# Patient Record
Sex: FEMALE | Race: WHITE | NOT HISPANIC OR LATINO | Employment: OTHER | URBAN - NONMETROPOLITAN AREA
[De-identification: names, ages, dates, MRNs, and addresses within clinical notes are randomized per-mention and may not be internally consistent; named-entity substitution may affect disease eponyms.]

---

## 2017-08-11 ENCOUNTER — TRANSCRIBE ORDERS (OUTPATIENT)
Dept: LAB | Facility: HOSPITAL | Age: 77
End: 2017-08-11

## 2017-08-11 ENCOUNTER — LAB (OUTPATIENT)
Dept: LAB | Facility: HOSPITAL | Age: 77
End: 2017-08-11

## 2017-08-11 DIAGNOSIS — E78.5 HYPERLIPIDEMIA, UNSPECIFIED HYPERLIPIDEMIA TYPE: ICD-10-CM

## 2017-08-11 DIAGNOSIS — E78.00 PURE HYPERCHOLESTEROLEMIA: ICD-10-CM

## 2017-08-11 DIAGNOSIS — E78.00 PURE HYPERCHOLESTEROLEMIA: Primary | ICD-10-CM

## 2017-08-11 LAB
ALBUMIN SERPL-MCNC: 4.4 G/DL (ref 3.5–5)
ALP SERPL-CCNC: 57 U/L (ref 38–126)
ALT SERPL W P-5'-P-CCNC: 35 U/L (ref 13–69)
ANION GAP SERPL CALCULATED.3IONS-SCNC: 17.2 MMOL/L
AST SERPL-CCNC: 42 U/L (ref 15–46)
BILIRUB CONJ SERPL-MCNC: 0.3 MG/DL (ref 0–0.4)
BILIRUB INDIRECT SERPL-MCNC: 0.2 MG/DL
BILIRUB SERPL-MCNC: 0.5 MG/DL (ref 0.2–1.3)
BUN BLD-MCNC: 16 MG/DL (ref 7–20)
BUN/CREAT SERPL: 20 (ref 7.1–23.5)
CALCIUM SPEC-SCNC: 9.5 MG/DL (ref 8.4–10.2)
CHLORIDE SERPL-SCNC: 102 MMOL/L (ref 98–107)
CHOLEST SERPL-MCNC: 181 MG/DL (ref 0–199)
CO2 SERPL-SCNC: 29 MMOL/L (ref 26–30)
CREAT BLD-MCNC: 0.8 MG/DL (ref 0.6–1.3)
GFR SERPL CREATININE-BSD FRML MDRD: 69 ML/MIN/1.73
GLUCOSE BLD-MCNC: 75 MG/DL (ref 74–98)
HDLC SERPL-MCNC: 40 MG/DL (ref 40–60)
LDLC SERPL CALC-MCNC: 76 MG/DL (ref 0–99)
LDLC/HDLC SERPL: 1.9 {RATIO}
POTASSIUM BLD-SCNC: 4.2 MMOL/L (ref 3.5–5.1)
PROT SERPL-MCNC: 7.2 G/DL (ref 6.3–8.2)
SODIUM BLD-SCNC: 144 MMOL/L (ref 137–145)
TRIGL SERPL-MCNC: 326 MG/DL
VLDLC SERPL-MCNC: 65.2 MG/DL

## 2017-08-11 PROCEDURE — 80076 HEPATIC FUNCTION PANEL: CPT | Performed by: INTERNAL MEDICINE

## 2017-08-11 PROCEDURE — 80048 BASIC METABOLIC PNL TOTAL CA: CPT | Performed by: INTERNAL MEDICINE

## 2017-08-11 PROCEDURE — 80061 LIPID PANEL: CPT | Performed by: INTERNAL MEDICINE

## 2017-08-11 PROCEDURE — 36415 COLL VENOUS BLD VENIPUNCTURE: CPT

## 2018-01-26 ENCOUNTER — HOSPITAL ENCOUNTER (OUTPATIENT)
Dept: GENERAL RADIOLOGY | Facility: HOSPITAL | Age: 78
Discharge: HOME | End: 2018-01-26

## 2018-01-26 ENCOUNTER — HOSPITAL ENCOUNTER (OUTPATIENT)
Dept: GENERAL RADIOLOGY | Facility: HOSPITAL | Age: 78
Discharge: HOME | End: 2018-01-26
Admitting: INTERNAL MEDICINE

## 2018-01-26 ENCOUNTER — TRANSCRIBE ORDERS (OUTPATIENT)
Dept: GENERAL RADIOLOGY | Facility: HOSPITAL | Age: 78
End: 2018-01-26

## 2018-01-26 DIAGNOSIS — R52 PAIN: ICD-10-CM

## 2018-01-26 DIAGNOSIS — R52 PAIN: Primary | ICD-10-CM

## 2018-01-26 PROCEDURE — 73630 X-RAY EXAM OF FOOT: CPT

## 2018-01-26 PROCEDURE — 73120 X-RAY EXAM OF HAND: CPT

## 2018-02-14 ENCOUNTER — HOSPITAL ENCOUNTER (OUTPATIENT)
Dept: GENERAL RADIOLOGY | Facility: HOSPITAL | Age: 78
Discharge: HOME | End: 2018-02-14

## 2018-02-14 ENCOUNTER — HOSPITAL ENCOUNTER (OUTPATIENT)
Dept: GENERAL RADIOLOGY | Facility: HOSPITAL | Age: 78
Discharge: HOME | End: 2018-02-14
Admitting: INTERNAL MEDICINE

## 2018-02-14 ENCOUNTER — TRANSCRIBE ORDERS (OUTPATIENT)
Dept: ADMINISTRATIVE | Facility: HOSPITAL | Age: 78
End: 2018-02-14

## 2018-02-14 DIAGNOSIS — M06.4 INFLAMMATORY POLYARTHROPATHY (HCC): ICD-10-CM

## 2018-02-14 DIAGNOSIS — R53.82 CHRONIC FATIGUE, UNSPECIFIED: ICD-10-CM

## 2018-02-14 DIAGNOSIS — M13.0 POLYARTHRITIS: ICD-10-CM

## 2018-02-14 DIAGNOSIS — R76.0 RAISED ANTIBODY TITER: ICD-10-CM

## 2018-02-14 DIAGNOSIS — M15.0 PRIMARY GENERALIZED (OSTEO)ARTHRITIS: ICD-10-CM

## 2018-02-14 DIAGNOSIS — M79.10 MYALGIA: ICD-10-CM

## 2018-02-14 PROCEDURE — 72040 X-RAY EXAM NECK SPINE 2-3 VW: CPT

## 2018-02-14 PROCEDURE — 72100 X-RAY EXAM L-S SPINE 2/3 VWS: CPT

## 2018-04-03 ENCOUNTER — HOSPITAL ENCOUNTER (EMERGENCY)
Facility: HOSPITAL | Age: 78
Discharge: HOME | End: 2018-04-03
Attending: EMERGENCY MEDICINE | Admitting: EMERGENCY MEDICINE

## 2018-04-03 ENCOUNTER — APPOINTMENT (OUTPATIENT)
Dept: GENERAL RADIOLOGY | Facility: HOSPITAL | Age: 78
End: 2018-04-03

## 2018-04-03 VITALS
OXYGEN SATURATION: 95 % | WEIGHT: 134 LBS | HEIGHT: 62 IN | HEART RATE: 70 BPM | SYSTOLIC BLOOD PRESSURE: 164 MMHG | BODY MASS INDEX: 24.66 KG/M2 | RESPIRATION RATE: 18 BRPM | DIASTOLIC BLOOD PRESSURE: 66 MMHG | TEMPERATURE: 98.6 F

## 2018-04-03 DIAGNOSIS — S80.11XA CONTUSION OF RIGHT LEG, INITIAL ENCOUNTER: ICD-10-CM

## 2018-04-03 DIAGNOSIS — S80.02XA CONTUSION OF LEFT KNEE, INITIAL ENCOUNTER: ICD-10-CM

## 2018-04-03 DIAGNOSIS — S80.01XA CONTUSION OF RIGHT KNEE, INITIAL ENCOUNTER: Primary | ICD-10-CM

## 2018-04-03 PROCEDURE — 73562 X-RAY EXAM OF KNEE 3: CPT

## 2018-04-03 PROCEDURE — 99283 EMERGENCY DEPT VISIT LOW MDM: CPT

## 2018-04-03 PROCEDURE — 73590 X-RAY EXAM OF LOWER LEG: CPT

## 2018-04-03 RX ORDER — IBUPROFEN 400 MG/1
400 TABLET ORAL ONCE
Status: COMPLETED | OUTPATIENT
Start: 2018-04-03 | End: 2018-04-03

## 2018-04-03 RX ADMIN — IBUPROFEN 400 MG: 400 TABLET ORAL at 19:30

## 2018-04-04 NOTE — DISCHARGE INSTRUCTIONS
Take your pain medication as directed by your physician.  Have a recheck with your primary care physician in 2-3 days if not improving.  Keep your injured legs elevated and apply ice packs 15 minutes hourly.

## 2018-07-31 ENCOUNTER — HOSPITAL ENCOUNTER (EMERGENCY)
Facility: HOSPITAL | Age: 78
Discharge: HOME | End: 2018-07-31
Attending: EMERGENCY MEDICINE | Admitting: EMERGENCY MEDICINE

## 2018-07-31 ENCOUNTER — APPOINTMENT (OUTPATIENT)
Dept: GENERAL RADIOLOGY | Facility: HOSPITAL | Age: 78
End: 2018-07-31
Attending: EMERGENCY MEDICINE

## 2018-07-31 VITALS
TEMPERATURE: 97.9 F | HEART RATE: 72 BPM | HEIGHT: 62 IN | WEIGHT: 130 LBS | RESPIRATION RATE: 16 BRPM | DIASTOLIC BLOOD PRESSURE: 62 MMHG | SYSTOLIC BLOOD PRESSURE: 179 MMHG | OXYGEN SATURATION: 96 % | BODY MASS INDEX: 23.92 KG/M2

## 2018-07-31 DIAGNOSIS — S86.911A KNEE STRAIN, RIGHT, INITIAL ENCOUNTER: ICD-10-CM

## 2018-07-31 DIAGNOSIS — G89.29 CHRONIC PAIN OF RIGHT KNEE: Primary | ICD-10-CM

## 2018-07-31 DIAGNOSIS — M25.561 CHRONIC PAIN OF RIGHT KNEE: Primary | ICD-10-CM

## 2018-07-31 PROCEDURE — 73562 X-RAY EXAM OF KNEE 3: CPT

## 2018-07-31 PROCEDURE — 99283 EMERGENCY DEPT VISIT LOW MDM: CPT

## 2018-11-03 ENCOUNTER — APPOINTMENT (OUTPATIENT)
Dept: GENERAL RADIOLOGY | Facility: HOSPITAL | Age: 78
End: 2018-11-03

## 2018-11-03 ENCOUNTER — HOSPITAL ENCOUNTER (EMERGENCY)
Facility: HOSPITAL | Age: 78
Discharge: HOME | End: 2018-11-03
Attending: EMERGENCY MEDICINE | Admitting: EMERGENCY MEDICINE

## 2018-11-03 ENCOUNTER — APPOINTMENT (OUTPATIENT)
Dept: CT IMAGING | Facility: HOSPITAL | Age: 78
End: 2018-11-03

## 2018-11-03 VITALS
HEART RATE: 72 BPM | BODY MASS INDEX: 25.21 KG/M2 | TEMPERATURE: 97.7 F | OXYGEN SATURATION: 95 % | RESPIRATION RATE: 18 BRPM | DIASTOLIC BLOOD PRESSURE: 74 MMHG | SYSTOLIC BLOOD PRESSURE: 143 MMHG | WEIGHT: 137 LBS | HEIGHT: 62 IN

## 2018-11-03 DIAGNOSIS — S20.212A CHEST WALL CONTUSION, LEFT, INITIAL ENCOUNTER: Primary | ICD-10-CM
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
## 2018-11-03 PROCEDURE — 71100 X-RAY EXAM RIBS UNI 2 VIEWS: CPT

## 2018-11-03 PROCEDURE — 71046 X-RAY EXAM CHEST 2 VIEWS: CPT

## 2018-11-03 PROCEDURE — 71250 CT THORAX DX C-: CPT

## 2018-11-03 PROCEDURE — 99283 EMERGENCY DEPT VISIT LOW MDM: CPT

## 2018-11-03 RX ORDER — HYDROCODONE BITARTRATE AND ACETAMINOPHEN 5; 325 MG/1; MG/1
1 TABLET ORAL EVERY 6 HOURS PRN
COMMUNITY

## 2018-11-03 RX ORDER — IBUPROFEN 800 MG/1
800 TABLET ORAL ONCE
Status: COMPLETED | OUTPATIENT
Start: 2018-11-03 | End: 2018-11-03

## 2018-11-03 RX ORDER — AMLODIPINE BESYLATE 5 MG/1
5 TABLET ORAL DAILY
COMMUNITY

## 2018-11-03 RX ORDER — GABAPENTIN 600 MG/1
600 TABLET ORAL 3 TIMES DAILY
COMMUNITY

## 2018-11-03 RX ORDER — OMEPRAZOLE 40 MG/1
40 CAPSULE, DELAYED RELEASE ORAL DAILY
COMMUNITY

## 2018-11-03 RX ORDER — PRAVASTATIN SODIUM 40 MG
80 TABLET ORAL DAILY
COMMUNITY

## 2018-11-03 RX ORDER — CARVEDILOL 25 MG/1
25 TABLET ORAL 2 TIMES DAILY WITH MEALS
COMMUNITY

## 2018-11-03 RX ADMIN — IBUPROFEN 800 MG: 800 TABLET, FILM COATED ORAL at 15:01

## 2018-11-03 NOTE — DISCHARGE INSTRUCTIONS
Take over-the-counter ibuprofen or Tylenol as needed for pain- 400 mg ibuprofen and/or 500 mg Tylenol every 6 hours.  Massage the area.  May apply topical creams to the area as you have been doing.  Apply ice and/or heating pad to area to help.  May splint your side with a pillow while coughing or deep breathing. Schedule a follow-up with your PCP as needed.  ED for any change, worsening symptoms, or any additional concerns including but not limited to worsening shortness of breath, chest pain, productive cough with fever >100.4.

## 2018-11-03 NOTE — ED PROVIDER NOTES
"Subjective   Patient is a 77-year-old female to history of CHF, COPD, CAD, and hypertension resents to the ED for evaluation of left side rib pain.  Patient states this past Wednesday 10/31/18 she was out on her patio when she tripped over a hose and landed on a steel railing on her left chest wall.  She states that is \"knocked the wind out of her\", but she otherwise felt ok.  She denies any head trauma or LOC.  She states the pain is worse with certain movements, especially when moving her left arm or bending forward.  She has noticed a bruise to the posterior chest wall her left side.  She states she has been taking her Lortab at home as directed, but is afraid she may have broken a rib.  She denies any shortness of breath chest pain, shoulder pain, back pain, abdominal pain, emesis, or any other symptoms.            Review of Systems   All other systems reviewed and are negative.      Past Medical History:   Diagnosis Date   • CHF (congestive heart failure) (CMS/HCC)    • COPD (chronic obstructive pulmonary disease) (CMS/HCC)    • Coronary artery disease    • Hyperlipidemia    • Hypertension    • Myocardial infarction (CMS/HCC)    • Oxygen deficit    • Pneumonia    • RLS (restless legs syndrome)    • Seizures (CMS/HCC)        Allergies   Allergen Reactions   • Codeine Delirium   • Sulfa Antibiotics Hives   • Penicillins Rash       Past Surgical History:   Procedure Laterality Date   • CAROTID ENDARTERECTOMY Left    • CORONARY ANGIOPLASTY WITH STENT PLACEMENT      x2   • LUNG SURGERY Left    • PACEMAKER IMPLANTATION         History reviewed. No pertinent family history.    Social History     Social History   • Marital status:      Social History Main Topics   • Smoking status: Former Smoker   • Alcohol use No   • Drug use: No     Other Topics Concern   • Not on file           Objective   Physical Exam   Nursing note and vitals reviewed.    GEN: No acute distress, sitting comfortably in the stretcher. She is " awake, alert, speaking in full sentences.   Head: Normocephalic, atraumatic  Eyes: Pupils equal and round, EOM intact.   Chest: Nontender to palpation over anterior chest wall and clavicles. Tender to palpation over left anterior and posterior chest wall. Bruise noted to posterior to midline under right axilla, no obvious deformity appreciated.   Cardiovascular: Regular rate and rhythm   Lungs: Lung sounds are diminished throughout without wheezes or rales.  Abdomen: Soft, nontender, nondistended, no peritoneal signs.   Extremities: No edema, normal appearance, full ROM and strength. Full ROM in left shoulder without deformity, ecchymoses, or tenderness.   Neuro: GCS 15  Psych: Mood and affect are appropriate    Procedures           ED Course                  MDM  Number of Diagnoses or Management Options  Chest wall contusion, left, initial encounter:   Fall, initial encounter:   Diagnosis management comments: On arrival, patient is in no acute distress, nontoxic in appearance, vital signs within normal limits.  She is in no respiratory distress.  Differential includes muscle strain or spasm, rib fracture, contusion, and other concerns.  Patient was given 800 mg for pain; she took a Lortab at home prior to arrival.  Primary chest x-ray revealed abnormalities to left anterior ribs that may be calcifications, but we will obtain CT of chest for further examination.  CT of the chest did not reveal any acute fracture or abnormality.  Discussed these findings with patient.  He is likely she has muscle strain or spasm from her fall.  Advised she keep using over-the-counter ibuprofen pain medication as prescribed.  Advised she follow-up with her PCP if symptoms worsen.  She was given instructions on symptomatic treatment.  She was given strict precautions to return to the ED.  She verbalized understanding and was agreeable to this plan of care.  She was discharged home in stable condition.       Amount and/or Complexity of  Data Reviewed  Tests in the radiology section of CPT®: reviewed and ordered  Independent visualization of images, tracings, or specimens: yes    Risk of Complications, Morbidity, and/or Mortality  Presenting problems: moderate  Diagnostic procedures: moderate  Management options: low    Patient Progress  Patient progress: stable        Final diagnoses:   None            Jeniffer Interiano PA-C  11/04/18 0046

## 2019-04-19 ENCOUNTER — TRANSCRIBE ORDERS (OUTPATIENT)
Dept: ADMINISTRATIVE | Facility: HOSPITAL | Age: 79
End: 2019-04-19

## 2019-04-19 DIAGNOSIS — J44.9 OBSTRUCTIVE CHRONIC BRONCHITIS WITHOUT EXACERBATION (HCC): Primary | ICD-10-CM

## 2019-05-20 ENCOUNTER — APPOINTMENT (OUTPATIENT)
Dept: GENERAL RADIOLOGY | Facility: HOSPITAL | Age: 79
End: 2019-05-20

## 2019-05-20 ENCOUNTER — HOSPITAL ENCOUNTER (EMERGENCY)
Facility: HOSPITAL | Age: 79
Discharge: HOME | End: 2019-05-20
Attending: EMERGENCY MEDICINE | Admitting: EMERGENCY MEDICINE

## 2019-05-20 VITALS
OXYGEN SATURATION: 98 % | HEIGHT: 61 IN | TEMPERATURE: 98.1 F | SYSTOLIC BLOOD PRESSURE: 96 MMHG | WEIGHT: 140.8 LBS | DIASTOLIC BLOOD PRESSURE: 59 MMHG | HEART RATE: 70 BPM | BODY MASS INDEX: 26.58 KG/M2 | RESPIRATION RATE: 20 BRPM

## 2019-05-20 DIAGNOSIS — R05.9 COUGH: Primary | ICD-10-CM

## 2019-05-20 DIAGNOSIS — J44.1 COPD EXACERBATION (HCC): ICD-10-CM

## 2019-05-20 LAB
ALBUMIN SERPL-MCNC: 4.2 G/DL (ref 3.5–5)
ALBUMIN/GLOB SERPL: 1.5 G/DL (ref 1–2)
ALP SERPL-CCNC: 63 U/L (ref 38–126)
ALT SERPL W P-5'-P-CCNC: 36 U/L (ref 13–69)
ANION GAP SERPL CALCULATED.3IONS-SCNC: 12.5 MMOL/L (ref 10–20)
AST SERPL-CCNC: 54 U/L (ref 15–46)
BASOPHILS # BLD AUTO: 0.02 10*3/MM3 (ref 0–0.2)
BASOPHILS NFR BLD AUTO: 0.4 % (ref 0–1.5)
BILIRUB SERPL-MCNC: 0.5 MG/DL (ref 0.2–1.3)
BUN BLD-MCNC: 19 MG/DL (ref 7–20)
BUN/CREAT SERPL: 23.8 (ref 7.1–23.5)
CALCIUM SPEC-SCNC: 8.9 MG/DL (ref 8.4–10.2)
CHLORIDE SERPL-SCNC: 101 MMOL/L (ref 98–107)
CO2 SERPL-SCNC: 31 MMOL/L (ref 26–30)
CREAT BLD-MCNC: 0.8 MG/DL (ref 0.6–1.3)
DEPRECATED RDW RBC AUTO: 44.6 FL (ref 37–54)
EOSINOPHIL # BLD AUTO: 0.21 10*3/MM3 (ref 0–0.4)
EOSINOPHIL NFR BLD AUTO: 4.1 % (ref 0.3–6.2)
ERYTHROCYTE [DISTWIDTH] IN BLOOD BY AUTOMATED COUNT: 12.6 % (ref 12.3–15.4)
GFR SERPL CREATININE-BSD FRML MDRD: 69 ML/MIN/1.73
GLOBULIN UR ELPH-MCNC: 2.8 GM/DL
GLUCOSE BLD-MCNC: 85 MG/DL (ref 74–98)
HCT VFR BLD AUTO: 38.9 % (ref 34–46.6)
HGB BLD-MCNC: 12.9 G/DL (ref 12–15.9)
IMM GRANULOCYTES # BLD AUTO: 0.01 10*3/MM3 (ref 0–0.05)
IMM GRANULOCYTES NFR BLD AUTO: 0.2 % (ref 0–0.5)
LYMPHOCYTES # BLD AUTO: 1.26 10*3/MM3 (ref 0.7–3.1)
LYMPHOCYTES NFR BLD AUTO: 24.4 % (ref 19.6–45.3)
MCH RBC QN AUTO: 31.5 PG (ref 26.6–33)
MCHC RBC AUTO-ENTMCNC: 33.2 G/DL (ref 31.5–35.7)
MCV RBC AUTO: 94.9 FL (ref 79–97)
MONOCYTES # BLD AUTO: 0.44 10*3/MM3 (ref 0.1–0.9)
MONOCYTES NFR BLD AUTO: 8.5 % (ref 5–12)
NEUTROPHILS # BLD AUTO: 3.23 10*3/MM3 (ref 1.7–7)
NEUTROPHILS NFR BLD AUTO: 62.4 % (ref 42.7–76)
NRBC BLD AUTO-RTO: 0 /100 WBC (ref 0–0.2)
NT-PROBNP SERPL-MCNC: 1740 PG/ML (ref 0–450)
PLATELET # BLD AUTO: 141 10*3/MM3 (ref 140–450)
PMV BLD AUTO: 10.3 FL (ref 6–12)
POTASSIUM BLD-SCNC: 4.5 MMOL/L (ref 3.5–5.1)
PROT SERPL-MCNC: 7 G/DL (ref 6.3–8.2)
RBC # BLD AUTO: 4.1 10*6/MM3 (ref 3.77–5.28)
SODIUM BLD-SCNC: 140 MMOL/L (ref 137–145)
TROPONIN I SERPL-MCNC: <0.012 NG/ML (ref 0–0.03)
WBC NRBC COR # BLD: 5.17 10*3/MM3 (ref 3.4–10.8)

## 2019-05-20 PROCEDURE — 93005 ELECTROCARDIOGRAM TRACING: CPT | Performed by: PHYSICIAN ASSISTANT

## 2019-05-20 PROCEDURE — 94799 UNLISTED PULMONARY SVC/PX: CPT

## 2019-05-20 PROCEDURE — 80053 COMPREHEN METABOLIC PANEL: CPT | Performed by: PHYSICIAN ASSISTANT

## 2019-05-20 PROCEDURE — 83880 ASSAY OF NATRIURETIC PEPTIDE: CPT | Performed by: PHYSICIAN ASSISTANT

## 2019-05-20 PROCEDURE — 84484 ASSAY OF TROPONIN QUANT: CPT | Performed by: PHYSICIAN ASSISTANT

## 2019-05-20 PROCEDURE — 85025 COMPLETE CBC W/AUTO DIFF WBC: CPT | Performed by: PHYSICIAN ASSISTANT

## 2019-05-20 PROCEDURE — 36415 COLL VENOUS BLD VENIPUNCTURE: CPT

## 2019-05-20 PROCEDURE — 99284 EMERGENCY DEPT VISIT MOD MDM: CPT

## 2019-05-20 PROCEDURE — 94640 AIRWAY INHALATION TREATMENT: CPT

## 2019-05-20 PROCEDURE — 71046 X-RAY EXAM CHEST 2 VIEWS: CPT

## 2019-05-20 RX ORDER — DOXYCYCLINE 100 MG/1
100 CAPSULE ORAL ONCE
Status: COMPLETED | OUTPATIENT
Start: 2019-05-20 | End: 2019-05-20

## 2019-05-20 RX ORDER — IPRATROPIUM BROMIDE AND ALBUTEROL SULFATE 2.5; .5 MG/3ML; MG/3ML
3 SOLUTION RESPIRATORY (INHALATION) ONCE
Status: COMPLETED | OUTPATIENT
Start: 2019-05-20 | End: 2019-05-20

## 2019-05-20 RX ORDER — DOXYCYCLINE 100 MG/1
100 CAPSULE ORAL 2 TIMES DAILY
Qty: 14 CAPSULE | Refills: 0 | Status: SHIPPED | OUTPATIENT
Start: 2019-05-20 | End: 2019-05-27

## 2019-05-20 RX ADMIN — DOXYCYCLINE 100 MG: 100 CAPSULE ORAL at 14:41

## 2019-05-20 RX ADMIN — IPRATROPIUM BROMIDE AND ALBUTEROL SULFATE 3 ML: .5; 3 SOLUTION RESPIRATORY (INHALATION) at 13:27

## 2019-09-03 ENCOUNTER — APPOINTMENT (OUTPATIENT)
Dept: GENERAL RADIOLOGY | Facility: HOSPITAL | Age: 79
End: 2019-09-03

## 2019-09-03 ENCOUNTER — HOSPITAL ENCOUNTER (EMERGENCY)
Facility: HOSPITAL | Age: 79
Discharge: HOME | End: 2019-09-03
Attending: EMERGENCY MEDICINE | Admitting: EMERGENCY MEDICINE

## 2019-09-03 VITALS
TEMPERATURE: 98.8 F | HEART RATE: 71 BPM | DIASTOLIC BLOOD PRESSURE: 62 MMHG | HEIGHT: 64 IN | WEIGHT: 146.13 LBS | RESPIRATION RATE: 20 BRPM | OXYGEN SATURATION: 93 % | BODY MASS INDEX: 24.95 KG/M2 | SYSTOLIC BLOOD PRESSURE: 129 MMHG

## 2019-09-03 DIAGNOSIS — L03.90 CELLULITIS, UNSPECIFIED CELLULITIS SITE: Primary | ICD-10-CM

## 2019-09-03 PROCEDURE — 73590 X-RAY EXAM OF LOWER LEG: CPT

## 2019-09-03 PROCEDURE — 99283 EMERGENCY DEPT VISIT LOW MDM: CPT

## 2019-09-03 RX ORDER — NITROGLYCERIN 0.4 MG/1
0.4 TABLET SUBLINGUAL
COMMUNITY

## 2019-09-03 RX ORDER — CEPHALEXIN 500 MG/1
500 CAPSULE ORAL 4 TIMES DAILY
Qty: 28 CAPSULE | Refills: 0 | Status: SHIPPED | OUTPATIENT
Start: 2019-09-03 | End: 2019-09-10

## 2019-09-03 NOTE — ED PROVIDER NOTES
Subjective   78-year-old female presents to the ED with chief complaint of leg wound.  Patient indicates that she initially injured her leg in May when she struck it with a weedeater.  She states that she had 5-6 small lacerations that did not require suturing.  She states that 1 of them has not healed well.  She is complaining of a wound on the distal aspect of her right medial leg.  She states that over the last 3 to 4 days it has become tender and red.  She notes a small amount of swelling around it.  No fever chills.  No prior treatments or alleviating factors.  No other complaints at this time.            Review of Systems   Constitutional: Negative for fever.   Skin: Positive for wound.   All other systems reviewed and are negative.      Past Medical History:   Diagnosis Date   • CHF (congestive heart failure) (CMS/HCC)    • COPD (chronic obstructive pulmonary disease) (CMS/HCC)    • Coronary artery disease    • Hyperlipidemia    • Hypertension    • Myocardial infarction (CMS/HCC)    • Oxygen deficit    • Pneumonia    • RLS (restless legs syndrome)    • Seizures (CMS/HCC)        Allergies   Allergen Reactions   • Codeine Delirium   • Crestor [Rosuvastatin] Other (See Comments)     Muscle pain    • Morphine Nausea And Vomiting   • Sulfa Antibiotics Hives   • Penicillins Rash       Past Surgical History:   Procedure Laterality Date   • CAROTID ENDARTERECTOMY Left    • CORONARY ANGIOPLASTY WITH STENT PLACEMENT      x2   • LUNG SURGERY Left    • PACEMAKER IMPLANTATION         History reviewed. No pertinent family history.    Social History     Socioeconomic History   • Marital status:      Spouse name: Not on file   • Number of children: Not on file   • Years of education: Not on file   • Highest education level: Not on file   Tobacco Use   • Smoking status: Former Smoker   Substance and Sexual Activity   • Alcohol use: No   • Drug use: No           Objective   Physical Exam   Constitutional: She is  oriented to person, place, and time. No distress.   Elderly appearing.   HENT:   Head: Normocephalic and atraumatic.   Nose: Nose normal.   Eyes: Conjunctivae and EOM are normal.   Cardiovascular: Normal rate and regular rhythm.   Pulmonary/Chest: Effort normal and breath sounds normal. No respiratory distress.   Abdominal: Soft. She exhibits no distension. There is no tenderness. There is no guarding.   Musculoskeletal: She exhibits no edema or deformity.   Neurological: She is alert and oriented to person, place, and time. No cranial nerve deficit.   Skin: She is not diaphoretic.   Small 1 cm circular wound on the medial aspect of the right distal leg.  There is a small amount of surrounding edema and erythema.  The site is tenderness to palpation with a normal eschar covering the wound.   Nursing note and vitals reviewed.      Procedures           ED Course  Final Diagnosis: as of Sep 03 1549   Cellulitis, unspecified cellulitis site                  MDM    Xray negative for acute process. Exam consistent with cellulitis. Will cover with antibiotics. Follow up as needed.          Kimani Bacon, DO  09/03/19 1549

## 2019-11-21 ENCOUNTER — HOSPITAL ENCOUNTER (EMERGENCY)
Facility: HOSPITAL | Age: 79
Discharge: HOME | End: 2019-11-21
Attending: EMERGENCY MEDICINE | Admitting: EMERGENCY MEDICINE

## 2019-11-21 ENCOUNTER — APPOINTMENT (OUTPATIENT)
Dept: GENERAL RADIOLOGY | Facility: HOSPITAL | Age: 79
End: 2019-11-21

## 2019-11-21 VITALS
RESPIRATION RATE: 18 BRPM | SYSTOLIC BLOOD PRESSURE: 145 MMHG | BODY MASS INDEX: 25.49 KG/M2 | TEMPERATURE: 98.2 F | WEIGHT: 135 LBS | DIASTOLIC BLOOD PRESSURE: 70 MMHG | OXYGEN SATURATION: 91 % | HEIGHT: 61 IN | HEART RATE: 72 BPM

## 2019-11-21 DIAGNOSIS — R05.9 COUGH: ICD-10-CM

## 2019-11-21 DIAGNOSIS — R06.02 SHORTNESS OF BREATH: Primary | ICD-10-CM

## 2019-11-21 LAB
A-A DO2: 89.8 MMHG
ALBUMIN SERPL-MCNC: 4 G/DL (ref 3.5–5.2)
ALBUMIN/GLOB SERPL: 1.4 G/DL
ALP SERPL-CCNC: 62 U/L (ref 39–117)
ALT SERPL W P-5'-P-CCNC: 21 U/L (ref 1–33)
ANION GAP SERPL CALCULATED.3IONS-SCNC: 13.6 MMOL/L (ref 5–15)
ARTERIAL PATENCY WRIST A: POSITIVE
AST SERPL-CCNC: 31 U/L (ref 1–32)
ATMOSPHERIC PRESS: 738 MMHG
BASE EXCESS BLDA CALC-SCNC: 6.5 MMOL/L (ref 0–2)
BASOPHILS # BLD AUTO: 0.03 10*3/MM3 (ref 0–0.2)
BASOPHILS NFR BLD AUTO: 0.3 % (ref 0–1.5)
BDY SITE: ABNORMAL
BILIRUB SERPL-MCNC: 0.7 MG/DL (ref 0.2–1.2)
BUN BLD-MCNC: 15 MG/DL (ref 8–23)
BUN/CREAT SERPL: 22.7 (ref 7–25)
CALCIUM SPEC-SCNC: 9.6 MG/DL (ref 8.6–10.5)
CHLORIDE SERPL-SCNC: 98 MMOL/L (ref 98–107)
CO2 SERPL-SCNC: 27.4 MMOL/L (ref 22–29)
COHGB MFR BLD: 0.9 % (ref 0–2)
CREAT BLD-MCNC: 0.66 MG/DL (ref 0.57–1)
D-LACTATE SERPL-SCNC: 0.7 MMOL/L (ref 0.5–2)
DEPRECATED RDW RBC AUTO: 43 FL (ref 37–54)
EOSINOPHIL # BLD AUTO: 0.04 10*3/MM3 (ref 0–0.4)
EOSINOPHIL NFR BLD AUTO: 0.3 % (ref 0.3–6.2)
ERYTHROCYTE [DISTWIDTH] IN BLOOD BY AUTOMATED COUNT: 12.5 % (ref 12.3–15.4)
FLUAV AG NPH QL: NEGATIVE
FLUBV AG NPH QL IA: NEGATIVE
GAS FLOW AIRWAY: 2.5 LPM
GFR SERPL CREATININE-BSD FRML MDRD: 86 ML/MIN/1.73
GLOBULIN UR ELPH-MCNC: 2.8 GM/DL
GLUCOSE BLD-MCNC: 133 MG/DL (ref 65–99)
GLUCOSE BLDC GLUCOMTR-MCNC: 148 MG/DL (ref 70–130)
HCO3 BLDA-SCNC: 31.2 MMOL/L (ref 22–28)
HCT VFR BLD AUTO: 37.8 % (ref 34–46.6)
HCT VFR BLD CALC: 40 %
HGB BLD-MCNC: 12.7 G/DL (ref 12–15.9)
HGB BLDA-MCNC: 13.1 G/DL (ref 12–18)
HOROWITZ INDEX BLD+IHG-RTO: 30 %
IMM GRANULOCYTES # BLD AUTO: 0.04 10*3/MM3 (ref 0–0.05)
IMM GRANULOCYTES NFR BLD AUTO: 0.3 % (ref 0–0.5)
LYMPHOCYTES # BLD AUTO: 0.54 10*3/MM3 (ref 0.7–3.1)
LYMPHOCYTES NFR BLD AUTO: 4.6 % (ref 19.6–45.3)
MAGNESIUM SERPL-MCNC: 1.8 MG/DL (ref 1.6–2.4)
MCH RBC QN AUTO: 31.6 PG (ref 26.6–33)
MCHC RBC AUTO-ENTMCNC: 33.6 G/DL (ref 31.5–35.7)
MCV RBC AUTO: 94 FL (ref 79–97)
METHGB BLD QL: 0.8 % (ref 0–1.5)
MODALITY: ABNORMAL
MONOCYTES # BLD AUTO: 0.84 10*3/MM3 (ref 0.1–0.9)
MONOCYTES NFR BLD AUTO: 7.2 % (ref 5–12)
NEUTROPHILS # BLD AUTO: 10.25 10*3/MM3 (ref 1.7–7)
NEUTROPHILS NFR BLD AUTO: 87.3 % (ref 42.7–76)
NOTE: ABNORMAL
NRBC BLD AUTO-RTO: 0 /100 WBC (ref 0–0.2)
NT-PROBNP SERPL-MCNC: 3904 PG/ML (ref 5–1800)
OXYHGB MFR BLDV: 93.9 % (ref 94–99)
PCO2 BLDA: 43.8 MM HG (ref 35–45)
PCO2 TEMP ADJ BLD: ABNORMAL MM[HG]
PH BLDA: 7.46 PH UNITS (ref 7.3–7.5)
PH, TEMP CORRECTED: ABNORMAL
PLATELET # BLD AUTO: 133 10*3/MM3 (ref 140–450)
PMV BLD AUTO: 10.5 FL (ref 6–12)
PO2 BLDA: 68.6 MM HG (ref 75–100)
PO2 TEMP ADJ BLD: ABNORMAL MM[HG]
POTASSIUM BLD-SCNC: 4.1 MMOL/L (ref 3.5–5.2)
PROCALCITONIN SERPL-MCNC: 0.09 NG/ML (ref 0.1–0.25)
PROT SERPL-MCNC: 6.8 G/DL (ref 6–8.5)
RBC # BLD AUTO: 4.02 10*6/MM3 (ref 3.77–5.28)
SAO2 % BLDCOA: 95.5 % (ref 94–100)
SODIUM BLD-SCNC: 139 MMOL/L (ref 136–145)
TROPONIN T SERPL-MCNC: <0.01 NG/ML (ref 0–0.03)
VENTILATOR MODE: ABNORMAL
WBC NRBC COR # BLD: 11.74 10*3/MM3 (ref 3.4–10.8)

## 2019-11-21 PROCEDURE — 94799 UNLISTED PULMONARY SVC/PX: CPT

## 2019-11-21 PROCEDURE — 96374 THER/PROPH/DIAG INJ IV PUSH: CPT

## 2019-11-21 PROCEDURE — 94640 AIRWAY INHALATION TREATMENT: CPT

## 2019-11-21 PROCEDURE — 25010000002 FUROSEMIDE PER 20 MG: Performed by: EMERGENCY MEDICINE

## 2019-11-21 PROCEDURE — 25010000002 METHYLPREDNISOLONE PER 125 MG: Performed by: EMERGENCY MEDICINE

## 2019-11-21 PROCEDURE — 87804 INFLUENZA ASSAY W/OPTIC: CPT | Performed by: EMERGENCY MEDICINE

## 2019-11-21 PROCEDURE — 83605 ASSAY OF LACTIC ACID: CPT | Performed by: EMERGENCY MEDICINE

## 2019-11-21 PROCEDURE — 85025 COMPLETE CBC W/AUTO DIFF WBC: CPT | Performed by: EMERGENCY MEDICINE

## 2019-11-21 PROCEDURE — 83735 ASSAY OF MAGNESIUM: CPT | Performed by: EMERGENCY MEDICINE

## 2019-11-21 PROCEDURE — 71046 X-RAY EXAM CHEST 2 VIEWS: CPT

## 2019-11-21 PROCEDURE — 82375 ASSAY CARBOXYHB QUANT: CPT

## 2019-11-21 PROCEDURE — 82962 GLUCOSE BLOOD TEST: CPT

## 2019-11-21 PROCEDURE — 87040 BLOOD CULTURE FOR BACTERIA: CPT | Performed by: EMERGENCY MEDICINE

## 2019-11-21 PROCEDURE — 84484 ASSAY OF TROPONIN QUANT: CPT | Performed by: EMERGENCY MEDICINE

## 2019-11-21 PROCEDURE — 83050 HGB METHEMOGLOBIN QUAN: CPT

## 2019-11-21 PROCEDURE — 96375 TX/PRO/DX INJ NEW DRUG ADDON: CPT

## 2019-11-21 PROCEDURE — 36600 WITHDRAWAL OF ARTERIAL BLOOD: CPT

## 2019-11-21 PROCEDURE — 93005 ELECTROCARDIOGRAM TRACING: CPT | Performed by: EMERGENCY MEDICINE

## 2019-11-21 PROCEDURE — 99284 EMERGENCY DEPT VISIT MOD MDM: CPT

## 2019-11-21 PROCEDURE — 80053 COMPREHEN METABOLIC PANEL: CPT | Performed by: EMERGENCY MEDICINE

## 2019-11-21 PROCEDURE — 82805 BLOOD GASES W/O2 SATURATION: CPT

## 2019-11-21 PROCEDURE — 84145 PROCALCITONIN (PCT): CPT | Performed by: EMERGENCY MEDICINE

## 2019-11-21 PROCEDURE — 83880 ASSAY OF NATRIURETIC PEPTIDE: CPT | Performed by: EMERGENCY MEDICINE

## 2019-11-21 RX ORDER — IPRATROPIUM BROMIDE AND ALBUTEROL SULFATE 2.5; .5 MG/3ML; MG/3ML
3 SOLUTION RESPIRATORY (INHALATION) ONCE
Status: COMPLETED | OUTPATIENT
Start: 2019-11-21 | End: 2019-11-21

## 2019-11-21 RX ORDER — METHYLPREDNISOLONE SODIUM SUCCINATE 125 MG/2ML
125 INJECTION, POWDER, LYOPHILIZED, FOR SOLUTION INTRAMUSCULAR; INTRAVENOUS ONCE
Status: COMPLETED | OUTPATIENT
Start: 2019-11-21 | End: 2019-11-21

## 2019-11-21 RX ORDER — PREDNISONE 20 MG/1
TABLET ORAL
Qty: 12 TABLET | Refills: 0 | Status: SHIPPED | OUTPATIENT
Start: 2019-11-21 | End: 2019-11-27

## 2019-11-21 RX ORDER — FUROSEMIDE 10 MG/ML
20 INJECTION INTRAMUSCULAR; INTRAVENOUS ONCE
Status: COMPLETED | OUTPATIENT
Start: 2019-11-21 | End: 2019-11-21

## 2019-11-21 RX ORDER — DOXYCYCLINE 100 MG/1
100 CAPSULE ORAL 2 TIMES DAILY
Qty: 20 CAPSULE | Refills: 0 | OUTPATIENT
Start: 2019-11-21 | End: 2023-01-09

## 2019-11-21 RX ORDER — DOXYCYCLINE 100 MG/1
100 CAPSULE ORAL ONCE
Status: COMPLETED | OUTPATIENT
Start: 2019-11-21 | End: 2019-11-21

## 2019-11-21 RX ORDER — CEFUROXIME AXETIL 250 MG/1
500 TABLET ORAL ONCE
Status: COMPLETED | OUTPATIENT
Start: 2019-11-21 | End: 2019-11-21

## 2019-11-21 RX ORDER — FUROSEMIDE 20 MG/1
20 TABLET ORAL DAILY
Qty: 4 TABLET | Refills: 0 | Status: SHIPPED | OUTPATIENT
Start: 2019-11-21 | End: 2019-12-08 | Stop reason: SDUPTHER

## 2019-11-21 RX ORDER — CEFUROXIME AXETIL 500 MG/1
500 TABLET ORAL 2 TIMES DAILY
Qty: 20 TABLET | Refills: 0 | OUTPATIENT
Start: 2019-11-21 | End: 2023-01-09

## 2019-11-21 RX ADMIN — IPRATROPIUM BROMIDE AND ALBUTEROL SULFATE 3 ML: .5; 3 SOLUTION RESPIRATORY (INHALATION) at 12:42

## 2019-11-21 RX ADMIN — FUROSEMIDE 20 MG: 10 INJECTION, SOLUTION INTRAMUSCULAR; INTRAVENOUS at 12:07

## 2019-11-21 RX ADMIN — SODIUM CHLORIDE 500 ML: 9 INJECTION, SOLUTION INTRAVENOUS at 09:54

## 2019-11-21 RX ADMIN — METHYLPREDNISOLONE SODIUM SUCCINATE 125 MG: 125 INJECTION, POWDER, FOR SOLUTION INTRAMUSCULAR; INTRAVENOUS at 09:57

## 2019-11-21 RX ADMIN — DOXYCYCLINE 100 MG: 100 CAPSULE ORAL at 13:19

## 2019-11-21 RX ADMIN — IPRATROPIUM BROMIDE AND ALBUTEROL SULFATE 3 ML: .5; 3 SOLUTION RESPIRATORY (INHALATION) at 09:02

## 2019-11-21 RX ADMIN — CEFUROXIME AXETIL 500 MG: 250 TABLET ORAL at 13:19

## 2019-11-21 RX ADMIN — IPRATROPIUM BROMIDE AND ALBUTEROL SULFATE 3 ML: .5; 3 SOLUTION RESPIRATORY (INHALATION) at 10:44

## 2019-11-21 NOTE — ED PROVIDER NOTES
TRIAGE CHIEF COMPLAINT:     Nursing and triage notes reviewed    Chief Complaint   Patient presents with   • Shortness of Breath      HPI: Lesley Calero is a 79 y.o. female who presents to the emergency department complaining of a 3-week history of cough, sputum production, shortness of breath.  Patient describes coughing up a large amount of thick green sputum occasionally laced with blood.  Patient does have a history of congestive heart failure as well as COPD and coronary artery disease and is on 2 L of oxygen continuously at home.  Patient states the cough has progressively worsened and seemed the most severe yesterday evening and throughout the night.  She states she does have some pain with coughing but denies out right chest pain.  Denies abdominal pain.    REVIEW OF SYSTEMS: All other systems reviewed and are negative     PAST MEDICAL HISTORY:   Past Medical History:   Diagnosis Date   • CHF (congestive heart failure) (CMS/[facility])    • COPD (chronic obstructive pulmonary disease) (CMS/[facility])    • Coronary artery disease    • Hyperlipidemia    • Hypertension    • Myocardial infarction (CMS/[facility])    • Oxygen deficit    • Pneumonia    • RLS (restless legs syndrome)    • Seizures (CMS/[facility])         FAMILY HISTORY:   History reviewed. No pertinent family history.     SOCIAL HISTORY:   Social History     Socioeconomic History   • Marital status:      Spouse name: Not on file   • Number of children: Not on file   • Years of education: Not on file   • Highest education level: Not on file   Tobacco Use   • Smoking status: Former Smoker   Substance and Sexual Activity   • Alcohol use: No   • Drug use: No        SURGICAL HISTORY:   Past Surgical History:   Procedure Laterality Date   • CAROTID ENDARTERECTOMY Left    • CORONARY ANGIOPLASTY WITH STENT PLACEMENT      x2   • LUNG SURGERY Left    • PACEMAKER IMPLANTATION          CURRENT MEDICATIONS:      Medication List      ASK your doctor about these medications     amLODIPine 5 MG tablet  Commonly known as:  NORVASC     carvedilol 25 MG tablet  Commonly known as:  COREG     gabapentin 600 MG tablet  Commonly known as:  NEURONTIN     HYDROcodone-acetaminophen 5-325 MG per tablet  Commonly known as:  NORCO     nitroglycerin 0.4 MG SL tablet  Commonly known as:  NITROSTAT     omeprazole 40 MG capsule  Commonly known as:  priLOSEC     pravastatin 40 MG tablet  Commonly known as:  PRAVACHOL           ALLERGIES: Codeine; Crestor [rosuvastatin]; Morphine; Sulfa antibiotics; and Penicillins     PHYSICAL EXAM:   VITAL SIGNS:   Vitals:    11/21/19 0826   BP: 145/66   Pulse: 72   Resp: 20   Temp: 99.4 °F (37.4 °C)   SpO2: 95%      CONSTITUTIONAL: Awake, oriented, appears non-toxic   HENT: Atraumatic, normocephalic, oral mucosa pink and moist, airway patent.  EYES: Conjunctiva clear   NECK: Trachea midline, non-tender, supple   CARDIOVASCULAR: Normal heart rate, Normal rhythm, No murmurs, rubs, gallops   PULMONARY/CHEST: Decreased air movement with some faint scattered wheezes.  Patient coughs frequently.  ABDOMINAL: Non-distended, soft, non-tender - no rebound or guarding.   NEUROLOGIC: Non-focal, moving all four extremities, no gross sensory or motor deficits.   EXTREMITIES: No clubbing, cyanosis, or edema   SKIN: Warm, Dry, No erythema, No rash     ED COURSE / MEDICAL DECISION MAKING:   Lesley Calero is a 79 y.o. female who presents to the emergency department for evaluation of cough, congestion, shortness of breath.  Patient does appear mildly uncomfortable on arrival but is nontoxic.  Patient breathing comfortably on her home dose of oxygen therapy.  Patient does have significantly decreased air movement with few wheezes.  Patient has a low-grade temperature of 99.4 on arrival.    EKG obtained and interpreted by me and reveals an electronic ventricular pacemaker with a rate of 72 bpm.  There is a left bundle branch pattern consistent with a ventricularly paced rhythm.  This is an  abnormal appearing EKG.    Chest x-ray per radiology interpretation reveals chronic appearing findings but no acute infiltrate.  X-ray is unchanged when compared to previous.    Laboratory testing reveals mildly elevated white blood cell count at 11.  Patient does have an elevated BNP at 3900.  Other labs are largely unremarkable including cardiac enzymes, procalcitonin, lactic acid.    Patient treated with breathing treatments, IV steroids, Lasix in the emergency department with improvement in symptoms.  Repeat exam revealed improved air movement.    Patient remained stable on her home oxygen in the emergency department.  Will place patient on antibiotics and steroids for her COPD as well as a light dose of Lasix to try and help with fluid retention.  Patient comfortable with this plan and discharged in good condition with strict return precautions.    DECISION TO DISCHARGE/ADMIT: see ED care timeline     FINAL IMPRESSION:   1 --shortness of breath  2 --cough  3 --     Electronically signed by: Laly Wray MD, 11/21/2019 8:48 AM       Laly Wray MD  11/21/19 6572

## 2019-11-21 NOTE — ED NOTES
Pt waiting for family to bring oxygen and come to pick her up.      Rosie Martinez, RN  11/21/19 2241

## 2019-11-26 LAB
BACTERIA SPEC AEROBE CULT: NORMAL
BACTERIA SPEC AEROBE CULT: NORMAL

## 2019-12-08 ENCOUNTER — HOSPITAL ENCOUNTER (EMERGENCY)
Facility: HOSPITAL | Age: 79
Discharge: HOME | End: 2019-12-08
Attending: EMERGENCY MEDICINE | Admitting: EMERGENCY MEDICINE

## 2019-12-08 ENCOUNTER — APPOINTMENT (OUTPATIENT)
Dept: GENERAL RADIOLOGY | Facility: HOSPITAL | Age: 79
End: 2019-12-08

## 2019-12-08 VITALS
RESPIRATION RATE: 18 BRPM | DIASTOLIC BLOOD PRESSURE: 84 MMHG | WEIGHT: 135 LBS | SYSTOLIC BLOOD PRESSURE: 146 MMHG | HEIGHT: 61 IN | OXYGEN SATURATION: 96 % | TEMPERATURE: 98.1 F | BODY MASS INDEX: 25.49 KG/M2 | HEART RATE: 70 BPM

## 2019-12-08 DIAGNOSIS — R06.02 SHORTNESS OF BREATH: ICD-10-CM

## 2019-12-08 DIAGNOSIS — R11.0 NAUSEA: Primary | ICD-10-CM

## 2019-12-08 LAB
ALBUMIN SERPL-MCNC: 4 G/DL (ref 3.5–5.2)
ALBUMIN/GLOB SERPL: 1.3 G/DL
ALP SERPL-CCNC: 70 U/L (ref 39–117)
ALT SERPL W P-5'-P-CCNC: 24 U/L (ref 1–33)
AMYLASE SERPL-CCNC: 52 U/L (ref 28–100)
ANION GAP SERPL CALCULATED.3IONS-SCNC: 13.6 MMOL/L (ref 5–15)
AST SERPL-CCNC: 37 U/L (ref 1–32)
BASOPHILS # BLD AUTO: 0.02 10*3/MM3 (ref 0–0.2)
BASOPHILS NFR BLD AUTO: 0.3 % (ref 0–1.5)
BILIRUB SERPL-MCNC: 0.8 MG/DL (ref 0.2–1.2)
BILIRUB UR QL STRIP: NEGATIVE
BUN BLD-MCNC: 11 MG/DL (ref 8–23)
BUN/CREAT SERPL: 14.5 (ref 7–25)
CALCIUM SPEC-SCNC: 9.5 MG/DL (ref 8.6–10.5)
CHLORIDE SERPL-SCNC: 99 MMOL/L (ref 98–107)
CLARITY UR: CLEAR
CO2 SERPL-SCNC: 26.4 MMOL/L (ref 22–29)
COLOR UR: YELLOW
CREAT BLD-MCNC: 0.76 MG/DL (ref 0.57–1)
DEPRECATED RDW RBC AUTO: 44.6 FL (ref 37–54)
EOSINOPHIL # BLD AUTO: 0.02 10*3/MM3 (ref 0–0.4)
EOSINOPHIL NFR BLD AUTO: 0.3 % (ref 0.3–6.2)
ERYTHROCYTE [DISTWIDTH] IN BLOOD BY AUTOMATED COUNT: 13 % (ref 12.3–15.4)
FLUAV AG NPH QL: NEGATIVE
FLUBV AG NPH QL IA: NEGATIVE
GFR SERPL CREATININE-BSD FRML MDRD: 73 ML/MIN/1.73
GLOBULIN UR ELPH-MCNC: 3.1 GM/DL
GLUCOSE BLD-MCNC: 95 MG/DL (ref 65–99)
GLUCOSE UR STRIP-MCNC: NEGATIVE MG/DL
HCT VFR BLD AUTO: 37.3 % (ref 34–46.6)
HGB BLD-MCNC: 12.3 G/DL (ref 12–15.9)
HGB UR QL STRIP.AUTO: NEGATIVE
IMM GRANULOCYTES # BLD AUTO: 0.03 10*3/MM3 (ref 0–0.05)
IMM GRANULOCYTES NFR BLD AUTO: 0.4 % (ref 0–0.5)
KETONES UR QL STRIP: ABNORMAL
LEUKOCYTE ESTERASE UR QL STRIP.AUTO: NEGATIVE
LIPASE SERPL-CCNC: 17 U/L (ref 13–60)
LYMPHOCYTES # BLD AUTO: 0.56 10*3/MM3 (ref 0.7–3.1)
LYMPHOCYTES NFR BLD AUTO: 7.5 % (ref 19.6–45.3)
MAGNESIUM SERPL-MCNC: 2.1 MG/DL (ref 1.6–2.4)
MCH RBC QN AUTO: 31.5 PG (ref 26.6–33)
MCHC RBC AUTO-ENTMCNC: 33 G/DL (ref 31.5–35.7)
MCV RBC AUTO: 95.4 FL (ref 79–97)
MONOCYTES # BLD AUTO: 0.25 10*3/MM3 (ref 0.1–0.9)
MONOCYTES NFR BLD AUTO: 3.4 % (ref 5–12)
NEUTROPHILS # BLD AUTO: 6.57 10*3/MM3 (ref 1.7–7)
NEUTROPHILS NFR BLD AUTO: 88.1 % (ref 42.7–76)
NITRITE UR QL STRIP: NEGATIVE
NRBC BLD AUTO-RTO: 0 /100 WBC (ref 0–0.2)
NT-PROBNP SERPL-MCNC: 4589 PG/ML (ref 5–1800)
PH UR STRIP.AUTO: 7.5 [PH] (ref 5–8)
PLATELET # BLD AUTO: 137 10*3/MM3 (ref 140–450)
PMV BLD AUTO: 10.4 FL (ref 6–12)
POTASSIUM BLD-SCNC: 4.6 MMOL/L (ref 3.5–5.2)
PROT SERPL-MCNC: 7.1 G/DL (ref 6–8.5)
PROT UR QL STRIP: NEGATIVE
RBC # BLD AUTO: 3.91 10*6/MM3 (ref 3.77–5.28)
SODIUM BLD-SCNC: 139 MMOL/L (ref 136–145)
SP GR UR STRIP: 1.01 (ref 1–1.03)
TROPONIN T SERPL-MCNC: <0.01 NG/ML (ref 0–0.03)
TROPONIN T SERPL-MCNC: <0.01 NG/ML (ref 0–0.03)
UROBILINOGEN UR QL STRIP: ABNORMAL
WBC NRBC COR # BLD: 7.45 10*3/MM3 (ref 3.4–10.8)

## 2019-12-08 PROCEDURE — 96375 TX/PRO/DX INJ NEW DRUG ADDON: CPT

## 2019-12-08 PROCEDURE — 25010000002 ONDANSETRON PER 1 MG: Performed by: PHYSICIAN ASSISTANT

## 2019-12-08 PROCEDURE — 25010000002 FUROSEMIDE PER 20 MG: Performed by: PHYSICIAN ASSISTANT

## 2019-12-08 PROCEDURE — 81003 URINALYSIS AUTO W/O SCOPE: CPT | Performed by: PHYSICIAN ASSISTANT

## 2019-12-08 PROCEDURE — 85025 COMPLETE CBC W/AUTO DIFF WBC: CPT | Performed by: PHYSICIAN ASSISTANT

## 2019-12-08 PROCEDURE — 96374 THER/PROPH/DIAG INJ IV PUSH: CPT

## 2019-12-08 PROCEDURE — 84484 ASSAY OF TROPONIN QUANT: CPT | Performed by: PHYSICIAN ASSISTANT

## 2019-12-08 PROCEDURE — 99284 EMERGENCY DEPT VISIT MOD MDM: CPT

## 2019-12-08 PROCEDURE — 82150 ASSAY OF AMYLASE: CPT | Performed by: PHYSICIAN ASSISTANT

## 2019-12-08 PROCEDURE — 87804 INFLUENZA ASSAY W/OPTIC: CPT | Performed by: PHYSICIAN ASSISTANT

## 2019-12-08 PROCEDURE — 83735 ASSAY OF MAGNESIUM: CPT | Performed by: PHYSICIAN ASSISTANT

## 2019-12-08 PROCEDURE — 83690 ASSAY OF LIPASE: CPT | Performed by: PHYSICIAN ASSISTANT

## 2019-12-08 PROCEDURE — 83880 ASSAY OF NATRIURETIC PEPTIDE: CPT | Performed by: PHYSICIAN ASSISTANT

## 2019-12-08 PROCEDURE — 80053 COMPREHEN METABOLIC PANEL: CPT | Performed by: PHYSICIAN ASSISTANT

## 2019-12-08 PROCEDURE — 93005 ELECTROCARDIOGRAM TRACING: CPT | Performed by: PHYSICIAN ASSISTANT

## 2019-12-08 PROCEDURE — 71046 X-RAY EXAM CHEST 2 VIEWS: CPT

## 2019-12-08 RX ORDER — FUROSEMIDE 10 MG/ML
40 INJECTION INTRAMUSCULAR; INTRAVENOUS ONCE
Status: COMPLETED | OUTPATIENT
Start: 2019-12-08 | End: 2019-12-08

## 2019-12-08 RX ORDER — SODIUM CHLORIDE 0.9 % (FLUSH) 0.9 %
10 SYRINGE (ML) INJECTION AS NEEDED
Status: DISCONTINUED | OUTPATIENT
Start: 2019-12-08 | End: 2019-12-08 | Stop reason: HOSPADM

## 2019-12-08 RX ORDER — FUROSEMIDE 20 MG/1
40 TABLET ORAL DAILY
Qty: 6 TABLET | Refills: 0 | Status: SHIPPED | OUTPATIENT
Start: 2019-12-08 | End: 2019-12-11

## 2019-12-08 RX ORDER — ONDANSETRON 2 MG/ML
4 INJECTION INTRAMUSCULAR; INTRAVENOUS ONCE
Status: COMPLETED | OUTPATIENT
Start: 2019-12-08 | End: 2019-12-08

## 2019-12-08 RX ORDER — ONDANSETRON 4 MG/1
4 TABLET, ORALLY DISINTEGRATING ORAL EVERY 6 HOURS PRN
Qty: 12 TABLET | Refills: 0 | Status: SHIPPED | OUTPATIENT
Start: 2019-12-08 | End: 2023-01-09 | Stop reason: SDUPTHER

## 2019-12-08 RX ADMIN — ONDANSETRON 4 MG: 2 INJECTION INTRAMUSCULAR; INTRAVENOUS at 17:17

## 2019-12-08 RX ADMIN — FUROSEMIDE 40 MG: 10 INJECTION, SOLUTION INTRAMUSCULAR; INTRAVENOUS at 18:17

## 2019-12-09 NOTE — ED PROVIDER NOTES
Subjective   79-year-old female with past medical history significant for hypertension, hyperlipidemia, COPD, CHF, CAD, oxygen dependent presents to the ER with complaint of nausea since yesterday, and shortness of breath.  Patient states that she was recently seen in here approximately 3 weeks ago for shortness of breath and cough, she was diagnosed with a COPD exacerbation put on Ceftin and doxycycline.  She completed both of these.  She states her cough is improved.  She states she has had the nausea, but no vomiting.  She states she initially had some mild generalized abdominal cramping, but this is since resolved.  Bowel movements have been normal.  Denies any dysuria.  She denies any chest pain or pressure.  She has been wearing her oxygen as normal.      History provided by:  Patient   used: No    Nausea   The primary symptoms include nausea. Primary symptoms do not include fever, vomiting, diarrhea, dysuria, myalgias, arthralgias or rash. Primary symptoms comment: shortness of breath. The illness began yesterday. The onset was gradual. The problem has been gradually improving.   The illness does not include chills, constipation or back pain. Associated medical issues do not include bowel resection, irritable bowel syndrome or diverticulitis.       Review of Systems   Constitutional: Negative for activity change, chills and fever.   HENT: Negative for congestion, rhinorrhea and sore throat.    Eyes: Negative for pain and redness.   Respiratory: Positive for shortness of breath. Negative for cough and wheezing.    Cardiovascular: Negative for chest pain.   Gastrointestinal: Positive for nausea. Negative for abdominal distention, constipation, diarrhea and vomiting.   Endocrine: Negative for polyphagia and polyuria.   Genitourinary: Negative for decreased urine volume, difficulty urinating and dysuria.   Musculoskeletal: Negative for arthralgias, back pain and myalgias.   Skin: Negative for  rash and wound.   Allergic/Immunologic: Negative for food allergies and immunocompromised state.   Neurological: Negative for dizziness and headaches.   Hematological: Negative for adenopathy.   Psychiatric/Behavioral: Negative for agitation and confusion.   All other systems reviewed and are negative.      Past Medical History:   Diagnosis Date   • CHF (congestive heart failure) (CMS/[facility])    • COPD (chronic obstructive pulmonary disease) (CMS/[facility])    • Coronary artery disease    • Hyperlipidemia    • Hypertension    • Myocardial infarction (CMS/[facility])    • Oxygen deficit    • Pneumonia    • RLS (restless legs syndrome)    • Seizures (CMS/[facility])        Allergies   Allergen Reactions   • Ceftin [Cefuroxime Axetil] Diarrhea   • Codeine Delirium   • Crestor [Rosuvastatin] Other (See Comments)     Muscle pain    • Doxycycline Diarrhea   • Morphine Nausea And Vomiting   • Sulfa Antibiotics Hives   • Penicillins Rash       Past Surgical History:   Procedure Laterality Date   • CAROTID ENDARTERECTOMY Left    • CORONARY ANGIOPLASTY WITH STENT PLACEMENT      x2   • LUNG SURGERY Left    • PACEMAKER IMPLANTATION         History reviewed. No pertinent family history.    Social History     Socioeconomic History   • Marital status:      Spouse name: Not on file   • Number of children: Not on file   • Years of education: Not on file   • Highest education level: Not on file   Tobacco Use   • Smoking status: Former Smoker   Substance and Sexual Activity   • Alcohol use: No   • Drug use: No           Objective   Physical Exam   Constitutional: She is oriented to person, place, and time. She appears well-developed and well-nourished.   HENT:   Head: Normocephalic and atraumatic.   Eyes: Pupils are equal, round, and reactive to light. EOM are normal.   Neck: Normal range of motion. Neck supple.   Cardiovascular: Normal rate, regular rhythm and normal heart sounds.   Pulmonary/Chest: Effort normal and breath sounds normal. She has no  decreased breath sounds. She has no wheezes.   Abdominal: Soft. Bowel sounds are normal. There is no tenderness.   Musculoskeletal: Normal range of motion.   Neurological: She is alert and oriented to person, place, and time.   Skin: Skin is warm and dry.   Psychiatric: She has a normal mood and affect. Her behavior is normal. Judgment and thought content normal.   Nursing note and vitals reviewed.      Procedures           ED Course  ED Course as of Dec 08 1937   Sun Dec 08, 2019   1717 EKG interpreted by me reveals a paced rhythm with a rate of 69 bpm.  There are some nonspecific findings but EKG is largely unchanged when compared to previous.  This is an abnormal appearing EKG.    [TB]   1841 Awaiting 2hr troponin, if negative, will plan for discharge home with rx for zofran and an increase of po lasix for the next couple of days. Have d/w Dr. Wray.     [JM]   1931 Patient states she feels better at this time, no longer is nauseous after the Zofran.  She has remained 96 to 100% on her regular 2 L .work-up here has been fairly unremarkable, aside from the elevated BNP.  She was instructed on increasing her Lasix for the next couple of days.  She has follow-up with her primary care doctor this week.  Discussed signs and symptoms to watch for prompting return to the ER.    [JM]      ED Course User Index  [JM] Kristian Mcgrath PA-C  [TB] Laly Wray MD                      No data recorded                        MDM  Number of Diagnoses or Management Options  Nausea:   Shortness of breath:      Amount and/or Complexity of Data Reviewed  Clinical lab tests: ordered and reviewed  Tests in the radiology section of CPT®: ordered and reviewed  Tests in the medicine section of CPT®: reviewed and ordered  Decide to obtain previous medical records or to obtain history from someone other than the patient: yes  Independent visualization of images, tracings, or specimens: yes    Patient Progress  Patient  progress: stable      Final diagnoses:   Nausea   Shortness of breath              Kristian Mcgrath PA-C  12/08/19 1937

## 2021-04-07 ENCOUNTER — HOSPITAL ENCOUNTER (EMERGENCY)
Facility: HOSPITAL | Age: 81
Discharge: HOME | End: 2021-04-07
Attending: EMERGENCY MEDICINE | Admitting: EMERGENCY MEDICINE

## 2021-04-07 ENCOUNTER — APPOINTMENT (OUTPATIENT)
Dept: CT IMAGING | Facility: HOSPITAL | Age: 81
End: 2021-04-07

## 2021-04-07 ENCOUNTER — APPOINTMENT (OUTPATIENT)
Dept: GENERAL RADIOLOGY | Facility: HOSPITAL | Age: 81
End: 2021-04-07

## 2021-04-07 VITALS
SYSTOLIC BLOOD PRESSURE: 138 MMHG | HEIGHT: 61 IN | TEMPERATURE: 97.6 F | OXYGEN SATURATION: 98 % | BODY MASS INDEX: 27.38 KG/M2 | HEART RATE: 71 BPM | DIASTOLIC BLOOD PRESSURE: 45 MMHG | RESPIRATION RATE: 17 BRPM | WEIGHT: 145 LBS

## 2021-04-07 DIAGNOSIS — E87.6 HYPOKALEMIA: ICD-10-CM

## 2021-04-07 DIAGNOSIS — R19.7 DIARRHEA, UNSPECIFIED TYPE: Primary | ICD-10-CM

## 2021-04-07 DIAGNOSIS — R53.1 WEAKNESS: ICD-10-CM

## 2021-04-07 LAB
ALBUMIN SERPL-MCNC: 4.7 G/DL (ref 3.5–5.2)
ALBUMIN/GLOB SERPL: 1.7 G/DL
ALP SERPL-CCNC: 84 U/L (ref 39–117)
ALT SERPL W P-5'-P-CCNC: 16 U/L (ref 1–33)
ANION GAP SERPL CALCULATED.3IONS-SCNC: 11.2 MMOL/L (ref 5–15)
AST SERPL-CCNC: 36 U/L (ref 1–32)
BASOPHILS # BLD AUTO: 0.02 10*3/MM3 (ref 0–0.2)
BASOPHILS NFR BLD AUTO: 0.3 % (ref 0–1.5)
BILIRUB SERPL-MCNC: 0.5 MG/DL (ref 0–1.2)
BILIRUB UR QL STRIP: NEGATIVE
BUN SERPL-MCNC: 15 MG/DL (ref 8–23)
BUN/CREAT SERPL: 16.1 (ref 7–25)
CALCIUM SPEC-SCNC: 9.9 MG/DL (ref 8.6–10.5)
CHLORIDE SERPL-SCNC: 96 MMOL/L (ref 98–107)
CLARITY UR: CLEAR
CO2 SERPL-SCNC: 33.8 MMOL/L (ref 22–29)
COLOR UR: YELLOW
CREAT SERPL-MCNC: 0.93 MG/DL (ref 0.57–1)
DEPRECATED RDW RBC AUTO: 45.9 FL (ref 37–54)
EOSINOPHIL # BLD AUTO: 0.14 10*3/MM3 (ref 0–0.4)
EOSINOPHIL NFR BLD AUTO: 2.4 % (ref 0.3–6.2)
ERYTHROCYTE [DISTWIDTH] IN BLOOD BY AUTOMATED COUNT: 13.4 % (ref 12.3–15.4)
GFR SERPL CREATININE-BSD FRML MDRD: 58 ML/MIN/1.73
GLOBULIN UR ELPH-MCNC: 2.7 GM/DL
GLUCOSE SERPL-MCNC: 94 MG/DL (ref 65–99)
GLUCOSE UR STRIP-MCNC: NEGATIVE MG/DL
HCT VFR BLD AUTO: 40.3 % (ref 34–46.6)
HGB BLD-MCNC: 13.1 G/DL (ref 12–15.9)
HGB UR QL STRIP.AUTO: NEGATIVE
HOLD SPECIMEN: NORMAL
IMM GRANULOCYTES # BLD AUTO: 0.02 10*3/MM3 (ref 0–0.05)
IMM GRANULOCYTES NFR BLD AUTO: 0.3 % (ref 0–0.5)
KETONES UR QL STRIP: NEGATIVE
LEUKOCYTE ESTERASE UR QL STRIP.AUTO: NEGATIVE
LIPASE SERPL-CCNC: 34 U/L (ref 13–60)
LYMPHOCYTES # BLD AUTO: 0.8 10*3/MM3 (ref 0.7–3.1)
LYMPHOCYTES NFR BLD AUTO: 13.6 % (ref 19.6–45.3)
MCH RBC QN AUTO: 30.7 PG (ref 26.6–33)
MCHC RBC AUTO-ENTMCNC: 32.5 G/DL (ref 31.5–35.7)
MCV RBC AUTO: 94.4 FL (ref 79–97)
MONOCYTES # BLD AUTO: 0.4 10*3/MM3 (ref 0.1–0.9)
MONOCYTES NFR BLD AUTO: 6.8 % (ref 5–12)
NEUTROPHILS NFR BLD AUTO: 4.5 10*3/MM3 (ref 1.7–7)
NEUTROPHILS NFR BLD AUTO: 76.6 % (ref 42.7–76)
NITRITE UR QL STRIP: NEGATIVE
NRBC BLD AUTO-RTO: 0 /100 WBC (ref 0–0.2)
PH UR STRIP.AUTO: 8 [PH] (ref 5–8)
PLATELET # BLD AUTO: 171 10*3/MM3 (ref 140–450)
PMV BLD AUTO: 10.4 FL (ref 6–12)
POTASSIUM SERPL-SCNC: 3.3 MMOL/L (ref 3.5–5.2)
PROT SERPL-MCNC: 7.4 G/DL (ref 6–8.5)
PROT UR QL STRIP: NEGATIVE
RBC # BLD AUTO: 4.27 10*6/MM3 (ref 3.77–5.28)
SARS-COV-2 RNA PNL SPEC NAA+PROBE: NOT DETECTED
SODIUM SERPL-SCNC: 141 MMOL/L (ref 136–145)
SP GR UR STRIP: 1.01 (ref 1–1.03)
TROPONIN T SERPL-MCNC: <0.01 NG/ML (ref 0–0.03)
UROBILINOGEN UR QL STRIP: NORMAL
WBC # BLD AUTO: 5.88 10*3/MM3 (ref 3.4–10.8)
WHOLE BLOOD HOLD SPECIMEN: NORMAL
WHOLE BLOOD HOLD SPECIMEN: NORMAL

## 2021-04-07 PROCEDURE — 93005 ELECTROCARDIOGRAM TRACING: CPT

## 2021-04-07 PROCEDURE — 87635 SARS-COV-2 COVID-19 AMP PRB: CPT | Performed by: PHYSICIAN ASSISTANT

## 2021-04-07 PROCEDURE — 74177 CT ABD & PELVIS W/CONTRAST: CPT

## 2021-04-07 PROCEDURE — 81003 URINALYSIS AUTO W/O SCOPE: CPT

## 2021-04-07 PROCEDURE — 85025 COMPLETE CBC W/AUTO DIFF WBC: CPT

## 2021-04-07 PROCEDURE — 99284 EMERGENCY DEPT VISIT MOD MDM: CPT

## 2021-04-07 PROCEDURE — 83690 ASSAY OF LIPASE: CPT

## 2021-04-07 PROCEDURE — 96360 HYDRATION IV INFUSION INIT: CPT

## 2021-04-07 PROCEDURE — 71045 X-RAY EXAM CHEST 1 VIEW: CPT

## 2021-04-07 PROCEDURE — 25010000002 IOPAMIDOL 61 % SOLUTION: Performed by: EMERGENCY MEDICINE

## 2021-04-07 PROCEDURE — 84484 ASSAY OF TROPONIN QUANT: CPT

## 2021-04-07 PROCEDURE — 80053 COMPREHEN METABOLIC PANEL: CPT

## 2021-04-07 RX ORDER — POTASSIUM CHLORIDE 750 MG/1
40 CAPSULE, EXTENDED RELEASE ORAL ONCE
Status: COMPLETED | OUTPATIENT
Start: 2021-04-07 | End: 2021-04-07

## 2021-04-07 RX ORDER — POTASSIUM CHLORIDE 750 MG/1
10 TABLET, FILM COATED, EXTENDED RELEASE ORAL 2 TIMES DAILY
Qty: 6 TABLET | Refills: 0 | Status: SHIPPED | OUTPATIENT
Start: 2021-04-07 | End: 2021-04-10

## 2021-04-07 RX ORDER — SODIUM CHLORIDE 0.9 % (FLUSH) 0.9 %
10 SYRINGE (ML) INJECTION AS NEEDED
Status: DISCONTINUED | OUTPATIENT
Start: 2021-04-07 | End: 2021-04-07 | Stop reason: HOSPADM

## 2021-04-07 RX ADMIN — POTASSIUM CHLORIDE 40 MEQ: 10 CAPSULE, COATED, EXTENDED RELEASE ORAL at 15:16

## 2021-04-07 RX ADMIN — IOPAMIDOL 100 ML: 612 INJECTION, SOLUTION INTRAVENOUS at 15:57

## 2021-04-07 RX ADMIN — SODIUM CHLORIDE 500 ML: 9 INJECTION, SOLUTION INTRAVENOUS at 15:16

## 2021-04-07 NOTE — ED PROVIDER NOTES
Subjective   80-year-old female presents with weakness, and diarrhea, she states she has not felt well for a few days, and she has had diarrhea for 1 to 2 days.  She says for the last week she is intermittently not felt well, she had decreased appetite as well.  No fever no chills no chest pain no shortness of breath.  She denies any urinary symptoms.  She states the diarrhea is worse with eating and drinking at times.  No recent antibiotic use      History provided by:  Patient   used: No        Review of Systems   Gastrointestinal: Positive for abdominal pain and diarrhea.   Neurological: Positive for weakness.   All other systems reviewed and are negative.      Past Medical History:   Diagnosis Date   • CHF (congestive heart failure) (CMS/Formerly [facility])    • COPD (chronic obstructive pulmonary disease) (CMS/Formerly [facility])    • Coronary artery disease    • Hyperlipidemia    • Hypertension    • Myocardial infarction (CMS/Formerly [facility])    • Oxygen deficit    • Pneumonia    • RLS (restless legs syndrome)    • Seizures (CMS/Formerly [facility])        Allergies   Allergen Reactions   • Ceftin [Cefuroxime Axetil] Diarrhea   • Codeine Delirium   • Crestor [Rosuvastatin] Other (See Comments)     Muscle pain    • Doxycycline Diarrhea   • Morphine Nausea And Vomiting   • Sulfa Antibiotics Hives   • Penicillins Rash       Past Surgical History:   Procedure Laterality Date   • CAROTID ENDARTERECTOMY Left    • CORONARY ANGIOPLASTY WITH STENT PLACEMENT      x2   • LUNG SURGERY Left    • PACEMAKER IMPLANTATION         History reviewed. No pertinent family history.    Social History     Socioeconomic History   • Marital status:      Spouse name: Not on file   • Number of children: Not on file   • Years of education: Not on file   • Highest education level: Not on file   Tobacco Use   • Smoking status: Former Smoker   Substance and Sexual Activity   • Alcohol use: No   • Drug use: No           Objective   Physical Exam  Vitals and nursing note  reviewed.   Constitutional:       Appearance: She is well-developed.   HENT:      Head: Normocephalic.   Cardiovascular:      Rate and Rhythm: Normal rate and regular rhythm.   Pulmonary:      Effort: Pulmonary effort is normal.      Breath sounds: Normal breath sounds.   Abdominal:      General: Bowel sounds are normal. There is no distension.      Palpations: Abdomen is soft.      Tenderness: There is no abdominal tenderness.   Musculoskeletal:         General: Normal range of motion.      Cervical back: Normal range of motion and neck supple.   Skin:     General: Skin is warm and dry.   Neurological:      Mental Status: She is alert and oriented to person, place, and time.      Deep Tendon Reflexes: Reflexes are normal and symmetric.         Procedures           ED Course  ED Course as of Apr 07 1741   Wed Apr 07, 2021   1420 EKG interpreted by me reveals an electronic ventricular pacemaker with a rate of 69 bpm.  This is an abnormal appearing EKG.    [TB]   1631 Daughter is requesting she be tested for Covid for discharge    [CS]      ED Course User Index  [CS] Darin Moy Jr., PA-C  [TB] Laly Wray MD                                           MDM  Number of Diagnoses or Management Options  Diarrhea, unspecified type: new and requires workup  Hypokalemia: new and requires workup  Weakness: new and requires workup     Amount and/or Complexity of Data Reviewed  Clinical lab tests: reviewed  Tests in the radiology section of CPT®: reviewed  Discuss the patient with other providers: yes    Risk of Complications, Morbidity, and/or Mortality  Presenting problems: minimal  Diagnostic procedures: minimal  Management options: minimal    Patient Progress  Patient progress: stable      Final diagnoses:   Diarrhea, unspecified type   Weakness   Hypokalemia       ED Disposition  ED Disposition     ED Disposition Condition Comment    Discharge Stable           Nj Smith MD  [address] [place]  [address]  [facility] [address] [phone]    Schedule an appointment as soon as possible for a visit       [facility] Emergency Department  [address] [facility] [address] [phone]    If symptoms worsen         Medication List      New Prescriptions    potassium chloride 10 MEQ CR tablet  Take 1 tablet by mouth 2 (Two) Times a Day for 3 days.           Where to Get Your Medications      These medications were sent to [facility] [address] [place] [address] [address] AT [facility] - [phone]  - [phone]   [address] [place] [id number]    Phone: [phone]   · potassium chloride 10 MEQ CR tablet          Darin Moy Jr., PA-C  04/07/21 1741

## 2023-01-09 ENCOUNTER — APPOINTMENT (OUTPATIENT)
Dept: GENERAL RADIOLOGY | Facility: HOSPITAL | Age: 83
End: 2023-01-09
Payer: MEDICARE

## 2023-01-09 ENCOUNTER — HOSPITAL ENCOUNTER (EMERGENCY)
Facility: HOSPITAL | Age: 83
Discharge: HOME | End: 2023-01-09
Attending: STUDENT IN AN ORGANIZED HEALTH CARE EDUCATION/TRAINING PROGRAM | Admitting: EMERGENCY MEDICINE
Payer: MEDICARE

## 2023-01-09 VITALS
SYSTOLIC BLOOD PRESSURE: 172 MMHG | WEIGHT: 120 LBS | RESPIRATION RATE: 18 BRPM | TEMPERATURE: 97.5 F | DIASTOLIC BLOOD PRESSURE: 70 MMHG | HEART RATE: 71 BPM | OXYGEN SATURATION: 100 % | HEIGHT: 60 IN | BODY MASS INDEX: 23.56 KG/M2

## 2023-01-09 DIAGNOSIS — U07.1 COVID-19: Primary | ICD-10-CM

## 2023-01-09 LAB
ALBUMIN SERPL-MCNC: 4.3 G/DL (ref 3.5–5.2)
ALBUMIN/GLOB SERPL: 1.1 G/DL
ALP SERPL-CCNC: 106 U/L (ref 39–117)
ALT SERPL W P-5'-P-CCNC: 22 U/L (ref 1–33)
ANION GAP SERPL CALCULATED.3IONS-SCNC: 19.4 MMOL/L (ref 5–15)
AST SERPL-CCNC: 52 U/L (ref 1–32)
BASOPHILS # BLD AUTO: 0.01 10*3/MM3 (ref 0–0.2)
BASOPHILS NFR BLD AUTO: 0.2 % (ref 0–1.5)
BILIRUB SERPL-MCNC: 0.9 MG/DL (ref 0–1.2)
BUN SERPL-MCNC: 26 MG/DL (ref 8–23)
BUN/CREAT SERPL: 33.3 (ref 7–25)
CALCIUM SPEC-SCNC: 10.1 MG/DL (ref 8.6–10.5)
CHLORIDE SERPL-SCNC: 91 MMOL/L (ref 98–107)
CO2 SERPL-SCNC: 27.6 MMOL/L (ref 22–29)
CREAT SERPL-MCNC: 0.78 MG/DL (ref 0.57–1)
D-LACTATE SERPL-SCNC: 1.4 MMOL/L (ref 0.5–2)
DEPRECATED RDW RBC AUTO: 44.2 FL (ref 37–54)
EGFRCR SERPLBLD CKD-EPI 2021: 75 ML/MIN/1.73
EOSINOPHIL # BLD AUTO: 0 10*3/MM3 (ref 0–0.4)
EOSINOPHIL NFR BLD AUTO: 0 % (ref 0.3–6.2)
ERYTHROCYTE [DISTWIDTH] IN BLOOD BY AUTOMATED COUNT: 13.1 % (ref 12.3–15.4)
FLUAV AG NPH QL: NEGATIVE
FLUBV AG NPH QL IA: NEGATIVE
GLOBULIN UR ELPH-MCNC: 3.8 GM/DL
GLUCOSE SERPL-MCNC: 78 MG/DL (ref 65–99)
HCT VFR BLD AUTO: 46.6 % (ref 34–46.6)
HGB BLD-MCNC: 15.4 G/DL (ref 12–15.9)
HOLD SPECIMEN: NORMAL
HOLD SPECIMEN: NORMAL
IMM GRANULOCYTES # BLD AUTO: 0.01 10*3/MM3 (ref 0–0.05)
IMM GRANULOCYTES NFR BLD AUTO: 0.2 % (ref 0–0.5)
LIPASE SERPL-CCNC: 24 U/L (ref 13–60)
LYMPHOCYTES # BLD AUTO: 0.49 10*3/MM3 (ref 0.7–3.1)
LYMPHOCYTES NFR BLD AUTO: 10.1 % (ref 19.6–45.3)
MCH RBC QN AUTO: 30.4 PG (ref 26.6–33)
MCHC RBC AUTO-ENTMCNC: 33 G/DL (ref 31.5–35.7)
MCV RBC AUTO: 91.9 FL (ref 79–97)
MONOCYTES # BLD AUTO: 0.51 10*3/MM3 (ref 0.1–0.9)
MONOCYTES NFR BLD AUTO: 10.5 % (ref 5–12)
NEUTROPHILS NFR BLD AUTO: 3.85 10*3/MM3 (ref 1.7–7)
NEUTROPHILS NFR BLD AUTO: 79 % (ref 42.7–76)
NRBC BLD AUTO-RTO: 0 /100 WBC (ref 0–0.2)
PLATELET # BLD AUTO: 181 10*3/MM3 (ref 140–450)
PMV BLD AUTO: 10.6 FL (ref 6–12)
POTASSIUM SERPL-SCNC: 3.3 MMOL/L (ref 3.5–5.2)
PROT SERPL-MCNC: 8.1 G/DL (ref 6–8.5)
RBC # BLD AUTO: 5.07 10*6/MM3 (ref 3.77–5.28)
SARS-COV-2 RNA PNL SPEC NAA+PROBE: DETECTED
SODIUM SERPL-SCNC: 138 MMOL/L (ref 136–145)
WBC NRBC COR # BLD: 4.87 10*3/MM3 (ref 3.4–10.8)
WHOLE BLOOD HOLD COAG: NORMAL
WHOLE BLOOD HOLD SPECIMEN: NORMAL

## 2023-01-09 PROCEDURE — 25010000002 METHYLPREDNISOLONE PER 125 MG

## 2023-01-09 PROCEDURE — 85025 COMPLETE CBC W/AUTO DIFF WBC: CPT | Performed by: STUDENT IN AN ORGANIZED HEALTH CARE EDUCATION/TRAINING PROGRAM

## 2023-01-09 PROCEDURE — 71045 X-RAY EXAM CHEST 1 VIEW: CPT

## 2023-01-09 PROCEDURE — 83690 ASSAY OF LIPASE: CPT | Performed by: STUDENT IN AN ORGANIZED HEALTH CARE EDUCATION/TRAINING PROGRAM

## 2023-01-09 PROCEDURE — 96374 THER/PROPH/DIAG INJ IV PUSH: CPT

## 2023-01-09 PROCEDURE — 96375 TX/PRO/DX INJ NEW DRUG ADDON: CPT

## 2023-01-09 PROCEDURE — 87635 SARS-COV-2 COVID-19 AMP PRB: CPT

## 2023-01-09 PROCEDURE — 87804 INFLUENZA ASSAY W/OPTIC: CPT | Performed by: EMERGENCY MEDICINE

## 2023-01-09 PROCEDURE — 83605 ASSAY OF LACTIC ACID: CPT | Performed by: STUDENT IN AN ORGANIZED HEALTH CARE EDUCATION/TRAINING PROGRAM

## 2023-01-09 PROCEDURE — 99284 EMERGENCY DEPT VISIT MOD MDM: CPT

## 2023-01-09 PROCEDURE — 80053 COMPREHEN METABOLIC PANEL: CPT | Performed by: STUDENT IN AN ORGANIZED HEALTH CARE EDUCATION/TRAINING PROGRAM

## 2023-01-09 PROCEDURE — 25010000002 ONDANSETRON PER 1 MG

## 2023-01-09 RX ORDER — ONDANSETRON 2 MG/ML
4 INJECTION INTRAMUSCULAR; INTRAVENOUS ONCE
Status: COMPLETED | OUTPATIENT
Start: 2023-01-09 | End: 2023-01-09

## 2023-01-09 RX ORDER — SODIUM CHLORIDE 0.9 % (FLUSH) 0.9 %
10 SYRINGE (ML) INJECTION AS NEEDED
Status: DISCONTINUED | OUTPATIENT
Start: 2023-01-09 | End: 2023-01-09 | Stop reason: HOSPADM

## 2023-01-09 RX ORDER — ONDANSETRON 4 MG/1
4 TABLET, ORALLY DISINTEGRATING ORAL EVERY 6 HOURS PRN
Qty: 12 TABLET | Refills: 0 | Status: SHIPPED | OUTPATIENT
Start: 2023-01-09

## 2023-01-09 RX ORDER — METHYLPREDNISOLONE SODIUM SUCCINATE 125 MG/2ML
125 INJECTION, POWDER, LYOPHILIZED, FOR SOLUTION INTRAMUSCULAR; INTRAVENOUS ONCE
Status: COMPLETED | OUTPATIENT
Start: 2023-01-09 | End: 2023-01-09

## 2023-01-09 RX ORDER — DEXAMETHASONE 0.5 MG/1
TABLET ORAL
Qty: 21 TABLET | Refills: 0 | Status: SHIPPED | OUTPATIENT
Start: 2023-01-09

## 2023-01-09 RX ADMIN — METHYLPREDNISOLONE SODIUM SUCCINATE 125 MG: 125 INJECTION, POWDER, FOR SOLUTION INTRAMUSCULAR; INTRAVENOUS at 19:25

## 2023-01-09 RX ADMIN — ONDANSETRON 4 MG: 2 INJECTION INTRAMUSCULAR; INTRAVENOUS at 19:25

## 2023-01-10 NOTE — ED PROVIDER NOTES
Subjective  History of Present Illness:    Patient is an 82-year-old female here today with nausea, diarrhea, and abdominal pain.  She states that she has been having symptoms for 4 days.  Nausea with minimal to no vomiting.  She has had 2 episodes of diarrhea.  Has had decreased p.o. intake and notes that she has lost her sense of taste and smell.  Mild headache and generalized abdominal pain, but denies any fever, urinary symptoms, sore throat, ear pain, or sinus drainage.  No known exposure that she is aware of, lives at home with her .  Patient has a history of COPD and wears 2 L nasal cannula at all times and did not require any increase in her oxygen requirement at home.  However, she did report feeling like she could not get a good breath at home, but did not increase her oxygen.      Nurses Notes reviewed and agree, including vitals, allergies, social history and prior medical history.     REVIEW OF SYSTEMS: All systems reviewed and not pertinent unless noted.  Review of Systems    Past Medical History:   Diagnosis Date   • CHF (congestive heart failure) ([facility])    • COPD (chronic obstructive pulmonary disease) ([facility])    • Coronary artery disease    • Hyperlipidemia    • Hypertension    • Myocardial infarction ([facility])    • Oxygen deficit    • Pneumonia    • RLS (restless legs syndrome)    • Seizures ([facility])        Allergies:    Ceftin [cefuroxime axetil], Codeine, Crestor [rosuvastatin], Doxycycline, Morphine, Sulfa antibiotics, and Penicillins      Past Surgical History:   Procedure Laterality Date   • CAROTID ENDARTERECTOMY Left    • CORONARY ANGIOPLASTY WITH STENT PLACEMENT      x2   • LUNG SURGERY Left    • PACEMAKER IMPLANTATION           Social History     Socioeconomic History   • Marital status:    Tobacco Use   • Smoking status: Former   Substance and Sexual Activity   • Alcohol use: No   • Drug use: No         History reviewed. No pertinent family history.    Objective  Physical Exam:  BP  "172/70   Pulse 71   Temp 97.5 °F (36.4 °C)   Resp 18   Ht 152.4 cm (60\")   Wt 54.4 kg (120 lb)   SpO2 100%   BMI 23.44 kg/m²      Physical Exam  Vitals and nursing note reviewed.   Constitutional:       Appearance: Normal appearance.   HENT:      Head: Normocephalic and atraumatic.      Right Ear: Tympanic membrane, ear canal and external ear normal.      Left Ear: Tympanic membrane, ear canal and external ear normal.      Nose: Nose normal.      Mouth/Throat:      Mouth: Mucous membranes are moist.      Pharynx: Oropharynx is clear.   Eyes:      Extraocular Movements: Extraocular movements intact.      Conjunctiva/sclera: Conjunctivae normal.      Pupils: Pupils are equal, round, and reactive to light.   Neck:      Vascular: No carotid bruit.   Cardiovascular:      Rate and Rhythm: Normal rate and regular rhythm.      Pulses: Normal pulses.      Heart sounds: Normal heart sounds.   Pulmonary:      Effort: Pulmonary effort is normal.      Breath sounds: Decreased breath sounds present.   Abdominal:      General: Abdomen is flat. Bowel sounds are normal. There is no distension.      Palpations: Abdomen is soft.      Tenderness: There is no abdominal tenderness.   Musculoskeletal:      Cervical back: Neck supple. No tenderness.      Right lower leg: No edema.      Left lower leg: No edema.   Lymphadenopathy:      Cervical: No cervical adenopathy.   Skin:     General: Skin is warm and dry.      Capillary Refill: Capillary refill takes less than 2 seconds.   Neurological:      General: No focal deficit present.      Mental Status: She is alert and oriented to person, place, and time.   Psychiatric:         Mood and Affect: Mood normal.         Behavior: Behavior normal.         Procedures    ED Course:         Lab Results (last 24 hours)     Procedure Component Value Units Date/Time    CBC & Differential [[id number]]  (Abnormal) Collected: 01/09/23 1856    Specimen: Blood Updated: 01/09/23 1905    Narrative:      " The following orders were created for panel order CBC & Differential.  Procedure                               Abnormality         Status                     ---------                               -----------         ------                     CBC Auto Differential[[id number]]        Abnormal            Final result                 Please view results for these tests on the individual orders.    Comprehensive Metabolic Panel [[id number]]  (Abnormal) Collected: 01/09/23 1856    Specimen: Blood Updated: 01/09/23 1925     Glucose 78 mg/dL      BUN 26 mg/dL      Creatinine 0.78 mg/dL      Sodium 138 mmol/L      Potassium 3.3 mmol/L      Chloride 91 mmol/L      CO2 27.6 mmol/L      Calcium 10.1 mg/dL      Total Protein 8.1 g/dL      Albumin 4.3 g/dL      ALT (SGPT) 22 U/L      AST (SGOT) 52 U/L      Alkaline Phosphatase 106 U/L      Total Bilirubin 0.9 mg/dL      Globulin 3.8 gm/dL      A/G Ratio 1.1 g/dL      BUN/Creatinine Ratio 33.3     Anion Gap 19.4 mmol/L      eGFR 75.0 mL/min/1.73      Comment: National Kidney Foundation and American Society of Nephrology (ASN) Task Force recommended calculation based on the Chronic Kidney Disease Epidemiology Collaboration (CKD-EPI) equation refit without adjustment for race.       Narrative:      GFR Normal >60  Chronic Kidney Disease <60  Kidney Failure <15    The GFR formula is only valid for adults with stable renal function between ages 18 and 70.    Lipase [[id number]]  (Normal) Collected: 01/09/23 1856    Specimen: Blood Updated: 01/09/23 1925     Lipase 24 U/L     Lactic Acid, Plasma [[id number]]  (Normal) Collected: 01/09/23 1856    Specimen: Blood Updated: 01/09/23 1921     Lactate 1.4 mmol/L     CBC Auto Differential [[id number]]  (Abnormal) Collected: 01/09/23 1856    Specimen: Blood Updated: 01/09/23 1905     WBC 4.87 10*3/mm3      RBC 5.07 10*6/mm3      Hemoglobin 15.4 g/dL      Hematocrit 46.6 %      MCV 91.9 fL      MCH 30.4 pg      MCHC 33.0 g/dL      RDW 13.1 %       RDW-SD 44.2 fl      MPV 10.6 fL      Platelets 181 10*3/mm3      Neutrophil % 79.0 %      Lymphocyte % 10.1 %      Monocyte % 10.5 %      Eosinophil % 0.0 %      Basophil % 0.2 %      Immature Grans % 0.2 %      Neutrophils, Absolute 3.85 10*3/mm3      Lymphocytes, Absolute 0.49 10*3/mm3      Monocytes, Absolute 0.51 10*3/mm3      Eosinophils, Absolute 0.00 10*3/mm3      Basophils, Absolute 0.01 10*3/mm3      Immature Grans, Absolute 0.01 10*3/mm3      nRBC 0.0 /100 WBC     COVID-19,[facility] IN-HOUSE,Nasal Swab No Transport Media 3-4 HR TAT - Swab, Nasal Cavity [[id number]]  (Abnormal) Collected: 01/09/23 1926    Specimen: Swab from Nasal Cavity Updated: 01/09/23 2009     COVID19 Detected    Narrative:      Fact sheet for providers: https://www.fda.gov/media/644528/download     Fact sheet for patients: https://www.fda.gov/media/033325/download    Test performed by PCR.    Consider negative results in combination with clinical observations, patient history, and epidemiological information.    Influenza Antigen, Rapid - Swab, Nasopharynx [[id number]]  (Normal) Collected: 01/09/23 1926    Specimen: Swab from Nasopharynx Updated: 01/09/23 1958     Influenza A Ag, EIA Negative     Influenza B Ag, EIA Negative           No radiology results from the last 24 hrs       MDM    Initial impression of presenting illness: Viral illness    DDX: includes but is not limited to: Viral illness, COVID-19 infection, bacterial infection    Patient arrives hemodynamically stable with vitals interpreted by myself.     Pertinent features from physical exam: Diminished lung sounds.    Initial diagnostic plan: COVID swab, flu swab, basic labs, chest x-ray, and IV medications to treat her cough and nausea    Results from initial plan were reviewed and interpreted by me revealing positive COVID-19 swab.  Her other labs are within their normal ranges and her chest x-ray did not show any viral appearance or concern for focal  opacities.    Diagnostic information from other sources: Medical record    Interventions / Re-evaluation: Patient noted improvement in her nausea with Zofran and did not report any change in her breathing with the Solu-Medrol.    Results/clinical rationale were discussed with Dr. Wray    Consultations/Discussion of results with other physicians: None    Disposition plan: Patient is an 82-year-old female here today with a COVID-19 infection.  She does not appear to be in acute distress and did not require any extra supplemental oxygen during her visit.  She had a positive COVID-19 swab and normal blood work.  Provided her with prescription for Zofran and dexamethasone.  Also discussed possibly taking Paxlovid.  Advised her that it may or may not be beneficial, but she would need to begin this medication no later than tomorrow evening as it requires a 5-day window from symptom onset.  Instructed her that if she is unable to get this filled by her pharmacy tomorrow evening she should continue her other medications as normal.  If she is able to get the antiviral from her pharmacy, she needs to take as directed.  Patient understands the plan of care and is ready for discharge.  -----    Final diagnoses:   COVID-19        Tyrese Olivo, APRN  01/10/23 0216

## 2023-01-10 NOTE — DISCHARGE INSTRUCTIONS
You are positive for COVID-19 which would help explain your symptoms.  Use the Zofran as needed to help with nausea and vomiting.  Start the dexamethasone tomorrow and take as directed.  You have been prescribed the antiviral Paxlovid to help treat COVID-19.  If your pharmacy is unable to fill this or you are unable to start this before tomorrow evening, do not take as that puts you outside of your 5-day window.  If you are not able to start the medication, please stop your pravastatin as it can interact with the antiviral.  Once you complete the treatment, you can restart your pravastatin.  Follow-up with your primary provider as needed, return to the ER for any new or worsening symptoms.

## 2023-02-25 ENCOUNTER — HOSPITAL ENCOUNTER (EMERGENCY)
Facility: HOSPITAL | Age: 83
Discharge: HOME | End: 2023-02-25
Attending: EMERGENCY MEDICINE | Admitting: EMERGENCY MEDICINE
Payer: MEDICARE

## 2023-02-25 VITALS
HEIGHT: 60 IN | WEIGHT: 125 LBS | DIASTOLIC BLOOD PRESSURE: 64 MMHG | SYSTOLIC BLOOD PRESSURE: 150 MMHG | RESPIRATION RATE: 18 BRPM | TEMPERATURE: 98.1 F | OXYGEN SATURATION: 95 % | HEART RATE: 70 BPM | BODY MASS INDEX: 24.54 KG/M2

## 2023-02-25 DIAGNOSIS — E87.70 HYPERVOLEMIA, UNSPECIFIED HYPERVOLEMIA TYPE: ICD-10-CM

## 2023-02-25 DIAGNOSIS — S81.802A WOUND OF LEFT LOWER EXTREMITY, INITIAL ENCOUNTER: Primary | ICD-10-CM

## 2023-02-25 LAB
ALBUMIN SERPL-MCNC: 3.5 G/DL (ref 3.5–5.2)
ALBUMIN/GLOB SERPL: 1.3 G/DL
ALP SERPL-CCNC: 100 U/L (ref 39–117)
ALT SERPL W P-5'-P-CCNC: 11 U/L (ref 1–33)
ANION GAP SERPL CALCULATED.3IONS-SCNC: 8.5 MMOL/L (ref 5–15)
AST SERPL-CCNC: 26 U/L (ref 1–32)
BASOPHILS # BLD AUTO: 0.03 10*3/MM3 (ref 0–0.2)
BASOPHILS NFR BLD AUTO: 0.7 % (ref 0–1.5)
BILIRUB SERPL-MCNC: 0.5 MG/DL (ref 0–1.2)
BUN SERPL-MCNC: 21 MG/DL (ref 8–23)
BUN/CREAT SERPL: 26.9 (ref 7–25)
CALCIUM SPEC-SCNC: 9.2 MG/DL (ref 8.6–10.5)
CHLORIDE SERPL-SCNC: 99 MMOL/L (ref 98–107)
CO2 SERPL-SCNC: 31.5 MMOL/L (ref 22–29)
CREAT SERPL-MCNC: 0.78 MG/DL (ref 0.57–1)
DEPRECATED RDW RBC AUTO: 46.7 FL (ref 37–54)
EGFRCR SERPLBLD CKD-EPI 2021: 75 ML/MIN/1.73
EOSINOPHIL # BLD AUTO: 0.19 10*3/MM3 (ref 0–0.4)
EOSINOPHIL NFR BLD AUTO: 4.4 % (ref 0.3–6.2)
ERYTHROCYTE [DISTWIDTH] IN BLOOD BY AUTOMATED COUNT: 13.7 % (ref 12.3–15.4)
GLOBULIN UR ELPH-MCNC: 2.6 GM/DL
GLUCOSE SERPL-MCNC: 98 MG/DL (ref 65–99)
HCT VFR BLD AUTO: 33.9 % (ref 34–46.6)
HGB BLD-MCNC: 11 G/DL (ref 12–15.9)
HOLD SPECIMEN: NORMAL
HOLD SPECIMEN: NORMAL
IMM GRANULOCYTES # BLD AUTO: 0.01 10*3/MM3 (ref 0–0.05)
IMM GRANULOCYTES NFR BLD AUTO: 0.2 % (ref 0–0.5)
LYMPHOCYTES # BLD AUTO: 0.75 10*3/MM3 (ref 0.7–3.1)
LYMPHOCYTES NFR BLD AUTO: 17.6 % (ref 19.6–45.3)
MCH RBC QN AUTO: 30.5 PG (ref 26.6–33)
MCHC RBC AUTO-ENTMCNC: 32.4 G/DL (ref 31.5–35.7)
MCV RBC AUTO: 93.9 FL (ref 79–97)
MONOCYTES # BLD AUTO: 0.49 10*3/MM3 (ref 0.1–0.9)
MONOCYTES NFR BLD AUTO: 11.5 % (ref 5–12)
NEUTROPHILS NFR BLD AUTO: 2.8 10*3/MM3 (ref 1.7–7)
NEUTROPHILS NFR BLD AUTO: 65.6 % (ref 42.7–76)
NRBC BLD AUTO-RTO: 0 /100 WBC (ref 0–0.2)
NT-PROBNP SERPL-MCNC: 5448 PG/ML (ref 0–1800)
PLATELET # BLD AUTO: 158 10*3/MM3 (ref 140–450)
PMV BLD AUTO: 10.7 FL (ref 6–12)
POTASSIUM SERPL-SCNC: 3.6 MMOL/L (ref 3.5–5.2)
PROT SERPL-MCNC: 6.1 G/DL (ref 6–8.5)
RBC # BLD AUTO: 3.61 10*6/MM3 (ref 3.77–5.28)
SODIUM SERPL-SCNC: 139 MMOL/L (ref 136–145)
WBC NRBC COR # BLD: 4.27 10*3/MM3 (ref 3.4–10.8)
WHOLE BLOOD HOLD COAG: NORMAL
WHOLE BLOOD HOLD SPECIMEN: NORMAL

## 2023-02-25 PROCEDURE — 83880 ASSAY OF NATRIURETIC PEPTIDE: CPT | Performed by: EMERGENCY MEDICINE

## 2023-02-25 PROCEDURE — 99283 EMERGENCY DEPT VISIT LOW MDM: CPT

## 2023-02-25 PROCEDURE — 93005 ELECTROCARDIOGRAM TRACING: CPT | Performed by: EMERGENCY MEDICINE

## 2023-02-25 PROCEDURE — 99284 EMERGENCY DEPT VISIT MOD MDM: CPT

## 2023-02-25 PROCEDURE — 85025 COMPLETE CBC W/AUTO DIFF WBC: CPT | Performed by: EMERGENCY MEDICINE

## 2023-02-25 PROCEDURE — 80053 COMPREHEN METABOLIC PANEL: CPT | Performed by: EMERGENCY MEDICINE

## 2023-02-25 RX ORDER — SODIUM CHLORIDE 0.9 % (FLUSH) 0.9 %
10 SYRINGE (ML) INJECTION AS NEEDED
Status: DISCONTINUED | OUTPATIENT
Start: 2023-02-25 | End: 2023-02-25 | Stop reason: HOSPADM

## 2023-04-12 ENCOUNTER — APPOINTMENT (OUTPATIENT)
Dept: GENERAL RADIOLOGY | Facility: HOSPITAL | Age: 83
End: 2023-04-12
Payer: MEDICARE

## 2023-04-12 ENCOUNTER — HOSPITAL ENCOUNTER (OUTPATIENT)
Facility: HOSPITAL | Age: 83
Setting detail: OBSERVATION
LOS: 1 days | Discharge: HOME | End: 2023-04-14
Attending: EMERGENCY MEDICINE | Admitting: INTERNAL MEDICINE
Payer: MEDICARE

## 2023-04-12 DIAGNOSIS — Z87.09 HISTORY OF COPD: ICD-10-CM

## 2023-04-12 DIAGNOSIS — I50.9 ACUTE ON CHRONIC CONGESTIVE HEART FAILURE, UNSPECIFIED HEART FAILURE TYPE: Primary | ICD-10-CM

## 2023-04-12 DIAGNOSIS — R09.02 HYPOXIA: ICD-10-CM

## 2023-04-12 DIAGNOSIS — J44.9 CHRONIC OBSTRUCTIVE PULMONARY DISEASE, UNSPECIFIED COPD TYPE: ICD-10-CM

## 2023-04-12 PROBLEM — I10 ESSENTIAL HYPERTENSION: Status: ACTIVE | Noted: 2023-04-12

## 2023-04-12 PROBLEM — Z95.0 STATUS POST PLACEMENT OF CARDIAC PACEMAKER: Status: ACTIVE | Noted: 2023-04-12

## 2023-04-12 LAB
ALBUMIN SERPL-MCNC: 4 G/DL (ref 3.5–5.2)
ALBUMIN/GLOB SERPL: 1.5 G/DL
ALP SERPL-CCNC: 91 U/L (ref 39–117)
ALT SERPL W P-5'-P-CCNC: 18 U/L (ref 1–33)
ANION GAP SERPL CALCULATED.3IONS-SCNC: 10.6 MMOL/L (ref 5–15)
AST SERPL-CCNC: 49 U/L (ref 1–32)
BASOPHILS # BLD AUTO: 0.03 10*3/MM3 (ref 0–0.2)
BASOPHILS NFR BLD AUTO: 0.5 % (ref 0–1.5)
BILIRUB SERPL-MCNC: 1 MG/DL (ref 0–1.2)
BUN SERPL-MCNC: 24 MG/DL (ref 8–23)
BUN/CREAT SERPL: 28.9 (ref 7–25)
CALCIUM SPEC-SCNC: 9.5 MG/DL (ref 8.6–10.5)
CHLORIDE SERPL-SCNC: 98 MMOL/L (ref 98–107)
CO2 SERPL-SCNC: 34.4 MMOL/L (ref 22–29)
CREAT SERPL-MCNC: 0.83 MG/DL (ref 0.57–1)
DEPRECATED RDW RBC AUTO: 50.3 FL (ref 37–54)
EGFRCR SERPLBLD CKD-EPI 2021: 69.6 ML/MIN/1.73
EOSINOPHIL # BLD AUTO: 0.03 10*3/MM3 (ref 0–0.4)
EOSINOPHIL NFR BLD AUTO: 0.5 % (ref 0.3–6.2)
ERYTHROCYTE [DISTWIDTH] IN BLOOD BY AUTOMATED COUNT: 13.9 % (ref 12.3–15.4)
GLOBULIN UR ELPH-MCNC: 2.6 GM/DL
GLUCOSE SERPL-MCNC: 90 MG/DL (ref 65–99)
HCT VFR BLD AUTO: 38 % (ref 34–46.6)
HGB BLD-MCNC: 11.8 G/DL (ref 12–15.9)
HOLD SPECIMEN: NORMAL
HOLD SPECIMEN: NORMAL
IMM GRANULOCYTES # BLD AUTO: 0.02 10*3/MM3 (ref 0–0.05)
IMM GRANULOCYTES NFR BLD AUTO: 0.3 % (ref 0–0.5)
LYMPHOCYTES # BLD AUTO: 0.49 10*3/MM3 (ref 0.7–3.1)
LYMPHOCYTES NFR BLD AUTO: 7.4 % (ref 19.6–45.3)
MCH RBC QN AUTO: 30.8 PG (ref 26.6–33)
MCHC RBC AUTO-ENTMCNC: 31.1 G/DL (ref 31.5–35.7)
MCV RBC AUTO: 99.2 FL (ref 79–97)
MONOCYTES # BLD AUTO: 0.3 10*3/MM3 (ref 0.1–0.9)
MONOCYTES NFR BLD AUTO: 4.5 % (ref 5–12)
NEUTROPHILS NFR BLD AUTO: 5.78 10*3/MM3 (ref 1.7–7)
NEUTROPHILS NFR BLD AUTO: 86.8 % (ref 42.7–76)
NRBC BLD AUTO-RTO: 0 /100 WBC (ref 0–0.2)
NT-PROBNP SERPL-MCNC: 9424 PG/ML (ref 0–1800)
PLATELET # BLD AUTO: 122 10*3/MM3 (ref 140–450)
PMV BLD AUTO: 11.7 FL (ref 6–12)
POTASSIUM SERPL-SCNC: 4.4 MMOL/L (ref 3.5–5.2)
PROT SERPL-MCNC: 6.6 G/DL (ref 6–8.5)
RBC # BLD AUTO: 3.83 10*6/MM3 (ref 3.77–5.28)
SODIUM SERPL-SCNC: 143 MMOL/L (ref 136–145)
TROPONIN T SERPL HS-MCNC: 23 NG/L
WBC NRBC COR # BLD: 6.65 10*3/MM3 (ref 3.4–10.8)
WHOLE BLOOD HOLD COAG: NORMAL
WHOLE BLOOD HOLD SPECIMEN: NORMAL

## 2023-04-12 PROCEDURE — 84484 ASSAY OF TROPONIN QUANT: CPT | Performed by: EMERGENCY MEDICINE

## 2023-04-12 PROCEDURE — 85025 COMPLETE CBC W/AUTO DIFF WBC: CPT | Performed by: EMERGENCY MEDICINE

## 2023-04-12 PROCEDURE — G0378 HOSPITAL OBSERVATION PER HR: HCPCS

## 2023-04-12 PROCEDURE — 25010000002 FUROSEMIDE PER 20 MG: Performed by: EMERGENCY MEDICINE

## 2023-04-12 PROCEDURE — 96372 THER/PROPH/DIAG INJ SC/IM: CPT

## 2023-04-12 PROCEDURE — 94664 DEMO&/EVAL PT USE INHALER: CPT

## 2023-04-12 PROCEDURE — 99285 EMERGENCY DEPT VISIT HI MDM: CPT

## 2023-04-12 PROCEDURE — 96375 TX/PRO/DX INJ NEW DRUG ADDON: CPT

## 2023-04-12 PROCEDURE — 25010000002 ENOXAPARIN PER 10 MG: Performed by: INTERNAL MEDICINE

## 2023-04-12 PROCEDURE — 93005 ELECTROCARDIOGRAM TRACING: CPT | Performed by: EMERGENCY MEDICINE

## 2023-04-12 PROCEDURE — 94640 AIRWAY INHALATION TREATMENT: CPT

## 2023-04-12 PROCEDURE — 96374 THER/PROPH/DIAG INJ IV PUSH: CPT

## 2023-04-12 PROCEDURE — 83880 ASSAY OF NATRIURETIC PEPTIDE: CPT | Performed by: EMERGENCY MEDICINE

## 2023-04-12 PROCEDURE — 94799 UNLISTED PULMONARY SVC/PX: CPT

## 2023-04-12 PROCEDURE — 71045 X-RAY EXAM CHEST 1 VIEW: CPT

## 2023-04-12 PROCEDURE — 25010000002 METHYLPREDNISOLONE PER 125 MG: Performed by: EMERGENCY MEDICINE

## 2023-04-12 PROCEDURE — 80053 COMPREHEN METABOLIC PANEL: CPT | Performed by: EMERGENCY MEDICINE

## 2023-04-12 PROCEDURE — 94761 N-INVAS EAR/PLS OXIMETRY MLT: CPT

## 2023-04-12 RX ORDER — FUROSEMIDE 10 MG/ML
40 INJECTION INTRAMUSCULAR; INTRAVENOUS ONCE
Status: COMPLETED | OUTPATIENT
Start: 2023-04-12 | End: 2023-04-12

## 2023-04-12 RX ORDER — ACETAMINOPHEN 160 MG/5ML
650 SOLUTION ORAL EVERY 4 HOURS PRN
Status: DISCONTINUED | OUTPATIENT
Start: 2023-04-12 | End: 2023-04-14 | Stop reason: HOSPADM

## 2023-04-12 RX ORDER — GABAPENTIN 300 MG/1
600 CAPSULE ORAL EVERY 8 HOURS SCHEDULED
Status: DISCONTINUED | OUTPATIENT
Start: 2023-04-12 | End: 2023-04-14 | Stop reason: HOSPADM

## 2023-04-12 RX ORDER — CARVEDILOL 25 MG/1
25 TABLET ORAL 2 TIMES DAILY WITH MEALS
Status: DISCONTINUED | OUTPATIENT
Start: 2023-04-12 | End: 2023-04-14 | Stop reason: HOSPADM

## 2023-04-12 RX ORDER — ENOXAPARIN SODIUM 100 MG/ML
40 INJECTION SUBCUTANEOUS EVERY 24 HOURS
Status: DISCONTINUED | OUTPATIENT
Start: 2023-04-12 | End: 2023-04-14 | Stop reason: HOSPADM

## 2023-04-12 RX ORDER — IPRATROPIUM BROMIDE AND ALBUTEROL SULFATE 2.5; .5 MG/3ML; MG/3ML
3 SOLUTION RESPIRATORY (INHALATION) EVERY 4 HOURS PRN
Status: DISCONTINUED | OUTPATIENT
Start: 2023-04-12 | End: 2023-04-14 | Stop reason: HOSPADM

## 2023-04-12 RX ORDER — POLYETHYLENE GLYCOL 3350 17 G/17G
17 POWDER, FOR SOLUTION ORAL DAILY PRN
Status: DISCONTINUED | OUTPATIENT
Start: 2023-04-12 | End: 2023-04-14 | Stop reason: HOSPADM

## 2023-04-12 RX ORDER — BISACODYL 5 MG/1
5 TABLET, DELAYED RELEASE ORAL DAILY PRN
Status: DISCONTINUED | OUTPATIENT
Start: 2023-04-12 | End: 2023-04-14 | Stop reason: HOSPADM

## 2023-04-12 RX ORDER — IPRATROPIUM BROMIDE AND ALBUTEROL SULFATE 2.5; .5 MG/3ML; MG/3ML
3 SOLUTION RESPIRATORY (INHALATION) ONCE
Status: COMPLETED | OUTPATIENT
Start: 2023-04-12 | End: 2023-04-12

## 2023-04-12 RX ORDER — FUROSEMIDE 10 MG/ML
40 INJECTION INTRAMUSCULAR; INTRAVENOUS DAILY
Status: DISCONTINUED | OUTPATIENT
Start: 2023-04-13 | End: 2023-04-14 | Stop reason: HOSPADM

## 2023-04-12 RX ORDER — ACETAMINOPHEN 325 MG/1
650 TABLET ORAL EVERY 4 HOURS PRN
Status: DISCONTINUED | OUTPATIENT
Start: 2023-04-12 | End: 2023-04-14 | Stop reason: HOSPADM

## 2023-04-12 RX ORDER — BUDESONIDE 0.5 MG/2ML
0.5 INHALANT ORAL
Status: DISCONTINUED | OUTPATIENT
Start: 2023-04-12 | End: 2023-04-14 | Stop reason: HOSPADM

## 2023-04-12 RX ORDER — FUROSEMIDE 40 MG/1
40 TABLET ORAL DAILY
COMMUNITY

## 2023-04-12 RX ORDER — SODIUM CHLORIDE 0.9 % (FLUSH) 0.9 %
10 SYRINGE (ML) INJECTION AS NEEDED
Status: DISCONTINUED | OUTPATIENT
Start: 2023-04-12 | End: 2023-04-14 | Stop reason: HOSPADM

## 2023-04-12 RX ORDER — BISACODYL 10 MG
10 SUPPOSITORY, RECTAL RECTAL DAILY PRN
Status: DISCONTINUED | OUTPATIENT
Start: 2023-04-12 | End: 2023-04-14 | Stop reason: HOSPADM

## 2023-04-12 RX ORDER — ASPIRIN 81 MG/1
81 TABLET ORAL DAILY
Status: DISCONTINUED | OUTPATIENT
Start: 2023-04-12 | End: 2023-04-14 | Stop reason: HOSPADM

## 2023-04-12 RX ORDER — ONDANSETRON 2 MG/ML
4 INJECTION INTRAMUSCULAR; INTRAVENOUS EVERY 6 HOURS PRN
Status: DISCONTINUED | OUTPATIENT
Start: 2023-04-12 | End: 2023-04-14 | Stop reason: HOSPADM

## 2023-04-12 RX ORDER — SODIUM CHLORIDE 0.9 % (FLUSH) 0.9 %
3-10 SYRINGE (ML) INJECTION AS NEEDED
Status: DISCONTINUED | OUTPATIENT
Start: 2023-04-12 | End: 2023-04-14 | Stop reason: HOSPADM

## 2023-04-12 RX ORDER — PRAVASTATIN SODIUM 20 MG
80 TABLET ORAL NIGHTLY
Status: DISCONTINUED | OUTPATIENT
Start: 2023-04-12 | End: 2023-04-14 | Stop reason: HOSPADM

## 2023-04-12 RX ORDER — HYDROCODONE BITARTRATE AND ACETAMINOPHEN 5; 325 MG/1; MG/1
1 TABLET ORAL EVERY 6 HOURS PRN
Status: DISCONTINUED | OUTPATIENT
Start: 2023-04-12 | End: 2023-04-14 | Stop reason: HOSPADM

## 2023-04-12 RX ORDER — ACETAMINOPHEN 650 MG/1
650 SUPPOSITORY RECTAL EVERY 4 HOURS PRN
Status: DISCONTINUED | OUTPATIENT
Start: 2023-04-12 | End: 2023-04-14 | Stop reason: HOSPADM

## 2023-04-12 RX ORDER — AMOXICILLIN 250 MG
2 CAPSULE ORAL 2 TIMES DAILY
Status: DISCONTINUED | OUTPATIENT
Start: 2023-04-12 | End: 2023-04-14 | Stop reason: HOSPADM

## 2023-04-12 RX ORDER — SODIUM CHLORIDE 9 MG/ML
40 INJECTION, SOLUTION INTRAVENOUS AS NEEDED
Status: DISCONTINUED | OUTPATIENT
Start: 2023-04-12 | End: 2023-04-14 | Stop reason: HOSPADM

## 2023-04-12 RX ORDER — PANTOPRAZOLE SODIUM 40 MG/1
40 TABLET, DELAYED RELEASE ORAL
Status: DISCONTINUED | OUTPATIENT
Start: 2023-04-13 | End: 2023-04-14 | Stop reason: HOSPADM

## 2023-04-12 RX ORDER — SODIUM CHLORIDE 0.9 % (FLUSH) 0.9 %
3 SYRINGE (ML) INJECTION EVERY 12 HOURS SCHEDULED
Status: DISCONTINUED | OUTPATIENT
Start: 2023-04-12 | End: 2023-04-14 | Stop reason: HOSPADM

## 2023-04-12 RX ORDER — METHYLPREDNISOLONE SODIUM SUCCINATE 125 MG/2ML
125 INJECTION, POWDER, LYOPHILIZED, FOR SOLUTION INTRAMUSCULAR; INTRAVENOUS ONCE
Status: COMPLETED | OUTPATIENT
Start: 2023-04-12 | End: 2023-04-12

## 2023-04-12 RX ADMIN — Medication 3 ML: at 21:09

## 2023-04-12 RX ADMIN — CARVEDILOL 25 MG: 25 TABLET, FILM COATED ORAL at 18:55

## 2023-04-12 RX ADMIN — HYDROCODONE BITARTRATE AND ACETAMINOPHEN 1 TABLET: 5; 325 TABLET ORAL at 21:08

## 2023-04-12 RX ADMIN — PRAVASTATIN SODIUM 80 MG: 20 TABLET ORAL at 21:08

## 2023-04-12 RX ADMIN — BUDESONIDE 0.5 MG: 0.5 INHALANT RESPIRATORY (INHALATION) at 19:16

## 2023-04-12 RX ADMIN — METHYLPREDNISOLONE SODIUM SUCCINATE 125 MG: 125 INJECTION, POWDER, FOR SOLUTION INTRAMUSCULAR; INTRAVENOUS at 14:59

## 2023-04-12 RX ADMIN — IPRATROPIUM BROMIDE AND ALBUTEROL SULFATE 3 ML: .5; 3 SOLUTION RESPIRATORY (INHALATION) at 14:56

## 2023-04-12 RX ADMIN — FUROSEMIDE 40 MG: 10 INJECTION, SOLUTION INTRAMUSCULAR; INTRAVENOUS at 16:04

## 2023-04-12 RX ADMIN — IPRATROPIUM BROMIDE AND ALBUTEROL SULFATE 3 ML: .5; 3 SOLUTION RESPIRATORY (INHALATION) at 19:16

## 2023-04-12 RX ADMIN — ENOXAPARIN SODIUM 40 MG: 100 INJECTION SUBCUTANEOUS at 18:56

## 2023-04-12 RX ADMIN — GABAPENTIN 600 MG: 300 CAPSULE ORAL at 21:08

## 2023-04-12 RX ADMIN — ASPIRIN 81 MG: 81 TABLET, COATED ORAL at 18:56

## 2023-04-12 RX ADMIN — SENNOSIDES AND DOCUSATE SODIUM 2 TABLET: 50; 8.6 TABLET ORAL at 21:08

## 2023-04-12 NOTE — ED PROVIDER NOTES
"Subjective  History of Present Illness:    Chief Complaint: Short of breath  History of Present Illness: This 82-year-old female presents with short of breath history of home oxygen dependent COPD CHF, history of hypertension MI, pneumonia, progressive symptoms.  Worse with ambulation.  Also chronic lower extremity edema for which she has been doing wraps it does have a superficial ulceration in the left shin.  Patient is on diuretics chronically, Lasix 20 mg daily.    Nurses Notes reviewed and agree, including vitals, allergies, social history and prior medical history.     REVIEW OF SYSTEMS: All systems reviewed and not pertinent unless noted.  Review of Systems      Positive for: Shortness of breath    Negative for: Fever cough hemoptysis syncope palpitations    Past Medical History:   Diagnosis Date   • CHF (congestive heart failure)    • COPD (chronic obstructive pulmonary disease)    • Coronary artery disease    • Hyperlipidemia    • Hypertension    • Myocardial infarction    • Oxygen deficit    • Pneumonia    • RLS (restless legs syndrome)    • Seizures        Allergies:    Ceftin [cefuroxime axetil], Codeine, Crestor [rosuvastatin], Doxycycline, Morphine, Sulfa antibiotics, and Penicillins      Past Surgical History:   Procedure Laterality Date   • CAROTID ENDARTERECTOMY Left    • CORONARY ANGIOPLASTY WITH STENT PLACEMENT      x2   • LUNG SURGERY Left    • PACEMAKER IMPLANTATION           Social History     Socioeconomic History   • Marital status:    Tobacco Use   • Smoking status: Former   Substance and Sexual Activity   • Alcohol use: No   • Drug use: No         History reviewed. No pertinent family history.    Objective  Physical Exam:  /78   Pulse 73   Temp 97.7 °F (36.5 °C) (Oral)   Resp 20   Ht 152.4 cm (60\")   Wt 56.2 kg (124 lb)   SpO2 92%   BMI 24.22 kg/m²      Physical Exam    CONSTITUTIONAL: Well developed, frail chronically ill-appearing 82-year-old female,  in no " acute.  VITAL SIGNS: per nursing, reviewed and noted  SKIN: exposed skin with no rashes, ulcerations or petechiae  EYES: Grossly EOMI, no icterus  ENT: Normal voice.  Moist mucous membranes   RESPIRATORY:  No increased work of breathing. No retractions.  Decreased breath sounds throughout.  Faint basilar crackles mild wheezes  CARDIOVASCULAR:   Extremities pink and warm.  Good cap refill to extremities.  1+ edema, 1 cm irregular shaped left pretibial superficial scabbed ulceration  GI: Abdomen without distention   MUSCULOSKELETAL: Age-appropriate bulk and tone, moves all 4 extremities  NEUROLOGIC: Alert, oriented x 3. No gross deficits. GCS 15.   PSYCH: appropriate affect.  : no bladder tenderness or distention, no CVA tenderness    Procedures    ED Course:    Lab Results (last 24 hours)     Procedure Component Value Units Date/Time    CBC & Differential [[id number]]  (Abnormal) Collected: 04/12/23 1439    Specimen: Blood Updated: 04/12/23 1451    Narrative:      The following orders were created for panel order CBC & Differential.  Procedure                               Abnormality         Status                     ---------                               -----------         ------                     CBC Auto Differential[[id number]]        Abnormal            Final result               Scan Slide[[id number]]                                                                    Please view results for these tests on the individual orders.    Comprehensive Metabolic Panel [[id number]]  (Abnormal) Collected: 04/12/23 1439    Specimen: Blood Updated: 04/12/23 1547     Glucose 90 mg/dL      BUN 24 mg/dL      Creatinine 0.83 mg/dL      Sodium 143 mmol/L      Potassium 4.4 mmol/L      Comment: Slight hemolysis detected by analyzer. Results may be affected.        Chloride 98 mmol/L      CO2 34.4 mmol/L      Calcium 9.5 mg/dL      Total Protein 6.6 g/dL      Albumin 4.0 g/dL      ALT (SGPT) 18 U/L      AST (SGOT) 49 U/L       Comment: Slight hemolysis detected by analyzer. Results may be affected.        Alkaline Phosphatase 91 U/L      Total Bilirubin 1.0 mg/dL      Globulin 2.6 gm/dL      A/G Ratio 1.5 g/dL      BUN/Creatinine Ratio 28.9     Anion Gap 10.6 mmol/L      eGFR 69.6 mL/min/1.73     Narrative:      GFR Normal >60  Chronic Kidney Disease <60  Kidney Failure <15    The GFR formula is only valid for adults with stable renal function between ages 18 and 70.    BNP [[id number]]  (Abnormal) Collected: 04/12/23 1439    Specimen: Blood Updated: 04/12/23 1548     proBNP 9,424.0 pg/mL     Narrative:      Among patients with dyspnea, NT-proBNP is highly sensitive for the detection of acute congestive heart failure. In addition NT-proBNP of <300 pg/ml effectively rules out acute congestive heart failure with 99% negative predictive value.    Results may be falsely decreased if patient taking Biotin.      Single High Sensitivity Troponin T [[id number]]  (Abnormal) Collected: 04/12/23 1439    Specimen: Blood Updated: 04/12/23 1545     HS Troponin T 23 ng/L     Narrative:      High Sensitive Troponin T Reference Range:  <10.0 ng/L- Negative Female for AMI  <15.0 ng/L- Negative Male for AMI  >=10 - Abnormal Female indicating possible myocardial injury.  >=15 - Abnormal Male indicating possible myocardial injury.   Clinicians would have to utilize clinical acumen, EKG, Troponin, and serial changes to determine if it is an Acute Myocardial Infarction or myocardial injury due to an underlying chronic condition.         CBC Auto Differential [[id number]]  (Abnormal) Collected: 04/12/23 1439    Specimen: Blood Updated: 04/12/23 1451     WBC 6.65 10*3/mm3      RBC 3.83 10*6/mm3      Hemoglobin 11.8 g/dL      Hematocrit 38.0 %      MCV 99.2 fL      MCH 30.8 pg      MCHC 31.1 g/dL      RDW 13.9 %      RDW-SD 50.3 fl      MPV 11.7 fL      Platelets 122 10*3/mm3      Neutrophil % 86.8 %      Lymphocyte % 7.4 %      Monocyte % 4.5 %      Eosinophil % 0.5  %      Basophil % 0.5 %      Immature Grans % 0.3 %      Neutrophils, Absolute 5.78 10*3/mm3      Lymphocytes, Absolute 0.49 10*3/mm3      Monocytes, Absolute 0.30 10*3/mm3      Eosinophils, Absolute 0.03 10*3/mm3      Basophils, Absolute 0.03 10*3/mm3      Immature Grans, Absolute 0.02 10*3/mm3      nRBC 0.0 /100 WBC            XR Chest 1 View    Result Date: 4/12/2023  CLINICAL INDICATION:  CHF/COPD Protocol  EXAMINATION TECHNIQUE: XR CHEST 1 VW-  COMPARISON: Radiographs 01/09/2023.  FINDINGS: Right chest wall subclavian approach right atrial biventricular cardiac pacer in place. Mild cardiomegaly. Aortic atherosclerosis. Perihilar vascular engorgement and prominent peribronchovascular interstitial markings. Small bilateral pleural effusions. No pneumothorax. Moderate to severe emphysema.      Impression: Findings are concerning for mild congestive heart failure.  Images personally reviewed, interpreted and dictated by JIGAR Jin.       This report was signed and finalized on 4/12/2023 3:14 PM by JIGAR Jin.       [facility]    ED Course as of 04/12/23 1646   Wed Apr 12, 2023   1440 EKG interpreted by me: Ventricularly paced rhythm, normal rate, no further interpretation, this is an abnormal EKG [MP]      ED Course User Index  [MP] Jose Francisco Spring MD       Medical Decision Making:    Initial impression of presenting illness: 82-year-old female home oxygen pendant COPD CHF, here with shortness of breath    DDX: includes but is not limited to: COPD exacerbation pneumonia CHF exacerbation         Patient arrives hypertensive afebrile no tachycardia oxygen saturations 92% initially on baseline 3 L nasal cannula with vitals interpreted by myself.     Pertinent features from physical exam: Decreased breath sounds faint basilar crackles and diffuse mild wheezes, 1+ lower extremity edema.    Initial diagnostic plan: Shortness of breath protocol labs work-up chest x-ray    Results from initial plan  were reviewed and interpreted by me revealing pulmonary edema/CHF on chest x-ray per radiologist, BNP 9424    Diagnostic information from other sources: Old record review    Interventions / Re-evaluation: Provided Lasix Solu-Medrol, duo nebs.  Had increased patient's oxygen to 4 L that she had trended to 88% on her baseline oxygen troponin 23    Results/clinical rationale were discussed with patient    Consultations/Discussion of results with other physicians: Dr. Ruiz. hospitalist    Disposition plan: Plan for presumed CHF exacerbation with increased oxygen requirement off of baseline  -----      Final diagnoses:   Acute on chronic congestive heart failure, unspecified heart failure type   Hypoxia   History of COPD        Minesh Joshi, DO  04/12/23 9533

## 2023-04-12 NOTE — PROGRESS NOTES
"Pharmacy Consult - Enoxaparin Dosing  Lesley Calero is a 82 y.o. female who has been consulted to dose enoxaparin for DVT prophylaxis.     Allergies  Ceftin [cefuroxime axetil], Codeine, Crestor [rosuvastatin], Doxycycline, Morphine, Sulfa antibiotics, and Penicillins    Relevant clinical data and objective history reviewed:   [Ht: 152.4 cm (60\"); Wt: 56.2 kg (124 lb)]  Body mass index is 24.22 kg/m².  Estimated Creatinine Clearance: 39.7 mL/min (by C-G formula based on SCr of 0.83 mg/dL).  Results from last 7 days   Lab Units 04/12/23  1439   HEMOGLOBIN g/dL 11.8*   HEMATOCRIT % 38.0   PLATELETS 10*3/mm3 122*   CREATININE mg/dL 0.83       Asessment/Plan  Initiate Enoxaparin 40 mg SQ every 24 hours  Pharmacy will monitor Ms. Calero's renal function and clinical status and adjust the enoxaparin dose and/or frequency as needed.    Thank you for the consult,     Azalea BuenoD, BCPS   4/12/2023  17:47 EDT     "

## 2023-04-12 NOTE — H&P
[facility]   HISTORY AND PHYSICAL      Name:  Lesley Calero   Age:  82 y.o.  Sex:  female  :  1940  MRN:  [id number]   Visit Number:  [id number]  Admission Date:  2023  Date Of Service:  23  Primary Care Physician:  Nj Smith MD    Chief Complaint:     Shortness of breath.    History Of Presenting Illness:      Ms. Calero is an 82-year-old female with history of CHF unknown type, COPD, coronary artery disease, hypertension was brought to the emergency room by EMS with symptoms of increased shortness of breath since 1 day.  Patient lives with her  and is usually independent in daily activities.  She does have COPD and uses 3 L of nasal cannula oxygen.  Patient states that she was walking at [facility] yesterday when she developed shortness of breath and sweating with some chest discomfort.  Since then, patient has had progressive shortness of breath overnight and called her sister who visited her and subsequently called the ambulance.  Patient currently denies any chest pain.  She does have history of heart disease and used to follow-up with Dr. Erickson in [place].  She has had pacemaker for several years.  She does not remember whether she had stents in her heart but unsure.  Denies any fevers, orthopnea or PND.  Does complain of bilateral chronic leg swelling.    In the emergency room, she was afebrile and hemodynamically stable but was requiring 3 L of nasal cannula oxygen to maintain saturations above 90%.  Blood work done in the emergency room showed a proBNP of 9424, high-sensitivity troponin of 23.  Otherwise CMP and CBC was unremarkable.  Hemoglobin was 11.8 and platelets were 122.  EKG showed paced rhythm.  Chest x-ray showed findings concerning for mild congestive heart failure.  Patient was treated with IV Lasix 40 mg, DuoNebs and IV Solu-Medrol in the emergency room.    Review Of Systems:    All systems were reviewed and negative except as  mentioned in history of presenting illness, assessment and plan.    Past Medical History: Patient  has a past medical history of CHF (congestive heart failure), COPD (chronic obstructive pulmonary disease), Coronary artery disease, Hyperlipidemia, Hypertension, Myocardial infarction, Oxygen deficit, Pneumonia, RLS (restless legs syndrome), and Seizures.    Past Surgical History: Patient  has a past surgical history that includes Coronary angioplasty with stent; Pacemaker Implantation; Carotid endarterectomy (Left); and Lung surgery (Left).    Social History: Patient  reports that she has quit smoking. She does not have any smokeless tobacco history on file. She reports that she does not drink alcohol and does not use drugs.    Family History: History of congestive heart failure in her mother.    Allergies:      Ceftin [cefuroxime axetil], Codeine, Crestor [rosuvastatin], Doxycycline, Morphine, Sulfa antibiotics, and Penicillins    Home Medications:    Prior to Admission Medications     Prescriptions Last Dose Informant Patient Reported? Taking?    amLODIPine (NORVASC) 5 MG tablet   Yes No    Take 5 mg by mouth Daily.    carvedilol (COREG) 25 MG tablet   Yes No    Take 25 mg by mouth 2 (Two) Times a Day With Meals.    dexamethasone (DECADRON) 0.5 MG tablet   No No    6 po x1d; then 5 po x 1d; then 4 po x 1d; then 3 po x 1d; then 2 po x 1d; then 1 po x1d; then STOP    gabapentin (NEURONTIN) 600 MG tablet   Yes No    Take 600 mg by mouth 3 (Three) Times a Day.    HYDROcodone-acetaminophen (NORCO) 5-325 MG per tablet   Yes No    Take 1 tablet by mouth Every 6 (Six) Hours As Needed.    nitroglycerin (NITROSTAT) 0.4 MG SL tablet   Yes No    Place 0.4 mg under the tongue Every 5 (Five) Minutes As Needed for Chest Pain. Take no more than 3 doses in 15 minutes.    omeprazole (priLOSEC) 40 MG capsule   Yes No    Take 40 mg by mouth Daily.    ondansetron ODT (ZOFRAN-ODT) 4 MG disintegrating tablet   No No    Place 1 tablet on  "the tongue Every 6 (Six) Hours As Needed for Nausea or Vomiting.    pravastatin (PRAVACHOL) 40 MG tablet   Yes No    Take 80 mg by mouth Daily.        ED Medications:    Medications   sodium chloride 0.9 % flush 10 mL (has no administration in time range)   methylPREDNISolone sodium succinate (SOLU-Medrol) injection 125 mg (125 mg Intravenous Given 4/12/23 1459)   ipratropium-albuterol (DUO-NEB) nebulizer solution 3 mL (3 mL Nebulization Given 4/12/23 1456)   furosemide (LASIX) injection 40 mg (40 mg Intravenous Given 4/12/23 1604)     Vital Signs:  Temp:  [97.7 °F (36.5 °C)] 97.7 °F (36.5 °C)  Heart Rate:  [71-73] 73  Resp:  [20-23] 20  BP: (148-165)/(69-78) 148/78        04/12/23  1426   Weight: 56.2 kg (124 lb)     Body mass index is 24.22 kg/m².    Physical Exam:     Most recent vital Signs: /78   Pulse 73   Temp 97.7 °F (36.5 °C) (Oral)   Resp 20   Ht 152.4 cm (60\")   Wt 56.2 kg (124 lb)   SpO2 92%   BMI 24.22 kg/m²     Physical Exam  Constitutional:       General: She is not in acute distress.     Appearance: Normal appearance. She is not ill-appearing.   HENT:      Head: Normocephalic and atraumatic.      Right Ear: External ear normal.      Left Ear: External ear normal.      Nose: Nose normal.      Mouth/Throat:      Mouth: Mucous membranes are moist.   Eyes:      Extraocular Movements: Extraocular movements intact.      Conjunctiva/sclera: Conjunctivae normal.   Cardiovascular:      Rate and Rhythm: Normal rate and regular rhythm.      Pulses: Normal pulses.      Heart sounds: Normal heart sounds. No murmur heard.     Comments: Pacemaker palpated on the right upper chest.  Pulmonary:      Effort: Pulmonary effort is normal.      Breath sounds: Rales present. No wheezing.      Comments: Bilateral basal crackles heard.  Abdominal:      General: Bowel sounds are normal.      Palpations: Abdomen is soft.      Tenderness: There is no abdominal tenderness. There is no guarding or rebound. "   Musculoskeletal:         General: Normal range of motion.      Cervical back: Neck supple.      Right lower leg: Edema present.      Left lower leg: Edema present.      Comments: 1+ pitting edema noted bilaterally.  Healing skin ulcer noted on the left anterior leg.   Skin:     General: Skin is warm.      Findings: No erythema or rash.   Neurological:      General: No focal deficit present.      Mental Status: She is alert and oriented to person, place, and time. Mental status is at baseline.   Psychiatric:         Mood and Affect: Mood normal.         Behavior: Behavior normal.       Laboratory data:    I have reviewed the labs done in the emergency room.    Results from last 7 days   Lab Units 04/12/23  1439   SODIUM mmol/L 143   POTASSIUM mmol/L 4.4   CHLORIDE mmol/L 98   CO2 mmol/L 34.4*   BUN mg/dL 24*   CREATININE mg/dL 0.83   CALCIUM mg/dL 9.5   BILIRUBIN mg/dL 1.0   ALK PHOS U/L 91   ALT (SGPT) U/L 18   AST (SGOT) U/L 49*   GLUCOSE mg/dL 90     Results from last 7 days   Lab Units 04/12/23  1439   WBC 10*3/mm3 6.65   HEMOGLOBIN g/dL 11.8*   HEMATOCRIT % 38.0   PLATELETS 10*3/mm3 122*         Results from last 7 days   Lab Units 04/12/23  1439   HSTROP T ng/L 23*     Results from last 7 days   Lab Units 04/12/23  1439   PROBNP pg/mL 9,424.0*     EKG:      EKG done in the emergency room was reviewed by me.  It shows ventricularly paced rhythm at 70 bpm.  Broad QRS complexes noted with no significant ST-T changes.    Radiology:    XR Chest 1 View    Result Date: 4/12/2023  CLINICAL INDICATION:  CHF/COPD Protocol  EXAMINATION TECHNIQUE: XR CHEST 1 VW-  COMPARISON: Radiographs 01/09/2023.  FINDINGS: Right chest wall subclavian approach right atrial biventricular cardiac pacer in place. Mild cardiomegaly. Aortic atherosclerosis. Perihilar vascular engorgement and prominent peribronchovascular interstitial markings. Small bilateral pleural effusions. No pneumothorax. Moderate to severe emphysema.      Findings  are concerning for mild congestive heart failure.  Images personally reviewed, interpreted and dictated by JIGAR Jin.       This report was signed and finalized on 4/12/2023 3:14 PM by JIGAR Jin.    Assessment:    1. Suspected acute on chronic diastolic heart failure, POA.  2. Suspected coronary artery disease.  3. Hypertensive heart disease.  4. COPD, no evidence of exacerbation.  5. Chronic hypoxic respiratory failure on home oxygen.  6. Sick sinus syndrome status post pacemaker.  7. Essential hypertension.    Plan:    Patient is currently being admitted to the medical floor with telemetry as an inpatient.  Due to her congestive heart failure, anticipate 2 midnights of hospital stay for IV diuretic therapy.    Congestive heart failure.  - Patient has already received IV Lasix 40 mg in the emergency room and we will continue her on daily Lasix from tomorrow.  - She has already started having good diuresis and is feeling better.  - Continue home dose of carvedilol.    Suspected coronary artery disease.  - Patient did have some chest tightness yesterday with sweating.  - High-sensitivity troponin after 24 hours is not significantly elevated.  - We will place her on low-dose aspirin and continue carvedilol and pravastatin.  - We will repeat troponin level in the morning tomorrow.  - We will obtain 2D echocardiogram.    COPD.  - Continue DuoNeb and budesonide.  - Continue nasal cannula oxygen at 3 L.    I have discussed advanced directives with the patient and she wants to be full code.  She does not have a living will.  I have discussed the patient's condition and treatment plan with her sister Brittny who is at the bedside.    Risk Assessment: Moderate  DVT Prophylaxis: Enoxaparin  Code Status: Full  Diet: Cardiac    Advance Care Planning      ACP discussion was held with the patient during this visit. Patient does not have an advance directive, information provided.         Dawood Ruiz  MD  04/12/23  16:56 EDT    Dictated utilizing Dragon dictation.

## 2023-04-13 ENCOUNTER — APPOINTMENT (OUTPATIENT)
Dept: CARDIOLOGY | Facility: HOSPITAL | Age: 83
End: 2023-04-13
Payer: MEDICARE

## 2023-04-13 LAB
ANION GAP SERPL CALCULATED.3IONS-SCNC: 9.7 MMOL/L (ref 5–15)
BH CV ECHO MEAS - AO ROOT DIAM: 3 CM
BH CV ECHO MEAS - EDV(CUBED): 105.8 ML
BH CV ECHO MEAS - EDV(MOD-SP2): 77.3 ML
BH CV ECHO MEAS - EDV(MOD-SP4): 80.8 ML
BH CV ECHO MEAS - EF(MOD-BP): 62.7 %
BH CV ECHO MEAS - EF(MOD-SP2): 66.9 %
BH CV ECHO MEAS - EF(MOD-SP4): 59 %
BH CV ECHO MEAS - ESV(CUBED): 58.4 ML
BH CV ECHO MEAS - ESV(MOD-SP2): 25.6 ML
BH CV ECHO MEAS - ESV(MOD-SP4): 33.1 ML
BH CV ECHO MEAS - FS: 18 %
BH CV ECHO MEAS - IVS/LVPW: 0.52 CM
BH CV ECHO MEAS - IVSD: 1.3 CM
BH CV ECHO MEAS - LA DIMENSION: 4.9 CM
BH CV ECHO MEAS - LAT PEAK E' VEL: 7.2 CM/SEC
BH CV ECHO MEAS - LV MASS(C)D: 162.8 GRAMS
BH CV ECHO MEAS - LVIDD: 4.7 CM
BH CV ECHO MEAS - LVIDS: 3.9 CM
BH CV ECHO MEAS - LVOT AREA: 1.99 CM2
BH CV ECHO MEAS - LVOT DIAM: 1.59 CM
BH CV ECHO MEAS - LVPWD: 1.3 CM
BH CV ECHO MEAS - MED PEAK E' VEL: 5.8 CM/SEC
BH CV ECHO MEAS - MV DEC SLOPE: 820 CM/SEC2
BH CV ECHO MEAS - MV DEC TIME: 0.14 MSEC
BH CV ECHO MEAS - MV E MAX VEL: 114 CM/SEC
BH CV ECHO MEAS - MV MAX PG: 12.3 MMHG
BH CV ECHO MEAS - MV MEAN PG: 3 MMHG
BH CV ECHO MEAS - MV V2 VTI: 31.5 CM
BH CV ECHO MEAS - PA ACC TIME: 0.13 SEC
BH CV ECHO MEAS - PA PR(ACCEL): 21.9 MMHG
BH CV ECHO MEAS - PA V2 MAX: 97.6 CM/SEC
BH CV ECHO MEAS - RAP SYSTOLE: 8 MMHG
BH CV ECHO MEAS - RV MAX PG: 1.09 MMHG
BH CV ECHO MEAS - RV V1 MAX: 52.2 CM/SEC
BH CV ECHO MEAS - RV V1 VTI: 11.6 CM
BH CV ECHO MEAS - RVSP: 55 MMHG
BH CV ECHO MEAS - SV(MOD-SP2): 51.7 ML
BH CV ECHO MEAS - SV(MOD-SP4): 47.7 ML
BH CV ECHO MEAS - TAPSE (>1.6): 1.11 CM
BH CV ECHO MEAS - TR MAX PG: 47 MMHG
BH CV ECHO MEAS - TR MAX VEL: 342.5 CM/SEC
BH CV ECHO MEASUREMENTS AVERAGE E/E' RATIO: 17.54
BH CV XLRA - RV BASE: 3.9 CM
BH CV XLRA - RV LENGTH: 6.8 CM
BH CV XLRA - RV MID: 2.08 CM
BH CV XLRA - TDI S': 7.7 CM/SEC
BUN SERPL-MCNC: 26 MG/DL (ref 8–23)
BUN/CREAT SERPL: 31.7 (ref 7–25)
CALCIUM SPEC-SCNC: 8.5 MG/DL (ref 8.6–10.5)
CHLORIDE SERPL-SCNC: 97 MMOL/L (ref 98–107)
CO2 SERPL-SCNC: 36.3 MMOL/L (ref 22–29)
CREAT SERPL-MCNC: 0.82 MG/DL (ref 0.57–1)
DEPRECATED RDW RBC AUTO: 49.6 FL (ref 37–54)
EGFRCR SERPLBLD CKD-EPI 2021: 70.6 ML/MIN/1.73
ERYTHROCYTE [DISTWIDTH] IN BLOOD BY AUTOMATED COUNT: 13.6 % (ref 12.3–15.4)
GEN 5 2HR TROPONIN T REFLEX: 25 NG/L
GLUCOSE SERPL-MCNC: 142 MG/DL (ref 65–99)
HCT VFR BLD AUTO: 34.5 % (ref 34–46.6)
HGB BLD-MCNC: 10.8 G/DL (ref 12–15.9)
LEFT ATRIUM VOLUME INDEX: 49.6 ML/M2
LV EF 2D ECHO EST: 62 %
MAGNESIUM SERPL-MCNC: 1.5 MG/DL (ref 1.6–2.4)
MAXIMAL PREDICTED HEART RATE: 136 BPM
MCH RBC QN AUTO: 30.9 PG (ref 26.6–33)
MCHC RBC AUTO-ENTMCNC: 31.3 G/DL (ref 31.5–35.7)
MCV RBC AUTO: 98.9 FL (ref 79–97)
PLATELET # BLD AUTO: 121 10*3/MM3 (ref 140–450)
PMV BLD AUTO: 11.3 FL (ref 6–12)
POTASSIUM SERPL-SCNC: 3.5 MMOL/L (ref 3.5–5.2)
RBC # BLD AUTO: 3.49 10*6/MM3 (ref 3.77–5.28)
SODIUM SERPL-SCNC: 143 MMOL/L (ref 136–145)
STRESS TARGET HR: 116 BPM
TROPONIN T DELTA: -1 NG/L
TROPONIN T SERPL HS-MCNC: 26 NG/L
TSH SERPL DL<=0.05 MIU/L-ACNC: 0.76 UIU/ML (ref 0.27–4.2)
WBC NRBC COR # BLD: 3.44 10*3/MM3 (ref 3.4–10.8)

## 2023-04-13 PROCEDURE — 84443 ASSAY THYROID STIM HORMONE: CPT | Performed by: INTERNAL MEDICINE

## 2023-04-13 PROCEDURE — 85027 COMPLETE CBC AUTOMATED: CPT | Performed by: INTERNAL MEDICINE

## 2023-04-13 PROCEDURE — 93306 TTE W/DOPPLER COMPLETE: CPT

## 2023-04-13 PROCEDURE — 94761 N-INVAS EAR/PLS OXIMETRY MLT: CPT

## 2023-04-13 PROCEDURE — 96372 THER/PROPH/DIAG INJ SC/IM: CPT

## 2023-04-13 PROCEDURE — 84484 ASSAY OF TROPONIN QUANT: CPT | Performed by: INTERNAL MEDICINE

## 2023-04-13 PROCEDURE — 25010000002 ENOXAPARIN PER 10 MG: Performed by: INTERNAL MEDICINE

## 2023-04-13 PROCEDURE — 94799 UNLISTED PULMONARY SVC/PX: CPT

## 2023-04-13 PROCEDURE — G0378 HOSPITAL OBSERVATION PER HR: HCPCS

## 2023-04-13 PROCEDURE — 25010000002 MAGNESIUM SULFATE IN D5W 1G/100ML (PREMIX) 1-5 GM/100ML-% SOLUTION: Performed by: INTERNAL MEDICINE

## 2023-04-13 PROCEDURE — 83735 ASSAY OF MAGNESIUM: CPT | Performed by: INTERNAL MEDICINE

## 2023-04-13 PROCEDURE — 94664 DEMO&/EVAL PT USE INHALER: CPT

## 2023-04-13 PROCEDURE — 97161 PT EVAL LOW COMPLEX 20 MIN: CPT

## 2023-04-13 PROCEDURE — 25010000002 FUROSEMIDE PER 20 MG: Performed by: INTERNAL MEDICINE

## 2023-04-13 PROCEDURE — 80048 BASIC METABOLIC PNL TOTAL CA: CPT | Performed by: INTERNAL MEDICINE

## 2023-04-13 PROCEDURE — 96376 TX/PRO/DX INJ SAME DRUG ADON: CPT

## 2023-04-13 RX ORDER — MAGNESIUM SULFATE 1 G/100ML
1 INJECTION INTRAVENOUS ONCE
Status: COMPLETED | OUTPATIENT
Start: 2023-04-13 | End: 2023-04-13

## 2023-04-13 RX ADMIN — SENNOSIDES AND DOCUSATE SODIUM 2 TABLET: 50; 8.6 TABLET ORAL at 08:41

## 2023-04-13 RX ADMIN — ASPIRIN 81 MG: 81 TABLET, COATED ORAL at 08:41

## 2023-04-13 RX ADMIN — PANTOPRAZOLE SODIUM 40 MG: 40 TABLET, DELAYED RELEASE ORAL at 06:23

## 2023-04-13 RX ADMIN — BUDESONIDE 0.5 MG: 0.5 INHALANT RESPIRATORY (INHALATION) at 19:17

## 2023-04-13 RX ADMIN — MAGNESIUM SULFATE HEPTAHYDRATE 1 G: 1 INJECTION, SOLUTION INTRAVENOUS at 08:41

## 2023-04-13 RX ADMIN — GABAPENTIN 600 MG: 300 CAPSULE ORAL at 21:36

## 2023-04-13 RX ADMIN — GABAPENTIN 600 MG: 300 CAPSULE ORAL at 06:23

## 2023-04-13 RX ADMIN — HYDROCODONE BITARTRATE AND ACETAMINOPHEN 1 TABLET: 5; 325 TABLET ORAL at 04:11

## 2023-04-13 RX ADMIN — FUROSEMIDE 40 MG: 10 INJECTION, SOLUTION INTRAMUSCULAR; INTRAVENOUS at 08:41

## 2023-04-13 RX ADMIN — GABAPENTIN 600 MG: 300 CAPSULE ORAL at 13:42

## 2023-04-13 RX ADMIN — BUDESONIDE 0.5 MG: 0.5 INHALANT RESPIRATORY (INHALATION) at 06:53

## 2023-04-13 RX ADMIN — PRAVASTATIN SODIUM 80 MG: 20 TABLET ORAL at 21:35

## 2023-04-13 RX ADMIN — CARVEDILOL 25 MG: 25 TABLET, FILM COATED ORAL at 17:24

## 2023-04-13 RX ADMIN — Medication 3 ML: at 21:36

## 2023-04-13 RX ADMIN — CARVEDILOL 25 MG: 25 TABLET, FILM COATED ORAL at 08:41

## 2023-04-13 RX ADMIN — HYDROCODONE BITARTRATE AND ACETAMINOPHEN 1 TABLET: 5; 325 TABLET ORAL at 21:36

## 2023-04-13 RX ADMIN — ENOXAPARIN SODIUM 40 MG: 100 INJECTION SUBCUTANEOUS at 17:24

## 2023-04-13 RX ADMIN — Medication 3 ML: at 08:42

## 2023-04-13 NOTE — PROGRESS NOTES
Adult Nutrition  Assessment/PES    Patient Name:  Lesley Calero  YOB: 1940  MRN: [id number]  Admit Date:  4/12/2023    Assessment Date:  4/13/2023    Comments:      1. Continue current diet order as medically appropriate.   2. Encourage PO intake to meet 50% of estimated needs.   3. Will order Boost Plus BID.   4. Encourage intake of supplement ordered.     RD to follow-up and available PRN.      Reason for Assessment     Row Name 04/13/23 1027          Reason for Assessment    Reason For Assessment identified at risk by screening criteria;per organizational policy     Diagnosis pulmonary disease;cardiac disease     Identified At Risk by Screening Criteria MST SCORE 2+;difficulty chewing/swallowing                  Labs/Tests/Procedures/Meds     Row Name 04/13/23 1027          Labs/Procedures/Meds    Lab Results Reviewed reviewed, pertinent     Lab Results Comments High: BUN  Low: Mg++        Medications    Pertinent Medications Reviewed reviewed, pertinent     Pertinent Medications Comments lovenox, protonix, docusate, lasix                Physical Findings     Row Name 04/13/23 1028          Physical Findings    Overall Physical Appearance normal wt for age                Estimated/Assessed Needs - Anthropometrics     Row Name 04/13/23 1028 04/13/23 0444       Anthropometrics    Weight -- 57.6 kg (126 lb 15.8 oz)    Height for Calculation 1.524 m (5') --    Weight for Calculation 57.2 kg (126 lb) --       Estimated/Assessed Needs    Additional Documentation Estimated Calorie Needs (Group);KCAL/KG (Group);Protein Requirements (Group);Fluid Requirements (Group) --       Estimated Calorie Needs    Estimated Calorie Requirement (kcal/day) 1714 --       KCAL/KG    KCAL/KG 25 Kcal/Kg (kcal);30 Kcal/Kg (kcal) --    25 Kcal/Kg (kcal) 1428.825 --    30 Kcal/Kg (kcal) 1714.59 --       Protein Requirements    Weight Used For Protein Calculations 57.2 kg (126 lb) --    Est Protein Requirement Amount (gms/kg)  1.2 gm protein --    Estimated Protein Requirements (gms/day) 68.58 --       Fluid Requirements    Fluid Requirements (mL/day) 1714  1mL/kcal --    RDA Method (mL) 1714 --               Nutrition Prescription Ordered     Row Name 04/13/23 1029          Nutrition Prescription PO    Current PO Diet Regular     Fluid Consistency Thin     Common Modifiers Cardiac                Evaluation of Received Nutrient/Fluid Intake     Row Name 04/13/23 1029          Intake Assessment    Energy/Calorie Requirement Assessment meeting needs     Protein Requirement Assessment meeting needs        PO Evaluation    Number of Days PO Intake Evaluated 1 day     Number of Meals 1     % PO Intake 50                   Problem/Interventions:   Problem 1     Row Name 04/13/23 1030          Nutrition Diagnoses Problem 1    Problem 1 Increased Nutrient Needs     Macronutrient Kcal;Protein     Etiology (related to) Medical Diagnosis     Pulmonary/Critical Care COPD     Signs/Symptoms (evidenced by) Other (comment)  need for oral nutrition supplement to meet increased estimated needs.                      Intervention Goal     Row Name 04/13/23 1038          Intervention Goal    General Maintain nutrition;Improved nutrition related lab(s);Reduce/improve symptoms;Meet nutritional needs for age/condition;Disease management/therapy     PO Establish PO;Tolerate PO;Increase intake;Maintain intake;Continue positive trend;Meet estimated needs     Weight Maintain weight                Nutrition Intervention     Row Name 04/13/23 1038          Nutrition Intervention    RD/Tech Action Follow Tx progress;Care plan reviewd;Await begin PO;Encourage intake;Recommend/ordered     Recommended/Ordered Supplement                Nutrition Prescription     Row Name 04/13/23 1039          Nutrition Prescription PO    PO Prescription Begin/change supplement     Supplement Boost Plus     Supplement Frequency 2 times a day     New PO Prescription Ordered? Yes         Other Orders    Obtain Weight Daily     Obtain Weight Ordered? No, recommended     Supplement Vitamin mineral supplement     Supplement Ordered? No, recommended     Labs Mg++;Na+;K+     Labs Ordered? No, recommended                Education/Evaluation     Row Name 04/13/23 1039          Education    Education Will Instruct as appropriate        Monitor/Evaluation    Monitor Per protocol;PO intake;Supplement intake;Pertinent labs;Weight;Skin status                 Electronically signed by:  Glen Hutchins RD  04/13/23 10:39 EDT

## 2023-04-13 NOTE — THERAPY EVALUATION
Patient Name: Lesley Calero  : 1940    MRN: [id number]                              Today's Date: 2023       Admit Date: 2023    Visit Dx:     ICD-10-CM ICD-9-CM   1. Acute on chronic congestive heart failure, unspecified heart failure type  I50.9 428.0   2. Hypoxia  R09.02 799.02   3. History of COPD  Z87.09 V12.69     Patient Active Problem List   Diagnosis   • Knee strain, right, initial encounter   • Chronic pain of right knee   • Acute on chronic congestive heart failure, unspecified heart failure type   • COPD (chronic obstructive pulmonary disease)   • Essential hypertension   • Status post placement of cardiac pacemaker     Past Medical History:   Diagnosis Date   • CHF (congestive heart failure)    • COPD (chronic obstructive pulmonary disease)    • Coronary artery disease    • Hyperlipidemia    • Hypertension    • Impaired functional mobility, balance, gait, and endurance    • Myocardial infarction    • Oxygen deficit    • Pneumonia    • RLS (restless legs syndrome)    • Seizures      Past Surgical History:   Procedure Laterality Date   • CAROTID ENDARTERECTOMY Left    • CORONARY ANGIOPLASTY WITH STENT PLACEMENT      x2   • LUNG SURGERY Left    • PACEMAKER IMPLANTATION        General Information     Row Name 23 1421          Physical Therapy Time and Intention    Document Type evaluation  -JR     Mode of Treatment physical therapy  -JR     Row Name 23 1421          General Information    Patient Profile Reviewed yes  -JR     Prior Level of Function independent:;all household mobility  -JR     Existing Precautions/Restrictions fall;oxygen therapy device and L/min  -JR     Barriers to Rehab medically complex  -JR     Row Name 23 1421          Living Environment    People in Home spouse  -JR     Row Name 23 1421          Home Main Entrance    Number of Stairs, Main Entrance four  -JR     Stair Railings, Main Entrance railings on both sides of stairs  -JR     Row  Name 04/13/23 1421          Stairs Within Home, Primary    Number of Stairs, Within Home, Primary none  -JR     Row Name 04/13/23 1421          Cognition    Orientation Status (Cognition) oriented x 4  -JR     Row Name 04/13/23 1421          Safety Issues, Functional Mobility    Safety Issues Affecting Function (Mobility) safety precautions follow-through/compliance;awareness of need for assistance;insight into deficits/self-awareness  -JR     Impairments Affecting Function (Mobility) strength;balance;endurance/activity tolerance;postural/trunk control;shortness of breath  -JR           User Key  (r) = Recorded By, (t) = Taken By, (c) = Cosigned By    Initials Name Provider Type    Susan Clayton PT Physical Therapist               Mobility     Row Name 04/13/23 1421          Bed Mobility    Bed Mobility supine-sit;sit-supine  -JR     Supine-Sit [place] (Bed Mobility) contact guard  -JR     Sit-Supine [place] (Bed Mobility) contact guard  -JR     Assistive Device (Bed Mobility) head of bed elevated;bed rails  -     Row Name 04/13/23 1421          Sit-Stand Transfer    Sit-Stand [place] (Transfers) contact guard  -JR     Assistive Device (Sit-Stand Transfers) --  gait belt and bed rail  -     Row Name 04/13/23 1421          Gait/Stairs (Locomotion)    [place] Level (Gait) contact guard  gait belt & bed rail  -     Distance in Feet (Gait) marched in place x 20 bilaterally  -     Deviations/Abnormal Patterns (Gait) base of support, narrow;janeen decreased  -           User Key  (r) = Recorded By, (t) = Taken By, (c) = Cosigned By    Initials Name Provider Type    Susan Clayton PT Physical Therapist               Obj/Interventions     Row Name 04/13/23 1421          Range of Motion Comprehensive    General Range of Motion no range of motion deficits identified  -     Row Name 04/13/23 1421          Strength Comprehensive (MMT)    Comment, General Manual Muscle Testing (MMT)  Assessment Grossly 3+/5 throughout  -     Row Name 04/13/23 1421          Balance    Balance Assessment sitting static balance;sitting dynamic balance;sit to stand dynamic balance;standing static balance;standing dynamic balance  -JR     Static Sitting Balance supervision  -JR     Dynamic Sitting Balance supervision;contact guard  -JR     Position, Sitting Balance unsupported;sitting edge of bed  -JR     Sit to Stand Dynamic Balance contact guard  -JR     Static Standing Balance contact guard  -JR     Dynamic Standing Balance contact guard  -JR     Position/Device Used, Standing Balance other (see comments)  gait belt and hand rail  -JR           User Key  (r) = Recorded By, (t) = Taken By, (c) = Cosigned By    Initials Name Provider Type    Susan Clayton, PT Physical Therapist               Goals/Plan     Row Name 04/13/23 1421          Bed Mobility Goal 1 (PT)    Activity/Assistive Device (Bed Mobility Goal 1, PT) bed mobility activities, all  -JR     [place] Level/Cues Needed (Bed Mobility Goal 1, PT) modified independence  -JR     Time Frame (Bed Mobility Goal 1, PT) short term goal (STG)  -JR     Progress/Outcomes (Bed Mobility Goal 1, PT) goal ongoing  -     Row Name 04/13/23 1421          Transfer Goal 1 (PT)    Activity/Assistive Device (Transfer Goal 1, PT) sit-to-stand/stand-to-sit;bed-to-chair/chair-to-bed;walker, rolling  -JR     [place] Level/Cues Needed (Transfer Goal 1, PT) supervision required  -JR     Time Frame (Transfer Goal 1, PT) short term goal (STG)  -JR     Progress/Outcome (Transfer Goal 1, PT) goal ongoing  -     Row Name 04/13/23 1421          Gait Training Goal 1 (PT)    Activity/Assistive Device (Gait Training Goal 1, PT) gait (walking locomotion);assistive device use;walker, rolling  -JR     [place] Level (Gait Training Goal 1, PT) supervision required  -JR     Distance (Gait Training Goal 1, PT) 100  -JR     Time Frame (Gait Training Goal 1, PT) long term goal  (LTG)  -JR     Strategies/Barriers (Gait Training Goal 1, PT) with good pacing and minimal SOA  -JR     Progress/Outcome (Gait Training Goal 1, PT) goal ongoing  -JR     Row Name 04/13/23 1421          Patient Education Goal (PT)    Activity (Patient Education Goal, PT) Perform BLE exercises x 20  -JR     [place]/Cues/Accuracy (Memory Goal 2, PT) demonstrates adequately  -JR     Time Frame (Patient Education Goal, PT) 3 days  -JR     Barriers (Patient Education Goal, PT) with good breathing coordination  -JR     Progress/Outcome (Patient Education Goal, PT) goal ongoing  -JR     Row Name 04/13/23 1421          Therapy Assessment/Plan (PT)    Planned Therapy Interventions (PT) strengthening;balance training;bed mobility training;transfer training;gait training;home exercise program;patient/family education  -JR           User Key  (r) = Recorded By, (t) = Taken By, (c) = Cosigned By    Initials Name Provider Type    JR Susan Crum, PT Physical Therapist               Clinical Impression     [facility] Name 04/13/23 1421          Pain    Pretreatment Pain Rating 0/10 - no pain  -JR     Posttreatment Pain Rating 0/10 - no pain  -JR     Row Name 04/13/23 1421          Plan of Care Review    Plan of Care Reviewed With patient  -JR     Progress no change  -JR     Outcome Evaluation Patient participated well in PT evaluation and demonstrates decreased strength, activity tolerance, balance, transfers and gait.  She requires contact guard assistance for all mobility.  She is able to perform marching in place x 20 reps per leg while holding onto the bed rail.  She is instructed on pursed lip breathing and pacing.  Patient is expected to benefit from additional PT services while hospitalized and upon discharge to home with home health care services.  -JR     Row Name 04/13/23 1421          Therapy Assessment/Plan (PT)    Patient/Family Therapy Goals Statement (PT) Patient wants to go home  -JR     Rehab Potential (PT) good, to  achieve stated therapy goals  -     Criteria for Skilled Interventions Met (PT) yes;meets criteria;skilled treatment is necessary  -     Therapy Frequency (PT) 5 times/wk  -     Row Name 04/13/23 1421          Positioning and Restraints    Pre-Treatment Position in bed  -JR     Post Treatment Position bed  -JR     In Bed fowlers;sitting EOB;call light within reach;with family/caregiver  -           User Key  (r) = Recorded By, (t) = Taken By, (c) = Cosigned By    Initials Name Provider Type    Susan Clayton PT Physical Therapist               Outcome Measures     Row Name 04/13/23 1421          How much help from another person do you currently need...    Turning from your back to your side while in flat bed without using bedrails? 3  -JR     Moving from lying on back to sitting on the side of a flat bed without bedrails? 3  -JR     Moving to and from a bed to a chair (including a wheelchair)? 3  -JR     Standing up from a chair using your arms (e.g., wheelchair, bedside chair)? 3  -JR     Climbing 3-5 steps with a railing? 2  -JR     To walk in hospital room? 3  -JR     AM-PAC 6 Clicks Score (PT) 17  -JR     Highest level of mobility 5 --> Static standing  -JR     Row Name 04/13/23 1421          Functional Assessment    Outcome Measure Options AM-PAC 6 Clicks Basic Mobility (PT)  -           User Key  (r) = Recorded By, (t) = Taken By, (c) = Cosigned By    Initials Name Provider Type    Susan Clayton PT Physical Therapist                             Physical Therapy Education     Title: PT OT SLP Therapies (In Progress)     Topic: Physical Therapy (In Progress)     Point: Mobility training (Done)     Learning Progress Summary           Patient Acceptance, E,TB, VU by  at 4/13/2023 1674    Comment: Role of PT and POC                   Point: Home exercise program (Not Started)     Learner Progress:  Not documented in this visit.          Point: Body mechanics (Not Started)     Learner Progress:   Not documented in this visit.          Point: Precautions (Not Started)     Learner Progress:  Not documented in this visit.                      User Key     Initials Effective Dates Name Provider Type Discipline     03/23/22 -  Susan Crum PT Physical Therapist PT              PT Recommendation and Plan  Planned Therapy Interventions (PT): strengthening, balance training, bed mobility training, transfer training, gait training, home exercise program, patient/family education  Plan of Care Reviewed With: patient  Progress: no change  Outcome Evaluation: Patient participated well in PT evaluation and demonstrates decreased strength, activity tolerance, balance, transfers and gait.  She requires contact guard assistance for all mobility.  She is able to perform marching in place x 20 reps per leg while holding onto the bed rail.  She is instructed on pursed lip breathing and pacing.  Patient is expected to benefit from additional PT services while hospitalized and upon discharge to home with home health care services.     Time Calculation:    PT Charges     Row Name 04/13/23 1630             Time Calculation    Start Time 1421  -JR      PT Received On 04/13/23  -      PT Goal Re-Cert Due Date 04/23/23  -         Untimed Charges    PT Eval/Re-eval Minutes 55  -JR         Total Minutes    Untimed Charges Total Minutes 55  -JR       Total Minutes 55  -JR            User Key  (r) = Recorded By, (t) = Taken By, (c) = Cosigned By    Initials Name Provider Type     Susan Crum PT Physical Therapist              Therapy Charges for Today     Code Description Service Date Service Provider Modifiers Qty    03199704686 HC PT EVAL LOW COMPLEXITY 4 4/13/2023 Susan Crum, PT GP 1          PT G-Codes  Outcome Measure Options: AM-PAC 6 Clicks Basic Mobility (PT)  AM-PAC 6 Clicks Score (PT): 17  PT Discharge Summary  Anticipated Discharge Disposition (PT): home with home health    Susan Crum PT  4/13/2023

## 2023-04-13 NOTE — PLAN OF CARE
Goal Outcome Evaluation:      Daily Care Plan Summary: Heart Failure    Diuretic in use (IV or PO):   IV        Daily weight (up or down):    loss: 1lbs      Output > Intake (yes/no):Yes      O2 Requirements (current, any change?):  4 liters/min via nasal cannula      Symptoms noted with Activity (Respiratory Tolerance, functional state):     soa with movement      Anticipated Discharge Plans:     possible tomorrow

## 2023-04-13 NOTE — CASE MANAGEMENT/SOCIAL WORK
1600 Cm spoke with pt and her sister Maria C at bedside to further discuss HH and services they provide. Pt states she has already been taught exercises and does not feel HH can assist her with needs she has such as chores, laundry etc. Cm suggested [facility] to [facility] what resources they can offer. She is agreeable to doing so and states he has an appt soon. CM discussed advanced directives and pt is interested in ACP consult.

## 2023-04-13 NOTE — PLAN OF CARE
Goal Outcome Evaluation:  Plan of Care Reviewed With: patient        Progress: no change  Outcome Evaluation: Patient participated well in PT evaluation and demonstrates decreased strength, activity tolerance, balance, transfers and gait.  She requires contact guard assistance for all mobility.  She is able to perform marching in place x 20 reps per leg while holding onto the bed rail.  She is instructed on pursed lip breathing and pacing.  Patient is expected to benefit from additional PT services while hospitalized and upon discharge to home with home health care services.

## 2023-04-13 NOTE — PROGRESS NOTES
[facility]    Name:  Lesley Calero   Age:  82 y.o.  Sex:  female  :  1940  MRN:  [id number]   Visit Number:  [id number]  Admission Date:  2023  Date Of Service:  23  Primary Care Physician:  Nj Smith MD     LOS: 1 day :    Chief Complaint:      Follow-up of shortness of breath.    Subjective:    Ms. Calero was seen and examined this morning.  She is currently lying down in the bed and is comfortable at rest.  She states that her shortness of breath has significantly improved.  Her lower extremity swelling also has improved.  She is having good diuresis.  Denies any chest pain.  No significant overnight events.    Hospital Course:    Ms. Calero is an 82-year-old female with history of CHF unknown type, COPD, coronary artery disease, hypertension was brought to the emergency room by EMS with symptoms of increased shortness of breath  of 1 day duration.  Patient lives with her  and is usually independent in daily activities.  She does have COPD and uses 3 L of nasal cannula oxygen.  She does have history of heart disease and used to follow-up with Dr. Erickson in [place].  She has had pacemaker for several years.  She does not remember whether she had stents in her heart but unsure.      In the emergency room, she was afebrile and hemodynamically stable but was requiring 3 L of nasal cannula oxygen to maintain saturations above 90%.  Blood work done in the emergency room showed a proBNP of 9424, high-sensitivity troponin of 23.  Otherwise CMP and CBC was unremarkable.  Hemoglobin was 11.8 and platelets were 122.  EKG showed paced rhythm.  Chest x-ray showed findings concerning for mild congestive heart failure.  Patient was treated with IV Lasix 40 mg, DuoNebs and IV Solu-Medrol in the emergency room.    Patient was admitted to the medical floor with telemetry for suspected heart failure.  She was continued on IV Lasix with improvement in  her symptoms.    Review of Systems:     All systems were reviewed and negative except as mentioned in subjective, assessment and plan.    Vital Signs:    Temp:  [97.3 °F (36.3 °C)-97.9 °F (36.6 °C)] 97.5 °F (36.4 °C)  Heart Rate:  [70-74] 73  Resp:  [18-23] 18  BP: (134-165)/(45-78) 153/51    Intake and output:    I/O last 3 completed shifts:  In: 240 [P.O.:240]  Out: 500 [Urine:500]  I/O this shift:  In: 360 [P.O.:360]  Out: 900 [Urine:900]    Physical Examination:    General Appearance:  Alert and cooperative.   Head:  Atraumatic and normocephalic.   Eyes: Conjunctivae and sclerae normal, no icterus. No pallor.   Throat: No oral lesions, no thrush, oral mucosa moist.   Neck: Supple, trachea midline, no thyromegaly.   Lungs:   Breath sounds heard bilaterally equally.  No wheezing.  Occasional basal crackles. No Pleural rub or bronchial breathing.   Heart:  Normal S1 and S2, no murmur, no gallop, no rub. No JVD.  Pacemaker is palpated on the right upper chest.   Abdomen:   Normal bowel sounds, no masses, no organomegaly. Soft, nontender, nondistended, no rebound tenderness.   Extremities: Supple, no edema, no cyanosis, no clubbing.   Skin: No bleeding or rash.   Neurologic: Alert and oriented x 3. No facial asymmetry. Moves all four limbs. No tremors.      Laboratory results:    Results from last 7 days   Lab Units 04/13/23  0527 04/12/23  1439   SODIUM mmol/L 143 143   POTASSIUM mmol/L 3.5 4.4   CHLORIDE mmol/L 97* 98   CO2 mmol/L 36.3* 34.4*   BUN mg/dL 26* 24*   CREATININE mg/dL 0.82 0.83   CALCIUM mg/dL 8.5* 9.5   BILIRUBIN mg/dL  --  1.0   ALK PHOS U/L  --  91   ALT (SGPT) U/L  --  18   AST (SGOT) U/L  --  49*   GLUCOSE mg/dL 142* 90     Results from last 7 days   Lab Units 04/13/23  0527 04/12/23  1439   WBC 10*3/mm3 3.44 6.65   HEMOGLOBIN g/dL 10.8* 11.8*   HEMATOCRIT % 34.5 38.0   PLATELETS 10*3/mm3 121* 122*         Results from last 7 days   Lab Units 04/13/23  0714 04/13/23  0527 04/12/23  1439   HSTROP T  ng/L 25* 26* 23*     I have reviewed the patient's laboratory results.    Radiology results:    XR Chest 1 View    Result Date: 4/12/2023  CLINICAL INDICATION:  CHF/COPD Protocol  EXAMINATION TECHNIQUE: XR CHEST 1 VW-  COMPARISON: Radiographs 01/09/2023.  FINDINGS: Right chest wall subclavian approach right atrial biventricular cardiac pacer in place. Mild cardiomegaly. Aortic atherosclerosis. Perihilar vascular engorgement and prominent peribronchovascular interstitial markings. Small bilateral pleural effusions. No pneumothorax. Moderate to severe emphysema.      Impression: Findings are concerning for mild congestive heart failure.  Images personally reviewed, interpreted and dictated by JIGAR Jin.       This report was signed and finalized on 4/12/2023 3:14 PM by JIGAR Jin.    I have reviewed the patient's radiology reports.    Medication Review:     I have reviewed the patient's active and prn medications.     Problem List:      Acute on chronic congestive heart failure, unspecified heart failure type    COPD (chronic obstructive pulmonary disease)    Essential hypertension    Status post placement of cardiac pacemaker    Assessment:    1. Suspected acute on chronic diastolic heart failure, POA.  2. Suspected coronary artery disease.  3. Hypertensive heart disease.  4. COPD, no evidence of exacerbation.  5. Chronic hypoxic respiratory failure on home oxygen.  6. Sick sinus syndrome status post pacemaker.  7. Essential hypertension.    Plan:    Congestive heart failure.  - Continue IV Lasix 40 mg daily.  - Continue home dose of carvedilol.  - 2D echocardiogram has been ordered.     Suspected coronary artery disease.  - Patient did have some chest tightness prior to presentation.  - High-sensitivity troponin levels have not been significantly elevated and showed a plateau pattern.  - Continue aspirin, carvedilol and pravastatin.     COPD.  - Continue DuoNeb and budesonide.  - Continue  nasal cannula oxygen at 3 L.    Patient will be started on physical therapy.  Discussed with nursing staff and multidisciplinary team.  If she continues to improve she may be able to go home tomorrow with home health services.    DVT Prophylaxis: Enoxaparin  Code Status: Full  Diet: Cardiac  Discharge Plan: Hopefully, home with home health tomorrow.    Dawood Ruiz MD  04/13/23  12:54 EDT    Dictated utilizing Dragon dictation.

## 2023-04-13 NOTE — CASE MANAGEMENT/SOCIAL WORK
Discharge Planning Assessment  [facility]     Patient Name: Lesley Calero  MRN: [id number]  Today's Date: 4/13/2023    Admit Date: 4/12/2023    Plan: Pt plans to return home with HH services. Sister/Maria C/[phone] will transport. Pt has portable oxygen tank available for transport home. Pt given List of [facility] agencies and will talk to sister (who works for HH agency) to see who she wants for hh. No other needs at this time.   Discharge Needs Assessment     Row Name 04/13/23 1320       Living Environment    People in Home spouse    Name(s) of People in Home Roberto    Current Living Arrangements home    Duration at Residence 26 yrs    Potentially Unsafe Housing Conditions unable to assess    Primary Care Provided by self;spouse/significant other    Provides Primary Care For no one    Caregiving Concerns pt feels she could use more assistance-wants HH at [place]    Family Caregiver if Needed sibling(s)    Family Caregiver Names Maria C [email or web address]-[phone]    Quality of Family Relationships helpful    Able to Return to Prior Arrangements yes    Living Arrangement Comments 4 steps onto porch to enter home entrance       Resource/Environmental Concerns    Resource/Environmental Concerns none    Transportation Concerns none       Food Insecurity    Within the past 12 months, you worried that your food would run out before you got the money to buy more. Never true    Within the past 12 months, the food you bought just didn't last and you didn't have money to get more. Never true       Transition Planning    Patient/Family Anticipates Transition to home with family;home with help/services    Patient/Family Anticipated Services at Transition home health care    Transportation Anticipated family or friend will provide  sister Maria C to transport       Discharge Needs Assessment    Readmission Within the Last 30 Days no previous admission in last 30 days    Equipment Currently Used at Home  rollator;wheelchair;oxygen;bp cuff;commode;cane, straight;nebulizer;pulse ox    Concerns to be Addressed discharge planning    Concerns Comments wants HH at [place]- sister works for hh in [place] and pt will ask her for rec for agencies    Anticipated Changes Related to Illness none    Equipment Needed After Discharge none    Outpatient/Agency/Support Group Needs homecare agency    Provided Post Acute Provider List? Yes    Post Acute Provider List Home Health    Provided Post Acute Provider Quality & Resource List? Yes    Post Acute Provider Quality and Resource List Home Health    Delivered To Patient    Method of Delivery In person    Patient's Choice of Community Agency(s) TBD wants to talk to sister first    Current Discharge Risk chronically ill;physical impairment               Discharge Plan     Row Name 23 9577       Plan    Plan Comments Pt lives at in single level home with spouse. Uses a cane and 3L o2 ([employer])  at baseline. Pt confirms she has O2 concentrator and portable tanks. Also has Nebulizer, BP cuff, pulse ox BSC, Shower chair, grab bars, rollator (used for dr jean) and WC she does not use. Pt states she and he spouse help each other out with bathing and meal prep. Pt drives self to appts. Pt sister is also helpful and will transport her home at [place]. Pt states her spouse is a  and [facility] [place]. CM encouraged her to ask him about any additional benefits he may be entitled to which can provide additional asistance to them in the home. She is agreeable. Pt denies further needs at this time. Wants to talk to her sister before deciding on HH agency.    Row Name 23 4267       Plan    Plan Comments She confirms her name, , phone number and address. Pcp is Nj Smith and she uses [facility]. Pt agreeable to meds to beds.    Row Name 23 1129       Plan    Plan Pt plans to return home with  services. Sister/Maria C/[phone] will transport. Pt has  portable oxygen tank available for transport home. Pt given List of [facility] agencies and will talk to sister (who works for HH agenccy) to see who she wants for hh. No other needs at this time.    Plan Comments CM spoke with pt at bedside to discuss DCP.              Continued Care and Services - Admitted Since 4/12/2023     Durable Medical Equipment Coordination complete.    Service Provider Request Status Selected Services Address Phone Fax Patient Preferred    [facility] [place]  Selected Durable Medical Equipment [address] [facility] [id number] [phone] --       Internal Comment last updated by Rafaela Ramos, APRN 4/13/2023 1214    Provides O2 concentrator and port tanks.                         Expected Discharge Date and Time     Expected Discharge Date Expected Discharge Time    Apr 16, 2023          Demographic Summary     Row Name 04/13/23 1317       General Information    Admission Type observation    Arrived From emergency department    Required Notices Provided Observation Status Notice;Important Message from Medicare    Referral Source admission list    Reason for Consult discharge planning       Contact Information    Permission Granted to Share Info With family/designee    Contact Information Obtained for     Contact Information Comments Roberto Calero- spouse- [phone]               Functional Status     Row Name 04/13/23 1318       Functional Status    Usual Activity Tolerance moderate    Current Activity Tolerance fair    Functional Status Comments uses cane at baseline       Physical Activity    On average, how many days per week do you engage in moderate to strenuous exercise (like a brisk walk)? 0 days    On average, how many minutes do you engage in exercise at this level? 0 min    Number of minutes of exercise per week 0       Assessment of Health Literacy    How often do you have someone help you read hospital materials? Sometimes     How often do you have problems learning about your medical condition because of difficulty understanding written information? Sometimes    How often do you have a problem understanding what is told to you about your medical condition? Sometimes    How confident are you filling out medical forms by yourself? Somewhat    Health Literacy Moderate       Functional Status, IADL    Medications independent    Meal Preparation assistive person    Housekeeping assistive person    Laundry assistive person    Shopping assistive person    IADL Comments pt states she and her  help each other. Pt drives self to appts.               Psychosocial     Row Name 04/13/23 1064       Developmental Stage (Eriksson's)    Developmental Stage Stage 8 (63 years-death/Late Adulthood) Integrity vs. Despair               Abuse/Neglect    No documentation.                Legal    No documentation.                Substance Abuse    No documentation.                Patient Forms     Row Name 04/13/23 7619       Patient Forms    Provider Choice List Delivered    Delivered to Patient    Method of delivery In person    Important Message from Medicare (IMM) Delivered    Delivered to Patient    Method of delivery In person    Patient Observation Letter Delivered    Delivered to Patient    Method of delivery In person                  Rafaela Mandel, APRN

## 2023-04-14 ENCOUNTER — READMISSION MANAGEMENT (OUTPATIENT)
Dept: CALL CENTER | Facility: HOSPITAL | Age: 83
End: 2023-04-14
Payer: MEDICARE

## 2023-04-14 VITALS
WEIGHT: 114.86 LBS | HEART RATE: 75 BPM | SYSTOLIC BLOOD PRESSURE: 137 MMHG | DIASTOLIC BLOOD PRESSURE: 63 MMHG | HEIGHT: 60 IN | RESPIRATION RATE: 18 BRPM | OXYGEN SATURATION: 92 % | TEMPERATURE: 97.5 F | BODY MASS INDEX: 22.55 KG/M2

## 2023-04-14 LAB
ANION GAP SERPL CALCULATED.3IONS-SCNC: 3.2 MMOL/L (ref 5–15)
BUN SERPL-MCNC: 23 MG/DL (ref 8–23)
BUN/CREAT SERPL: 29.5 (ref 7–25)
CALCIUM SPEC-SCNC: 8.8 MG/DL (ref 8.6–10.5)
CHLORIDE SERPL-SCNC: 96 MMOL/L (ref 98–107)
CO2 SERPL-SCNC: 42.8 MMOL/L (ref 22–29)
CREAT SERPL-MCNC: 0.78 MG/DL (ref 0.57–1)
EGFRCR SERPLBLD CKD-EPI 2021: 75 ML/MIN/1.73
GLUCOSE SERPL-MCNC: 106 MG/DL (ref 65–99)
MAGNESIUM SERPL-MCNC: 1.8 MG/DL (ref 1.6–2.4)
POTASSIUM SERPL-SCNC: 3.3 MMOL/L (ref 3.5–5.2)
SODIUM SERPL-SCNC: 142 MMOL/L (ref 136–145)

## 2023-04-14 PROCEDURE — 83735 ASSAY OF MAGNESIUM: CPT | Performed by: INTERNAL MEDICINE

## 2023-04-14 PROCEDURE — 94664 DEMO&/EVAL PT USE INHALER: CPT

## 2023-04-14 PROCEDURE — 94799 UNLISTED PULMONARY SVC/PX: CPT

## 2023-04-14 PROCEDURE — 80048 BASIC METABOLIC PNL TOTAL CA: CPT | Performed by: INTERNAL MEDICINE

## 2023-04-14 PROCEDURE — G0378 HOSPITAL OBSERVATION PER HR: HCPCS

## 2023-04-14 RX ORDER — IPRATROPIUM BROMIDE AND ALBUTEROL SULFATE 2.5; .5 MG/3ML; MG/3ML
3 SOLUTION RESPIRATORY (INHALATION) EVERY 4 HOURS PRN
Qty: 360 ML | Refills: 0 | Status: SHIPPED | OUTPATIENT
Start: 2023-04-14

## 2023-04-14 RX ORDER — ASPIRIN 81 MG/1
81 TABLET ORAL DAILY
Qty: 30 TABLET | Refills: 0 | Status: SHIPPED | OUTPATIENT
Start: 2023-04-14

## 2023-04-14 RX ADMIN — GABAPENTIN 600 MG: 300 CAPSULE ORAL at 06:52

## 2023-04-14 RX ADMIN — BUDESONIDE 0.5 MG: 0.5 INHALANT RESPIRATORY (INHALATION) at 06:53

## 2023-04-14 RX ADMIN — ASPIRIN 81 MG: 81 TABLET, COATED ORAL at 09:51

## 2023-04-14 RX ADMIN — PANTOPRAZOLE SODIUM 40 MG: 40 TABLET, DELAYED RELEASE ORAL at 06:52

## 2023-04-14 RX ADMIN — Medication 3 ML: at 09:51

## 2023-04-14 RX ADMIN — HYDROCODONE BITARTRATE AND ACETAMINOPHEN 1 TABLET: 5; 325 TABLET ORAL at 06:52

## 2023-04-14 RX ADMIN — CARVEDILOL 25 MG: 25 TABLET, FILM COATED ORAL at 09:51

## 2023-04-14 NOTE — PLAN OF CARE
Goal Outcome Evaluation:               VSS on baseline o2. Adequate UOP this shift. Prn meds given for pain in BLE. Will continue to monitor.   Daily Care Plan Summary: Heart Failure    Diuretic in use (IV or PO):   IV        Daily weight (up or down):    loss: stayed same as yesterday      Output > Intake (yes/no):Yes      O2 Requirements (current, any change?):  3 liters/min via nasal cannula      Symptoms noted with Activity (Respiratory Tolerance, functional state):     SOA improved      Anticipated Discharge Plans:     Home with home health

## 2023-04-14 NOTE — CASE MANAGEMENT/SOCIAL WORK
Case Management Discharge Note           Provided Post Acute Provider List?: Yes  Post Acute Provider List: Home Health  Provided Post Acute Provider Quality & Resource List?: Yes  Post Acute Provider Quality and Resource List: Home Health  Delivered To: Patient  Method of Delivery: In person    Selected Continued Care - Discharged on 4/14/2023 Admission date: 4/12/2023 - Discharge disposition: Home or Self Care    Destination    No services have been selected for the patient.              Durable Medical Equipment Coordination complete.    Service Provider Selected Services Address Phone Fax Patient Preferred    [facility] [place] [facility] [address] [facility] [address] [phone] --       Internal Comment last updated by Rafaela Ramos, APRN 4/13/2023 1214    Provides O2 concentrator and port tanks.                     Dialysis/Infusion    No services have been selected for the patient.              Home Medical Care    No services have been selected for the patient.              Therapy    No services have been selected for the patient.              Community Resources    No services have been selected for the patient.              Community & DME    No services have been selected for the patient.                  Transportation Services  Private: Car    Final Discharge Disposition Code: 01 - home or self-care

## 2023-04-14 NOTE — DISCHARGE SUMMARY
[facility]   DISCHARGE SUMMARY      Name:  Lesley Calero   Age:  82 y.o.  Sex:  female  :  1940  MRN:  [id number]   Visit Number:  [id number]    Admission Date:  2023  Date of Discharge:  2023  Primary Care Physician:  Nj Smith MD    Important issues to note:    1.  Patient was treated for acute on chronic diastolic heart failure with IV Lasix with improvement in her symptoms.  She did have borderline elevated troponin levels in the blood to pattern and has been started on low-dose aspirin therapy.  2.  Patient is advised to follow-up with the primary cardiologist Dr. Erickson in 4 to 6 weeks.  3.  No change has been made to her home medication regimen.  4.  Follow-up with primary care physician in 1 week.  Patient declined home health services.  5.  Patient already has home oxygen.    Discharge Diagnoses:     1. Acute on chronic diastolic heart failure, POA.  2. Suspected coronary artery disease.  3. Hypertensive heart disease.  4. COPD, no evidence of exacerbation.  5. Chronic hypoxic respiratory failure on home oxygen.  6. Sick sinus syndrome status post pacemaker.  7. Essential hypertension.    Problem List:     Active Hospital Problems    Diagnosis  POA   • **Acute on chronic congestive heart failure, unspecified heart failure type [I50.9]  Yes   • COPD (chronic obstructive pulmonary disease) [J44.9]  Yes   • Essential hypertension [I10]  Yes   • Status post placement of cardiac pacemaker [Z95.0]  Yes      Resolved Hospital Problems   No resolved problems to display.     Presenting Problem:    Chief Complaint   Patient presents with   • Shortness of Breath      Consults:     Consulting Physician(s)             None          Procedures Performed:    None.    History of presenting illness/Hospital Course:    Ms. Calero is an 82-year-old female with history of CHF unknown type, COPD, coronary artery disease, hypertension was brought to the emergency room by EMS  with symptoms of increased shortness of breath  of 1 day duration.  Patient lives with her  and is usually independent in daily activities.  She does have COPD and uses 3 L of nasal cannula oxygen.  She does have history of heart disease and used to follow-up with Dr. Erickson in [place].  She has had pacemaker for several years.  She does not remember whether she had stents in her heart but unsure.      In the emergency room, she was afebrile and hemodynamically stable but was requiring 3 L of nasal cannula oxygen to maintain saturations above 90%.  Blood work done in the emergency room showed a proBNP of 9424, high-sensitivity troponin of 23.  Otherwise CMP and CBC was unremarkable.  Hemoglobin was 11.8 and platelets were 122.  EKG showed paced rhythm.  Chest x-ray showed findings concerning for mild congestive heart failure.  Patient was treated with IV Lasix 40 mg, DuoNebs and IV Solu-Medrol in the emergency room.     Patient was admitted to the medical floor with telemetry for suspected heart failure.  She was continued on IV Lasix with improvement in her symptoms.  2D echocardiogram done on 4/13/2023 showed a left ventricular ejection fraction of 60% with grade 2 diastolic dysfunction.  Moderate pulmonary hypertension was noted.  Patient was seen by physical therapy and was able to walk around in the room.  Patient lives with her  and [facility] and does not want home health at this time.  She already has home oxygen at 3 L.  She is already on Lasix 40 mg daily at home which she will be continued on.  Due to suspected underlying coronary artery disease, she has been started on low-dose aspirin therapy.  Due to her advanced age, at this time, no further cardiac intervention is planned.  She is advised to follow-up with her cardiologist Dr. Erickson in 4 to 6 weeks.  I tried calling the patient's  Roberto but there was no response.  I subsequently called the patient's  sister Maria C and discussed the patient's discharge plan with her.    Vital Signs:    Temp:  [97.3 °F (36.3 °C)-97.7 °F (36.5 °C)] 97.3 °F (36.3 °C)  Heart Rate:  [70-77] 74  Resp:  [18] 18  BP: (121-153)/(50-69) 153/68    Physical Exam:    General Appearance:  Alert and cooperative.   Head:  Atraumatic and normocephalic.   Eyes: Conjunctivae and sclerae normal, no icterus. No pallor.   Ears:  Ears with no abnormalities noted.   Throat: No oral lesions, no thrush, oral mucosa moist.   Neck: Supple, trachea midline, no thyromegaly.   Back:   No kyphoscoliosis present. No tenderness to palpation.   Lungs:   Breath sounds heard bilaterally equally.  No crackles or wheezing. No Pleural rub or bronchial breathing.   Heart:  Normal S1 and S2, no murmur, no gallop, no rub. No JVD.  Pacemaker is palpated on the right upper chest.   Abdomen:   Normal bowel sounds, no masses, no organomegaly. Soft, nontender, nondistended, no rebound tenderness.   Extremities: Supple, no edema, no cyanosis, no clubbing.   Pulses: Pulses palpable bilaterally.   Skin: No bleeding or rash.   Neurologic: Alert and oriented x 3. No facial asymmetry. Moves all four limbs. No tremors.     Pertinent Lab Results:     Results from last 7 days   Lab Units 04/14/23  0522 04/13/23  0527 04/12/23  1439   SODIUM mmol/L 142 143 143   POTASSIUM mmol/L 3.3* 3.5 4.4   CHLORIDE mmol/L 96* 97* 98   CO2 mmol/L 42.8* 36.3* 34.4*   BUN mg/dL 23 26* 24*   CREATININE mg/dL 0.78 0.82 0.83   CALCIUM mg/dL 8.8 8.5* 9.5   BILIRUBIN mg/dL  --   --  1.0   ALK PHOS U/L  --   --  91   ALT (SGPT) U/L  --   --  18   AST (SGOT) U/L  --   --  49*   GLUCOSE mg/dL 106* 142* 90     Results from last 7 days   Lab Units 04/13/23  0527 04/12/23  1439   WBC 10*3/mm3 3.44 6.65   HEMOGLOBIN g/dL 10.8* 11.8*   HEMATOCRIT % 34.5 38.0   PLATELETS 10*3/mm3 121* 122*         Results from last 7 days   Lab Units 04/13/23  0714 04/13/23  0527 04/12/23  1439   HSTROP T ng/L 25* 26* 23*      Results from last 7 days   Lab Units 04/12/23  1439   PROBNP pg/mL 9,424.0*     Pertinent Radiology Results:    Imaging Results (All)     Procedure Component Value Units Date/Time    XR Chest 1 View [[id number]] Collected: 04/12/23 1512     Updated: 04/12/23 1516    Narrative:      CLINICAL INDICATION:    CHF/COPD Protocol     EXAMINATION TECHNIQUE:   XR CHEST 1 VW-     COMPARISON:  Radiographs 01/09/2023.     FINDINGS:  Right chest wall subclavian approach right atrial biventricular cardiac  pacer in place. Mild cardiomegaly. Aortic atherosclerosis. Perihilar  vascular engorgement and prominent peribronchovascular interstitial  markings. Small bilateral pleural effusions. No pneumothorax. Moderate  to severe emphysema.       Impression:      Findings are concerning for mild congestive heart failure.     Images personally reviewed, interpreted and dictated by JIGAR Jin.      This report was signed and finalized on 4/12/2023 3:14 PM by JIGAR Jin.        Echo:    Results for orders placed during the hospital encounter of 04/12/23    Adult Transthoracic Echo Complete W/ Cont if Necessary Per Protocol    Interpretation Summary  •  Left ventricular ejection fraction appears to be 61 - 65%.  •  Left ventricular wall thickness is consistent with mild concentric hypertrophy.  •  Left ventricular diastolic function is consistent with (grade II w/high LAP) pseudonormalization.  •  Left atrial volume is severely increased.  •  The right atrial cavity is mildly  dilated.  •  Estimated right ventricular systolic pressure from tricuspid regurgitation is moderately elevated (45-55 mmHg).  •  Moderate pulmonary hypertension is present.    Condition on Discharge:      Stable.    Code status during the hospital stay:    Code Status and Medical Interventions:   Ordered at: 04/12/23 7752     Level Of Support Discussed With:    Patient    Health Care Surrogate     Code Status (Patient has no pulse and is not  breathing):    CPR (Attempt to Resuscitate)     Medical Interventions (Patient has pulse or is breathing):    Full Support     Discharge Disposition:    Home or Self Care    Discharge Medications:       Discharge Medications      New Medications      Instructions Start Date   aspirin 81 MG EC tablet   81 mg, Oral, Daily      ipratropium-albuterol 0.5-2.5 mg/3 ml nebulizer  Commonly known as: DUO-NEB   3 mL, Nebulization, Every 4 Hours PRN         Continue These Medications      Instructions Start Date   amLODIPine 5 MG tablet  Commonly known as: NORVASC   5 mg, Oral, Daily      carvedilol 25 MG tablet  Commonly known as: COREG   25 mg, Oral, 2 Times Daily With Meals      furosemide 40 MG tablet  Commonly known as: LASIX   40 mg, Oral, Daily      gabapentin 600 MG tablet  Commonly known as: NEURONTIN   600 mg, Oral, 3 Times Daily      HYDROcodone-acetaminophen 5-325 MG per tablet  Commonly known as: NORCO   1 tablet, Oral, Every 6 Hours PRN      nitroglycerin 0.4 MG SL tablet  Commonly known as: NITROSTAT   0.4 mg, Sublingual, Every 5 Minutes PRN, Take no more than 3 doses in 15 minutes.       omeprazole 40 MG capsule  Commonly known as: priLOSEC   40 mg, Oral, Daily      ondansetron ODT 4 MG disintegrating tablet  Commonly known as: ZOFRAN-ODT   4 mg, Translingual, Every 6 Hours PRN      pravastatin 40 MG tablet  Commonly known as: PRAVACHOL   80 mg, Oral, Daily           Discharge Diet:     Diet Instructions     Diet: Cardiac Diets, Fluid Restriction (240 mL/tray) Diets; Healthy Heart (2-3 Na+); Regular Texture (IDDSI 7); Thin (IDDSI 0); 1500 mL/day      Discharge Diet:  Cardiac Diets  Fluid Restriction (240 mL/tray) Diets       Cardiac Diet: Healthy Heart (2-3 Na+)    Texture: Regular Texture (IDDSI 7)    Fluid Consistency: Thin (IDDSI 0)    Fluid Restriction Diet (240 mL/tray): 1500 mL/day    Diet: Cardiac Diets; Low Sodium (2g)      Discharge Diet: Cardiac Diets    Cardiac Diet: Low Sodium (2g)    Texture:  Regular Texture (IDDSI 7)    Fluid Consistency: Thin (IDDSI 0)        Activity at Discharge:     Activity Instructions     Activity as Tolerated      Measure Weight Daily      Instruct patient on daily weights        Follow-up Appointments:     Contact information for follow-up providers     Nj Smith MD Follow up on 4/18/2023.    Specialty: Family Medicine  Why: @3pm  Contact information:  [address]  [facility] [address]  [phone]             Luciano Erickson MD Follow up in 4 day(s).    Specialty: Interventional Cardiology  Contact information:  [address]  [facility], [facility] [address] [phone]                   Contact information for after-discharge care     Durable Medical Equipment     [facility] .    Service: Durable Medical Equipment  Contact information:  [address] [facility] [address] [phone]                           Test Results Pending at Discharge:    None.       Dawood Ruiz MD  04/14/23  09:33 EDT    Time: I spent 25 minutes on this discharge activity which included: face-to-face encounter with the patient, reviewing the data in the system, coordination of the care with the nursing staff as well as consultants, documentation, and entering orders.     Dictated utilizing Dragon dictation.

## 2023-04-15 NOTE — OUTREACH NOTE
Prep Survey    Flowsheet Row Responses   [facility] facility patient discharged from? [facility]   Is LACE score < 7 ? No   Eligibility Readm Mgmt   Discharge diagnosis Acute on chronic diastolic heart failure   Does the patient have one of the following disease processes/diagnoses(primary or secondary)? CHF   Does the patient have Home health ordered? No   Is there a DME ordered? No   Prep survey completed? Yes          Isamar NORRIS - Registered Nurse

## 2023-04-17 ENCOUNTER — READMISSION MANAGEMENT (OUTPATIENT)
Dept: CALL CENTER | Facility: HOSPITAL | Age: 83
End: 2023-04-17
Payer: MEDICARE

## 2023-04-17 NOTE — OUTREACH NOTE
CHF Week 1 Survey    Flowsheet Row Responses   [facility] patient discharged from? [facility]   Does the patient have one of the following disease processes/diagnoses(primary or secondary)? CHF   CHF Week 1 attempt successful? Yes   Call start time 1704   Call end time 1709   Discharge diagnosis Acute on chronic diastolic heart failure   Meds reviewed with patient/caregiver? Yes   Is the patient having any side effects they believe may be caused by any medication additions or changes? No   Does the patient have all medications ordered at discharge? No   Nursing Interventions No intervention needed   Prescription comments waiting on Duo-neb meds   Is the patient taking all medications as directed (includes completed medication regime)? No   Does the patient have a primary care provider?  Yes   Does the patient have an appointment with their PCP within 7 days of discharge? Yes   Has the patient kept scheduled appointments due by today? Yes   DME comments using nebs at home, wearing 3L 24/7   Pulse Ox monitoring Intermittent   Pulse Ox device source Patient   O2 Sat comments 3L O2-above 90%   O2 Sat: education provided Sat levels, Monitoring frequency, When to seek care   Psychosocial issues? No   Comments Pt reports feeling so much better, SOA improved.    Did the patient receive a copy of their discharge instructions? Yes   Nursing interventions Reviewed instructions with patient   What is the patient's perception of their health status since discharge? Returned to baseline/stable   Nursing interventions Nurse provided patient education   Is the patient able to teach back signs and symptoms of worsening condition? (i.e. weight gain, shortness of air, etc.) Yes   If the patient is a current smoker, are they able to teach back resources for cessation? Not a smoker   Is the patient/caregiver able to teach back the hierarchy of who to call/visit for symptoms/problems? PCP, Specialist, Home health nurse, Urgent Care,  ED, 911 Yes   Is the patient able to teach back Heart Failure Zones? Yes   CHF Zone this Call Green Zone   Green Zone Patient reports doing well, Physical activity level is normal for you, No new or worsening shortness of breath, No chest pain   Green Zone Interventions Meds as directed, Follow up visits planned, Daily weight check    CHF Week 1 call completed? Yes   Revoked No further contact(revokes)-requires comment   Is the patient interested in additional calls from an ambulatory ?  NOTE:  applies to high risk patients requiring additional follow-up. No   Call end time 1181          Nay FAJARDO - Registered Nurse

## 2023-07-17 PROBLEM — H25.11 NUCLEAR SCLEROTIC CATARACT OF RIGHT EYE: Status: ACTIVE | Noted: 2023-07-17

## 2023-07-27 ENCOUNTER — ANESTHESIA EVENT (OUTPATIENT)
Dept: PERIOP | Facility: HOSPITAL | Age: 83
End: 2023-07-27
Payer: MEDICARE

## 2023-07-27 ENCOUNTER — ANESTHESIA (OUTPATIENT)
Dept: PERIOP | Facility: HOSPITAL | Age: 83
End: 2023-07-27
Payer: MEDICARE

## 2023-07-27 ENCOUNTER — HOSPITAL ENCOUNTER (OUTPATIENT)
Facility: HOSPITAL | Age: 83
Setting detail: HOSPITAL OUTPATIENT SURGERY
Discharge: HOME | End: 2023-07-27
Attending: OPHTHALMOLOGY | Admitting: OPHTHALMOLOGY
Payer: MEDICARE

## 2023-07-27 VITALS
OXYGEN SATURATION: 99 % | TEMPERATURE: 96.9 F | WEIGHT: 114 LBS | HEIGHT: 60 IN | SYSTOLIC BLOOD PRESSURE: 169 MMHG | HEART RATE: 72 BPM | BODY MASS INDEX: 22.38 KG/M2 | DIASTOLIC BLOOD PRESSURE: 53 MMHG | RESPIRATION RATE: 16 BRPM

## 2023-07-27 DIAGNOSIS — H25.11 NUCLEAR SCLEROTIC CATARACT OF RIGHT EYE: ICD-10-CM

## 2023-07-27 PROCEDURE — V2632 POST CHMBR INTRAOCULAR LENS: HCPCS | Performed by: OPHTHALMOLOGY

## 2023-07-27 PROCEDURE — 25010000002 PROPOFOL 200 MG/20ML EMULSION: Performed by: NURSE ANESTHETIST, CERTIFIED REGISTERED

## 2023-07-27 DEVICE — LENS MONOFOCAL IQ CC60WF225: Type: IMPLANTABLE DEVICE | Site: POSTERIOR CHAMBER | Status: FUNCTIONAL

## 2023-07-27 RX ORDER — KETAMINE HCL IN NACL, ISO-OSM 100MG/10ML
SYRINGE (ML) INJECTION AS NEEDED
Status: DISCONTINUED | OUTPATIENT
Start: 2023-07-27 | End: 2023-07-27 | Stop reason: SURG

## 2023-07-27 RX ORDER — SODIUM CHLORIDE 9 MG/ML
40 INJECTION, SOLUTION INTRAVENOUS AS NEEDED
Status: DISCONTINUED | OUTPATIENT
Start: 2023-07-27 | End: 2023-07-27 | Stop reason: HOSPADM

## 2023-07-27 RX ORDER — TETRACAINE HYDROCHLORIDE 5 MG/ML
1 SOLUTION OPHTHALMIC SEE ADMIN INSTRUCTIONS
Status: COMPLETED | OUTPATIENT
Start: 2023-07-27 | End: 2023-07-27

## 2023-07-27 RX ORDER — BALANCED SALT SOLUTION 6.4; .75; .48; .3; 3.9; 1.7 MG/ML; MG/ML; MG/ML; MG/ML; MG/ML; MG/ML
SOLUTION OPHTHALMIC AS NEEDED
Status: DISCONTINUED | OUTPATIENT
Start: 2023-07-27 | End: 2023-07-27 | Stop reason: HOSPADM

## 2023-07-27 RX ORDER — DIFLUPREDNATE OPHTHALMIC 0.5 MG/ML
1 EMULSION OPHTHALMIC 4 TIMES DAILY
Start: 2023-07-27

## 2023-07-27 RX ORDER — NEOMYCIN SULFATE, POLYMYXIN B SULFATE, AND DEXAMETHASONE 3.5; 10000; 1 MG/G; [USP'U]/G; MG/G
OINTMENT OPHTHALMIC AS NEEDED
Status: DISCONTINUED | OUTPATIENT
Start: 2023-07-27 | End: 2023-07-27 | Stop reason: HOSPADM

## 2023-07-27 RX ORDER — SODIUM CHLORIDE 0.9 % (FLUSH) 0.9 %
10 SYRINGE (ML) INJECTION EVERY 12 HOURS SCHEDULED
Status: DISCONTINUED | OUTPATIENT
Start: 2023-07-27 | End: 2023-07-27 | Stop reason: HOSPADM

## 2023-07-27 RX ORDER — PREDNISOLONE ACETATE 10 MG/ML
1 SUSPENSION/ DROPS OPHTHALMIC SEE ADMIN INSTRUCTIONS
Status: DISCONTINUED | OUTPATIENT
Start: 2023-07-27 | End: 2023-07-27 | Stop reason: HOSPADM

## 2023-07-27 RX ORDER — LIDOCAINE HYDROCHLORIDE 40 MG/ML
INJECTION, SOLUTION RETROBULBAR; TOPICAL AS NEEDED
Status: DISCONTINUED | OUTPATIENT
Start: 2023-07-27 | End: 2023-07-27 | Stop reason: HOSPADM

## 2023-07-27 RX ORDER — TETRACAINE HYDROCHLORIDE 5 MG/ML
SOLUTION OPHTHALMIC AS NEEDED
Status: DISCONTINUED | OUTPATIENT
Start: 2023-07-27 | End: 2023-07-27 | Stop reason: HOSPADM

## 2023-07-27 RX ORDER — LIDOCAINE HYDROCHLORIDE 20 MG/ML
INJECTION, SOLUTION INFILTRATION; PERINEURAL AS NEEDED
Status: DISCONTINUED | OUTPATIENT
Start: 2023-07-27 | End: 2023-07-27 | Stop reason: SURG

## 2023-07-27 RX ORDER — CYCLOPENT/TROPIC/PHEN/KETR/WAT 1%-1%-2.5%
1 DROPS (EA) OPHTHALMIC (EYE)
Status: COMPLETED | OUTPATIENT
Start: 2023-07-27 | End: 2023-07-27

## 2023-07-27 RX ORDER — PROPOFOL 10 MG/ML
INJECTION, EMULSION INTRAVENOUS AS NEEDED
Status: DISCONTINUED | OUTPATIENT
Start: 2023-07-27 | End: 2023-07-27 | Stop reason: SURG

## 2023-07-27 RX ORDER — SODIUM CHLORIDE, SODIUM LACTATE, POTASSIUM CHLORIDE, CALCIUM CHLORIDE 600; 310; 30; 20 MG/100ML; MG/100ML; MG/100ML; MG/100ML
1000 INJECTION, SOLUTION INTRAVENOUS CONTINUOUS
Status: DISCONTINUED | OUTPATIENT
Start: 2023-07-27 | End: 2023-07-27 | Stop reason: HOSPADM

## 2023-07-27 RX ORDER — SODIUM CHLORIDE 0.9 % (FLUSH) 0.9 %
1-10 SYRINGE (ML) INJECTION AS NEEDED
Status: DISCONTINUED | OUTPATIENT
Start: 2023-07-27 | End: 2023-07-27 | Stop reason: HOSPADM

## 2023-07-27 RX ADMIN — Medication 10 MG: at 12:01

## 2023-07-27 RX ADMIN — SODIUM CHLORIDE, POTASSIUM CHLORIDE, SODIUM LACTATE AND CALCIUM CHLORIDE 1000 ML: 600; 310; 30; 20 INJECTION, SOLUTION INTRAVENOUS at 11:27

## 2023-07-27 RX ADMIN — PROPOFOL 10 MG: 10 INJECTION, EMULSION INTRAVENOUS at 12:07

## 2023-07-27 RX ADMIN — Medication 1 DROP: at 11:34

## 2023-07-27 RX ADMIN — TETRACAINE HYDROCHLORIDE 1 DROP: 5 SOLUTION OPHTHALMIC at 11:23

## 2023-07-27 RX ADMIN — TETRACAINE HYDROCHLORIDE 1 DROP: 5 SOLUTION OPHTHALMIC at 11:22

## 2023-07-27 RX ADMIN — LIDOCAINE HYDROCHLORIDE 20 MG: 20 INJECTION, SOLUTION INFILTRATION; PERINEURAL at 12:01

## 2023-07-27 RX ADMIN — Medication 1 DROP: at 11:29

## 2023-07-27 RX ADMIN — PROPOFOL 30 MG: 10 INJECTION, EMULSION INTRAVENOUS at 12:01

## 2023-07-27 RX ADMIN — PROPOFOL 10 MG: 10 INJECTION, EMULSION INTRAVENOUS at 12:12

## 2023-07-27 RX ADMIN — Medication 1 DROP: at 11:24

## 2023-07-27 NOTE — OP NOTE
OPERATIVE NOTE    Date of Procedure: 7/27/2023  Patient Name: Lesley Calero  Patient MRN: [id number]  YOB: 1940     Preoperative Diagnosis: Right nuclear sclerotic cataract.     Postoperative Diagnosis: Right nuclear sclerotic cataract.     Procedure Performed: Phacoemulsification with implantation of a  foldable posterior chamber intraocular lens, Right eye.     Surgeon: Dajuan Blackburn MD     Anesthesia:  Monitored Anesthesia Care (MAC)      Brief History and Indication: The patient presents with a history of past progressive loss of vision.  Patient was diagnosed with cataract and requests removal for increased ability to read and see.     Operation Description: The patient was taken to the OR and prepped and draped in the usual sterile ophthalmic fashion. A lid speculum was placed in the eye.  A #75 blade was then used to make a stab incision two o’clock hours from the intended temporal clear cornea groove. The anterior chamber was then inflated with a Viscoelastic. A metal microkeratome blade was then used to enter the anterior chamber at the temporal clear cornea site. A three level tunnel incision was made. A curvilinear capsulorrhexis was then performed with a bent cystotome needle and capsulorrhexis forceps.  BSS on a 30 gauge bent cannula was used to hydro-dissect, and hydro-delineate the lens. Good fluid waves and hydro-delineation were noted. Phacoemulsification was then used to remove nuclear material without complications. The residual cortical and lenticular material was then removed with irrigation and aspiration. Viscoelastics were then used to inflate the bag in a soft shell technique. A PCIOL was injected into the bag. Post-implantation, there were no rents or tears in the bag and the lens was noted to be stable and centered. The residual Viscoelastic was then removed with irrigation and aspiration.  The wound was checked and found to be without leaks. Therefore a Polydex  ointment and one drop of Durezol eye drop was placed in the eye.     Implant Information:   Implant Name Type Inv. Item Serial No.  Lot No. LRB No. Used Action   LENS MONOFOCAL IQ [id number] - [id number] - [id number] Implant LENS MONOFOCAL IQ [id number] MARCIAL  Right 1 Implanted       Complications: None    Estimated Blood Loss:  Less than 1 cc.       []   Changed to complex procedure due to: []  hypermature, white cataract. Blue dye was used. []   small iris. A Malyugin Ring was used.    Discharge and Condition  The patient was transported to same day surgery in excellent condition and scheduled for follow-up tomorrow morning. The patient was given instructions on use of eye drops for the operative eye and was specifically instructed to call Dr. Blackburn at his office or home for any nausea, vomiting, headache, decreased visual acuity, or pain, or if the patient had any general concerns.    Dajuan Blackburn MD  7/27/2023

## 2023-07-27 NOTE — ANESTHESIA PREPROCEDURE EVALUATION
Anesthesia Evaluation     Patient summary reviewed and Nursing notes reviewed   NPO Solid Status: > 8 hours  NPO Liquid Status: > 8 hours           Airway   Mallampati: II  TM distance: >3 FB  Neck ROM: full  Possible difficult intubation  Dental - normal exam     Pulmonary - normal exam   (+) pneumonia , COPD,  Cardiovascular - normal exam  Exercise tolerance: good (4-7 METS)    ECG reviewed  PT is on anticoagulation therapy  Patient on routine beta blocker and Beta blocker given within 24 hours of surgery    (+) pacemaker interrogated unknown, hypertension, past MI  >12 months, CADCHF , hyperlipidemia    ROS comment: 4/13/23 echo-  ·  Left ventricular ejection fraction appears to be 61 - 65%.  ·  Left ventricular wall thickness is consistent with mild concentric hypertrophy.  ·  Left ventricular diastolic function is consistent with (grade II w/high LAP) pseudonormalization.  ·  Left atrial volume is severely increased.  ·  The right atrial cavity is mildly  dilated.  ·  Estimated right ventricular systolic pressure from tricuspid regurgitation is moderately elevated (45-55 mmHg).  ·  Moderate pulmonary hypertension is present.         Neuro/Psych  (+) seizures  GI/Hepatic/Renal/Endo - negative ROS     Musculoskeletal (-) negative ROS    Abdominal    Substance History - negative use     OB/GYN negative ob/gyn ROS         Other                        Anesthesia Plan    ASA 3     MAC     (Risks and benefits discussed including risk of aspiration, recall and dental damage. All patient questions answered.    Will continue with plan of care.)  intravenous induction     Anesthetic plan, risks, benefits, and alternatives have been provided, discussed and informed consent has been obtained with: patient.  Pre-procedure education provided  Plan discussed with CRNA.    CODE STATUS:

## 2023-07-27 NOTE — DISCHARGE INSTRUCTIONS
Post Operative Cataract Instructions     Right Eye  POST OPERATIVE INSTRUCTIONS  You have received anesthesia today. DO NOT drive, drink alcohol, sign legal documents.   After surgery, your eye will not hurt. It may feel scratchy, sticky or uncomfortable. Your eye will be sensitive to light.  Most people see better 1-3 days after the procedure, but it could take 3 weeks to get the full benefits and reach your visual potential. If your vision is blurry for a few days it is normal, and means you may have swelling outside or inside the eye. For some patients, a bubble is placed and there will be blurriness.   You should receive a post-op kit with tape and an eye shield. Wear the shield for the first 3 nights after surgery to keep you from rubbing the eye.  You may wear glasses or sunglasses during the day.  You must keep eye protected at all times.  Most people are able to return to their normal routine 1-3 days after surgery, however, due to the brain adjusting to your new vision you may have trouble judging distances and want to be careful when driving and going up and downstairs.   You can shower and wash your hair the day after surgery. Keep water, shampoo, hair spray and shaving lotion out of the eye, especially for the first week.   If eyes become stuck together after surgery you may soak with warm cloth.  During the first week, you should AVOID:   Rubbing or putting pressure on your eye.  Eye make-up, face cream or lotion, hair coloring or perms  Strenuous activities, such as running or lifting weights, as to avoid sweat from getting in the eye. Avoid swimming, hot tubs, fumes or riddhi conditions.   Sleeping on operative side.  Excessive sneezing or coughing.  Hanging the head down for periods of time. Keep your head above your heart.    Some discomfort and blurred vision after surgery are normal, but if you have any unusual pain, swelling, bleeding or sudden decrease in vision, contact our office immediately.      Emergency assistance is available at any time by calling:    Dr.Mark Alexey Blackburn  [phone]    If unable to reach call [facility] @ [phone]    You have been prescribed an eye drop to use after surgery, please follow these instructions and bring eye drops and instructions with you to post op appointment:    Difluprednate (DUREZOL) 0.05 %. Shake well before use.    USE 1 DROP 4 (FOUR) TIMES A DAY FOR 1 WEEK THEN STARTING WEEK 2, ONLY USE 2 (TWO) TIMES A DAY UNTIL YOU RUN OUT OF DROPS.     PLACE A TALI IN THE DAY COLUMN EACH TIME YOU USE TO KEEP ON SCHEDULE    WEEK 1-USE 4  (FOUR) TIMES A DAY DAY 1  []   []    []   []     DAY 2  []   []    []   []  DAY 3  []   []    []   []  DAY 4  []   []    []   []  DAY 5  []   []    []   []  DAY 6  []   []    []   []  DAY 7  []   []    []   []      WEEK 2-USE 2 (TWO)  TIMES A DAY DAY 1  []   []  DAY 2  []   []  DAY 3  []   []  DAY 4  []   []  DAY 5  []   []  DAY 6  []   []  DAY 7  []   []      WEEK 3-USE 2 (TWO) TIMES A DAY DAY 1  []   []  DAY 2  []   []  DAY 3  []   []  DAY 4  []   []  DAY 5  []   []  DAY 6  []   []  DAY 7  []   []      WEEK 4-USE 2 (TWO)TIMES A DAY DAY 1  []   []  DAY 2  []   []  DAY 3  []   []  DAY 4  []   []  DAY 5  []   []  DAY 6  []   []  DAY 7  []   []

## 2023-07-27 NOTE — H&P
[facility]         History and Physical    Patient Name: Lesley Calero  MRN: [id number]  : 1940  Gender: female     HPI: Patient complaint of PPLOV Right eye diagnosed with cataract. Patient requests PHACO PCIOL for Increase of VA/ADL.    History:    Past Medical History:   Diagnosis Date    CHF (congestive heart failure)     COPD (chronic obstructive pulmonary disease)     Coronary artery disease     Hyperlipidemia     Hypertension     Impaired functional mobility, balance, gait, and endurance     Myocardial infarction     Oxygen deficit     Pneumonia     RLS (restless legs syndrome)     Seizures        Past Surgical History:   Procedure Laterality Date    CAROTID ENDARTERECTOMY Left     CORONARY ANGIOPLASTY WITH STENT PLACEMENT      x2    LUNG SURGERY Left     PACEMAKER IMPLANTATION         Social History     Socioeconomic History    Marital status:    Tobacco Use    Smoking status: Former   Vaping Use    Vaping Use: Never used   Substance and Sexual Activity    Alcohol use: No    Drug use: No    Sexual activity: Not Currently       History reviewed. No pertinent family history.    Prior to Admission Medications:  Medications Prior to Admission   Medication Sig Dispense Refill Last Dose    amLODIPine (NORVASC) 5 MG tablet Take 1 tablet by mouth Daily.   2023    aspirin 81 MG EC tablet Take 1 tablet by mouth Daily. 30 tablet 0 2023    carvedilol (COREG) 25 MG tablet Take 1 tablet by mouth 2 (Two) Times a Day With Meals.   2023    furosemide (LASIX) 40 MG tablet Take 1 tablet by mouth Daily.   2023    gabapentin (NEURONTIN) 600 MG tablet Take 1 tablet by mouth 3 (Three) Times a Day.   2023    HYDROcodone-acetaminophen (NORCO) 5-325 MG per tablet Take 1 tablet by mouth Every 6 (Six) Hours As Needed.   2023    ipratropium-albuterol (DUO-NEB) 0.5-2.5 mg/3 ml nebulizer Take 3 mL by nebulization Every 4 (Four) Hours As Needed for Shortness of  Air. 360 mL 0 7/26/2023    omeprazole (priLOSEC) 40 MG capsule Take 1 capsule by mouth Daily.   7/26/2023    ondansetron ODT (ZOFRAN-ODT) 4 MG disintegrating tablet Place 1 tablet on the tongue Every 6 (Six) Hours As Needed for Nausea or Vomiting. 12 tablet 0 7/26/2023    pravastatin (PRAVACHOL) 40 MG tablet Take 2 tablets by mouth Daily.   7/26/2023    nitroglycerin (NITROSTAT) 0.4 MG SL tablet Place 1 tablet under the tongue Every 5 (Five) Minutes As Needed for Chest Pain. Take no more than 3 doses in 15 minutes.   Unknown       Allergies:  Allergies   Allergen Reactions    Ceftin [Cefuroxime Axetil] Diarrhea    Codeine Delirium    Crestor [Rosuvastatin] Other (See Comments)     Muscle pain     Doxycycline Diarrhea    Morphine Nausea And Vomiting    Sulfa Antibiotics Hives    Penicillins Rash        Vitals: Temp:  [97.5 °F (36.4 °C)] 97.5 °F (36.4 °C)  Heart Rate:  [73] 73  Resp:  [20] 20  BP: (147)/(57) 147/57    Review of Systems:   Within Normal Limits Abnormal   HEENT [x]    []     Cardiovascular [x]   []     Gastrointestinal [x]   []     Genitourinary [x]   []     Neurologic [x]   []     Pulmonary [x]   []       Physical Exam:   Within Normal Limits Abnormal   HEENT [x]    []     Heart [x]   []     Lungs [x]   []     Abdomen [x]   []     Extremities [x]   []       Impression: Right nuclear sclerotic cataract.     Plan: CATARACT PHACO EXTRACTION WITH INTRAOCULAR LENS IMPLANT RIGHT (Right)     Dajuan Blackburn MD  7/27/2023

## 2023-07-27 NOTE — ANESTHESIA POSTPROCEDURE EVALUATION
Patient: Lesley Shaffer Galilea    Procedure Summary       Date: 07/27/23 Room / Location: [facility] OR 3 /  [facility] OR    Anesthesia Start: 1158 Anesthesia Stop: 1215    Procedure: CATARACT PHACO EXTRACTION WITH INTRAOCULAR LENS IMPLANT RIGHT (Right: Eye) Diagnosis:       Nuclear sclerotic cataract of right eye      (Nuclear sclerotic cataract of right eye [H25.11])    Surgeons: Dajuan Blackburn MD Provider: Kristian Tenorio CRNA    Anesthesia Type: MAC ASA Status: 3            Anesthesia Type: MAC    Vitals  Vitals Value Taken Time   /55 07/27/23 1219   Temp 96.9 °F (36.1 °C) 07/27/23 1219   Pulse 69 07/27/23 1219   Resp 16 07/27/23 1219   SpO2 100 % 07/27/23 1219           Post Anesthesia Care and Evaluation    Patient location during evaluation: PHASE II  Patient participation: complete - patient participated  Level of consciousness: awake and alert  Pain management: adequate    Airway patency: patent  Anesthetic complications: No anesthetic complications  PONV Status: none  Cardiovascular status: acceptable  Respiratory status: acceptable  Hydration status: acceptable  No anesthesia care post op

## 2023-07-31 ENCOUNTER — PREP FOR SURGERY (OUTPATIENT)
Dept: OTHER | Facility: HOSPITAL | Age: 83
End: 2023-07-31
Payer: MEDICARE

## 2023-07-31 DIAGNOSIS — H25.12 NUCLEAR SCLEROTIC CATARACT OF LEFT EYE: Primary | ICD-10-CM

## 2023-07-31 RX ORDER — SODIUM CHLORIDE 0.9 % (FLUSH) 0.9 %
10 SYRINGE (ML) INJECTION EVERY 12 HOURS SCHEDULED
OUTPATIENT
Start: 2023-07-31

## 2023-07-31 RX ORDER — TETRACAINE HYDROCHLORIDE 5 MG/ML
1 SOLUTION OPHTHALMIC SEE ADMIN INSTRUCTIONS
OUTPATIENT
Start: 2023-07-31

## 2023-07-31 RX ORDER — SODIUM CHLORIDE 9 MG/ML
40 INJECTION, SOLUTION INTRAVENOUS AS NEEDED
OUTPATIENT
Start: 2023-07-31

## 2023-07-31 RX ORDER — CYCLOPENT/TROPIC/PHEN/KETR/WAT 1%-1%-2.5%
1 DROPS (EA) OPHTHALMIC (EYE)
OUTPATIENT
Start: 2023-07-31 | End: 2023-07-31

## 2023-07-31 RX ORDER — SODIUM CHLORIDE 0.9 % (FLUSH) 0.9 %
1-10 SYRINGE (ML) INJECTION AS NEEDED
OUTPATIENT
Start: 2023-07-31

## 2023-07-31 RX ORDER — PREDNISOLONE ACETATE 10 MG/ML
1 SUSPENSION/ DROPS OPHTHALMIC SEE ADMIN INSTRUCTIONS
OUTPATIENT
Start: 2023-07-31

## 2023-08-10 ENCOUNTER — HOSPITAL ENCOUNTER (OUTPATIENT)
Facility: HOSPITAL | Age: 83
Setting detail: HOSPITAL OUTPATIENT SURGERY
Discharge: HOME | End: 2023-08-10
Attending: OPHTHALMOLOGY | Admitting: OPHTHALMOLOGY
Payer: MEDICARE

## 2023-08-10 ENCOUNTER — ANESTHESIA EVENT (OUTPATIENT)
Dept: PERIOP | Facility: HOSPITAL | Age: 83
End: 2023-08-10
Payer: MEDICARE

## 2023-08-10 ENCOUNTER — ANESTHESIA (OUTPATIENT)
Dept: PERIOP | Facility: HOSPITAL | Age: 83
End: 2023-08-10
Payer: MEDICARE

## 2023-08-10 VITALS
DIASTOLIC BLOOD PRESSURE: 60 MMHG | OXYGEN SATURATION: 96 % | RESPIRATION RATE: 16 BRPM | WEIGHT: 114 LBS | BODY MASS INDEX: 22.38 KG/M2 | SYSTOLIC BLOOD PRESSURE: 116 MMHG | HEART RATE: 75 BPM | TEMPERATURE: 98.3 F | HEIGHT: 60 IN

## 2023-08-10 DIAGNOSIS — H25.12 NUCLEAR SCLEROTIC CATARACT OF LEFT EYE: ICD-10-CM

## 2023-08-10 PROCEDURE — V2632 POST CHMBR INTRAOCULAR LENS: HCPCS | Performed by: OPHTHALMOLOGY

## 2023-08-10 PROCEDURE — 25010000002 PROPOFOL 10 MG/ML EMULSION: Performed by: NURSE ANESTHETIST, CERTIFIED REGISTERED

## 2023-08-10 DEVICE — LENS MONOFOCAL IQ CC60WF215: Type: IMPLANTABLE DEVICE | Site: POSTERIOR CHAMBER | Status: FUNCTIONAL

## 2023-08-10 RX ORDER — SODIUM CHLORIDE 0.9 % (FLUSH) 0.9 %
1-10 SYRINGE (ML) INJECTION AS NEEDED
Status: DISCONTINUED | OUTPATIENT
Start: 2023-08-10 | End: 2023-08-10 | Stop reason: HOSPADM

## 2023-08-10 RX ORDER — DIFLUPREDNATE OPHTHALMIC 0.5 MG/ML
1 EMULSION OPHTHALMIC 4 TIMES DAILY
Start: 2023-08-10

## 2023-08-10 RX ORDER — SODIUM CHLORIDE 9 MG/ML
40 INJECTION, SOLUTION INTRAVENOUS AS NEEDED
Status: DISCONTINUED | OUTPATIENT
Start: 2023-08-10 | End: 2023-08-10 | Stop reason: HOSPADM

## 2023-08-10 RX ORDER — PREDNISOLONE ACETATE 10 MG/ML
1 SUSPENSION/ DROPS OPHTHALMIC SEE ADMIN INSTRUCTIONS
Status: DISCONTINUED | OUTPATIENT
Start: 2023-08-10 | End: 2023-08-10 | Stop reason: HOSPADM

## 2023-08-10 RX ORDER — BALANCED SALT SOLUTION 6.4; .75; .48; .3; 3.9; 1.7 MG/ML; MG/ML; MG/ML; MG/ML; MG/ML; MG/ML
SOLUTION OPHTHALMIC AS NEEDED
Status: DISCONTINUED | OUTPATIENT
Start: 2023-08-10 | End: 2023-08-10 | Stop reason: HOSPADM

## 2023-08-10 RX ORDER — LIDOCAINE HYDROCHLORIDE 20 MG/ML
INJECTION, SOLUTION INTRAVENOUS AS NEEDED
Status: DISCONTINUED | OUTPATIENT
Start: 2023-08-10 | End: 2023-08-10 | Stop reason: SURG

## 2023-08-10 RX ORDER — NEOMYCIN SULFATE, POLYMYXIN B SULFATE, AND DEXAMETHASONE 3.5; 10000; 1 MG/G; [USP'U]/G; MG/G
OINTMENT OPHTHALMIC AS NEEDED
Status: DISCONTINUED | OUTPATIENT
Start: 2023-08-10 | End: 2023-08-10 | Stop reason: HOSPADM

## 2023-08-10 RX ORDER — LIDOCAINE HYDROCHLORIDE 40 MG/ML
INJECTION, SOLUTION RETROBULBAR; TOPICAL AS NEEDED
Status: DISCONTINUED | OUTPATIENT
Start: 2023-08-10 | End: 2023-08-10 | Stop reason: HOSPADM

## 2023-08-10 RX ORDER — TETRACAINE HYDROCHLORIDE 5 MG/ML
SOLUTION OPHTHALMIC AS NEEDED
Status: DISCONTINUED | OUTPATIENT
Start: 2023-08-10 | End: 2023-08-10 | Stop reason: HOSPADM

## 2023-08-10 RX ORDER — TETRACAINE HYDROCHLORIDE 5 MG/ML
1 SOLUTION OPHTHALMIC SEE ADMIN INSTRUCTIONS
Status: COMPLETED | OUTPATIENT
Start: 2023-08-10 | End: 2023-08-10

## 2023-08-10 RX ORDER — CYCLOPENT/TROPIC/PHEN/KETR/WAT 1%-1%-2.5%
1 DROPS (EA) OPHTHALMIC (EYE)
Status: COMPLETED | OUTPATIENT
Start: 2023-08-10 | End: 2023-08-10

## 2023-08-10 RX ORDER — SODIUM CHLORIDE, SODIUM LACTATE, POTASSIUM CHLORIDE, CALCIUM CHLORIDE 600; 310; 30; 20 MG/100ML; MG/100ML; MG/100ML; MG/100ML
1000 INJECTION, SOLUTION INTRAVENOUS CONTINUOUS
Status: DISCONTINUED | OUTPATIENT
Start: 2023-08-10 | End: 2023-08-10 | Stop reason: HOSPADM

## 2023-08-10 RX ORDER — SODIUM CHLORIDE 0.9 % (FLUSH) 0.9 %
10 SYRINGE (ML) INJECTION EVERY 12 HOURS SCHEDULED
Status: DISCONTINUED | OUTPATIENT
Start: 2023-08-10 | End: 2023-08-10 | Stop reason: HOSPADM

## 2023-08-10 RX ADMIN — LIDOCAINE HYDROCHLORIDE 60 MG: 20 INJECTION, SOLUTION INTRAVENOUS at 12:28

## 2023-08-10 RX ADMIN — Medication 1 DROP: at 10:23

## 2023-08-10 RX ADMIN — TETRACAINE HYDROCHLORIDE 1 DROP: 5 SOLUTION OPHTHALMIC at 10:16

## 2023-08-10 RX ADMIN — Medication 1 DROP: at 10:18

## 2023-08-10 RX ADMIN — TETRACAINE HYDROCHLORIDE 1 DROP: 5 SOLUTION OPHTHALMIC at 10:17

## 2023-08-10 RX ADMIN — SODIUM CHLORIDE, POTASSIUM CHLORIDE, SODIUM LACTATE AND CALCIUM CHLORIDE 1000 ML: 600; 310; 30; 20 INJECTION, SOLUTION INTRAVENOUS at 10:21

## 2023-08-10 RX ADMIN — Medication 1 DROP: at 10:28

## 2023-08-10 RX ADMIN — PROPOFOL 200 MCG/KG/MIN: 10 INJECTION, EMULSION INTRAVENOUS at 12:28

## 2023-08-10 NOTE — OP NOTE
OPERATIVE NOTE    Date of Procedure: 8/10/2023  Patient Name: Lesley Calero  Patient MRN: [id number]  YOB: 1940     Preoperative Diagnosis: Left nuclear sclerotic cataract.     Postoperative Diagnosis: Left nuclear sclerotic cataract.     Procedure Performed: Phacoemulsification with implantation of a  foldable posterior chamber intraocular lens, Left eye.     Surgeon: Dajuan Blackburn MD     Anesthesia:  Monitored Anesthesia Care (MAC)      Brief History and Indication: The patient presents with a history of past progressive loss of vision.  Patient was diagnosed with cataract and requests removal for increased ability to read and see.     Operation Description: The patient was taken to the OR and prepped and draped in the usual sterile ophthalmic fashion. A lid speculum was placed in the eye.  A #75 blade was then used to make a stab incision two o'clock hours from the intended temporal clear cornea groove. The anterior chamber was then inflated with a Viscoelastic. A metal microkeratome blade was then used to enter the anterior chamber at the temporal clear cornea site. A three level tunnel incision was made. A curvilinear capsulorrhexis was then performed with a bent cystotome needle and capsulorrhexis forceps.  BSS on a 30 gauge bent cannula was used to hydro-dissect, and hydro-delineate the lens. Good fluid waves and hydro-delineation were noted. Phacoemulsification was then used to remove nuclear material without complications. The residual cortical and lenticular material was then removed with irrigation and aspiration. Viscoelastics were then used to inflate the bag in a soft shell technique. A PCIOL was injected into the bag. Post-implantation, there were no rents or tears in the bag and the lens was noted to be stable and centered. The residual Viscoelastic was then removed with irrigation and aspiration.  The wound was checked and found to be without leaks. Therefore a Polydex  ointment and one drop of Durezol eye drop was placed in the eye.     Implant Information:   Implant Name Type Inv. Item Serial No.  Lot No. LRB No. Used Action   LENS MONOFOCAL IQ [id number] - [id number] - [id number] Implant LENS MONOFOCAL IQ [id number] MARCIAL  Left 1 Implanted       Complications: None    Estimated Blood Loss:  Less than 1 cc.       []   Changed to complex procedure due to: []  hypermature, white cataract. Blue dye was used. []   small iris. A Malyugin Ring was used.    Discharge and Condition  The patient was transported to same day surgery in excellent condition and scheduled for follow-up tomorrow morning. The patient was given instructions on use of eye drops for the operative eye and was specifically instructed to call Dr. Blackburn at his office or home for any nausea, vomiting, headache, decreased visual acuity, or pain, or if the patient had any general concerns.    Dajuan Blackburn MD  8/10/2023

## 2023-08-10 NOTE — ANESTHESIA POSTPROCEDURE EVALUATION
Patient: Lesley Calero    Procedure Summary       Date: 08/10/23 Room / Location: [facility] OR 3 /  [facility]    Anesthesia Start: 1222 Anesthesia Stop: 1241    Procedure: CATARACT PHACO EXTRACTION WITH INTRAOCULAR LENS IMPLANT LEFT (Left: Eye) Diagnosis:       Nuclear sclerotic cataract of left eye      (Nuclear sclerotic cataract of left eye [H25.12])    Surgeons: Dajuan Blackburn MD Provider: Charles Baxter CRNA    Anesthesia Type: MAC ASA Status: 3            Anesthesia Type: MAC    Vitals  No vitals data found for the desired time range.          Post Anesthesia Care and Evaluation    Patient location during evaluation: bedside  Patient participation: complete - patient participated  Level of consciousness: awake  Pain score: 0  Pain management: adequate    Airway patency: patent  Anesthetic complications: No anesthetic complications  PONV Status: controlled  Cardiovascular status: acceptable and stable  Respiratory status: acceptable and room air  Hydration status: acceptable    Comments: See nursing documentation for post op vital signs

## 2023-08-10 NOTE — DISCHARGE INSTRUCTIONS
Post Operative Cataract Instructions     Left Eye  POST OPERATIVE INSTRUCTIONS  You have received anesthesia today. DO NOT drive, drink alcohol, sign legal documents.   After surgery, your eye will not hurt. It may feel scratchy, sticky or uncomfortable. Your eye will be sensitive to light.  Most people see better 1-3 days after the procedure, but it could take 3 weeks to get the full benefits and reach your visual potential. If your vision is blurry for a few days it is normal, and means you may have swelling outside or inside the eye. For some patients, a bubble is placed and there will be blurriness.   You should receive a post-op kit with tape and an eye shield. Wear the shield for the first 3 nights after surgery to keep you from rubbing the eye.  You may wear glasses or sunglasses during the day.  You must keep eye protected at all times.  Most people are able to return to their normal routine 1-3 days after surgery, however, due to the brain adjusting to your new vision you may have trouble judging distances and want to be careful when driving and going up and downstairs.   You can shower and wash your hair the day after surgery. Keep water, shampoo, hair spray and shaving lotion out of the eye, especially for the first week.   If eyes become stuck together after surgery you may soak with warm cloth.  During the first week, you should AVOID:   Rubbing or putting pressure on your eye.  Eye make-up, face cream or lotion, hair coloring or perms  Strenuous activities, such as running or lifting weights, as to avoid sweat from getting in the eye. Avoid swimming, hot tubs, fumes or riddhi conditions.   Sleeping on operative side.  Excessive sneezing or coughing.  Hanging the head down for periods of time. Keep your head above your heart.    Some discomfort and blurred vision after surgery are normal, but if you have any unusual pain, swelling, bleeding or sudden decrease in vision, contact our office immediately.      Emergency assistance is available at any time by calling:    Dr.Mark Alexey Blackburn  [phone]    If unable to reach call [facility] @ [phone]    You have been prescribed an eye drop to use after surgery, please follow these instructions and bring eye drops and instructions with you to post op appointment:    Difluprednate (DUREZOL) 0.05 %. Shake well before use.    USE 1 DROP 4 (FOUR) TIMES A DAY FOR 1 WEEK THEN STARTING WEEK 2, ONLY USE 2 (TWO) TIMES A DAY UNTIL YOU RUN OUT OF DROPS.     PLACE A TALI IN THE DAY COLUMN EACH TIME YOU USE TO KEEP ON SCHEDULE    WEEK 1-USE 4  (FOUR) TIMES A DAY DAY 1  []   []    []   []     DAY 2  []   []    []   []  DAY 3  []   []    []   []  DAY 4  []   []    []   []  DAY 5  []   []    []   []  DAY 6  []   []    []   []  DAY 7  []   []    []   []      WEEK 2-USE 2 (TWO)  TIMES A DAY DAY 1  []   []  DAY 2  []   []  DAY 3  []   []  DAY 4  []   []  DAY 5  []   []  DAY 6  []   []  DAY 7  []   []      WEEK 3-USE 2 (TWO) TIMES A DAY DAY 1  []   []  DAY 2  []   []  DAY 3  []   []  DAY 4  []   []  DAY 5  []   []  DAY 6  []   []  DAY 7  []   []      WEEK 4-USE 2 (TWO)TIMES A DAY DAY 1  []   []  DAY 2  []   []  DAY 3  []   []  DAY 4  []   []  DAY 5  []   []  DAY 6  []   []  DAY 7  []   []

## 2023-08-10 NOTE — ANESTHESIA PREPROCEDURE EVALUATION
Anesthesia Evaluation     Patient summary reviewed and Nursing notes reviewed   NPO Solid Status: > 8 hours  NPO Liquid Status: > 8 hours           Airway   Mallampati: II  TM distance: >3 FB  Neck ROM: full  Possible difficult intubation  Dental - normal exam     Pulmonary - normal exam   (+) pneumonia , COPD,  Cardiovascular - normal exam  Exercise tolerance: good (4-7 METS)    ECG reviewed  PT is on anticoagulation therapy  Patient on routine beta blocker and Beta blocker given within 24 hours of surgery    (+) pacemaker interrogated unknown, hypertension, past MI  >12 months, CADCHF , hyperlipidemia    ROS comment: 4/13/23 echo-  ú  Left ventricular ejection fraction appears to be 61 - 65%.  ú  Left ventricular wall thickness is consistent with mild concentric hypertrophy.  ú  Left ventricular diastolic function is consistent with (grade II w/high LAP) pseudonormalization.  ú  Left atrial volume is severely increased.  ú  The right atrial cavity is mildly  dilated.  ú  Estimated right ventricular systolic pressure from tricuspid regurgitation is moderately elevated (45-55 mmHg).  ú  Moderate pulmonary hypertension is present.         Neuro/Psych  (+) seizures  GI/Hepatic/Renal/Endo - negative ROS     Musculoskeletal (-) negative ROS    Abdominal    Substance History - negative use     OB/GYN negative ob/gyn ROS         Other                        Anesthesia Plan    ASA 3     MAC     (Risks and benefits discussed including risk of aspiration, recall and dental damage. All patient questions answered.    Will continue with plan of care.)  intravenous induction     Anesthetic plan, risks, benefits, and alternatives have been provided, discussed and informed consent has been obtained with: patient.  Pre-procedure education provided  Plan discussed with CRNA.    CODE STATUS:

## 2023-08-10 NOTE — H&P
[facility] [place]         History and Physical    Patient Name: Lesley Calero  MRN: [id number]  : 1940  Gender: female     HPI: Patient complaint of PPLOV Left eye diagnosed with cataract. Patient requests PHACO PCIOL for Increase of VA/ADL.    History:    Past Medical History:   Diagnosis Date    Cataracts, bilateral     CHF (congestive heart failure)     COPD (chronic obstructive pulmonary disease)     Coronary artery disease     Hyperlipidemia     Hypertension     Impaired functional mobility, balance, gait, and endurance     Myocardial infarction     Oxygen deficit     Pneumonia     RLS (restless legs syndrome)     Seizures        Past Surgical History:   Procedure Laterality Date    CAROTID ENDARTERECTOMY Left     CATARACT EXTRACTION W/ INTRAOCULAR LENS IMPLANT Right 2023    Procedure: CATARACT PHACO EXTRACTION WITH INTRAOCULAR LENS IMPLANT RIGHT;  Surgeon: Dajuan Blackburn MD;  Location: [facility];  Service: Ophthalmology;  Laterality: Right;    CORONARY ANGIOPLASTY WITH STENT PLACEMENT      x2    LUNG SURGERY Left     PACEMAKER IMPLANTATION         Social History     Socioeconomic History    Marital status:    Tobacco Use    Smoking status: Former   Vaping Use    Vaping Use: Never used   Substance and Sexual Activity    Alcohol use: No    Drug use: No    Sexual activity: Not Currently       History reviewed. No pertinent family history.    Prior to Admission Medications:  Medications Prior to Admission   Medication Sig Dispense Refill Last Dose    amLODIPine (NORVASC) 5 MG tablet Take 1 tablet by mouth Daily.   8/10/2023    aspirin 81 MG EC tablet Take 1 tablet by mouth Daily. 30 tablet 0 8/10/2023 at 0730    carvedilol (COREG) 25 MG tablet Take 1 tablet by mouth 2 (Two) Times a Day With Meals.   8/10/2023 at 0730    difluprednate (DUREZOL) 0.05 % ophthalmic emulsion Administer 1 drop to the right eye 4 (Four) Times a Day. See admin instructions on AVS.    8/9/2023 at 2100    furosemide (LASIX) 40 MG tablet Take 1 tablet by mouth Daily.   8/9/2023 at 0800    gabapentin (NEURONTIN) 600 MG tablet Take 1 tablet by mouth 3 (Three) Times a Day.   8/9/2023 at 2100    HYDROcodone-acetaminophen (NORCO) 5-325 MG per tablet Take 1 tablet by mouth Every 6 (Six) Hours As Needed.   Past Week    ipratropium-albuterol (DUO-NEB) 0.5-2.5 mg/3 ml nebulizer Take 3 mL by nebulization Every 4 (Four) Hours As Needed for Shortness of Air. 360 mL 0 Past Week    omeprazole (priLOSEC) 40 MG capsule Take 1 capsule by mouth Daily.   8/9/2023 at 2100    pravastatin (PRAVACHOL) 40 MG tablet Take 2 tablets by mouth Daily.   8/9/2023 at 2100    nitroglycerin (NITROSTAT) 0.4 MG SL tablet Place 1 tablet under the tongue Every 5 (Five) Minutes As Needed for Chest Pain. Take no more than 3 doses in 15 minutes.   Unknown    ondansetron ODT (ZOFRAN-ODT) 4 MG disintegrating tablet Place 1 tablet on the tongue Every 6 (Six) Hours As Needed for Nausea or Vomiting. 12 tablet 0 Unknown       Allergies:  Allergies   Allergen Reactions    Ceftin [Cefuroxime Axetil] Diarrhea    Codeine Delirium    Crestor [Rosuvastatin] Other (See Comments)     Muscle pain     Doxycycline Diarrhea    Morphine Nausea And Vomiting    Sulfa Antibiotics Hives    Penicillins Rash        Vitals: Temp:  [98.3 øF (36.8 øC)] 98.3 øF (36.8 øC)  Heart Rate:  [76] 76  Resp:  [18] 18  BP: (156)/(60) 156/60    Review of Systems:   Within Normal Limits Abnormal   HEENT [x]    []     Cardiovascular [x]   []     Gastrointestinal [x]   []     Genitourinary [x]   []     Neurologic [x]   []     Pulmonary [x]   []       Physical Exam:   Within Normal Limits Abnormal   HEENT [x]    []     Heart [x]   []     Lungs [x]   []     Abdomen [x]   []     Extremities [x]   []       Impression: Left nuclear sclerotic cataract.     Plan: CATARACT PHACO EXTRACTION WITH INTRAOCULAR LENS IMPLANT LEFT (Left)     Dajuan Blackburn MD  8/10/2023

## 2023-09-02 ENCOUNTER — HOSPITAL ENCOUNTER (EMERGENCY)
Facility: HOSPITAL | Age: 83
Discharge: HOME | End: 2023-09-02
Attending: EMERGENCY MEDICINE
Payer: MEDICARE

## 2023-09-02 ENCOUNTER — APPOINTMENT (OUTPATIENT)
Dept: GENERAL RADIOLOGY | Facility: HOSPITAL | Age: 83
End: 2023-09-02
Payer: MEDICARE

## 2023-09-02 VITALS
TEMPERATURE: 98.2 F | HEART RATE: 65 BPM | HEIGHT: 60 IN | SYSTOLIC BLOOD PRESSURE: 126 MMHG | DIASTOLIC BLOOD PRESSURE: 76 MMHG | OXYGEN SATURATION: 93 % | WEIGHT: 117 LBS | RESPIRATION RATE: 18 BRPM | BODY MASS INDEX: 22.97 KG/M2

## 2023-09-02 DIAGNOSIS — L03.116 LEFT LEG CELLULITIS: Primary | ICD-10-CM

## 2023-09-02 PROCEDURE — 99283 EMERGENCY DEPT VISIT LOW MDM: CPT

## 2023-09-02 PROCEDURE — 73590 X-RAY EXAM OF LOWER LEG: CPT

## 2023-09-02 RX ORDER — CEFDINIR 300 MG/1
300 CAPSULE ORAL ONCE
Status: COMPLETED | OUTPATIENT
Start: 2023-09-02 | End: 2023-09-02

## 2023-09-02 RX ORDER — CEFDINIR 300 MG/1
300 CAPSULE ORAL 2 TIMES DAILY
Qty: 14 CAPSULE | Refills: 0 | Status: SHIPPED | OUTPATIENT
Start: 2023-09-02

## 2023-09-02 RX ADMIN — CEFDINIR 300 MG: 300 CAPSULE ORAL at 20:40

## 2023-09-02 NOTE — ED PROVIDER NOTES
Subjective:  History of Present Illness:    Patient is an 82-year-old female here today for wound check.  She reports injuring her left shin a few weeks ago when a Tupperware bowl fell out of the fridge and scraped her leg.  Since then she has noticed a persistent wound with minimal to no drainage.  She is here concerned that the area is becoming more painful and there has been delayed healing.      Nurses Notes reviewed and agree, including vitals, allergies, social history and prior medical history.     REVIEW OF SYSTEMS: All systems reviewed and not pertinent unless noted.  Review of Systems    Past Medical History:   Diagnosis Date    Cataracts, bilateral     CHF (congestive heart failure)     COPD (chronic obstructive pulmonary disease)     Coronary artery disease     Hyperlipidemia     Hypertension     Impaired functional mobility, balance, gait, and endurance     Myocardial infarction     Oxygen deficit     Pneumonia     RLS (restless legs syndrome)     Seizures        Allergies:    Ceftin [cefuroxime axetil], Codeine, Crestor [rosuvastatin], Doxycycline, Morphine, Sulfa antibiotics, and Penicillins      Past Surgical History:   Procedure Laterality Date    CAROTID ENDARTERECTOMY Left     CATARACT EXTRACTION W/ INTRAOCULAR LENS IMPLANT Right 7/27/2023    Procedure: CATARACT PHACO EXTRACTION WITH INTRAOCULAR LENS IMPLANT RIGHT;  Surgeon: Dajuan Blackburn MD;  Location: [facility];  Service: Ophthalmology;  Laterality: Right;    CATARACT EXTRACTION W/ INTRAOCULAR LENS IMPLANT Left 8/10/2023    Procedure: CATARACT PHACO EXTRACTION WITH INTRAOCULAR LENS IMPLANT LEFT;  Surgeon: Dajuan Blackburn MD;  Location: [facility];  Service: Ophthalmology;  Laterality: Left;    CORONARY ANGIOPLASTY WITH STENT PLACEMENT      x2    LUNG SURGERY Left     PACEMAKER IMPLANTATION           Social History     Socioeconomic History    Marital status:    Tobacco Use    Smoking status: Former   Vaping Use    Vaping Use:  "Never used   Substance and Sexual Activity    Alcohol use: No    Drug use: No    Sexual activity: Not Currently         History reviewed. No pertinent family history.    Objective  Physical Exam:  /76 (BP Location: Left arm, Patient Position: Sitting)   Pulse 65   Temp 98.2 °F (36.8 °C) (Oral)   Resp 18   Ht 152.4 cm (60\")   Wt 53.1 kg (117 lb)   SpO2 93%   BMI 22.85 kg/m²      Physical Exam  Vitals and nursing note reviewed.   Constitutional:       Appearance: Normal appearance. She is normal weight.   HENT:      Head: Normocephalic and atraumatic.   Cardiovascular:      Rate and Rhythm: Normal rate and regular rhythm.      Pulses: Normal pulses.      Heart sounds: Normal heart sounds.   Pulmonary:      Effort: Pulmonary effort is normal.      Breath sounds: Normal breath sounds.   Abdominal:      General: Abdomen is flat. Bowel sounds are normal. There is no distension.      Palpations: Abdomen is soft.      Tenderness: There is no abdominal tenderness.   Musculoskeletal:      Right lower leg: No edema.      Left lower leg: No edema.   Skin:     General: Skin is warm and dry.      Capillary Refill: Capillary refill takes less than 2 seconds.             Comments: Scabbed lesion the noted above with no evidence of drainage.  Some mild erythema noted distally.   Neurological:      General: No focal deficit present.      Mental Status: She is alert and oriented to person, place, and time.   Psychiatric:         Mood and Affect: Mood normal.         Behavior: Behavior normal.       Procedures    ED Course:         Lab Results (last 24 hours)       ** No results found for the last 24 hours. **             XR Tibia Fibula 2 View Left    Result Date: 9/3/2023  PROCEDURE: XR TIBIA FIBULA 2 VW LEFT-  History: Pain status post injury.  COMPARISON: None.  FINDINGS:  A 2 view exam demonstrates no acute fracture or dislocation. The joint spaces are preserved. Anterior leg ulceration. Soft tissue swelling..  "     Impression: No acute fracture.     This report was signed and finalized on 9/3/2023 9:04 AM by Cristi Parikh MD.          [facility]      Initial impression of presenting illness: Patient is an 82-year-old female with a wound to her anterior tibia.    DDX: includes but is not limited to: Cellulitis, abscess, bony abnormality, among others    Patient arrives hemodynamically stable with vitals interpreted by myself.     Pertinent features from physical exam: Wound as described above.    Initial diagnostic plan: Leg x-ray    Results from initial plan were reviewed and interpreted by me revealing no obvious abnormality noted    Diagnostic information from other sources: Medical record    Interventions / Re-evaluation: Patient treated with cefdinir for her localized cellulitis.    Results/clinical rationale were discussed with patient and provided her with a dose of cefdinir based on her allergies.  Advised her to keep the area clean and dry until then.  Recommend to follow-up with her primary provider for reevaluation or return to the ER for any new or worsening symptoms.    Consultations/Discussion of results with other physicians: None    Disposition plan: Patient discharged home in stable condition.  -----        Final diagnoses:   Left leg cellulitis          Tyrese Olivo, APRN  09/03/23 1355

## 2023-09-03 NOTE — DISCHARGE INSTRUCTIONS
First dose of antibiotics provided in the ER.   your prescription tomorrow and take as directed.  Keep area clean and dry.  Follow-up with your primary provider for reevaluation return to the ER for any new or worsening symptoms.

## 2023-09-08 ENCOUNTER — APPOINTMENT (OUTPATIENT)
Dept: GENERAL RADIOLOGY | Facility: HOSPITAL | Age: 83
End: 2023-09-08
Payer: MEDICARE

## 2023-09-08 ENCOUNTER — HOSPITAL ENCOUNTER (EMERGENCY)
Facility: HOSPITAL | Age: 83
Discharge: HOME | End: 2023-09-08
Attending: EMERGENCY MEDICINE
Payer: MEDICARE

## 2023-09-08 VITALS
DIASTOLIC BLOOD PRESSURE: 83 MMHG | TEMPERATURE: 97.5 F | RESPIRATION RATE: 18 BRPM | HEART RATE: 80 BPM | BODY MASS INDEX: 22.38 KG/M2 | OXYGEN SATURATION: 96 % | HEIGHT: 60 IN | WEIGHT: 114 LBS | SYSTOLIC BLOOD PRESSURE: 171 MMHG

## 2023-09-08 DIAGNOSIS — I50.9 ACUTE ON CHRONIC CONGESTIVE HEART FAILURE, UNSPECIFIED HEART FAILURE TYPE: Primary | ICD-10-CM

## 2023-09-08 LAB
ALBUMIN SERPL-MCNC: 4.9 G/DL (ref 3.5–5.2)
ALBUMIN/GLOB SERPL: 1.5 G/DL
ALP SERPL-CCNC: 97 U/L (ref 39–117)
ALT SERPL W P-5'-P-CCNC: 18 U/L (ref 1–33)
ANION GAP SERPL CALCULATED.3IONS-SCNC: 14.2 MMOL/L (ref 5–15)
AST SERPL-CCNC: 39 U/L (ref 1–32)
ATMOSPHERIC PRESS: 732 MMHG
B PARAPERT DNA SPEC QL NAA+PROBE: NOT DETECTED
B PERT DNA SPEC QL NAA+PROBE: NOT DETECTED
BASE EXCESS BLDV CALC-SCNC: 7.8 MMOL/L (ref 0–2)
BASOPHILS # BLD AUTO: 0.01 10*3/MM3 (ref 0–0.2)
BASOPHILS NFR BLD AUTO: 0.2 % (ref 0–1.5)
BDY SITE: ABNORMAL
BILIRUB SERPL-MCNC: 0.7 MG/DL (ref 0–1.2)
BILIRUB UR QL STRIP: NEGATIVE
BUN SERPL-MCNC: 14 MG/DL (ref 8–23)
BUN/CREAT SERPL: 15.7 (ref 7–25)
C PNEUM DNA NPH QL NAA+NON-PROBE: NOT DETECTED
CALCIUM SPEC-SCNC: 10.3 MG/DL (ref 8.6–10.5)
CHLORIDE SERPL-SCNC: 97 MMOL/L (ref 98–107)
CLARITY UR: CLEAR
CO2 SERPL-SCNC: 30.8 MMOL/L (ref 22–29)
COHGB MFR BLD: 1.6 % (ref 0–5)
COLOR UR: YELLOW
CREAT SERPL-MCNC: 0.89 MG/DL (ref 0.57–1)
D-LACTATE SERPL-SCNC: 0.9 MMOL/L (ref 0.5–2)
DEPRECATED RDW RBC AUTO: 46.5 FL (ref 37–54)
EGFRCR SERPLBLD CKD-EPI 2021: 64 ML/MIN/1.73
EOSINOPHIL # BLD AUTO: 0.04 10*3/MM3 (ref 0–0.4)
EOSINOPHIL NFR BLD AUTO: 0.7 % (ref 0.3–6.2)
ERYTHROCYTE [DISTWIDTH] IN BLOOD BY AUTOMATED COUNT: 13.3 % (ref 12.3–15.4)
FLUAV SUBTYP SPEC NAA+PROBE: NOT DETECTED
FLUBV RNA ISLT QL NAA+PROBE: NOT DETECTED
GAS FLOW AIRWAY: 2 LPM
GLOBULIN UR ELPH-MCNC: 3.2 GM/DL
GLUCOSE SERPL-MCNC: 105 MG/DL (ref 65–99)
GLUCOSE UR STRIP-MCNC: NEGATIVE MG/DL
HADV DNA SPEC NAA+PROBE: NOT DETECTED
HCO3 BLDV-SCNC: 34.1 MMOL/L (ref 22–28)
HCOV 229E RNA SPEC QL NAA+PROBE: NOT DETECTED
HCOV HKU1 RNA SPEC QL NAA+PROBE: NOT DETECTED
HCOV NL63 RNA SPEC QL NAA+PROBE: NOT DETECTED
HCOV OC43 RNA SPEC QL NAA+PROBE: NOT DETECTED
HCT VFR BLD AUTO: 41.5 % (ref 34–46.6)
HGB BLD-MCNC: 13.7 G/DL (ref 12–15.9)
HGB UR QL STRIP.AUTO: NEGATIVE
HMPV RNA NPH QL NAA+NON-PROBE: NOT DETECTED
HOLD SPECIMEN: NORMAL
HOLD SPECIMEN: NORMAL
HPIV1 RNA ISLT QL NAA+PROBE: NOT DETECTED
HPIV2 RNA SPEC QL NAA+PROBE: NOT DETECTED
HPIV3 RNA NPH QL NAA+PROBE: NOT DETECTED
HPIV4 P GENE NPH QL NAA+PROBE: NOT DETECTED
IMM GRANULOCYTES # BLD AUTO: 0.02 10*3/MM3 (ref 0–0.05)
IMM GRANULOCYTES NFR BLD AUTO: 0.4 % (ref 0–0.5)
KETONES UR QL STRIP: ABNORMAL
LEUKOCYTE ESTERASE UR QL STRIP.AUTO: NEGATIVE
LYMPHOCYTES # BLD AUTO: 0.72 10*3/MM3 (ref 0.7–3.1)
LYMPHOCYTES NFR BLD AUTO: 12.7 % (ref 19.6–45.3)
Lab: ABNORMAL
M PNEUMO IGG SER IA-ACNC: NOT DETECTED
MAGNESIUM SERPL-MCNC: 2.1 MG/DL (ref 1.6–2.4)
MCH RBC QN AUTO: 31.4 PG (ref 26.6–33)
MCHC RBC AUTO-ENTMCNC: 33 G/DL (ref 31.5–35.7)
MCV RBC AUTO: 95.2 FL (ref 79–97)
METHGB BLD QL: 0.4 % (ref 0–3)
MODALITY: ABNORMAL
MONOCYTES # BLD AUTO: 0.32 10*3/MM3 (ref 0.1–0.9)
MONOCYTES NFR BLD AUTO: 5.6 % (ref 5–12)
NEUTROPHILS NFR BLD AUTO: 4.56 10*3/MM3 (ref 1.7–7)
NEUTROPHILS NFR BLD AUTO: 80.4 % (ref 42.7–76)
NITRITE UR QL STRIP: NEGATIVE
NRBC BLD AUTO-RTO: 0 /100 WBC (ref 0–0.2)
NT-PROBNP SERPL-MCNC: 7800 PG/ML (ref 0–1800)
OXYHGB MFR BLDV: 63 % (ref 40–70)
PCO2 BLDV: 54.6 MM HG (ref 40–50)
PH BLDV: 7.4 PH UNITS (ref 7.32–7.42)
PH UR STRIP.AUTO: 7.5 [PH] (ref 5–8)
PLATELET # BLD AUTO: 198 10*3/MM3 (ref 140–450)
PMV BLD AUTO: 9.9 FL (ref 6–12)
PO2 BLDV: 34.4 MM HG (ref 30–50)
POTASSIUM SERPL-SCNC: 3.6 MMOL/L (ref 3.5–5.2)
PROCALCITONIN SERPL-MCNC: 0.04 NG/ML (ref 0–0.25)
PROT SERPL-MCNC: 8.1 G/DL (ref 6–8.5)
PROT UR QL STRIP: NEGATIVE
RBC # BLD AUTO: 4.36 10*6/MM3 (ref 3.77–5.28)
RHINOVIRUS RNA SPEC NAA+PROBE: NOT DETECTED
RSV RNA NPH QL NAA+NON-PROBE: NOT DETECTED
SAO2 % BLDCOV: 64.3 % (ref 45–75)
SARS-COV-2 RNA NPH QL NAA+NON-PROBE: NOT DETECTED
SODIUM SERPL-SCNC: 142 MMOL/L (ref 136–145)
SP GR UR STRIP: 1.01 (ref 1–1.03)
TROPONIN T SERPL HS-MCNC: 29 NG/L
TROPONIN T SERPL HS-MCNC: 30 NG/L
UROBILINOGEN UR QL STRIP: ABNORMAL
VENTILATOR MODE: ABNORMAL
WBC NRBC COR # BLD: 5.67 10*3/MM3 (ref 3.4–10.8)
WHOLE BLOOD HOLD COAG: NORMAL
WHOLE BLOOD HOLD SPECIMEN: NORMAL

## 2023-09-08 PROCEDURE — 80053 COMPREHEN METABOLIC PANEL: CPT | Performed by: EMERGENCY MEDICINE

## 2023-09-08 PROCEDURE — 25010000002 FUROSEMIDE PER 20 MG

## 2023-09-08 PROCEDURE — 84484 ASSAY OF TROPONIN QUANT: CPT

## 2023-09-08 PROCEDURE — 84484 ASSAY OF TROPONIN QUANT: CPT | Performed by: EMERGENCY MEDICINE

## 2023-09-08 PROCEDURE — 82820 HEMOGLOBIN-OXYGEN AFFINITY: CPT

## 2023-09-08 PROCEDURE — 93005 ELECTROCARDIOGRAM TRACING: CPT | Performed by: EMERGENCY MEDICINE

## 2023-09-08 PROCEDURE — 85025 COMPLETE CBC W/AUTO DIFF WBC: CPT

## 2023-09-08 PROCEDURE — 82805 BLOOD GASES W/O2 SATURATION: CPT

## 2023-09-08 PROCEDURE — 71045 X-RAY EXAM CHEST 1 VIEW: CPT

## 2023-09-08 PROCEDURE — 83880 ASSAY OF NATRIURETIC PEPTIDE: CPT

## 2023-09-08 PROCEDURE — 83735 ASSAY OF MAGNESIUM: CPT | Performed by: EMERGENCY MEDICINE

## 2023-09-08 PROCEDURE — 96374 THER/PROPH/DIAG INJ IV PUSH: CPT

## 2023-09-08 PROCEDURE — 84145 PROCALCITONIN (PCT): CPT

## 2023-09-08 PROCEDURE — 83605 ASSAY OF LACTIC ACID: CPT

## 2023-09-08 PROCEDURE — 81003 URINALYSIS AUTO W/O SCOPE: CPT | Performed by: EMERGENCY MEDICINE

## 2023-09-08 PROCEDURE — 99284 EMERGENCY DEPT VISIT MOD MDM: CPT

## 2023-09-08 PROCEDURE — 36415 COLL VENOUS BLD VENIPUNCTURE: CPT

## 2023-09-08 PROCEDURE — 0202U NFCT DS 22 TRGT SARS-COV-2: CPT

## 2023-09-08 RX ORDER — SODIUM CHLORIDE 0.9 % (FLUSH) 0.9 %
10 SYRINGE (ML) INJECTION AS NEEDED
Status: DISCONTINUED | OUTPATIENT
Start: 2023-09-08 | End: 2023-09-08 | Stop reason: HOSPADM

## 2023-09-08 RX ORDER — FUROSEMIDE 10 MG/ML
40 INJECTION INTRAMUSCULAR; INTRAVENOUS ONCE
Status: COMPLETED | OUTPATIENT
Start: 2023-09-08 | End: 2023-09-08

## 2023-09-08 RX ADMIN — FUROSEMIDE 40 MG: 10 INJECTION, SOLUTION INTRAMUSCULAR; INTRAVENOUS at 19:53

## 2023-09-08 NOTE — ED PROVIDER NOTES
Subjective  History of Present Illness:    This is a 82-year-old female, history of CHF, COPD, coronary artery disease, hyperlipidemia, hypertension, impaired functional mobility balance and gait, myocardial infarction, chronically on 3 L of oxygen at baseline via nasal cannula, history of pneumonia presents emergency room today for a general unwell feeling.  Patient reports that for the last 3 to 4 days she has been feeling fatigued, having some chills with subjective fevers.  Recently seen and placed on antibiotics for a small wound where she dropped a Tupperware container onto her left anterior shin.  Appears to be well-healing without infection on arrival.  She does report some urinary frequency without dysuria hematuria or urgency.  Denies diarrhea or vomiting.  No cough.  She does describe a little bit more than usual shortness of air over the last 3 to 4 days.  No chest pain.  Is on furosemide 20 mg daily.  Does report some generalized myalgias.      Nurses Notes reviewed and agree, including vitals, allergies, social history and prior medical history.     REVIEW OF SYSTEMS: All systems reviewed and not pertinent unless noted.  Review of Systems   Constitutional:  Positive for chills and fever.   HENT:  Negative for congestion, rhinorrhea and sore throat.    Respiratory:  Positive for shortness of breath. Negative for cough.    Gastrointestinal:  Negative for abdominal pain, diarrhea, nausea and vomiting.   Genitourinary:  Positive for frequency. Negative for dysuria, hematuria and urgency.     Past Medical History:   Diagnosis Date    Cataracts, bilateral     CHF (congestive heart failure)     COPD (chronic obstructive pulmonary disease)     Coronary artery disease     Hyperlipidemia     Hypertension     Impaired functional mobility, balance, gait, and endurance     Myocardial infarction     Oxygen deficit     Pneumonia     RLS (restless legs syndrome)     Seizures        Allergies:    Ceftin [cefuroxime  "axetil], Codeine, Crestor [rosuvastatin], Doxycycline, Morphine, Sulfa antibiotics, and Penicillins      Past Surgical History:   Procedure Laterality Date    CAROTID ENDARTERECTOMY Left     CATARACT EXTRACTION W/ INTRAOCULAR LENS IMPLANT Right 7/27/2023    Procedure: CATARACT PHACO EXTRACTION WITH INTRAOCULAR LENS IMPLANT RIGHT;  Surgeon: Dajuan Blackburn MD;  Location: [facility];  Service: Ophthalmology;  Laterality: Right;    CATARACT EXTRACTION W/ INTRAOCULAR LENS IMPLANT Left 8/10/2023    Procedure: CATARACT PHACO EXTRACTION WITH INTRAOCULAR LENS IMPLANT LEFT;  Surgeon: Dajuan Blackburn MD;  Location: [facility];  Service: Ophthalmology;  Laterality: Left;    CORONARY ANGIOPLASTY WITH STENT PLACEMENT      x2    LUNG SURGERY Left     PACEMAKER IMPLANTATION           Social History     Socioeconomic History    Marital status:    Tobacco Use    Smoking status: Former   Vaping Use    Vaping Use: Never used   Substance and Sexual Activity    Alcohol use: No    Drug use: No    Sexual activity: Not Currently         History reviewed. No pertinent family history.    Objective  Physical Exam:  /74   Pulse 81   Temp 97.5 °F (36.4 °C)   Resp 16   Ht 152.4 cm (60\")   Wt 51.7 kg (114 lb)   SpO2 95%   BMI 22.26 kg/m²      Physical Exam  Vitals and nursing note reviewed.   Constitutional:       General: She is not in acute distress.     Appearance: She is normal weight. She is not toxic-appearing.      Comments: Chronically ill-appearing   HENT:      Head: Normocephalic and atraumatic.      Nose: Nose normal. No congestion or rhinorrhea.      Mouth/Throat:      Mouth: Mucous membranes are moist.      Pharynx: Oropharynx is clear. No oropharyngeal exudate or posterior oropharyngeal erythema.   Eyes:      Extraocular Movements: Extraocular movements intact.   Cardiovascular:      Rate and Rhythm: Normal rate and regular rhythm.      Pulses: Normal pulses.      Heart sounds: Normal heart sounds. "   Pulmonary:      Effort: Pulmonary effort is normal. No respiratory distress.      Breath sounds: Normal breath sounds. No stridor. No wheezing, rhonchi or rales.   Abdominal:      General: There is no distension.      Palpations: Abdomen is soft.      Tenderness: There is no abdominal tenderness. There is no guarding.   Musculoskeletal:         General: Normal range of motion.      Cervical back: Normal range of motion.   Skin:     General: Skin is warm and dry.      Capillary Refill: Capillary refill takes less than 2 seconds.   Neurological:      General: No focal deficit present.      Mental Status: She is alert and oriented to person, place, and time.   Psychiatric:         Mood and Affect: Mood normal.         Behavior: Behavior normal.         Thought Content: Thought content normal.         Judgment: Judgment normal.         Procedures    ED Course:    ED Course as of 09/08/23 2147   Fri Sep 08, 2023   1932 EKG interpreted by me: ventricularly paced rhythm, normal rate, no further interpretation, this is an abnormal EKG [MP]      ED Course User Index  [MP] Jose Francisco Spring MD       Lab Results (last 24 hours)       Procedure Component Value Units Date/Time    CBC & Differential [[id number]]  (Abnormal) Collected: 09/08/23 1848    Specimen: Blood Updated: 09/08/23 1854    Narrative:      The following orders were created for panel order CBC & Differential.  Procedure                               Abnormality         Status                     ---------                               -----------         ------                     CBC Auto Differential[[id number]]        Abnormal            Final result                 Please view results for these tests on the individual orders.    Comprehensive Metabolic Panel [[id number]]  (Abnormal) Collected: 09/08/23 1848    Specimen: Blood Updated: 09/08/23 1915     Glucose 105 mg/dL      BUN 14 mg/dL      Creatinine 0.89 mg/dL      Sodium 142 mmol/L      Potassium  3.6 mmol/L      Chloride 97 mmol/L      CO2 30.8 mmol/L      Calcium 10.3 mg/dL      Total Protein 8.1 g/dL      Albumin 4.9 g/dL      ALT (SGPT) 18 U/L      AST (SGOT) 39 U/L      Alkaline Phosphatase 97 U/L      Total Bilirubin 0.7 mg/dL      Globulin 3.2 gm/dL      A/G Ratio 1.5 g/dL      BUN/Creatinine Ratio 15.7     Anion Gap 14.2 mmol/L      eGFR 64.0 mL/min/1.73     Narrative:      GFR Normal >60  Chronic Kidney Disease <60  Kidney Failure <15    The GFR formula is only valid for adults with stable renal function between ages 18 and 70.    Single High Sensitivity Troponin T [[id number]]  (Abnormal) Collected: 09/08/23 1848    Specimen: Blood Updated: 09/08/23 1917     HS Troponin T 30 ng/L     Narrative:      High Sensitive Troponin T Reference Range:  <10.0 ng/L- Negative Female for AMI  <15.0 ng/L- Negative Male for AMI  >=10 - Abnormal Female indicating possible myocardial injury.  >=15 - Abnormal Male indicating possible myocardial injury.   Clinicians would have to utilize clinical acumen, EKG, Troponin, and serial changes to determine if it is an Acute Myocardial Infarction or myocardial injury due to an underlying chronic condition.         Magnesium [[id number]]  (Normal) Collected: 09/08/23 1848    Specimen: Blood Updated: 09/08/23 1915     Magnesium 2.1 mg/dL     CBC Auto Differential [[id number]]  (Abnormal) Collected: 09/08/23 1848    Specimen: Blood Updated: 09/08/23 1854     WBC 5.67 10*3/mm3      RBC 4.36 10*6/mm3      Hemoglobin 13.7 g/dL      Hematocrit 41.5 %      MCV 95.2 fL      MCH 31.4 pg      MCHC 33.0 g/dL      RDW 13.3 %      RDW-SD 46.5 fl      MPV 9.9 fL      Platelets 198 10*3/mm3      Neutrophil % 80.4 %      Lymphocyte % 12.7 %      Monocyte % 5.6 %      Eosinophil % 0.7 %      Basophil % 0.2 %      Immature Grans % 0.4 %      Neutrophils, Absolute 4.56 10*3/mm3      Lymphocytes, Absolute 0.72 10*3/mm3      Monocytes, Absolute 0.32 10*3/mm3      Eosinophils, Absolute 0.04 10*3/mm3   "    Basophils, Absolute 0.01 10*3/mm3      Immature Grans, Absolute 0.02 10*3/mm3      nRBC 0.0 /100 WBC     BNP [[id number]]  (Abnormal) Collected: 09/08/23 1848    Specimen: Blood Updated: 09/08/23 1941     proBNP 7,800.0 pg/mL     Narrative:      Among patients with dyspnea, NT-proBNP is highly sensitive for the detection of acute congestive heart failure. In addition NT-proBNP of <300 pg/ml effectively rules out acute congestive heart failure with 99% negative predictive value.      Procalcitonin [[id number]]  (Normal) Collected: 09/08/23 1848    Specimen: Blood Updated: 09/08/23 2003     Procalcitonin 0.04 ng/mL     Narrative:      As a Marker for Sepsis (Non-Neonates):    1. <0.5 ng/mL represents a low risk of severe sepsis and/or septic shock.  2. >2 ng/mL represents a high risk of severe sepsis and/or septic shock.    As a Marker for Lower Respiratory Tract Infections that require antibiotic therapy:    PCT on Admission    Antibiotic Therapy       6-12 Hrs later    >0.5                Strongly Recommended  >0.25 - <0.5        Recommended   0.1 - 0.25          Discouraged              Remeasure/reassess PCT  <0.1                Strongly Discouraged     Remeasure/reassess PCT    As 28 day mortality risk marker: \"Change in Procalcitonin Result\" (>80% or <=80%) if Day 0 (or Day 1) and Day 4 values are available. Refer to http://www.[email or web address]-pct-calculator.com    Change in PCT <=80%  A decrease of PCT levels below or equal to 80% defines a positive change in PCT test result representing a higher risk for 28-day all-cause mortality of patients diagnosed with severe sepsis for septic shock.    Change in PCT >80%  A decrease of PCT levels of more than 80% defines a negative change in PCT result representing a lower risk for 28-day all-cause mortality of patients diagnosed with severe sepsis or septic shock.       Respiratory Panel PCR w/COVID-19(SARS-CoV-2) UZIEL/IVONNE/NAYELY/PAD/COR/MAD/JAYDE In-House, NP Swab in [facility], 3-4 " HR TAT - Swab, Nasopharynx [[id number]]  (Normal) Collected: 09/08/23 1932    Specimen: Swab from Nasopharynx Updated: 09/08/23 2027     ADENOVIRUS, PCR Not Detected     Coronavirus 229E Not Detected     Coronavirus HKU1 Not Detected     Coronavirus NL63 Not Detected     Coronavirus OC43 Not Detected     COVID19 Not Detected     Human Metapneumovirus Not Detected     Human Rhinovirus/Enterovirus Not Detected     Influenza A PCR Not Detected     Influenza B PCR Not Detected     Parainfluenza Virus 1 Not Detected     Parainfluenza Virus 2 Not Detected     Parainfluenza Virus 3 Not Detected     Parainfluenza Virus 4 Not Detected     RSV, PCR Not Detected     Bordetella pertussis pcr Not Detected     Bordetella parapertussis PCR Not Detected     Chlamydophila pneumoniae PCR Not Detected     Mycoplasma pneumo by PCR Not Detected    Narrative:      In the setting of a positive respiratory panel with a viral infection PLUS a negative procalcitonin without other underlying concern for bacterial infection, consider observing off antibiotics or discontinuation of antibiotics and continue supportive care. If the respiratory panel is positive for atypical bacterial infection (Bordetella pertussis, Chlamydophila pneumoniae, or Mycoplasma pneumoniae), consider antibiotic de-escalation to target atypical bacterial infection.    Blood Gas, Venous With Co-Ox [[id number]]  (Abnormal) Collected: 09/08/23 1942    Specimen: Venous Blood Updated: 09/08/23 1942     Site OTHER     pH, Venous 7.404 pH Units      pCO2, Venous 54.6 mm Hg      Comment: 83 Value above reference range        pO2, Venous 34.4 mm Hg      HCO3, Venous 34.1 mmol/L      Comment: 83 Value above reference range        Base Excess, Venous 7.8 mmol/L      Comment: 83 Value above reference range        O2 Saturation, Venous 64.3 %      Oxyhemoglobin Venous 63.0 %      Methemoglobin Venous 0.4 %      Carboxyhemoglobin Venous 1.6 %      Barometric Pressure for Blood Gas 732  mmHg      Modality Nasal Cannula     Flow Rate 2.0 lpm      Ventilator Mode NA     Collected by [id number]     Comment: Meter: [id number]     :  [id number]       Lactic Acid, Plasma [[id number]]  (Normal) Collected: 09/08/23 2000    Specimen: Blood Updated: 09/08/23 2018     Lactate 0.9 mmol/L     Urinalysis With Microscopic If Indicated (No Culture) - Urine, Clean Catch [[id number]]  (Abnormal) Collected: 09/08/23 2013    Specimen: Urine, Clean Catch Updated: 09/08/23 2023     Color, UA Yellow     Appearance, UA Clear     pH, UA 7.5     Specific Gravity, UA 1.011     Glucose, UA Negative     Ketones, UA Trace     Bilirubin, UA Negative     Blood, UA Negative     Protein, UA Negative     Leuk Esterase, UA Negative     Nitrite, UA Negative     Urobilinogen, UA 0.2 E.U./dL    Narrative:      Urine microscopic not indicated.    High Sensitivity Troponin T [[id number]]  (Abnormal) Collected: 09/08/23 2100    Specimen: Blood Updated: 09/08/23 2130     HS Troponin T 29 ng/L     Narrative:      High Sensitive Troponin T Reference Range:  <10.0 ng/L- Negative Female for AMI  <15.0 ng/L- Negative Male for AMI  >=10 - Abnormal Female indicating possible myocardial injury.  >=15 - Abnormal Male indicating possible myocardial injury.   Clinicians would have to utilize clinical acumen, EKG, Troponin, and serial changes to determine if it is an Acute Myocardial Infarction or myocardial injury due to an underlying chronic condition.                  No radiology results from the last 24 hrs       MDM     Amount and/or Complexity of Data Reviewed  Decide to obtain previous medical records or to obtain history from someone other than the patient: yes  Independent visualization of images, tracings, or specimens: yes        Initial impression of presenting illness: This is a 82-year-old female presents emergency room today for evaluation of general illness.    DDX: includes but is not limited to: Respiratory infection, viral URI,  viral illness, pneumonia, urinary tract infection, electrolyte disturbance, sepsis, dehydration, OPD exacerbation, CHF exacerbation, other     Patient arrives dynamically stable afebrile nontachycardic nonhypoxic nontoxic-appearing with vitals interpreted by myself.     Pertinent features from physical exam: Chronically ill-appearing 82-year-old female on 3 L of oxygen via nasal cannula in no acute distress, abdomen soft and nontender..  Pulse PEERLA.  Oropharynx clear.  No exudate or erythematous tonsils.  Moist mucous membranes.  Moves all extremities without difficulty.  Cardiac auscultation revealed regular rate and rhythm, lungs were clear bilaterally without focal findings.    Initial diagnostic plan: BC, CMP, BNP, Pro-Jordan, lactic acid, urinalysis, chest x-ray, EKG, troponin, magnesium, respiratory panel VBG    Results from initial plan were reviewed and interpreted by me revealing initial high-sensitivity troponin of 30, CMP with a glucose of 105, CO2 of 30.8, CBC essentially unremarkable.  Magnesium normal.  Chest x-ray per my interpretation with improved interstitial edema, chronic interstitial changes.  VBG without acidosis, mild retention of CO2 at 54.6.  Mild elevation of bicarb at 34.1.  EKG revealed ventricular paced rhythm with normal rate.  BNP of 7800.  Appears essentially around baseline for the patient.  Troponins appear chronically elevated in the high 20s.  Not significantly different from baseline troponin.  Suspect secondary to chronic congestive heart failure and demand.  Calcitonin normal.  Less suspicious for pneumonia.  Urinalysis without pattern of infection.  Respiratory panel negative.  Labs overall appear mostly reassuring.  Suspect age-related decline.  Lactic acid normal, do not have concern for sepsis at this time.  Infectious work-up proved negative.  Repeat troponin with a delta of -1.    Diagnostic information from other sources: Chart reviewed.  Reviewed prior chest  x-ray.    Interventions / Re-evaluation: Patient continued on her normal oxygen.  No wheezing heard on exam, less likely COPD exacerbation.  Crackles heard at the bases of the lungs, less concern for acute CHF exacerbation, BNP was around 7800, appears not acutely/significantly abnormal from baseline, however we will give 40 mg IV Lasix.  Chest x-ray appears improved compared to prior regarding interstitial edema.     Results/clinical rationale were discussed with patient at bedside.  Suspect mild CHF exacerbation.  Patient is not requiring more than baseline oxygen, chest x-ray appears improved compared to prior in regards to her interstitial edema.    Consultations/Discussion of results with other physicians: N/A    Disposition plan: Discharge.  Follow-up with cardiology.  Continue furosemide as scribed.  Return precautions given.  Recommended supportive measures.  Stable for discharge.  -----    Final diagnoses:   Acute on chronic congestive heart failure, unspecified heart failure type          Keo Lora PA-C  09/08/23 [id number]

## 2023-09-09 NOTE — DISCHARGE INSTRUCTIONS
Return to ER for worsening symptoms.  Continue medications as prescribed.  Recommend close follow-up with your primary care physician and your cardiologist.

## 2023-10-08 ENCOUNTER — HOSPITAL ENCOUNTER (EMERGENCY)
Facility: HOSPITAL | Age: 83
Discharge: HOME | End: 2023-10-08
Attending: EMERGENCY MEDICINE | Admitting: EMERGENCY MEDICINE
Payer: MEDICARE

## 2023-10-08 ENCOUNTER — APPOINTMENT (OUTPATIENT)
Dept: CT IMAGING | Facility: HOSPITAL | Age: 83
End: 2023-10-08
Payer: MEDICARE

## 2023-10-08 VITALS
OXYGEN SATURATION: 98 % | WEIGHT: 117 LBS | BODY MASS INDEX: 22.97 KG/M2 | HEIGHT: 60 IN | DIASTOLIC BLOOD PRESSURE: 64 MMHG | RESPIRATION RATE: 18 BRPM | TEMPERATURE: 97.9 F | SYSTOLIC BLOOD PRESSURE: 119 MMHG | HEART RATE: 74 BPM
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
## 2023-10-08 DIAGNOSIS — S22.32XA CLOSED FRACTURE OF ONE RIB OF LEFT SIDE, INITIAL ENCOUNTER: ICD-10-CM

## 2023-10-08 LAB
ALBUMIN SERPL-MCNC: 4 G/DL (ref 3.5–5.2)
ALBUMIN/GLOB SERPL: 1.6 G/DL
ALP SERPL-CCNC: 80 U/L (ref 39–117)
ALT SERPL W P-5'-P-CCNC: 11 U/L (ref 1–33)
ANION GAP SERPL CALCULATED.3IONS-SCNC: 8.7 MMOL/L (ref 5–15)
AST SERPL-CCNC: 34 U/L (ref 1–32)
BASOPHILS # BLD AUTO: 0.03 10*3/MM3 (ref 0–0.2)
BASOPHILS NFR BLD AUTO: 0.4 % (ref 0–1.5)
BILIRUB SERPL-MCNC: 0.4 MG/DL (ref 0–1.2)
BUN SERPL-MCNC: 21 MG/DL (ref 8–23)
BUN/CREAT SERPL: 22.8 (ref 7–25)
CALCIUM SPEC-SCNC: 9.6 MG/DL (ref 8.6–10.5)
CHLORIDE SERPL-SCNC: 99 MMOL/L (ref 98–107)
CO2 SERPL-SCNC: 33.3 MMOL/L (ref 22–29)
CREAT SERPL-MCNC: 0.92 MG/DL (ref 0.57–1)
DEPRECATED RDW RBC AUTO: 47.8 FL (ref 37–54)
EGFRCR SERPLBLD CKD-EPI 2021: 61.5 ML/MIN/1.73
EOSINOPHIL # BLD AUTO: 0.21 10*3/MM3 (ref 0–0.4)
EOSINOPHIL NFR BLD AUTO: 2.9 % (ref 0.3–6.2)
ERYTHROCYTE [DISTWIDTH] IN BLOOD BY AUTOMATED COUNT: 13.2 % (ref 12.3–15.4)
GLOBULIN UR ELPH-MCNC: 2.5 GM/DL
GLUCOSE SERPL-MCNC: 112 MG/DL (ref 65–99)
HCT VFR BLD AUTO: 38 % (ref 34–46.6)
HGB BLD-MCNC: 12 G/DL (ref 12–15.9)
HOLD SPECIMEN: NORMAL
HOLD SPECIMEN: NORMAL
IMM GRANULOCYTES # BLD AUTO: 0.03 10*3/MM3 (ref 0–0.05)
IMM GRANULOCYTES NFR BLD AUTO: 0.4 % (ref 0–0.5)
LYMPHOCYTES # BLD AUTO: 0.87 10*3/MM3 (ref 0.7–3.1)
LYMPHOCYTES NFR BLD AUTO: 12 % (ref 19.6–45.3)
MCH RBC QN AUTO: 31.1 PG (ref 26.6–33)
MCHC RBC AUTO-ENTMCNC: 31.6 G/DL (ref 31.5–35.7)
MCV RBC AUTO: 98.4 FL (ref 79–97)
MONOCYTES # BLD AUTO: 0.57 10*3/MM3 (ref 0.1–0.9)
MONOCYTES NFR BLD AUTO: 7.9 % (ref 5–12)
NEUTROPHILS NFR BLD AUTO: 5.53 10*3/MM3 (ref 1.7–7)
NEUTROPHILS NFR BLD AUTO: 76.4 % (ref 42.7–76)
NRBC BLD AUTO-RTO: 0 /100 WBC (ref 0–0.2)
PLATELET # BLD AUTO: 157 10*3/MM3 (ref 140–450)
PMV BLD AUTO: 10.6 FL (ref 6–12)
POTASSIUM SERPL-SCNC: 4.1 MMOL/L (ref 3.5–5.2)
PROT SERPL-MCNC: 6.5 G/DL (ref 6–8.5)
RBC # BLD AUTO: 3.86 10*6/MM3 (ref 3.77–5.28)
SODIUM SERPL-SCNC: 141 MMOL/L (ref 136–145)
WBC NRBC COR # BLD: 7.24 10*3/MM3 (ref 3.4–10.8)
WHOLE BLOOD HOLD COAG: NORMAL
WHOLE BLOOD HOLD SPECIMEN: NORMAL

## 2023-10-08 PROCEDURE — 85025 COMPLETE CBC W/AUTO DIFF WBC: CPT | Performed by: PHYSICIAN ASSISTANT

## 2023-10-08 PROCEDURE — 96374 THER/PROPH/DIAG INJ IV PUSH: CPT

## 2023-10-08 PROCEDURE — 80053 COMPREHEN METABOLIC PANEL: CPT | Performed by: PHYSICIAN ASSISTANT

## 2023-10-08 PROCEDURE — 99284 EMERGENCY DEPT VISIT MOD MDM: CPT

## 2023-10-08 PROCEDURE — 25010000002 ONDANSETRON PER 1 MG: Performed by: PHYSICIAN ASSISTANT

## 2023-10-08 PROCEDURE — 71250 CT THORAX DX C-: CPT

## 2023-10-08 RX ORDER — LIDOCAINE 50 MG/G
1 PATCH TOPICAL
Status: DISCONTINUED | OUTPATIENT
Start: 2023-10-08 | End: 2023-10-08 | Stop reason: HOSPADM

## 2023-10-08 RX ORDER — SODIUM CHLORIDE 0.9 % (FLUSH) 0.9 %
10 SYRINGE (ML) INJECTION AS NEEDED
Status: DISCONTINUED | OUTPATIENT
Start: 2023-10-08 | End: 2023-10-08 | Stop reason: HOSPADM

## 2023-10-08 RX ORDER — ONDANSETRON 2 MG/ML
4 INJECTION INTRAMUSCULAR; INTRAVENOUS ONCE
Status: COMPLETED | OUTPATIENT
Start: 2023-10-08 | End: 2023-10-08

## 2023-10-08 RX ORDER — FENTANYL CITRATE 50 UG/ML
25 INJECTION, SOLUTION INTRAMUSCULAR; INTRAVENOUS
Status: DISCONTINUED | OUTPATIENT
Start: 2023-10-08 | End: 2023-10-08 | Stop reason: HOSPADM

## 2023-10-08 RX ORDER — LIDOCAINE 50 MG/G
1 PATCH TOPICAL EVERY 24 HOURS
Qty: 15 PATCH | Refills: 0 | Status: SHIPPED | OUTPATIENT
Start: 2023-10-08 | End: 2023-10-23

## 2023-10-08 RX ADMIN — LIDOCAINE 1 PATCH: 700 PATCH TOPICAL at 13:54

## 2023-10-08 RX ADMIN — ONDANSETRON 4 MG: 2 INJECTION INTRAMUSCULAR; INTRAVENOUS at 13:53

## 2023-10-08 NOTE — ED PROVIDER NOTES
Subjective  History of Present Illness:    Chief Complaint: Fall, Rib Pain  History of Present Illness: Patient is an 82-year-old female history of CHF, COPD on nasal cannula, CAD, hyperlipidemia, hypertension, MI, pneumonia and seizures presenting to the ER for evaluation of rib pain.  Patient reports to me that 2 nights ago around 1 AM she was getting ready for bed and accidentally tripped in her bedroom.  She states she fell back and struck the left side of her ribs on something in her bedroom.  She states that since then she has had pain in her left posterior ribs is worse with movements and breathing.  She does wear oxygen at all times.  She states the pain makes it harder to take a deep breath, has been taking her normal home pain medication which is not helping. She initially told the triage nurse that she hit her head on a box but during my evaluation she adamantly denies hitting her head or lose her consciousness during multiple conversations.  She denies any chest pain, shortness of breath or dizziness prior to her fall.  She denies any headache, vision loss, neck pain, extremity paresthesias, back pain, pain in her lower extremities, or any other symptoms.  Daughter is at bedside and states they did notice a small red marcelino to her mid left back but otherwise have not noticed bruising or other injuries.  Onset: 2 days  Duration: Persistent   Exacerbating / Alleviating factors: Worse with movements and breathing  Associated symptoms: None      Nurses Notes reviewed and agree, including vitals, allergies, social history and prior medical history.     REVIEW OF SYSTEMS: All systems reviewed and not pertinent unless noted.  Review of Systems      Positive for: Left posterior rib pain  Negative for: Headache, dizziness, vision loss or changes, syncope, chest pressure, shortness of breath, cough, hemoptysis, abdominal pain, dysuria, hematuria    Past Medical History:   Diagnosis Date    Cataracts, bilateral      "CHF (congestive heart failure)     COPD (chronic obstructive pulmonary disease)     Coronary artery disease     Hyperlipidemia     Hypertension     Impaired functional mobility, balance, gait, and endurance     Myocardial infarction     Oxygen deficit     Pneumonia     RLS (restless legs syndrome)     Seizures        Allergies:    Ceftin [cefuroxime axetil], Codeine, Crestor [rosuvastatin], Doxycycline, Morphine, Sulfa antibiotics, and Penicillins      Past Surgical History:   Procedure Laterality Date    CAROTID ENDARTERECTOMY Left     CATARACT EXTRACTION W/ INTRAOCULAR LENS IMPLANT Right 7/27/2023    Procedure: CATARACT PHACO EXTRACTION WITH INTRAOCULAR LENS IMPLANT RIGHT;  Surgeon: Marcelino Blackburn MD;  Location: [facility];  Service: Ophthalmology;  Laterality: Right;    CATARACT EXTRACTION W/ INTRAOCULAR LENS IMPLANT Left 8/10/2023    Procedure: CATARACT PHACO EXTRACTION WITH INTRAOCULAR LENS IMPLANT LEFT;  Surgeon: Marcelino Blackburn MD;  Location: [facility];  Service: Ophthalmology;  Laterality: Left;    CORONARY ANGIOPLASTY WITH STENT PLACEMENT      x2    LUNG SURGERY Left     PACEMAKER IMPLANTATION           Social History     Socioeconomic History    Marital status:    Tobacco Use    Smoking status: Former   Vaping Use    Vaping Use: Never used   Substance and Sexual Activity    Alcohol use: No    Drug use: No    Sexual activity: Not Currently         History reviewed. No pertinent family history.    Objective  Physical Exam:  /64   Pulse 74   Temp 97.9 øF (36.6 øC) (Oral)   Resp 18   Ht 152.4 cm (60\")   Wt 53.1 kg (117 lb)   SpO2 98%   BMI 22.85 kg/mý      Physical Exam    CONSTITUTIONAL: Well developed, no acute distress, nontoxic in appearance.  She is awake and alert, appears frail  VITAL SIGNS: per nursing, reviewed and noted  SKIN: exposed skin with no rashes, ulcerations or petechiae.  Patient has 1 small circular erythematous marcelino on her left mid back, no obvious bruises " noted.  She has some scattered abrasions and scabs on her lower extremities  EYES: Grossly EOMI, no icterus, PERRL  ENT: Normal voice.  No facial asymmetry, nasal cannula in place lung sounds clear to auscultation bilaterally  RESPIRATORY:  No increased work of breathing. No retractions.   CARDIOVASCULAR:  regular rate and rhythm, distal pulses intact  GI: Abdomen soft, nontender to palpation  MUSCULOSKELETAL: No tenderness of the midline cervical, thoracic or lumbar spine.  Patient has very focal tenderness to the posterior left ribs with no crepitus.  No tenderness over upper or lower extremities  NEUROLOGIC: Alert, oriented x 3. No gross deficits. GCS 15.   PSYCH: appropriate affect.      Procedures    ED Course:        ED Course as of 10/08/23 1458   Sun Oct 08, 2023   1300 Patient not in room  [LR]   1311 EKG interpreted by me: Ventricularly paced rhythm, normal rate, no further interpretation, this is an abnormal EKG [MP]   1341 WBC: 7.24 [LR]   1341 Hemoglobin: 12.0 [LR]   1400 PROCEDURE: CT CHEST WO CONTRAST DIAGNOSTIC-     HISTORY: Fall, posterior left rib pain     COMPARISON: November 3, 2018.     PROCEDURE: Noncontrast exam. Axial images were obtained from the lung  apex to the mid abdomen by computed tomography.      FINDINGS: Lack of intravenous contrast limits assessment of the solid  organs, the mediastinum, and the vasculature.     CHEST: Moderate emphysematous changes are present. There is no  pneumothorax. No pleural or pericardial effusion. Heart is enlarged.  There is evidence of calcified granulomatous disease. No mediastinal or  hilar adenopathy. Atherosclerosis with aneurysm. Motion artifact limits  assessment for subtle rib fracture, there is a suggestion of subtle  nondisplaced mid left rib fracture. No evidence of a sternal fracture.  Degenerative changes in the spine.           IMPRESSION:  Motion artifact limits assessment for subtle rib fracture,  there is a suggestion of subtle  nondisplaced mid left rib fracture. No  pneumothorax.              This study was performed with techniques to keep radiation doses as low  as reasonably achievable (ALARA). Individualized dose reduction  techniques using automated exposure control or adjustment of mA and/or  kV according to the patient size were employed.         This report was signed and finalized on 10/8/2023 1:55 PM by Js Palm DO.   [LR]   1400 Glucose(!): 112 [LR]   1400 BUN: 21 [LR]   1400 Creatinine: 0.92 [LR]   1400 Sodium: 141 [LR]   1400 Potassium: 4.1 [LR]   1400 Chloride: 99 [LR]   1400 CO2(!): 33.3 [LR]   1400 Calcium: 9.6 [LR]   1400 Total Protein: 6.5 [LR]   1400 Albumin: 4.0 [LR]   1400 ALT (SGPT): 11 [LR]   1400 AST (SGOT)(!): 34 [LR]   1400 Alkaline Phosphatase: 80 [LR]   1400 Total Bilirubin: 0.4 [LR]   1400 Globulin: 2.5 [LR]   1400 A/G Ratio: 1.6 [LR]   1400 BUN/Creatinine Ratio: 22.8 [LR]   1400 Anion Gap: 8.7 [LR]   1400 eGFR: 61.5 [LR]   1418 Discussed findings with patient and family.  She is already on hydrocodone, will add incentive spirometer and lidocaine patch. [LR]      ED Course User Index  [LR] Jeniffer Lane PA-C  [MP] Jose Francisco Spring MD       Lab Results (last 24 hours)       Procedure Component Value Units Date/Time    CBC & Differential [[id number]]  (Abnormal) Collected: 10/08/23 1331    Specimen: Blood Updated: 10/08/23 1340    Narrative:      The following orders were created for panel order CBC & Differential.  Procedure                               Abnormality         Status                     ---------                               -----------         ------                     CBC Auto Differential[[id number]]        Abnormal            Final result                 Please view results for these tests on the individual orders.    Comprehensive Metabolic Panel [[id number]]  (Abnormal) Collected: 10/08/23 1331    Specimen: Blood Updated: 10/08/23 1400     Glucose 112 mg/dL      BUN 21 mg/dL       Creatinine 0.92 mg/dL      Sodium 141 mmol/L      Potassium 4.1 mmol/L      Chloride 99 mmol/L      CO2 33.3 mmol/L      Calcium 9.6 mg/dL      Total Protein 6.5 g/dL      Albumin 4.0 g/dL      ALT (SGPT) 11 U/L      AST (SGOT) 34 U/L      Alkaline Phosphatase 80 U/L      Total Bilirubin 0.4 mg/dL      Globulin 2.5 gm/dL      A/G Ratio 1.6 g/dL      BUN/Creatinine Ratio 22.8     Anion Gap 8.7 mmol/L      eGFR 61.5 mL/min/1.73     Narrative:      GFR Normal >60  Chronic Kidney Disease <60  Kidney Failure <15    The GFR formula is only valid for adults with stable renal function between ages 18 and 70.    CBC Auto Differential [[id number]]  (Abnormal) Collected: 10/08/23 1331    Specimen: Blood Updated: 10/08/23 1340     WBC 7.24 10*3/mm3      RBC 3.86 10*6/mm3      Hemoglobin 12.0 g/dL      Hematocrit 38.0 %      MCV 98.4 fL      MCH 31.1 pg      MCHC 31.6 g/dL      RDW 13.2 %      RDW-SD 47.8 fl      MPV 10.6 fL      Platelets 157 10*3/mm3      Neutrophil % 76.4 %      Lymphocyte % 12.0 %      Monocyte % 7.9 %      Eosinophil % 2.9 %      Basophil % 0.4 %      Immature Grans % 0.4 %      Neutrophils, Absolute 5.53 10*3/mm3      Lymphocytes, Absolute 0.87 10*3/mm3      Monocytes, Absolute 0.57 10*3/mm3      Eosinophils, Absolute 0.21 10*3/mm3      Basophils, Absolute 0.03 10*3/mm3      Immature Grans, Absolute 0.03 10*3/mm3      nRBC 0.0 /100 WBC              CT Chest Without Contrast Diagnostic    Result Date: 10/8/2023  PROCEDURE: CT CHEST WO CONTRAST DIAGNOSTIC-  HISTORY: Fall, posterior left rib pain  COMPARISON: November 3, 2018.  PROCEDURE: Noncontrast exam. Axial images were obtained from the lung apex to the mid abdomen by computed tomography.  FINDINGS: Lack of intravenous contrast limits assessment of the solid organs, the mediastinum, and the vasculature.  CHEST: Moderate emphysematous changes are present. There is no pneumothorax. No pleural or pericardial effusion. Heart is enlarged. There is evidence  of calcified granulomatous disease. No mediastinal or hilar adenopathy. Atherosclerosis with aneurysm. Motion artifact limits assessment for subtle rib fracture, there is a suggestion of subtle nondisplaced mid left rib fracture. No evidence of a sternal fracture. Degenerative changes in the spine.        Impression: Motion artifact limits assessment for subtle rib fracture, there is a suggestion of subtle nondisplaced mid left rib fracture. No pneumothorax.     This study was performed with techniques to keep radiation doses as low as reasonably achievable (ALARA). Individualized dose reduction techniques using automated exposure control or adjustment of mA and/or kV according to the patient size were employed.   This report was signed and finalized on 10/8/2023 1:55 PM by Js Palm DO.          MDM     Amount and/or Complexity of Data Reviewed  Clinical lab tests: reviewed  Tests in the radiology section of [facility]: reviewed        Initial impression of presenting illness: Patient is a 82-year-old female presenting to the ER for evaluation of left posterior rib pain after fall    DDX: includes but is not limited to: Rib fracture, contusion, sprain, and other concerns.  Fall was mechanical in nature.  She adamantly denies any head trauma or LOC, dizziness, other chest pain or shortness of breath    Patient arrives in overall stable condition with vitals interpreted by myself.     Pertinent features from physical exam: Focal tenderness over the left posterior rib cage, no obvious crepitus or ecchymosis.  There is a small erythematous lesion to the left mid back.  She is awake and alert, answering questions appropriately, no abdominal guarding or tenderness, distal pulses intact, no tenderness of the midline cervical, thoracic or lumbar spine     Initial diagnostic plan: we will obtain basic labs, EKG, CT of the chest.  Give pain and nausea medication as well is lidocaine patch.    Results from initial plan were  reviewed and interpreted by me revealing overall stable work-up.  EKG was interpreted by the attending.  Patient's labs are stable.  CT of the chest was read by the radiologist which reveal likely subtle left posterior rib fracture, no pneumothorax.    Diagnostic information from other sources: Chart review    Interventions / Re-evaluation: Patient remained stable throughout visit.  She was given fentanyl and lidocaine patch.     Results/clinical rationale were discussed with Patient and family.  Discussed that there appears to be a rib fracture present which is likely causing her pain.  We will have her continue her home pain medication, will call in lidocaine patches to use and will give incentive spirometer.  Discussed follow-up with her primary care provider and strict return precautions to the ER.  She verbalized understanding and was in agreement with this plan of care    Consultations/Discussion of results with other physicians: None    Disposition plan: Discharge  -----    Final diagnoses:   Fall, initial encounter   Closed fracture of one rib of left side, initial encounter          Jeniffer Lane PA-C  10/08/23 6289

## 2023-10-08 NOTE — DISCHARGE INSTRUCTIONS
CT scan revealed likely single posterior rib fracture on the left side which is likely causing her pain.  Able take a few weeks for this to heal.  You can use lidocaine patches to help with pain.  Take your home pain medication as directed as well as your other home medications.  Continue to use your oxygen as directed.  You can use incentive spirometer to ensure you are taking deep breaths.  Try to follow-up with your primary care provider in the next 24 to 48 hours to reevaluate symptoms and ensure you are improving.  Return to the ER for any change, worsening of symptoms, or any additional concerns.

## 2023-10-16 ENCOUNTER — HOSPITAL ENCOUNTER (OUTPATIENT)
Dept: GENERAL RADIOLOGY | Facility: HOSPITAL | Age: 83
Discharge: HOME | End: 2023-10-16
Admitting: FAMILY MEDICINE
Payer: MEDICARE

## 2023-10-16 ENCOUNTER — TRANSCRIBE ORDERS (OUTPATIENT)
Dept: GENERAL RADIOLOGY | Facility: HOSPITAL | Age: 83
End: 2023-10-16
Payer: MEDICARE

## 2023-10-16 DIAGNOSIS — S22.32XA TRAUMATIC CLOSED DISPLACED FRACTURE OF RIB, LEFT, INITIAL ENCOUNTER: Primary | ICD-10-CM

## 2023-10-16 DIAGNOSIS — S22.32XA TRAUMATIC CLOSED DISPLACED FRACTURE OF RIB, LEFT, INITIAL ENCOUNTER: ICD-10-CM

## 2023-10-16 PROCEDURE — 71101 X-RAY EXAM UNILAT RIBS/CHEST: CPT

## 2023-11-12 ENCOUNTER — APPOINTMENT (OUTPATIENT)
Dept: GENERAL RADIOLOGY | Facility: HOSPITAL | Age: 83
End: 2023-11-12
Payer: MEDICARE

## 2023-11-12 ENCOUNTER — HOSPITAL ENCOUNTER (EMERGENCY)
Facility: HOSPITAL | Age: 83
Discharge: HOME | End: 2023-11-12
Attending: EMERGENCY MEDICINE | Admitting: EMERGENCY MEDICINE
Payer: MEDICARE

## 2023-11-12 VITALS
SYSTOLIC BLOOD PRESSURE: 138 MMHG | RESPIRATION RATE: 18 BRPM | WEIGHT: 117 LBS | TEMPERATURE: 97.8 F | OXYGEN SATURATION: 95 % | HEART RATE: 71 BPM | HEIGHT: 60 IN | BODY MASS INDEX: 22.97 KG/M2 | DIASTOLIC BLOOD PRESSURE: 57 MMHG

## 2023-11-12 DIAGNOSIS — J44.1 ACUTE EXACERBATION OF CHRONIC OBSTRUCTIVE PULMONARY DISEASE (COPD): Primary | ICD-10-CM

## 2023-11-12 DIAGNOSIS — J18.9 PNEUMONIA OF BOTH LUNGS DUE TO INFECTIOUS ORGANISM, UNSPECIFIED PART OF LUNG: ICD-10-CM

## 2023-11-12 DIAGNOSIS — J40 BRONCHITIS: ICD-10-CM

## 2023-11-12 LAB
A-A DO2: 8.4 MMHG
ALBUMIN SERPL-MCNC: 4.3 G/DL (ref 3.5–5.2)
ALBUMIN/GLOB SERPL: 1.2 G/DL
ALP SERPL-CCNC: 116 U/L (ref 39–117)
ALT SERPL W P-5'-P-CCNC: 17 U/L (ref 1–33)
ANION GAP SERPL CALCULATED.3IONS-SCNC: 9.4 MMOL/L (ref 5–15)
ARTERIAL PATENCY WRIST A: ABNORMAL
AST SERPL-CCNC: 36 U/L (ref 1–32)
ATMOSPHERIC PRESS: 743 MMHG
BASE EXCESS BLDA CALC-SCNC: 11.1 MMOL/L (ref 0–2)
BASOPHILS # BLD AUTO: 0.03 10*3/MM3 (ref 0–0.2)
BASOPHILS NFR BLD AUTO: 0.4 % (ref 0–1.5)
BDY SITE: ABNORMAL
BILIRUB SERPL-MCNC: 0.6 MG/DL (ref 0–1.2)
BUN SERPL-MCNC: 15 MG/DL (ref 8–23)
BUN/CREAT SERPL: 18.3 (ref 7–25)
CALCIUM SPEC-SCNC: 9.8 MG/DL (ref 8.6–10.5)
CHLORIDE SERPL-SCNC: 97 MMOL/L (ref 98–107)
CO2 SERPL-SCNC: 34.6 MMOL/L (ref 22–29)
COHGB MFR BLD: 2 % (ref 0–2)
CREAT SERPL-MCNC: 0.82 MG/DL (ref 0.57–1)
D-LACTATE SERPL-SCNC: 1.3 MMOL/L (ref 0.5–2)
DEPRECATED RDW RBC AUTO: 55.5 FL (ref 37–54)
EGFRCR SERPLBLD CKD-EPI 2021: 70.6 ML/MIN/1.73
EOSINOPHIL # BLD AUTO: 0.12 10*3/MM3 (ref 0–0.4)
EOSINOPHIL NFR BLD AUTO: 1.5 % (ref 0.3–6.2)
ERYTHROCYTE [DISTWIDTH] IN BLOOD BY AUTOMATED COUNT: 15 % (ref 12.3–15.4)
FLUAV RNA RESP QL NAA+PROBE: NOT DETECTED
FLUBV RNA RESP QL NAA+PROBE: NOT DETECTED
GAS FLOW AIRWAY: 3 LPM
GLOBULIN UR ELPH-MCNC: 3.7 GM/DL
GLUCOSE SERPL-MCNC: 115 MG/DL (ref 65–99)
HCO3 BLDA-SCNC: 37.5 MMOL/L (ref 22–28)
HCT VFR BLD AUTO: 37.8 % (ref 34–46.6)
HCT VFR BLD CALC: 31.1 %
HGB BLD-MCNC: 11.5 G/DL (ref 12–15.9)
HOLD SPECIMEN: NORMAL
HOLD SPECIMEN: NORMAL
IMM GRANULOCYTES # BLD AUTO: 0.03 10*3/MM3 (ref 0–0.05)
IMM GRANULOCYTES NFR BLD AUTO: 0.4 % (ref 0–0.5)
LYMPHOCYTES # BLD AUTO: 0.78 10*3/MM3 (ref 0.7–3.1)
LYMPHOCYTES NFR BLD AUTO: 9.8 % (ref 19.6–45.3)
MCH RBC QN AUTO: 30.7 PG (ref 26.6–33)
MCHC RBC AUTO-ENTMCNC: 30.4 G/DL (ref 31.5–35.7)
MCV RBC AUTO: 101.1 FL (ref 79–97)
METHGB BLD QL: 0.3 % (ref 0–1.5)
MODALITY: ABNORMAL
MONOCYTES # BLD AUTO: 0.5 10*3/MM3 (ref 0.1–0.9)
MONOCYTES NFR BLD AUTO: 6.3 % (ref 5–12)
NEUTROPHILS NFR BLD AUTO: 6.51 10*3/MM3 (ref 1.7–7)
NEUTROPHILS NFR BLD AUTO: 81.6 % (ref 42.7–76)
NRBC BLD AUTO-RTO: 0 /100 WBC (ref 0–0.2)
NT-PROBNP SERPL-MCNC: 7616 PG/ML (ref 0–1800)
OXYHGB MFR BLDV: 93.3 % (ref 94–99)
PCO2 BLDA: 59 MM HG (ref 35–45)
PCO2 TEMP ADJ BLD: ABNORMAL MM[HG]
PH BLDA: 7.41 PH UNITS (ref 7.35–7.45)
PH, TEMP CORRECTED: ABNORMAL
PLATELET # BLD AUTO: 229 10*3/MM3 (ref 140–450)
PMV BLD AUTO: 10.1 FL (ref 6–12)
PO2 BLDA: 71.2 MM HG (ref 75–100)
PO2 TEMP ADJ BLD: ABNORMAL MM[HG]
POTASSIUM SERPL-SCNC: 4.3 MMOL/L (ref 3.5–5.2)
PROCALCITONIN SERPL-MCNC: 0.07 NG/ML (ref 0–0.25)
PROT SERPL-MCNC: 8 G/DL (ref 6–8.5)
RBC # BLD AUTO: 3.74 10*6/MM3 (ref 3.77–5.28)
SAO2 % BLDCOA: 95.5 % (ref 94–100)
SARS-COV-2 RNA RESP QL NAA+PROBE: NOT DETECTED
SODIUM SERPL-SCNC: 141 MMOL/L (ref 136–145)
TROPONIN T SERPL HS-MCNC: 26 NG/L
VENTILATOR MODE: ABNORMAL
WBC NRBC COR # BLD: 7.97 10*3/MM3 (ref 3.4–10.8)
WHOLE BLOOD HOLD COAG: NORMAL
WHOLE BLOOD HOLD SPECIMEN: NORMAL

## 2023-11-12 PROCEDURE — 96375 TX/PRO/DX INJ NEW DRUG ADDON: CPT

## 2023-11-12 PROCEDURE — 83050 HGB METHEMOGLOBIN QUAN: CPT

## 2023-11-12 PROCEDURE — 84145 PROCALCITONIN (PCT): CPT | Performed by: PHYSICIAN ASSISTANT

## 2023-11-12 PROCEDURE — 94640 AIRWAY INHALATION TREATMENT: CPT

## 2023-11-12 PROCEDURE — 82805 BLOOD GASES W/O2 SATURATION: CPT

## 2023-11-12 PROCEDURE — 83880 ASSAY OF NATRIURETIC PEPTIDE: CPT | Performed by: EMERGENCY MEDICINE

## 2023-11-12 PROCEDURE — 85025 COMPLETE CBC W/AUTO DIFF WBC: CPT | Performed by: EMERGENCY MEDICINE

## 2023-11-12 PROCEDURE — 25010000002 DEXAMETHASONE SODIUM PHOSPHATE 10 MG/ML SOLUTION: Performed by: PHYSICIAN ASSISTANT

## 2023-11-12 PROCEDURE — 36415 COLL VENOUS BLD VENIPUNCTURE: CPT

## 2023-11-12 PROCEDURE — 25010000002 MAGNESIUM SULFATE 2 GM/50ML SOLUTION: Performed by: PHYSICIAN ASSISTANT

## 2023-11-12 PROCEDURE — 99284 EMERGENCY DEPT VISIT MOD MDM: CPT

## 2023-11-12 PROCEDURE — 36600 WITHDRAWAL OF ARTERIAL BLOOD: CPT

## 2023-11-12 PROCEDURE — 96365 THER/PROPH/DIAG IV INF INIT: CPT

## 2023-11-12 PROCEDURE — 94761 N-INVAS EAR/PLS OXIMETRY MLT: CPT

## 2023-11-12 PROCEDURE — 87636 SARSCOV2 & INF A&B AMP PRB: CPT | Performed by: PHYSICIAN ASSISTANT

## 2023-11-12 PROCEDURE — 71045 X-RAY EXAM CHEST 1 VIEW: CPT

## 2023-11-12 PROCEDURE — 80053 COMPREHEN METABOLIC PANEL: CPT | Performed by: EMERGENCY MEDICINE

## 2023-11-12 PROCEDURE — 93005 ELECTROCARDIOGRAM TRACING: CPT | Performed by: EMERGENCY MEDICINE

## 2023-11-12 PROCEDURE — 83605 ASSAY OF LACTIC ACID: CPT | Performed by: EMERGENCY MEDICINE

## 2023-11-12 PROCEDURE — 82375 ASSAY CARBOXYHB QUANT: CPT

## 2023-11-12 PROCEDURE — 94799 UNLISTED PULMONARY SVC/PX: CPT

## 2023-11-12 PROCEDURE — 84484 ASSAY OF TROPONIN QUANT: CPT | Performed by: EMERGENCY MEDICINE

## 2023-11-12 RX ORDER — AZITHROMYCIN 250 MG/1
250 TABLET, FILM COATED ORAL DAILY
Qty: 6 TABLET | Refills: 0 | Status: SHIPPED | OUTPATIENT
Start: 2023-11-12

## 2023-11-12 RX ORDER — DEXAMETHASONE SODIUM PHOSPHATE 10 MG/ML
10 INJECTION, SOLUTION INTRAMUSCULAR; INTRAVENOUS ONCE
Status: COMPLETED | OUTPATIENT
Start: 2023-11-12 | End: 2023-11-12

## 2023-11-12 RX ORDER — SODIUM CHLORIDE 0.9 % (FLUSH) 0.9 %
10 SYRINGE (ML) INJECTION AS NEEDED
Status: DISCONTINUED | OUTPATIENT
Start: 2023-11-12 | End: 2023-11-12 | Stop reason: HOSPADM

## 2023-11-12 RX ORDER — HYDROCODONE BITARTRATE AND ACETAMINOPHEN 5; 325 MG/1; MG/1
1 TABLET ORAL ONCE
Status: DISCONTINUED | OUTPATIENT
Start: 2023-11-12 | End: 2023-11-12

## 2023-11-12 RX ORDER — CEFUROXIME AXETIL 500 MG/1
500 TABLET ORAL 2 TIMES DAILY
Qty: 14 TABLET | Refills: 0 | Status: SHIPPED | OUTPATIENT
Start: 2023-11-12 | End: 2023-11-19

## 2023-11-12 RX ORDER — ONDANSETRON 4 MG/1
4 TABLET, ORALLY DISINTEGRATING ORAL ONCE
Status: DISCONTINUED | OUTPATIENT
Start: 2023-11-12 | End: 2023-11-12

## 2023-11-12 RX ORDER — IPRATROPIUM BROMIDE AND ALBUTEROL SULFATE 2.5; .5 MG/3ML; MG/3ML
6 SOLUTION RESPIRATORY (INHALATION) ONCE
Status: COMPLETED | OUTPATIENT
Start: 2023-11-12 | End: 2023-11-12

## 2023-11-12 RX ORDER — PREDNISONE 50 MG/1
50 TABLET ORAL DAILY
Qty: 5 TABLET | Refills: 0 | Status: SHIPPED | OUTPATIENT
Start: 2023-11-14 | End: 2023-11-19

## 2023-11-12 RX ORDER — MAGNESIUM SULFATE HEPTAHYDRATE 40 MG/ML
2 INJECTION, SOLUTION INTRAVENOUS ONCE
Status: COMPLETED | OUTPATIENT
Start: 2023-11-12 | End: 2023-11-12

## 2023-11-12 RX ADMIN — IPRATROPIUM BROMIDE AND ALBUTEROL SULFATE 6 ML: .5; 3 SOLUTION RESPIRATORY (INHALATION) at 10:14

## 2023-11-12 RX ADMIN — MAGNESIUM SULFATE HEPTAHYDRATE 2 G: 40 INJECTION, SOLUTION INTRAVENOUS at 10:19

## 2023-11-12 RX ADMIN — DEXAMETHASONE SODIUM PHOSPHATE 10 MG: 10 INJECTION INTRAMUSCULAR; INTRAVENOUS at 10:19

## 2023-11-12 NOTE — DISCHARGE INSTRUCTIONS
Your x-ray reveals a bronchitis with possible developing pneumonia.  Your lab work was overall stable here today.  Continue using a nasal cannula as directed and your nebulizers.  Take prednisone burst to help with inflammation.  You can take Mucinex to help with chest congestion.  You can take the antibiotics cefuroxime and azithromycin as directed to help treat underlying bacterial etiology.  You will need to follow-up with your primary care provider as early as possible to reevaluate symptoms.  Given your severe COPD, may benefit from following up with pulmonology and may contact Dr. Luna's office.  Return to the ER for any change, worsening of symptoms, or any additional concerns including but not limited to difficulty breathing, severe wheezing or stridor, hemoptysis, chest pain, dizziness, syncope.  
previously intubated - no problems

## 2023-11-12 NOTE — ED PROVIDER NOTES
Subjective  History of Present Illness:    Chief Complaint: SOA  History of Present Illness: Patient is a 82-year-old female with history of CHF, COPD, CAD, hyperlipidemia, hypertension, MI and restless leg syndrome.  She is oxygen dependent, family reports that she uses oxygen as needed and at night.  She reports that she has not felt well for the past week, has had increased shortness of breath.  She states she feels like she cannot take a deep breath and has some pressure in her chest.  She does use inhalers at home, does not believe she has been on any steroids or antibiotics recently.  She denies any recent fever, chills, dizziness, hemoptysis, abdominal pain, or any other symptoms.  It does appear that she takes Lasix.  Onset: 1 week  Duration: Persistent  Exacerbating / Alleviating factors: None  Associated symptoms: None shortness of breath      Nurses Notes reviewed and agree, including vitals, allergies, social history and prior medical history.     REVIEW OF SYSTEMS: All systems reviewed and not pertinent unless noted.  Review of Systems      Positive for: Shortness of breath, chest pressure    Negative for: Fever, chills, dizziness, syncope, abdominal pain, emesis, diarrhea    Past Medical History:   Diagnosis Date    Cataracts, bilateral     CHF (congestive heart failure)     COPD (chronic obstructive pulmonary disease)     Coronary artery disease     Hyperlipidemia     Hypertension     Impaired functional mobility, balance, gait, and endurance     Myocardial infarction     Oxygen deficit     Pneumonia     RLS (restless legs syndrome)     Seizures        Allergies:    Ceftin [cefuroxime axetil], Codeine, Crestor [rosuvastatin], Doxycycline, Morphine, Sulfa antibiotics, and Penicillins      Past Surgical History:   Procedure Laterality Date    CAROTID ENDARTERECTOMY Left     CATARACT EXTRACTION W/ INTRAOCULAR LENS IMPLANT Right 7/27/2023    Procedure: CATARACT PHACO EXTRACTION WITH INTRAOCULAR LENS  "IMPLANT RIGHT;  Surgeon: Dajuan Blackburn MD;  Location: [facility];  Service: Ophthalmology;  Laterality: Right;    CATARACT EXTRACTION W/ INTRAOCULAR LENS IMPLANT Left 8/10/2023    Procedure: CATARACT PHACO EXTRACTION WITH INTRAOCULAR LENS IMPLANT LEFT;  Surgeon: Dajuan Blackburn MD;  Location: [facility];  Service: Ophthalmology;  Laterality: Left;    CORONARY ANGIOPLASTY WITH STENT PLACEMENT      x2    LUNG SURGERY Left     PACEMAKER IMPLANTATION           Social History     Socioeconomic History    Marital status:    Tobacco Use    Smoking status: Former   Vaping Use    Vaping Use: Never used   Substance and Sexual Activity    Alcohol use: No    Drug use: No    Sexual activity: Not Currently         History reviewed. No pertinent family history.    Objective  Physical Exam:  /57   Pulse 71   Temp 97.8 °F (36.6 °C) (Oral)   Resp 18   Ht 152.4 cm (60\")   Wt 53.1 kg (117 lb)   SpO2 95%   BMI 22.85 kg/m²      Physical Exam    CONSTITUTIONAL: Well developed, patient appears chronically ill and cachectic.  She is awake, alert, answering questions appropriately.  VITAL SIGNS: per nursing, reviewed and noted  SKIN: exposed skin with no rashes, ulcerations or petechiae  EYES: Grossly EOMI, no icterus, PERRL  ENT: Normal voice. Nares patent, no facial asymmetry   RESPIRATORY: Patient has some tachypnea.  She has very poor and minimal air movement in all lung fields  CARDIOVASCULAR:  regular rate and rhythm, distal pulses intact  GI: Abdomen soft, nontender  MUSCULOSKELETAL:  No tenderness. Full ROM. Strength and tone grossly normal.  no spasms.   NEUROLOGIC: Alert, oriented x 3. No gross deficits. GCS 15.   PSYCH: appropriate affect.      Procedures    ED Course:        ED Course as of 11/12/23 1540   Sun Nov 12, 2023   1005 EKG interpreted by me reveals an electronic ventricular pacemaker with a rate of 71 bpm.  There is a left bundle branch block pattern.  This is an abnormal appearing EKG. " [TB]   1035 Glucose(!): 115 [LR]   1035 BUN: 15 [LR]   1035 Creatinine: 0.82 [LR]   1035 Sodium: 141 [LR]   1035 Potassium: 4.3 [LR]   1035 Chloride(!): 97 [LR]   1035 CO2(!): 34.6 [LR]   1035 Calcium: 9.8 [LR]   1035 Total Protein: 8.0 [LR]   1035 Albumin: 4.3 [LR]   1035 ALT (SGPT): 17 [LR]   1035 AST (SGOT)(!): 36 [LR]   1035 Alkaline Phosphatase: 116 [LR]   1035 Total Bilirubin: 0.6 [LR]   1035 Globulin: 3.7 [LR]   1035 A/G Ratio: 1.2 [LR]   1035 BUN/Creatinine Ratio: 18.3 [LR]   1035 Anion Gap: 9.4 [LR]   1035 eGFR: 70.6 [LR]   1035 PORTABLE CHEST    11/12/2023 11:04 AM      HISTORY: Shortness of air     COMPARISON: October 16, 2023.     FINDINGS: Normal heart size. Subtle patchy opacities throughout the  lungs bilaterally with a background of bronchitis. No pleural effusion  or pneumothorax. Atherosclerotic aorta. Multipolar right-sided pacer is  noted. No acute osseous abnormality.        IMPRESSION:  Diffuse bronchitis with probable early pneumonia or edema bilaterally.     This report was signed and finalized on 11/12/2023 10:14 AM by Dr Shahab Hart DO.   [LR]   1107 COVID19: Not Detected [LR]   1107 Influenza A PCR: Not Detected [LR]   1107 Influenza B PCR: Not Detected [LR]   1112 Procalcitonin: 0.07 [LR]   1137 Patient is sitting upright in the stretcher.  She states she feels much better after medications.  She does have better air movement on auscultation, has faint wheezes noted and still has poor air movement.  We will try to ambulate her to make sure there is no significant desaturation.  Discussed admission versus treatment with antibiotics and steroids at home.  She does prefer to go home [LR]   1207 Patient ambulated around the ER and states she feels much better, did not feel short of breath and wants to go home.  Per tech her oxygen level did not drop below 89% [LR]   1215 Patient has intolerances to cefuroxime and doxycycline which caused diarrhea, rash with penicillin. [LR]      ED Course  User Index  [LR] Jeniffer Lane PA-C  [TB] Laly Wray MD       Lab Results (last 24 hours)       Procedure Component Value Units Date/Time    CBC & Differential [[id number]]  (Abnormal) Collected: 11/12/23 0952    Specimen: Blood Updated: 11/12/23 1001    Narrative:      The following orders were created for panel order CBC & Differential.  Procedure                               Abnormality         Status                     ---------                               -----------         ------                     CBC Auto Differential[[id number]]        Abnormal            Final result                 Please view results for these tests on the individual orders.    Comprehensive Metabolic Panel [[id number]]  (Abnormal) Collected: 11/12/23 0952    Specimen: Blood Updated: 11/12/23 1014     Glucose 115 mg/dL      BUN 15 mg/dL      Creatinine 0.82 mg/dL      Sodium 141 mmol/L      Potassium 4.3 mmol/L      Chloride 97 mmol/L      CO2 34.6 mmol/L      Calcium 9.8 mg/dL      Total Protein 8.0 g/dL      Albumin 4.3 g/dL      ALT (SGPT) 17 U/L      AST (SGOT) 36 U/L      Alkaline Phosphatase 116 U/L      Total Bilirubin 0.6 mg/dL      Globulin 3.7 gm/dL      A/G Ratio 1.2 g/dL      BUN/Creatinine Ratio 18.3     Anion Gap 9.4 mmol/L      eGFR 70.6 mL/min/1.73     Narrative:      GFR Normal >60  Chronic Kidney Disease <60  Kidney Failure <15    The GFR formula is only valid for adults with stable renal function between ages 18 and 70.    BNP [[id number]]  (Abnormal) Collected: 11/12/23 0952    Specimen: Blood Updated: 11/12/23 1022     proBNP 7,616.0 pg/mL     Narrative:      This assay is used as an aid in the diagnosis of individuals suspected of having heart failure. It can be used as an aid in the diagnosis of acute decompensated heart failure (ADHF) in patients presenting with signs and symptoms of ADHF to the emergency department (ED). In addition, NT-proBNP of <300 pg/mL indicates ADHF is not likely.    Age  Range Result Interpretation  NT-proBNP Concentration (pg/mL:      <50             Positive            >450                   Gray                 300-450                    Negative             <300    50-75           Positive            >900                  Gray                300-900                  Negative            <300      >75             Positive            >1800                  Gray                300-1800                  Negative            <300    Single High Sensitivity Troponin T [[id number]]  (Abnormal) Collected: 11/12/23 0952    Specimen: Blood Updated: 11/12/23 1017     HS Troponin T 26 ng/L     Narrative:      High Sensitive Troponin T Reference Range:  <14.0 ng/L- Negative Female for AMI  <22.0 ng/L- Negative Male for AMI  >=14 - Abnormal Female indicating possible myocardial injury.  >=22 - Abnormal Male indicating possible myocardial injury.   Clinicians would have to utilize clinical acumen, EKG, Troponin, and serial changes to determine if it is an Acute Myocardial Infarction or myocardial injury due to an underlying chronic condition.         Lactic Acid, Plasma [[id number]]  (Normal) Collected: 11/12/23 0952    Specimen: Blood Updated: 11/12/23 1010     Lactate 1.3 mmol/L     CBC Auto Differential [[id number]]  (Abnormal) Collected: 11/12/23 0952    Specimen: Blood Updated: 11/12/23 1001     WBC 7.97 10*3/mm3      RBC 3.74 10*6/mm3      Hemoglobin 11.5 g/dL      Hematocrit 37.8 %      .1 fL      MCH 30.7 pg      MCHC 30.4 g/dL      RDW 15.0 %      RDW-SD 55.5 fl      MPV 10.1 fL      Platelets 229 10*3/mm3      Neutrophil % 81.6 %      Lymphocyte % 9.8 %      Monocyte % 6.3 %      Eosinophil % 1.5 %      Basophil % 0.4 %      Immature Grans % 0.4 %      Neutrophils, Absolute 6.51 10*3/mm3      Lymphocytes, Absolute 0.78 10*3/mm3      Monocytes, Absolute 0.50 10*3/mm3      Eosinophils, Absolute 0.12 10*3/mm3      Basophils, Absolute 0.03 10*3/mm3      Immature Grans, Absolute 0.03  "10*3/mm3      nRBC 0.0 /100 WBC     Procalcitonin [[id number]]  (Normal) Collected: 11/12/23 0952    Specimen: Blood Updated: 11/12/23 1111     Procalcitonin 0.07 ng/mL     Narrative:      As a Marker for Sepsis (Non-Neonates):    1. <0.5 ng/mL represents a low risk of severe sepsis and/or septic shock.  2. >2 ng/mL represents a high risk of severe sepsis and/or septic shock.    As a Marker for Lower Respiratory Tract Infections that require antibiotic therapy:    PCT on Admission    Antibiotic Therapy       6-12 Hrs later    >0.5                Strongly Recommended  >0.25 - <0.5        Recommended   0.1 - 0.25          Discouraged              Remeasure/reassess PCT  <0.1                Strongly Discouraged     Remeasure/reassess PCT    As 28 day mortality risk marker: \"Change in Procalcitonin Result\" (>80% or <=80%) if Day 0 (or Day 1) and Day 4 values are available. Refer to http://www[place] [place]    Change in PCT <=80%  A decrease of PCT levels below or equal to 80% defines a positive change in PCT test result representing a higher risk for 28-day all-cause mortality of patients diagnosed with severe sepsis for septic shock.    Change in PCT >80%  A decrease of PCT levels of more than 80% defines a negative change in PCT result representing a lower risk for 28-day all-cause mortality of patients diagnosed with severe sepsis or septic shock.       Blood Gas, Arterial With Co-Ox [[id number]]  (Abnormal) Collected: 11/12/23 1017    Specimen: Arterial Blood Updated: 11/12/23 1018     Site Left Radial     Salvador's Test N/A     pH, Arterial 7.411 pH units      pCO2, Arterial 59.0 mm Hg      Comment: 83 Value above reference range        pO2, Arterial 71.2 mm Hg      Comment: 84 Value below reference range        HCO3, Arterial 37.5 mmol/L      Comment: 83 Value above reference range        Base Excess, Arterial 11.1 mmol/L      Comment: 83 Value above reference range        O2 Saturation, Arterial 95.5 %  "     Hematocrit, Blood Gas 31.1 %      Comment: 84 Value below reference range        Oxyhemoglobin 93.3 %      Comment: 84 Value below reference range        Methemoglobin 0.30 %      Carboxyhemoglobin 2.0 %      A-a DO2 8.4 mmHg      Barometric Pressure for Blood Gas 743 mmHg      Modality Nasal Cannula     Flow Rate 3.0 lpm      Ventilator Mode NA     Comment: Meter: [id number]     :  [id number]        pH, Temp Corrected --     pCO2, Temperature Corrected --     pO2, Temperature Corrected --    COVID-19 and FLU A/B PCR, 1 HR TAT - Swab, Nasopharynx [[id number]]  (Normal) Collected: 11/12/23 1024    Specimen: Swab from Nasopharynx Updated: 11/12/23 1049     COVID19 Not Detected     Influenza A PCR Not Detected     Influenza B PCR Not Detected    Narrative:      Fact sheet for providers: https://www.fda.gov/media/029399/download    Fact sheet for patients: https://www.fda.gov/media/462412/download    Test performed by PCR.             XR Chest 1 View    Result Date: 11/12/2023  PORTABLE CHEST    11/12/2023 11:04 AM  HISTORY: Shortness of air  COMPARISON: October 16, 2023.  FINDINGS: Normal heart size. Subtle patchy opacities throughout the lungs bilaterally with a background of bronchitis. No pleural effusion or pneumothorax. Atherosclerotic aorta. Multipolar right-sided pacer is noted. No acute osseous abnormality.       Impression: Diffuse bronchitis with probable early pneumonia or edema bilaterally.  This report was signed and finalized on 11/12/2023 10:14 AM by Dr Shahab Hart DO.          MDM     Amount and/or Complexity of Data Reviewed  Clinical lab tests: reviewed  Tests in the radiology section of CPT®: reviewed  Tests in the medicine section of CPT®: reviewed  Decide to obtain previous medical records or to obtain history from someone other than the patient: yes        Initial impression of presenting illness: Patient is an 82-year-old female presenting to the ER for evaluation of shortness of  breath    DDX: includes but is not limited to: Pneumonia, COPD exacerbation, CHF exacerbation, pulmonary edema, ACS, NSTEMI, and other concerns.    Patient arrives in overall stable condition with vitals interpreted by myself.  There is a documented 75% O2 saturation, nursing staff believes this was incorrect    Pertinent features from physical exam: Patient is awake and alert, mild tachypnea, she has very poor air movement throughout lung fields bilaterally    Initial diagnostic plan: We will obtain basic labs, troponin, proBNP, ABG, chest x-ray, EKG, COVID and influenza swab.  Will give DuoNebs, magnesium, Decadron    Results from initial plan were reviewed and interpreted by me revealing overall stable work-up.  EKG interpreted by the attending.  There is no leukocytosis, hemoglobin 11.5 which appears stable overall.  No significant electrolyte abnormalities.  Patient had elevated troponin at 26, proBNP 7616, COVID and influenza negative.  There is no elevation of procalcitonin.  Blood gas revealed CO2 of 59, PO2 71.2, bicarb 37.5, pH 7.411.  X-ray was read by radiologist as diffuse bronchitis or probable early pneumonia or edema bilaterally.    Diagnostic information from other sources: Chart review    Interventions / Re-evaluation: Patient was given Decadron and magnesium as well as DuoNebs with improvement of symptoms.  She states she feels much better, there is better air movement on auscultation.  She ambulated with her oxygen around the ER and did not have any desaturations below 89%.    Results/clinical rationale were discussed with patient.  Discussed that her work-up was stable overall.  Patient reports feeling much better and wants to go home.  I believe this is reasonable given she does have oxygen and nebulizers at home.  We will place her on antibiotics given possible early pneumonia and steroids.  Will give pulmonology information for follow-up since she has reportedly not seen a pulmonologist.   Discussed follow-up and strict return precautions.  She verbalized understanding and was in agreement with this plan of care.    Consultations/Discussion of results with other physicians: Dr. Wray     Disposition plan: Discharge   -----    Final diagnoses:   Acute exacerbation of chronic obstructive pulmonary disease (COPD)   Bronchitis   Pneumonia of both lungs due to infectious organism, unspecified part of lung          Jeniffer Lane PA-C  11/12/23 4973

## 2023-11-29 ENCOUNTER — HOSPITAL ENCOUNTER (INPATIENT)
Facility: HOSPITAL | Age: 83
LOS: 1 days | Discharge: HOME HEALTH | DRG: 291 | End: 2023-12-01
Attending: EMERGENCY MEDICINE | Admitting: INTERNAL MEDICINE
Payer: MEDICARE

## 2023-11-29 ENCOUNTER — APPOINTMENT (OUTPATIENT)
Dept: GENERAL RADIOLOGY | Facility: HOSPITAL | Age: 83
DRG: 291 | End: 2023-11-29
Payer: MEDICARE

## 2023-11-29 DIAGNOSIS — I50.33 ACUTE ON CHRONIC DIASTOLIC CONGESTIVE HEART FAILURE: ICD-10-CM

## 2023-11-29 DIAGNOSIS — J44.1 COPD WITH ACUTE EXACERBATION: ICD-10-CM

## 2023-11-29 DIAGNOSIS — R53.81 DEBILITY: ICD-10-CM

## 2023-11-29 DIAGNOSIS — I50.9 ACUTE ON CHRONIC CONGESTIVE HEART FAILURE, UNSPECIFIED HEART FAILURE TYPE: Primary | ICD-10-CM

## 2023-11-29 DIAGNOSIS — R06.89 ACUTE RESPIRATORY INSUFFICIENCY: ICD-10-CM

## 2023-11-29 LAB
ALBUMIN SERPL-MCNC: 3.9 G/DL (ref 3.5–5.2)
ALBUMIN/GLOB SERPL: 1.2 G/DL
ALP SERPL-CCNC: 91 U/L (ref 39–117)
ALT SERPL W P-5'-P-CCNC: 14 U/L (ref 1–33)
ANION GAP SERPL CALCULATED.3IONS-SCNC: 7 MMOL/L (ref 5–15)
AST SERPL-CCNC: 29 U/L (ref 1–32)
BASOPHILS # BLD AUTO: 0.03 10*3/MM3 (ref 0–0.2)
BASOPHILS NFR BLD AUTO: 0.5 % (ref 0–1.5)
BILIRUB SERPL-MCNC: 0.5 MG/DL (ref 0–1.2)
BUN SERPL-MCNC: 22 MG/DL (ref 8–23)
BUN/CREAT SERPL: 20 (ref 7–25)
CALCIUM SPEC-SCNC: 9.2 MG/DL (ref 8.6–10.5)
CHLORIDE SERPL-SCNC: 101 MMOL/L (ref 98–107)
CO2 SERPL-SCNC: 35 MMOL/L (ref 22–29)
CREAT SERPL-MCNC: 1.1 MG/DL (ref 0.57–1)
DEPRECATED RDW RBC AUTO: 53.4 FL (ref 37–54)
EGFRCR SERPLBLD CKD-EPI 2021: 49.3 ML/MIN/1.73
EOSINOPHIL # BLD AUTO: 0.17 10*3/MM3 (ref 0–0.4)
EOSINOPHIL NFR BLD AUTO: 2.6 % (ref 0.3–6.2)
ERYTHROCYTE [DISTWIDTH] IN BLOOD BY AUTOMATED COUNT: 14.7 % (ref 12.3–15.4)
GLOBULIN UR ELPH-MCNC: 3.3 GM/DL
GLUCOSE SERPL-MCNC: 98 MG/DL (ref 65–99)
HCT VFR BLD AUTO: 33.1 % (ref 34–46.6)
HGB BLD-MCNC: 10 G/DL (ref 12–15.9)
HOLD SPECIMEN: NORMAL
HOLD SPECIMEN: NORMAL
IMM GRANULOCYTES # BLD AUTO: 0.02 10*3/MM3 (ref 0–0.05)
IMM GRANULOCYTES NFR BLD AUTO: 0.3 % (ref 0–0.5)
LYMPHOCYTES # BLD AUTO: 0.95 10*3/MM3 (ref 0.7–3.1)
LYMPHOCYTES NFR BLD AUTO: 14.4 % (ref 19.6–45.3)
MCH RBC QN AUTO: 30.2 PG (ref 26.6–33)
MCHC RBC AUTO-ENTMCNC: 30.2 G/DL (ref 31.5–35.7)
MCV RBC AUTO: 100 FL (ref 79–97)
MONOCYTES # BLD AUTO: 0.5 10*3/MM3 (ref 0.1–0.9)
MONOCYTES NFR BLD AUTO: 7.6 % (ref 5–12)
NEUTROPHILS NFR BLD AUTO: 4.93 10*3/MM3 (ref 1.7–7)
NEUTROPHILS NFR BLD AUTO: 74.6 % (ref 42.7–76)
NRBC BLD AUTO-RTO: 0 /100 WBC (ref 0–0.2)
NT-PROBNP SERPL-MCNC: 8472 PG/ML (ref 0–1800)
PLATELET # BLD AUTO: 166 10*3/MM3 (ref 140–450)
PMV BLD AUTO: 10.6 FL (ref 6–12)
POTASSIUM SERPL-SCNC: 4 MMOL/L (ref 3.5–5.2)
PROT SERPL-MCNC: 7.2 G/DL (ref 6–8.5)
RBC # BLD AUTO: 3.31 10*6/MM3 (ref 3.77–5.28)
SODIUM SERPL-SCNC: 143 MMOL/L (ref 136–145)
TROPONIN T SERPL HS-MCNC: 31 NG/L
TROPONIN T SERPL HS-MCNC: 34 NG/L
WBC NRBC COR # BLD AUTO: 6.6 10*3/MM3 (ref 3.4–10.8)
WHOLE BLOOD HOLD COAG: NORMAL
WHOLE BLOOD HOLD SPECIMEN: NORMAL

## 2023-11-29 PROCEDURE — 83880 ASSAY OF NATRIURETIC PEPTIDE: CPT

## 2023-11-29 PROCEDURE — 93005 ELECTROCARDIOGRAM TRACING: CPT

## 2023-11-29 PROCEDURE — 71045 X-RAY EXAM CHEST 1 VIEW: CPT

## 2023-11-29 PROCEDURE — 82375 ASSAY CARBOXYHB QUANT: CPT

## 2023-11-29 PROCEDURE — 82805 BLOOD GASES W/O2 SATURATION: CPT

## 2023-11-29 PROCEDURE — 36600 WITHDRAWAL OF ARTERIAL BLOOD: CPT

## 2023-11-29 PROCEDURE — 83050 HGB METHEMOGLOBIN QUAN: CPT

## 2023-11-29 PROCEDURE — 84484 ASSAY OF TROPONIN QUANT: CPT

## 2023-11-29 PROCEDURE — 99285 EMERGENCY DEPT VISIT HI MDM: CPT

## 2023-11-29 PROCEDURE — 85025 COMPLETE CBC W/AUTO DIFF WBC: CPT

## 2023-11-29 PROCEDURE — 84484 ASSAY OF TROPONIN QUANT: CPT | Performed by: EMERGENCY MEDICINE

## 2023-11-29 PROCEDURE — 80053 COMPREHEN METABOLIC PANEL: CPT

## 2023-11-29 RX ORDER — SODIUM CHLORIDE 0.9 % (FLUSH) 0.9 %
10 SYRINGE (ML) INJECTION AS NEEDED
Status: DISCONTINUED | OUTPATIENT
Start: 2023-11-29 | End: 2023-12-01 | Stop reason: HOSPADM

## 2023-11-30 ENCOUNTER — APPOINTMENT (OUTPATIENT)
Dept: CARDIOLOGY | Facility: HOSPITAL | Age: 83
DRG: 291 | End: 2023-11-30
Payer: MEDICARE

## 2023-11-30 PROBLEM — I50.9 ACUTE EXACERBATION OF CHF (CONGESTIVE HEART FAILURE): Status: ACTIVE | Noted: 2023-11-30

## 2023-11-30 LAB
A-A DO2: 12 MMHG
ANION GAP SERPL CALCULATED.3IONS-SCNC: 8.3 MMOL/L (ref 5–15)
ARTERIAL PATENCY WRIST A: POSITIVE
ATMOSPHERIC PRESS: 736 MMHG
BASE EXCESS BLDA CALC-SCNC: 10.5 MMOL/L (ref 0–2)
BASOPHILS # BLD AUTO: 0.04 10*3/MM3 (ref 0–0.2)
BASOPHILS NFR BLD AUTO: 0.5 % (ref 0–1.5)
BDY SITE: ABNORMAL
BH CV ECHO MEAS - AO MAX PG: 24.2 MMHG
BH CV ECHO MEAS - AO MEAN PG: 14 MMHG
BH CV ECHO MEAS - AO ROOT DIAM: 2.9 CM
BH CV ECHO MEAS - AO V2 MAX: 246 CM/SEC
BH CV ECHO MEAS - AO V2 VTI: 50.5 CM
BH CV ECHO MEAS - AVA(I,D): 1.01 CM2
BH CV ECHO MEAS - EDV(CUBED): 114.8 ML
BH CV ECHO MEAS - EDV(MOD-SP2): 61.6 ML
BH CV ECHO MEAS - EDV(MOD-SP4): 65.9 ML
BH CV ECHO MEAS - EF(MOD-BP): 61.2 %
BH CV ECHO MEAS - EF(MOD-SP2): 60.2 %
BH CV ECHO MEAS - EF(MOD-SP4): 63.3 %
BH CV ECHO MEAS - EF_3D-VOL: 49 %
BH CV ECHO MEAS - ESV(CUBED): 44.7 ML
BH CV ECHO MEAS - ESV(MOD-SP2): 24.5 ML
BH CV ECHO MEAS - ESV(MOD-SP4): 24.2 ML
BH CV ECHO MEAS - FS: 27 %
BH CV ECHO MEAS - IVS/LVPW: 0.73 CM
BH CV ECHO MEAS - IVSD: 0.93 CM
BH CV ECHO MEAS - LA DIMENSION: 5.4 CM
BH CV ECHO MEAS - LAT PEAK E' VEL: 8.8 CM/SEC
BH CV ECHO MEAS - LV DIASTOLIC VOL/BSA (35-75): 46.4 CM2
BH CV ECHO MEAS - LV MASS(C)D: 197.9 GRAMS
BH CV ECHO MEAS - LV MAX PG: 2.6 MMHG
BH CV ECHO MEAS - LV MEAN PG: 1 MMHG
BH CV ECHO MEAS - LV SYSTOLIC VOL/BSA (12-30): 17 CM2
BH CV ECHO MEAS - LV V1 MAX: 80.6 CM/SEC
BH CV ECHO MEAS - LV V1 VTI: 15.7 CM
BH CV ECHO MEAS - LVIDD: 4.9 CM
BH CV ECHO MEAS - LVIDS: 3.6 CM
BH CV ECHO MEAS - LVOT AREA: 3.2 CM2
BH CV ECHO MEAS - LVOT DIAM: 2.03 CM
BH CV ECHO MEAS - LVPWD: 1.27 CM
BH CV ECHO MEAS - MED PEAK E' VEL: 7.2 CM/SEC
BH CV ECHO MEAS - MV A MAX VEL: 34.8 CM/SEC
BH CV ECHO MEAS - MV DEC SLOPE: 662 CM/SEC2
BH CV ECHO MEAS - MV DEC TIME: 0.17 SEC
BH CV ECHO MEAS - MV E MAX VEL: 114 CM/SEC
BH CV ECHO MEAS - MV E/A: 3.3
BH CV ECHO MEAS - MV MAX PG: 5.7 MMHG
BH CV ECHO MEAS - MV MEAN PG: 2 MMHG
BH CV ECHO MEAS - MV V2 VTI: 25.7 CM
BH CV ECHO MEAS - MVA(VTI): 1.98 CM2
BH CV ECHO MEAS - PA ACC TIME: 0.05 SEC
BH CV ECHO MEAS - PA V2 MAX: 110 CM/SEC
BH CV ECHO MEAS - PULM A REVS DUR: 0.07 SEC
BH CV ECHO MEAS - PULM A REVS VEL: 20.6 CM/SEC
BH CV ECHO MEAS - PULM DIAS VEL: 71.5 CM/SEC
BH CV ECHO MEAS - PULM S/D: 0.86
BH CV ECHO MEAS - PULM SYS VEL: 61.7 CM/SEC
BH CV ECHO MEAS - RAP SYSTOLE: 8 MMHG
BH CV ECHO MEAS - RV MAX PG: 1.2 MMHG
BH CV ECHO MEAS - RV V1 MAX: 54.8 CM/SEC
BH CV ECHO MEAS - RV V1 VTI: 9.5 CM
BH CV ECHO MEAS - RVSP: 52.9 MMHG
BH CV ECHO MEAS - SI(MOD-SP2): 26.1 ML/M2
BH CV ECHO MEAS - SI(MOD-SP4): 29.4 ML/M2
BH CV ECHO MEAS - SV(LVOT): 50.8 ML
BH CV ECHO MEAS - SV(MOD-SP2): 37.1 ML
BH CV ECHO MEAS - SV(MOD-SP4): 41.7 ML
BH CV ECHO MEAS - TAPSE (>1.6): 1.41 CM
BH CV ECHO MEAS - TR MAX PG: 44.9 MMHG
BH CV ECHO MEAS - TR MAX VEL: 332.7 CM/SEC
BH CV ECHO MEASUREMENTS AVERAGE E/E' RATIO: 14.25
BH CV XLRA - RV BASE: 3.3 CM
BH CV XLRA - RV LENGTH: 7.3 CM
BH CV XLRA - RV MID: 2.7 CM
BH CV XLRA - TDI S': 9.3 CM/SEC
BUN SERPL-MCNC: 19 MG/DL (ref 8–23)
BUN/CREAT SERPL: 21.8 (ref 7–25)
CALCIUM SPEC-SCNC: 9.3 MG/DL (ref 8.6–10.5)
CHLORIDE SERPL-SCNC: 96 MMOL/L (ref 98–107)
CO2 SERPL-SCNC: 37.7 MMOL/L (ref 22–29)
COHGB MFR BLD: 1.9 % (ref 0–2)
CREAT SERPL-MCNC: 0.87 MG/DL (ref 0.57–1)
DEPRECATED RDW RBC AUTO: 51.2 FL (ref 37–54)
EGFRCR SERPLBLD CKD-EPI 2021: 65.4 ML/MIN/1.73
EOSINOPHIL # BLD AUTO: 0.21 10*3/MM3 (ref 0–0.4)
EOSINOPHIL NFR BLD AUTO: 2.8 % (ref 0.3–6.2)
ERYTHROCYTE [DISTWIDTH] IN BLOOD BY AUTOMATED COUNT: 14.6 % (ref 12.3–15.4)
GAS FLOW AIRWAY: 2 LPM
GLUCOSE SERPL-MCNC: 110 MG/DL (ref 65–99)
HCO3 BLDA-SCNC: 37.1 MMOL/L (ref 22–28)
HCT VFR BLD AUTO: 33.9 % (ref 34–46.6)
HCT VFR BLD CALC: 30.1 %
HGB BLD-MCNC: 10.5 G/DL (ref 12–15.9)
IMM GRANULOCYTES # BLD AUTO: 0.02 10*3/MM3 (ref 0–0.05)
IMM GRANULOCYTES NFR BLD AUTO: 0.3 % (ref 0–0.5)
LEFT ATRIUM VOLUME INDEX: 48.3 ML/M2
LYMPHOCYTES # BLD AUTO: 0.95 10*3/MM3 (ref 0.7–3.1)
LYMPHOCYTES NFR BLD AUTO: 12.7 % (ref 19.6–45.3)
Lab: ABNORMAL
MCH RBC QN AUTO: 30.1 PG (ref 26.6–33)
MCHC RBC AUTO-ENTMCNC: 31 G/DL (ref 31.5–35.7)
MCV RBC AUTO: 97.1 FL (ref 79–97)
METHGB BLD QL: 0.3 % (ref 0–1.5)
MODALITY: ABNORMAL
MONOCYTES # BLD AUTO: 0.57 10*3/MM3 (ref 0.1–0.9)
MONOCYTES NFR BLD AUTO: 7.6 % (ref 5–12)
NEUTROPHILS NFR BLD AUTO: 5.71 10*3/MM3 (ref 1.7–7)
NEUTROPHILS NFR BLD AUTO: 76.1 % (ref 42.7–76)
NRBC BLD AUTO-RTO: 0 /100 WBC (ref 0–0.2)
OXYHGB MFR BLDV: 90.4 % (ref 94–99)
PCO2 BLDA: 60.6 MM HG (ref 35–45)
PCO2 TEMP ADJ BLD: ABNORMAL MM[HG]
PH BLDA: 7.39 PH UNITS (ref 7.35–7.45)
PH, TEMP CORRECTED: ABNORMAL
PLATELET # BLD AUTO: 168 10*3/MM3 (ref 140–450)
PMV BLD AUTO: 10.4 FL (ref 6–12)
PO2 BLDA: 64.3 MM HG (ref 75–100)
PO2 TEMP ADJ BLD: ABNORMAL MM[HG]
POTASSIUM SERPL-SCNC: 3.5 MMOL/L (ref 3.5–5.2)
RBC # BLD AUTO: 3.49 10*6/MM3 (ref 3.77–5.28)
SAO2 % BLDCOA: 92.4 % (ref 94–100)
SODIUM SERPL-SCNC: 142 MMOL/L (ref 136–145)
VENTILATOR MODE: ABNORMAL
WBC NRBC COR # BLD AUTO: 7.5 10*3/MM3 (ref 3.4–10.8)

## 2023-11-30 PROCEDURE — 25010000002 HEPARIN (PORCINE) PER 1000 UNITS: Performed by: INTERNAL MEDICINE

## 2023-11-30 PROCEDURE — 85025 COMPLETE CBC W/AUTO DIFF WBC: CPT | Performed by: INTERNAL MEDICINE

## 2023-11-30 PROCEDURE — 80048 BASIC METABOLIC PNL TOTAL CA: CPT | Performed by: INTERNAL MEDICINE

## 2023-11-30 PROCEDURE — 99222 1ST HOSP IP/OBS MODERATE 55: CPT | Performed by: INTERNAL MEDICINE

## 2023-11-30 PROCEDURE — 97162 PT EVAL MOD COMPLEX 30 MIN: CPT

## 2023-11-30 PROCEDURE — 93306 TTE W/DOPPLER COMPLETE: CPT | Performed by: INTERNAL MEDICINE

## 2023-11-30 PROCEDURE — 94799 UNLISTED PULMONARY SVC/PX: CPT

## 2023-11-30 PROCEDURE — 97166 OT EVAL MOD COMPLEX 45 MIN: CPT

## 2023-11-30 PROCEDURE — 93306 TTE W/DOPPLER COMPLETE: CPT

## 2023-11-30 PROCEDURE — 25010000002 FUROSEMIDE PER 20 MG: Performed by: EMERGENCY MEDICINE

## 2023-11-30 PROCEDURE — 25010000002 FUROSEMIDE PER 20 MG: Performed by: INTERNAL MEDICINE

## 2023-11-30 RX ORDER — AMOXICILLIN 250 MG
2 CAPSULE ORAL 2 TIMES DAILY
Status: DISCONTINUED | OUTPATIENT
Start: 2023-11-30 | End: 2023-12-01 | Stop reason: HOSPADM

## 2023-11-30 RX ORDER — GABAPENTIN 800 MG/1
800 TABLET ORAL 3 TIMES DAILY
COMMUNITY
Start: 2023-11-28 | End: 2024-02-26

## 2023-11-30 RX ORDER — CYCLOBENZAPRINE HCL 10 MG
5 TABLET ORAL 3 TIMES DAILY
Status: DISCONTINUED | OUTPATIENT
Start: 2023-11-30 | End: 2023-11-30

## 2023-11-30 RX ORDER — ACETAMINOPHEN 325 MG/1
650 TABLET ORAL EVERY 4 HOURS PRN
Status: DISCONTINUED | OUTPATIENT
Start: 2023-11-30 | End: 2023-12-01 | Stop reason: HOSPADM

## 2023-11-30 RX ORDER — POLYETHYLENE GLYCOL 3350 17 G/17G
17 POWDER, FOR SOLUTION ORAL DAILY PRN
Status: DISCONTINUED | OUTPATIENT
Start: 2023-11-30 | End: 2023-12-01 | Stop reason: HOSPADM

## 2023-11-30 RX ORDER — PRAVASTATIN SODIUM 20 MG
80 TABLET ORAL DAILY
Status: DISCONTINUED | OUTPATIENT
Start: 2023-11-30 | End: 2023-12-01 | Stop reason: HOSPADM

## 2023-11-30 RX ORDER — CHOLECALCIFEROL (VITAMIN D3) 125 MCG
5 CAPSULE ORAL NIGHTLY PRN
Status: DISCONTINUED | OUTPATIENT
Start: 2023-11-30 | End: 2023-12-01 | Stop reason: HOSPADM

## 2023-11-30 RX ORDER — NITROGLYCERIN 0.4 MG/1
0.4 TABLET SUBLINGUAL
Status: DISCONTINUED | OUTPATIENT
Start: 2023-11-30 | End: 2023-12-01 | Stop reason: HOSPADM

## 2023-11-30 RX ORDER — GABAPENTIN 300 MG/1
600 CAPSULE ORAL EVERY 8 HOURS SCHEDULED
Status: DISCONTINUED | OUTPATIENT
Start: 2023-11-30 | End: 2023-12-01 | Stop reason: HOSPADM

## 2023-11-30 RX ORDER — IPRATROPIUM BROMIDE AND ALBUTEROL SULFATE 2.5; .5 MG/3ML; MG/3ML
3 SOLUTION RESPIRATORY (INHALATION) EVERY 4 HOURS PRN
Status: DISCONTINUED | OUTPATIENT
Start: 2023-11-30 | End: 2023-12-01 | Stop reason: HOSPADM

## 2023-11-30 RX ORDER — BISACODYL 5 MG/1
5 TABLET, DELAYED RELEASE ORAL DAILY PRN
Status: DISCONTINUED | OUTPATIENT
Start: 2023-11-30 | End: 2023-12-01 | Stop reason: HOSPADM

## 2023-11-30 RX ORDER — PANTOPRAZOLE SODIUM 40 MG/1
40 TABLET, DELAYED RELEASE ORAL
Status: DISCONTINUED | OUTPATIENT
Start: 2023-11-30 | End: 2023-12-01 | Stop reason: HOSPADM

## 2023-11-30 RX ORDER — HYDROCODONE BITARTRATE AND ACETAMINOPHEN 7.5; 325 MG/1; MG/1
1 TABLET ORAL ONCE
Status: COMPLETED | OUTPATIENT
Start: 2023-11-30 | End: 2023-11-30

## 2023-11-30 RX ORDER — AMLODIPINE BESYLATE 5 MG/1
5 TABLET ORAL DAILY
Status: DISCONTINUED | OUTPATIENT
Start: 2023-11-30 | End: 2023-12-01 | Stop reason: HOSPADM

## 2023-11-30 RX ORDER — ACETAMINOPHEN 160 MG/5ML
650 SOLUTION ORAL EVERY 4 HOURS PRN
Status: DISCONTINUED | OUTPATIENT
Start: 2023-11-30 | End: 2023-12-01 | Stop reason: HOSPADM

## 2023-11-30 RX ORDER — CARVEDILOL 25 MG/1
25 TABLET ORAL 2 TIMES DAILY WITH MEALS
Status: DISCONTINUED | OUTPATIENT
Start: 2023-11-30 | End: 2023-12-01 | Stop reason: HOSPADM

## 2023-11-30 RX ORDER — ONDANSETRON 2 MG/ML
4 INJECTION INTRAMUSCULAR; INTRAVENOUS EVERY 6 HOURS PRN
Status: DISCONTINUED | OUTPATIENT
Start: 2023-11-30 | End: 2023-12-01 | Stop reason: HOSPADM

## 2023-11-30 RX ORDER — CYCLOBENZAPRINE HCL 10 MG
5 TABLET ORAL 3 TIMES DAILY PRN
Status: DISCONTINUED | OUTPATIENT
Start: 2023-11-30 | End: 2023-12-01 | Stop reason: HOSPADM

## 2023-11-30 RX ORDER — FUROSEMIDE 10 MG/ML
80 INJECTION INTRAMUSCULAR; INTRAVENOUS ONCE
Status: COMPLETED | OUTPATIENT
Start: 2023-11-30 | End: 2023-11-30

## 2023-11-30 RX ORDER — IPRATROPIUM BROMIDE AND ALBUTEROL SULFATE 2.5; .5 MG/3ML; MG/3ML
3 SOLUTION RESPIRATORY (INHALATION) ONCE
Status: COMPLETED | OUTPATIENT
Start: 2023-11-30 | End: 2023-11-30

## 2023-11-30 RX ORDER — FUROSEMIDE 10 MG/ML
40 INJECTION INTRAMUSCULAR; INTRAVENOUS DAILY
Status: DISCONTINUED | OUTPATIENT
Start: 2023-11-30 | End: 2023-12-01 | Stop reason: HOSPADM

## 2023-11-30 RX ORDER — HEPARIN SODIUM 5000 [USP'U]/ML
5000 INJECTION, SOLUTION INTRAVENOUS; SUBCUTANEOUS EVERY 12 HOURS SCHEDULED
Status: DISCONTINUED | OUTPATIENT
Start: 2023-11-30 | End: 2023-12-01 | Stop reason: HOSPADM

## 2023-11-30 RX ORDER — SODIUM CHLORIDE 0.9 % (FLUSH) 0.9 %
10 SYRINGE (ML) INJECTION EVERY 12 HOURS SCHEDULED
Status: DISCONTINUED | OUTPATIENT
Start: 2023-11-30 | End: 2023-12-01 | Stop reason: HOSPADM

## 2023-11-30 RX ORDER — DIFLUPREDNATE OPHTHALMIC 0.5 MG/ML
1 EMULSION OPHTHALMIC 4 TIMES DAILY
Status: DISCONTINUED | OUTPATIENT
Start: 2023-11-30 | End: 2023-11-30

## 2023-11-30 RX ORDER — ASPIRIN 81 MG/1
81 TABLET ORAL DAILY
Status: DISCONTINUED | OUTPATIENT
Start: 2023-11-30 | End: 2023-12-01 | Stop reason: HOSPADM

## 2023-11-30 RX ORDER — BISACODYL 10 MG
10 SUPPOSITORY, RECTAL RECTAL DAILY PRN
Status: DISCONTINUED | OUTPATIENT
Start: 2023-11-30 | End: 2023-12-01 | Stop reason: HOSPADM

## 2023-11-30 RX ORDER — SODIUM CHLORIDE 9 MG/ML
40 INJECTION, SOLUTION INTRAVENOUS AS NEEDED
Status: DISCONTINUED | OUTPATIENT
Start: 2023-11-30 | End: 2023-12-01 | Stop reason: HOSPADM

## 2023-11-30 RX ORDER — HYDROCODONE BITARTRATE AND ACETAMINOPHEN 7.5; 325 MG/1; MG/1
1 TABLET ORAL EVERY 6 HOURS PRN
COMMUNITY
Start: 2023-11-15 | End: 2023-12-15

## 2023-11-30 RX ORDER — ACETAMINOPHEN 650 MG/1
650 SUPPOSITORY RECTAL EVERY 4 HOURS PRN
Status: DISCONTINUED | OUTPATIENT
Start: 2023-11-30 | End: 2023-12-01 | Stop reason: HOSPADM

## 2023-11-30 RX ORDER — SODIUM CHLORIDE 0.9 % (FLUSH) 0.9 %
10 SYRINGE (ML) INJECTION AS NEEDED
Status: DISCONTINUED | OUTPATIENT
Start: 2023-11-30 | End: 2023-12-01 | Stop reason: HOSPADM

## 2023-11-30 RX ORDER — HYDROCODONE BITARTRATE AND ACETAMINOPHEN 7.5; 325 MG/1; MG/1
1 TABLET ORAL EVERY 6 HOURS PRN
Status: DISCONTINUED | OUTPATIENT
Start: 2023-11-30 | End: 2023-12-01 | Stop reason: HOSPADM

## 2023-11-30 RX ADMIN — HYDROCODONE BITARTRATE AND ACETAMINOPHEN 1 TABLET: 7.5; 325 TABLET ORAL at 07:22

## 2023-11-30 RX ADMIN — IPRATROPIUM BROMIDE AND ALBUTEROL SULFATE 3 ML: .5; 3 SOLUTION RESPIRATORY (INHALATION) at 00:54

## 2023-11-30 RX ADMIN — CARVEDILOL 25 MG: 25 TABLET, FILM COATED ORAL at 17:31

## 2023-11-30 RX ADMIN — FUROSEMIDE 40 MG: 10 INJECTION, SOLUTION INTRAMUSCULAR; INTRAVENOUS at 09:04

## 2023-11-30 RX ADMIN — Medication 10 ML: at 09:03

## 2023-11-30 RX ADMIN — ACETAMINOPHEN 650 MG: 325 TABLET, FILM COATED ORAL at 10:21

## 2023-11-30 RX ADMIN — ASPIRIN 81 MG: 81 TABLET, COATED ORAL at 09:03

## 2023-11-30 RX ADMIN — FUROSEMIDE 80 MG: 10 INJECTION, SOLUTION INTRAMUSCULAR; INTRAVENOUS at 01:33

## 2023-11-30 RX ADMIN — CARVEDILOL 25 MG: 25 TABLET, FILM COATED ORAL at 09:03

## 2023-11-30 RX ADMIN — GABAPENTIN 600 MG: 300 CAPSULE ORAL at 06:04

## 2023-11-30 RX ADMIN — HEPARIN SODIUM 5000 UNITS: 5000 INJECTION INTRAVENOUS; SUBCUTANEOUS at 20:58

## 2023-11-30 RX ADMIN — Medication 10 ML: at 21:08

## 2023-11-30 RX ADMIN — DICLOFENAC SODIUM 2 G: 10 GEL TOPICAL at 09:03

## 2023-11-30 RX ADMIN — PRAVASTATIN SODIUM 80 MG: 20 TABLET ORAL at 09:03

## 2023-11-30 RX ADMIN — PANTOPRAZOLE SODIUM 40 MG: 40 TABLET, DELAYED RELEASE ORAL at 06:05

## 2023-11-30 RX ADMIN — HYDROCODONE BITARTRATE AND ACETAMINOPHEN 1 TABLET: 7.5; 325 TABLET ORAL at 04:12

## 2023-11-30 RX ADMIN — GABAPENTIN 600 MG: 300 CAPSULE ORAL at 13:27

## 2023-11-30 RX ADMIN — DOCUSATE SODIUM 50MG AND SENNOSIDES 8.6MG 2 TABLET: 8.6; 5 TABLET, FILM COATED ORAL at 20:58

## 2023-11-30 RX ADMIN — HEPARIN SODIUM 5000 UNITS: 5000 INJECTION INTRAVENOUS; SUBCUTANEOUS at 09:04

## 2023-11-30 RX ADMIN — CYCLOBENZAPRINE 5 MG: 10 TABLET, FILM COATED ORAL at 12:24

## 2023-11-30 RX ADMIN — GABAPENTIN 600 MG: 300 CAPSULE ORAL at 21:00

## 2023-11-30 RX ADMIN — HYDROCODONE BITARTRATE AND ACETAMINOPHEN 1 TABLET: 7.5; 325 TABLET ORAL at 21:08

## 2023-11-30 RX ADMIN — HYDROCODONE BITARTRATE AND ACETAMINOPHEN 1 TABLET: 7.5; 325 TABLET ORAL at 13:27

## 2023-11-30 RX ADMIN — AMLODIPINE BESYLATE 5 MG: 5 TABLET ORAL at 09:02

## 2023-11-30 NOTE — ED PROVIDER NOTES
Subjective   History of Present Illness  83-year-old female presents to the ED with a chief complaint of shortness of breath, generalized weakness, fatigue.  Patient has CHF and COPD.  Wears 3 L at home at baseline.  Patient's daughter states that she has been short of breath for 3 weeks.  She has been seen in our ED multiple times over that span.  She was diagnosed with an acute CHF exacerbation once an acute COPD exacerbation at that time sent home on steroids.  She has not had any significant improvement in her symptoms.  She is actually worsening per daughter.  She has increased shortness of breath and dyspnea on exertion.  Patient states that she cannot walk more than 3 or 4 feet without becoming extremely fatigued and short of breath.  Patient also has a cough.  Cough is nonproductive sputum.  No fever or chills.  No nausea vomiting diarrhea abdominal pain.  No other complaints at this time.    Review of Systems   Constitutional:  Positive for fatigue. Negative for fever.   Respiratory:  Positive for cough and shortness of breath. Negative for wheezing.        Past Medical History:   Diagnosis Date    Cataracts, bilateral     CHF (congestive heart failure)     COPD (chronic obstructive pulmonary disease)     Coronary artery disease     Hyperlipidemia     Hypertension     Impaired functional mobility, balance, gait, and endurance     Myocardial infarction     Oxygen deficit     Pneumonia     RLS (restless legs syndrome)     Seizures        Allergies   Allergen Reactions    Ceftin [Cefuroxime Axetil] Diarrhea    Codeine Delirium    Crestor [Rosuvastatin] Other (See Comments)     Muscle pain     Doxycycline Diarrhea    Morphine Nausea And Vomiting    Sulfa Antibiotics Hives    Penicillins Rash       Past Surgical History:   Procedure Laterality Date    CAROTID ENDARTERECTOMY Left     CATARACT EXTRACTION W/ INTRAOCULAR LENS IMPLANT Right 7/27/2023    Procedure: CATARACT PHACO EXTRACTION WITH INTRAOCULAR LENS  IMPLANT RIGHT;  Surgeon: Dajuan Blackburn MD;  Location: [facility];  Service: Ophthalmology;  Laterality: Right;    CATARACT EXTRACTION W/ INTRAOCULAR LENS IMPLANT Left 8/10/2023    Procedure: CATARACT PHACO EXTRACTION WITH INTRAOCULAR LENS IMPLANT LEFT;  Surgeon: Dajuan Blackburn MD;  Location: [facility];  Service: Ophthalmology;  Laterality: Left;    CORONARY ANGIOPLASTY WITH STENT PLACEMENT      x2    LUNG SURGERY Left     PACEMAKER IMPLANTATION         History reviewed. No pertinent family history.    Social History     Socioeconomic History    Marital status:    Tobacco Use    Smoking status: Former   Vaping Use    Vaping Use: Never used   Substance and Sexual Activity    Alcohol use: No    Drug use: No    Sexual activity: Not Currently           Objective   Physical Exam  Vitals and nursing note reviewed.   Constitutional:       General: She is not in acute distress.     Appearance: She is not diaphoretic.      Comments: Elderly-appearing, no acute distress   HENT:      Head: Normocephalic and atraumatic.      Nose: Nose normal.   Eyes:      Conjunctiva/sclera: Conjunctivae normal.   Cardiovascular:      Rate and Rhythm: Normal rate and regular rhythm.   Pulmonary:      Effort: Tachypnea present. No respiratory distress.      Comments: Mild tachypnea.  Coarse breath sounds with rhonchi in the bilateral lung fields  Abdominal:      General: There is no distension.      Palpations: Abdomen is soft.      Tenderness: There is no abdominal tenderness. There is no guarding.   Musculoskeletal:         General: No deformity.      Right lower leg: Edema present.      Left lower leg: Edema present.      Comments: Trace edema bilateral lower extremities   Neurological:      Mental Status: She is alert and oriented to person, place, and time.      Cranial Nerves: No cranial nerve deficit.         Procedures           ED Course  ED Course as of 11/30/23 0147   Thu Nov 30, 2023   0000 EKG interpreted by me.   Ventricularly paced rhythm.  Rate of 69.  Abnormal EKG [CG]      ED Course User Index  [CG] Kimani Bacon SCOTTY, DO                                             Medical Decision Making  Problems Addressed:  Acute on chronic congestive heart failure, unspecified heart failure type: complicated acute illness or injury  Acute respiratory insufficiency: complicated acute illness or injury  COPD with acute exacerbation: complicated acute illness or injury    Amount and/or Complexity of Data Reviewed  Labs: ordered.  Radiology: ordered.  ECG/medicine tests: ordered.    Risk  Prescription drug management.  Decision regarding hospitalization.    Chief complaint: Shortness of breath    Differential diagnosis includes but not limited to: Acute CHF exacerbation, acute COPD exacerbation, pneumonia, STEMI, NSTEMI, pleural effusion, other    Patient arrives stable, afebrile, without respiratory distress with initial vitals interpreted by myself.  Pertinent initial vitals include mild tachypnea, borderline hypoxia    Plan: CBC, CMP, ABG, chest x-ray, EKG, troponin, BNP, other    Results from initial plan were reviewed and interpreted by myself and include: High-sensitivity troponin indeterminate at 31, proBNP is elevated at 8472, this is more elevated than the patient's baseline.  Chest x-ray appears to show diffuse pulmonary edema.  No focal pneumonia.  CMP is reassuring with mild chronic renal insufficiency.  ABG shows some mild hypercapnia and hypoxia.    Consultations Dr. Lemos, hospitalist accepts for admission.    Reevaluation still mildly tachypneic.  Attempted to ambulate the patient on her home O2 requirements of 3 L nasal cannula.  Patient desatted to 83% with normal pleth after 3 steps.  She became very tachypneic and work of breathing was labored.    Discussion: Patient presented to the ED with shortness of breath.  Suspected CHF exacerbation versus COPD exacerbation.  Work-up was most consistent with likely CHF  exacerbation.  She was given Lasix and DuoNebs.  She is already been on steroids for a acute COPD exacerbation.  Given her respiratory status and dyspnea on exertion with documented hypoxia with minimal exertion I discussed the case with the hospitalist.  Patient will be admitted for further evaluation medical management.    Diagnostic information from other sources includes: Review of previous visits, prior labs, prior imaging, available notes from prior evaluations or visits with specialists, medication list, allergies, past medical history, past surgical history    Interventions in the ED included: DuoNeb's, continuous cardiac monitoring, Lasix, other    Disposition plan: Admission.    Final diagnoses:   Acute on chronic congestive heart failure, unspecified heart failure type   COPD with acute exacerbation   Acute respiratory insufficiency       ED Disposition  ED Disposition       ED Disposition   Decision to Admit    Condition   --    Comment   Level of Care: Telemetry [5]   Diagnosis: Acute exacerbation of CHF (congestive heart failure) [231122]   Admitting Physician: LEDY ADAMSON [[id number]]   Attending Physician: LEDY ADAMSON [[id number]]   Certification: I Certify That Inpatient Hospital Services Are Medically Necessary For Greater Than 2 Midnights                 No follow-up provider specified.       Medication List      No changes were made to your prescriptions during this visit.            Kimani Bacon, DO  11/30/23 0147

## 2023-11-30 NOTE — H&P
"  [facility]      Name:  Lesley Calero   Age:  83 y.o.  Sex:  female  :  1940  MRN:  [id number]   Visit Number:  [id number]  Admission Date:  2023  Date Of Service:  23  Primary Care Physician:  Nj Smith MD    Chief Complaint:     Dizziness    History Of Presenting Illness:      Patient is a chronically ill and frail 83-year-old female with history significant for HFpEF, COPD on 3 L chronically, CAD and hypertension who presents from home with family to the emergency room for progressively worsening weakness and shortness of breath.  Patient states that she has been taking half of her Lasix dose.  States that she has had worsening lower extremity swelling.  Normally she is able to ambulate \"from 1 end of her house to the other\" but has been too short of breath to move.  Admits to nonproductive cough.  Denies chest pain, nausea vomiting or diarrhea.  Upon arrival to the ER, patient was afebrile hemodynamically stable hypoxic 86%.  Significant labs include plateaued troponins 34-31.  proBNP 8472.  Creatinine 1.1.  H&H 10/33, stable.  Chest x-ray personally reviewed, chronic changes, mild pulmonary edema and small bilateral effusions per my read.  Patient received IV Lasix 80 mg in the ER.  Patient ambulated and oxygen dropped to 83% on her home O2 requirement.  Hospital service asked to admit.    Review Of Systems:    All systems were reviewed and negative except as mentioned in history of presenting illness, assessment and plan.    Past Medical History: Patient  has a past medical history of Cataracts, bilateral, CHF (congestive heart failure), COPD (chronic obstructive pulmonary disease), Coronary artery disease, Hyperlipidemia, Hypertension, Impaired functional mobility, balance, gait, and endurance, Myocardial infarction, Oxygen deficit, Pneumonia, RLS (restless legs syndrome), and Seizures.    Past Surgical History: Patient  has a past " surgical history that includes Coronary angioplasty with stent; Pacemaker Implantation; Carotid endarterectomy (Left); Lung surgery (Left); Cataract extraction w/ intraocular lens implant (Right, 7/27/2023); and Cataract extraction w/ intraocular lens implant (Left, 8/10/2023).    Social History: Patient  reports that she has quit smoking. She does not have any smokeless tobacco history on file. She reports that she does not drink alcohol and does not use drugs.    Family History:  Patient's family history has been reviewed and found to be noncontributory.     Allergies:      Ceftin [cefuroxime axetil], Codeine, Crestor [rosuvastatin], Doxycycline, Morphine, Sulfa antibiotics, and Penicillins    Home Medications:    Prior to Admission Medications       Prescriptions Last Dose Informant Patient Reported? Taking?    amLODIPine (NORVASC) 5 MG tablet   Yes No    Take 1 tablet by mouth Daily.    aspirin 81 MG EC tablet   No No    Take 1 tablet by mouth Daily.    azithromycin (ZITHROMAX) 250 MG tablet   No No    Take 1 tablet by mouth Daily. Take 2 tablets the first day, then 1 tablet daily for 4 days.    carvedilol (COREG) 25 MG tablet   Yes No    Take 1 tablet by mouth 2 (Two) Times a Day With Meals.    cefdinir (OMNICEF) 300 MG capsule   No No    Take 1 capsule by mouth 2 (Two) Times a Day.    difluprednate (DUREZOL) 0.05 % ophthalmic emulsion   No No    Administer 1 drop to the right eye 4 (Four) Times a Day. See admin instructions on AVS.    difluprednate (DUREZOL) 0.05 % ophthalmic emulsion   No No    Administer 1 drop into the left eye 4 (Four) Times a Day. See admin instructions on AVS.    furosemide (LASIX) 40 MG tablet   Yes No    Take 1 tablet by mouth Daily.    gabapentin (NEURONTIN) 600 MG tablet   Yes No    Take 1 tablet by mouth 3 (Three) Times a Day.    HYDROcodone-acetaminophen (NORCO) 5-325 MG per tablet   Yes No    Take 1 tablet by mouth Every 6 (Six) Hours As Needed.    ipratropium-albuterol (DUO-NEB)  "0.5-2.5 mg/3 ml nebulizer   No No    Take 3 mL by nebulization Every 4 (Four) Hours As Needed for Shortness of Air.    nitroglycerin (NITROSTAT) 0.4 MG SL tablet   Yes No    Place 1 tablet under the tongue Every 5 (Five) Minutes As Needed for Chest Pain. Take no more than 3 doses in 15 minutes.    omeprazole (priLOSEC) 40 MG capsule   Yes No    Take 1 capsule by mouth Daily.    ondansetron ODT (ZOFRAN-ODT) 4 MG disintegrating tablet   No No    Place 1 tablet on the tongue Every 6 (Six) Hours As Needed for Nausea or Vomiting.    pravastatin (PRAVACHOL) 40 MG tablet   Yes No    Take 2 tablets by mouth Daily.          ED Medications:    Medications   sodium chloride 0.9 % flush 10 mL (has no administration in time range)   furosemide (LASIX) injection 80 mg (80 mg Intravenous Given 11/30/23 0133)   ipratropium-albuterol (DUO-NEB) nebulizer solution 3 mL (3 mL Nebulization Given 11/30/23 0054)     Vital Signs:  Temp:  [97.9 °F (36.6 °C)] 97.9 °F (36.6 °C)  Heart Rate:  [70-74] 70  Resp:  [20-26] 22  BP: (134-168)/(64-89) 158/64        11/29/23 2019   Weight: 47.6 kg (105 lb)     Body mass index is 20.51 kg/m².    Physical Exam:     Most recent vital Signs: /64 (Patient Position: Lying)   Pulse 70   Temp 97.9 °F (36.6 °C) (Oral)   Resp 22   Ht 152.4 cm (60\")   Wt 47.6 kg (105 lb)   SpO2 94%   BMI 20.51 kg/m²     Physical Exam  Vitals reviewed.   Constitutional:       General: She is not in acute distress.     Appearance: She is normal weight.   HENT:      Head: Normocephalic and atraumatic.      Right Ear: External ear normal.      Left Ear: External ear normal.      Mouth/Throat:      Mouth: Mucous membranes are moist.      Pharynx: Oropharynx is clear.   Eyes:      Extraocular Movements: Extraocular movements intact.      Conjunctiva/sclera: Conjunctivae normal.   Cardiovascular:      Rate and Rhythm: Normal rate and regular rhythm.      Pulses: Normal pulses.      Heart sounds: Normal heart sounds. " "  Pulmonary:      Effort: Pulmonary effort is normal.      Breath sounds: Normal breath sounds.   Abdominal:      General: Bowel sounds are normal.      Palpations: Abdomen is soft.   Musculoskeletal:      Right lower leg: Edema present.      Left lower leg: Edema present.   Skin:     General: Skin is warm and dry.   Neurological:      General: No focal deficit present.      Mental Status: She is alert and oriented to person, place, and time.   Psychiatric:         Behavior: Behavior normal.       Laboratory data:    I have reviewed the labs done in the emergency room.    Results from last 7 days   Lab Units 11/29/23 2022   SODIUM mmol/L 143   POTASSIUM mmol/L 4.0   CHLORIDE mmol/L 101   CO2 mmol/L 35.0*   BUN mg/dL 22   CREATININE mg/dL 1.10*   CALCIUM mg/dL 9.2   BILIRUBIN mg/dL 0.5   ALK PHOS U/L 91   ALT (SGPT) U/L 14   AST (SGOT) U/L 29   GLUCOSE mg/dL 98     Results from last 7 days   Lab Units 11/29/23 2022   WBC 10*3/mm3 6.60   HEMOGLOBIN g/dL 10.0*   HEMATOCRIT % 33.1*   PLATELETS 10*3/mm3 166         Results from last 7 days   Lab Units 11/29/23  2254 11/29/23 2022   HSTROP T ng/L 31* 34*     Results from last 7 days   Lab Units 11/29/23 2022   PROBNP pg/mL 8,472.0*             Results from last 7 days   Lab Units 11/29/23  2308   PH, ARTERIAL pH units 7.395   PO2 ART mm Hg 64.3*   PCO2, ARTERIAL mm Hg 60.6*   HCO3 ART mmol/L 37.1*           Invalid input(s): \"USDES\", \"NITRITITE\", \"BACT\", \"EP\"    Pain Management Panel           No data to display                EKG:      EKG personally reviewed, ventricular paced rate of 69 bpm.    Radiology:    No radiology results for the last 3 days    Assessment:    HFpEF with acute exacerbation POA  Elevated troponin POA  Hypertension  Hyperlipidemia  CAD  Status post cardiac pacemaker  COPD on 3 L  Impaired mobility and ADL    Plan:    Will admit patient to hospital; anticipate less than 2 midnight stay.    HFpEF with acute exacerbation/elevated " troponin  -Patient already responding well to IV Lasix 80 mg received in the emergency room.  -She is now stable on home oxygen requirement of 3 L  -Repeat echo  -Strict I's and O  -Lasix IV 40 mg in the a.m.  -Suspect elevated troponin in setting of demand ischemia.  Low suspicion for ACS    PT/OT.  Further orders as clinical course dictates.    Risk Assessment: High  DVT Prophylaxis: Heparin subcu  Code Status: Full  Diet: Cardiac/    Advance Care Planning   ACP discussion was held with the patient during this visit. Patient does not have an advance directive, information provided.           Darrel Lemos DO  11/30/23  02:51 EST    Dictated utilizing Dragon dictation.

## 2023-11-30 NOTE — CASE MANAGEMENT/SOCIAL WORK
Discharge Planning Assessment  [facility]     Patient Name: Lesley Calero  MRN: [id number]  Today's Date: 11/30/2023    Admit Date: 11/29/2023    Plan: Patient is awake and able to answer questions.  She is a current patient of Dr. Smith and gets her medications from [employer].  She uses the following DME at home:  rollator, oxygen, nebulizer, and cane.  She gets her oxygen supplies from [facility].  She does not feel that she needs any additional DME or services when she returns home. At this time the patient plans to return to her home of 26 years that she shares with her .  Her daughter moved in 4 months ago to help provide care for the patient and her .  All questions and concerns were addressed at the time of this conversation.  Will provide additional resources and information upon patient request.   Discharge Needs Assessment       Row Name 11/30/23 1028       Living Environment    People in Home spouse;child(seth), adult    Name(s) of People in Home Roberto Calero,  and Anel Garcias, daughter    Unique Family Situation The patient's daughter moved in with the patient and her  4 months ago to provide care for them.    Current Living Arrangements home    Duration at Residence 26 years    Potentially Unsafe Housing Conditions none    In the past 12 months has the electric, gas, oil, or water company threatened to shut off services in your home? No    Primary Care Provided by child(seth)    Provides Primary Care For no one, unable/limited ability to care for self    Quality of Family Relationships helpful;involved;supportive    Able to Return to Prior Arrangements yes       Resource/Environmental Concerns    Resource/Environmental Concerns none       Food Insecurity    Within the past 12 months, you worried that your food would run out before you got the money to buy more. Never true    Within the past 12 months, the food you bought just didn't last and you didn't have money to  get more. Never true       Transition Planning    Patient/Family Anticipates Transition to home with family    Patient/Family Anticipated Services at Transition none    Transportation Anticipated family or friend will provide       Discharge Needs Assessment    Readmission Within the Last 30 Days no previous admission in last 30 days    Equipment Currently Used at Home rollator;cane, straight;wheelchair;oxygen;nebulizer  The patient receives her O2 supplies from [facility]    Concerns to be Addressed denies needs/concerns at this time    Anticipated Changes Related to Illness none    Equipment Needed After Discharge none    Current Discharge Risk chronically ill                   Discharge Plan       Row Name 11/30/23 1310       Plan    Plan Comments patient expressed concern    about hosptial bill.    Messaged   [facility]  and FC      Row Name 11/30/23 1039       Plan    Plan Patient is awake and able to answer questions.  She is a current patient of Dr. Smith and gets her medications from [employer].  She uses the following DME at home:  rollator, oxygen, nebulizer, and cane.  She gets her oxygen supplies from [facility].  She does not feel that she needs any additional DME or services when she returns home. At this time the patient plans to return to her home of 26 years that she shares with her .  Her daughter moved in 4 months ago to help provide care for the patient and her .  All questions and concerns were addressed at the time of this conversation.  Will provide additional resources and information upon patient request.    Final Discharge Disposition Code 01 - home or self-care                  Continued Care and Services - Admitted Since 11/29/2023       Durable Medical Equipment       Service Provider Request Status Selected Services Address Phone Fax Patient Preferred    [facility] - No Request Sent N/A [address] [id number] [phone]  [phone] --                     Demographic Summary       Row Name 11/30/23 1027       General Information    Admission Type inpatient    Arrived From emergency department    Required Notices Provided Important Message from Medicare    Referral Source admission list    Reason for Consult discharge planning    Preferred Language English                   Functional Status       Row Name 11/30/23 1027       Functional Status    Usual Activity Tolerance moderate    Current Activity Tolerance poor       Physical Activity    On average, how many days per week do you engage in moderate to strenuous exercise (like a brisk walk)? 0 days    On average, how many minutes do you engage in exercise at this level? 0 min    Number of minutes of exercise per week 0       Assessment of Health Literacy    How often do you have someone help you read hospital materials? Occasionally    How often do you have problems learning about your medical condition because of difficulty understanding written information? Occasionally    How often do you have a problem understanding what is told to you about your medical condition? Occasionally    How confident are you filling out medical forms by yourself? Quite a bit    Health Literacy Good       Functional Status, IADL    Medications independent    Meal Preparation assistive person    Housekeeping assistive person    Laundry assistive person    Shopping assistive person       Mental Status    General Appearance WDL WDL       Mental Status Summary    Recent Changes in Mental Status/Cognitive Functioning no changes                   Psychosocial       Row Name 11/30/23 1027       Values/Beliefs    Spiritual, Cultural Beliefs, Alevism Practices, Values that Affect Care no       Behavior WDL    Behavior WDL WDL       Mental Health    Little Interest or Pleasure in Doing Things 0-->not at all    Feeling Down, Depressed or Hopeless 0-->not at all    Do you feel stress - tense, restless, nervous,  or anxious, or unable to sleep at night because your mind is troubled all the time - these days? Not at all       Speech WDL    Speech WDL WDL       Perceptual State WDL    Perceptual State WDL WDL       Thought Process WDL    Thought Process WDL WDL       Intellectual Performance WDL    Intellectual Performance WDL WDL                   Abuse/Neglect       Row Name 11/30/23 1028       Personal Safety    Feels Unsafe at Home or Work/School no    Feels Threatened by Someone no    Does Anyone Try to Keep You From Having Contact with Others or Doing Things Outside Your Home? no    Physical Signs of Abuse Present no                   Legal       Row Name 11/30/23 1028       Financial Resource Strain    How hard is it for you to pay for the very basics like food, housing, medical care, and heating? Not hard                   Substance Abuse       Row Name 11/30/23 1028       Substance Use    Substance Use Status never used                   Patient Forms       Row Name 11/30/23 1036       Patient Forms    Important Message from Medicare (IMM) Delivered    Delivered to Patient    Method of delivery In person                      Riya Orantes RN

## 2023-11-30 NOTE — PLAN OF CARE
Goal Outcome Evaluation:  Plan of Care Reviewed With: patient        Progress: no change  Outcome Evaluation: Pt participated in PT eval this date. Pt presents with deficits in endurance and activity tolerance. Pt able to transfer to EOB with SBA. Pt maintained balance at EOB. Pt stood took steps to HOB with CGA. Pt returned to sitting. O2 sats dropped to 82% on 3L, Pt reported no symptoms of SOA. Pt performed breathing techniques in supine to improve O2. Pt required 5L O2 to improve to 89-90%.      Anticipated Discharge Disposition (PT): home with assist, home with home health

## 2023-11-30 NOTE — PLAN OF CARE
Goal Outcome Evaluation:  Plan of Care Reviewed With: patient    Given one Norco 7.5 mg/325 mg (see MAR) for chronic pain in legs.  Pt voided >2000 mls of urine.  SPO2 ranged from 95-99% on home 3L via NC.          Progress: no change

## 2023-11-30 NOTE — PLAN OF CARE
Problem: Fall Injury Risk  Goal: Absence of Fall and Fall-Related Injury  Outcome: Ongoing, Progressing  Intervention: Identify and Manage Contributors  Recent Flowsheet Documentation  Taken 11/30/2023 1600 by Danyell Nichols RN  Medication Review/Management: medications reviewed  Taken 11/30/2023 1400 by Danyell Nichols RN  Medication Review/Management: medications reviewed  Intervention: Promote Injury-Free Environment  Recent Flowsheet Documentation  Taken 11/30/2023 1600 by Danyell Nichols RN  Safety Promotion/Fall Prevention:   safety round/check completed   room organization consistent  Taken 11/30/2023 1400 by Danyell Nichols RN  Safety Promotion/Fall Prevention:   safety round/check completed   room organization consistent  Taken 11/30/2023 1239 by Danyell Nichols RN  Safety Promotion/Fall Prevention:   safety round/check completed   room organization consistent     Problem: Pain Chronic (Persistent)  Goal: Acceptable Pain Control and Functional Ability  Outcome: Ongoing, Progressing  Intervention: Manage Persistent Pain  Recent Flowsheet Documentation  Taken 11/30/2023 1600 by Danyell Nichols RN  Medication Review/Management: medications reviewed  Taken 11/30/2023 1400 by Danyell Nichols RN  Medication Review/Management: medications reviewed  Intervention: Develop Pain Management Plan  Recent Flowsheet Documentation  Taken 11/30/2023 1239 by Danyell Nichols RN  Pain Management Interventions: see MAR  Intervention: Optimize Psychosocial Wellbeing  Recent Flowsheet Documentation  Taken 11/30/2023 1239 by Danyell Nichols RN  Diversional Activities: television     Problem: Breathing Pattern Ineffective  Goal: Effective Breathing Pattern  Outcome: Ongoing, Progressing  Intervention: Promote Improved Breathing Pattern  Recent Flowsheet Documentation  Taken 11/30/2023 1600 by Danyell Nichlos RN  Head of Bed (HOB) Positioning: HOB elevated  Taken 11/30/2023 1400 by Magdalena  DESIRAE Child  Head of Bed (HOB) Positioning: HOB elevated  Taken 11/30/2023 1239 by Danyell Nichols RN  Head of Bed (HOB) Positioning: HOB elevated     Problem: Adult Inpatient Plan of Care  Goal: Plan of Care Review  Outcome: Ongoing, Progressing  Flowsheets (Taken 11/30/2023 1611)  Progress: improving  Plan of Care Reviewed With: patient  Outcome Evaluation: New admission to unit  Goal: Patient-Specific Goal (Individualized)  Outcome: Ongoing, Progressing  Goal: Absence of Hospital-Acquired Illness or Injury  Outcome: Ongoing, Progressing  Intervention: Identify and Manage Fall Risk  Recent Flowsheet Documentation  Taken 11/30/2023 1600 by Danyell Nichols RN  Safety Promotion/Fall Prevention:   safety round/check completed   room organization consistent  Taken 11/30/2023 1400 by Danyell Nichols RN  Safety Promotion/Fall Prevention:   safety round/check completed   room organization consistent  Taken 11/30/2023 1239 by Danyell Nichols RN  Safety Promotion/Fall Prevention:   safety round/check completed   room organization consistent  Intervention: Prevent Skin Injury  Recent Flowsheet Documentation  Taken 11/30/2023 1600 by Danyell Nichols RN  Body Position: supine, legs elevated  Taken 11/30/2023 1400 by Danyell Nichols RN  Body Position: supine, legs elevated  Taken 11/30/2023 1239 by Danyell Nichols RN  Body Position: supine  Intervention: Prevent and Manage VTE (Venous Thromboembolism) Risk  Recent Flowsheet Documentation  Taken 11/30/2023 1600 by Danyell Nichols RN  Activity Management: activity encouraged  Taken 11/30/2023 1400 by Danyell Nichols RN  Activity Management: activity encouraged  Taken 11/30/2023 1239 by Danyell Nichols RN  Activity Management: activity encouraged  Range of Motion: active ROM (range of motion) encouraged  Intervention: Prevent Infection  Recent Flowsheet Documentation  Taken 11/30/2023 1600 by Danyell Nichols RN  Infection Prevention:    single patient room provided   rest/sleep promoted  Taken 11/30/2023 1400 by Danyell Nichols RN  Infection Prevention:   single patient room provided   rest/sleep promoted  Taken 11/30/2023 1239 by Danyell Nichols RN  Infection Prevention:   single patient room provided   rest/sleep promoted  Goal: Optimal Comfort and Wellbeing  Outcome: Ongoing, Progressing  Intervention: Monitor Pain and Promote Comfort  Recent Flowsheet Documentation  Taken 11/30/2023 1239 by Danyell Nichols RN  Pain Management Interventions: see MAR  Intervention: Provide Person-Centered Care  Recent Flowsheet Documentation  Taken 11/30/2023 1239 by Danyell Nichols RN  Trust Relationship/Rapport:   care explained   questions answered  Goal: Readiness for Transition of Care  Outcome: Ongoing, Progressing  Intervention: Mutually Develop Transition Plan  Recent Flowsheet Documentation  Taken 11/30/2023 1239 by Danyell Nichols RN  Transportation Anticipated: family or friend will provide  Patient/Family Anticipated Services at Transition:   Patient/Family Anticipates Transition to: home with family     Problem: Skin Injury Risk Increased  Goal: Skin Health and Integrity  Outcome: Ongoing, Progressing  Intervention: Optimize Skin Protection  Recent Flowsheet Documentation  Taken 11/30/2023 1600 by Danyell Nichols RN  Head of Bed (HOB) Positioning: HOB elevated  Taken 11/30/2023 1400 by Danyell Nichols RN  Head of Bed (HOB) Positioning: HOB elevated  Taken 11/30/2023 1239 by Danyell Nichols RN  Head of Bed (HOB) Positioning: HOB elevated     Problem: Adjustment to Illness (Sepsis/Septic Shock)  Goal: Optimal Coping  Outcome: Ongoing, Progressing     Problem: Bleeding (Sepsis/Septic Shock)  Goal: Absence of Bleeding  Outcome: Ongoing, Progressing     Problem: Glycemic Control Impaired (Sepsis/Septic Shock)  Goal: Blood Glucose Level Within Desired Range  Outcome: Ongoing, Progressing     Problem: Infection  Progression (Sepsis/Septic Shock)  Goal: Absence of Infection Signs and Symptoms  Outcome: Ongoing, Progressing  Intervention: Initiate Sepsis Management  Recent Flowsheet Documentation  Taken 11/30/2023 1600 by Danyell Nichols RN  Infection Prevention:   single patient room provided   rest/sleep promoted  Taken 11/30/2023 1400 by Danyell Nichols RN  Infection Prevention:   single patient room provided   rest/sleep promoted  Taken 11/30/2023 1239 by Danyell Nichols RN  Infection Prevention:   single patient room provided   rest/sleep promoted  Intervention: Promote Recovery  Recent Flowsheet Documentation  Taken 11/30/2023 1600 by Danyell Nichols RN  Activity Management: activity encouraged  Taken 11/30/2023 1400 by Danyell Nichols RN  Activity Management: activity encouraged  Taken 11/30/2023 1239 by Danyell Nichols RN  Activity Management: activity encouraged     Problem: Nutrition Impaired (Sepsis/Septic Shock)  Goal: Optimal Nutrition Intake  Outcome: Ongoing, Progressing   Goal Outcome Evaluation:  Plan of Care Reviewed With: patient        Progress: improving  Outcome Evaluation: New admission to unit

## 2023-11-30 NOTE — THERAPY EVALUATION
Patient Name: Lesley Calero  : 1940    MRN: [id number]                              Today's Date: 2023       Admit Date: 2023    Visit Dx:     ICD-10-CM ICD-9-CM   1. Acute on chronic congestive heart failure, unspecified heart failure type  I50.9 428.0   2. COPD with acute exacerbation  J44.1 491.21   3. Acute respiratory insufficiency  R06.89 518.82     Patient Active Problem List   Diagnosis    Knee strain, right, initial encounter    Chronic pain of right knee    Acute on chronic congestive heart failure, unspecified heart failure type    COPD (chronic obstructive pulmonary disease)    Essential hypertension    Status post placement of cardiac pacemaker    Nuclear sclerotic cataract of right eye    Acute exacerbation of CHF (congestive heart failure)     Past Medical History:   Diagnosis Date    Cataracts, bilateral     CHF (congestive heart failure)     COPD (chronic obstructive pulmonary disease)     Coronary artery disease     Hyperlipidemia     Hypertension     Impaired functional mobility, balance, gait, and endurance     Myocardial infarction     Oxygen deficit     Pneumonia     RLS (restless legs syndrome)     Seizures      Past Surgical History:   Procedure Laterality Date    CAROTID ENDARTERECTOMY Left     CATARACT EXTRACTION W/ INTRAOCULAR LENS IMPLANT Right 2023    Procedure: CATARACT PHACO EXTRACTION WITH INTRAOCULAR LENS IMPLANT RIGHT;  Surgeon: Dajuan Blackburn MD;  Location: [facility] OR;  Service: Ophthalmology;  Laterality: Right;    CATARACT EXTRACTION W/ INTRAOCULAR LENS IMPLANT Left 8/10/2023    Procedure: CATARACT PHACO EXTRACTION WITH INTRAOCULAR LENS IMPLANT LEFT;  Surgeon: Dajuan Blackburn MD;  Location: [facility] OR;  Service: Ophthalmology;  Laterality: Left;    CORONARY ANGIOPLASTY WITH STENT PLACEMENT      x2    LUNG SURGERY Left     PACEMAKER IMPLANTATION        General Information       Row Name 23 1233          OT Time and Intention    Document  Type evaluation  -SD     Mode of Treatment occupational therapy  -SD       Row Name 11/30/23 1233          General Information    Patient Profile Reviewed yes  -SD     Prior Level of Function independent:;all household mobility;ADL's  Lives with  and daughter. Has a SPC for HH mobility, a rollator for the community. Has a SC and HH showerhead and a BSC.  -SD     Existing Precautions/Restrictions fall;oxygen therapy device and L/min  -SD     Barriers to Rehab medically complex  -SD       Row Name 11/30/23 1233          Living Environment    People in Home child(seth), adult;spouse  -SD       Row Name 11/30/23 1233          Home Main Entrance    Number of Stairs, Main Entrance four  -SD     Stair Railings, Main Entrance railings on both sides of stairs  -SD       Row Name 11/30/23 1233          Stairs Within Home, Primary    Number of Stairs, Within Home, Primary none  -SD       Row Name 11/30/23 1233          Cognition    Orientation Status (Cognition) oriented x 4  -SD       Row Name 11/30/23 1233          Safety Issues, Functional Mobility    Safety Issues Affecting Function (Mobility) safety precautions follow-through/compliance  -SD     Impairments Affecting Function (Mobility) balance;endurance/activity tolerance;shortness of breath;strength;pain  -SD               User Key  (r) = Recorded By, (t) = Taken By, (c) = Cosigned By      Initials Name Provider Type    SD Di Flores OT Occupational Therapist                     Mobility/ADL's       Row Name 11/30/23 1235          Bed Mobility    Bed Mobility supine-sit;sit-supine  -SD     Supine-Sit Gordon (Bed Mobility) standby assist  -SD     Sit-Supine Gordon (Bed Mobility) standby assist  -SD     Assistive Device (Bed Mobility) bed rails;head of bed elevated  -SD       Row Name 11/30/23 1235          Transfers    Transfers sit-stand transfer  -SD       Row Name 11/30/23 1235          Sit-Stand Transfer    Sit-Stand Gordon (Transfers)  contact guard  -SD     Assistive Device (Sit-Stand Transfers) cane, straight  -SD       Row Name 11/30/23 1235          Functional Mobility    Functional Mobility- Ind. Level contact guard assist  -SD     Functional Mobility- Device cane, straight  -SD     Functional Mobility-Distance (Feet) 3  -SD     Functional Mobility- Safety Issues balance decreased during turns;sequencing ability decreased;step length decreased;weight-shifting ability decreased;supplemental O2  -SD     Functional Mobility- Comment O2 dropped to 82%  -SD       Row Name 11/30/23 1235          Activities of Daily Living    BADL Assessment/Intervention bathing;upper body dressing;lower body dressing;grooming;feeding;toileting  -SD       Row Name 11/30/23 1235          Bathing Assessment/Intervention    [place] Level (Bathing) minimum assist (75% patient effort)  -SD       Row Name 11/30/23 1235          Upper Body Dressing Assessment/Training    [place] Level (Upper Body Dressing) standby assist  -SD       Row Name 11/30/23 1235          Lower Body Dressing Assessment/Training    [place] Level (Lower Body Dressing) minimum assist (75% patient effort)  -SD       Row Name 11/30/23 1235          Grooming Assessment/Training    [place] Level (Grooming) standby assist  -SD       Row Name 11/30/23 1235          Self-Feeding Assessment/Training    [place] Level (Feeding) supervision  -SD       Row Name 11/30/23 1235          Toileting Assessment/Training    [place] Level (Toileting) minimum assist (75% patient effort)  -SD     Assistive Devices (Toileting) commode, bedside without drop arms  -SD               User Key  (r) = Recorded By, (t) = Taken By, (c) = Cosigned By      Initials Name Provider Type    SD Di Flores OT Occupational Therapist                   Obj/Interventions       Row Name 11/30/23 1236          Range of Motion Comprehensive    General Range of Motion bilateral upper extremity ROM WFL  -SD        Row Name 11/30/23 1236          Strength Comprehensive (MMT)    General Manual Muscle Testing (MMT) Assessment upper extremity strength deficits identified  -SD     Comment, General Manual Muscle Testing (MMT) Assessment UB 4-/5  -SD               User Key  (r) = Recorded By, (t) = Taken By, (c) = Cosigned By      Initials Name Provider Type    SD Di Flores OT Occupational Therapist                   Goals/Plan       Row Name 11/30/23 1249          Transfer Goal 1 (OT)    Activity/Assistive Device (Transfer Goal 1, OT) sit-to-stand/stand-to-sit;cane, straight  -SD     [place] Level/Cues Needed (Transfer Goal 1, OT) modified independence  -SD     Time Frame (Transfer Goal 1, OT) long term goal (LTG)  -SD     Progress/Outcome (Transfer Goal 1, OT) goal ongoing  -SD       Row Name 11/30/23 1249          Dressing Goal 1 (OT)    Activity/Device (Dressing Goal 1, OT) lower body dressing  -SD     [place]/Cues Needed (Dressing Goal 1, OT) standby assist  -SD     Time Frame (Dressing Goal 1, OT) 2 weeks  -SD     Progress/Outcome (Dressing Goal 1, OT) goal ongoing  -SD       Row Name 11/30/23 1249          Toileting Goal 1 (OT)    Activity/Device (Toileting Goal 1, OT) toileting skills, all;commode  -SD     [place] Level/Cues Needed (Toileting Goal 1, OT) standby assist  -SD     Time Frame (Toileting Goal 1, OT) 2 weeks  -SD     Progress/Outcome (Toileting Goal 1, OT) goal ongoing  -SD       Row Name 11/30/23 1249          Strength Goal 1 (OT)    Strength Goal 1 (OT) Patient to perform UB ther ex as tolerated  -SD     Time Frame (Strength Goal 1, OT) long term goal (LTG)  -SD     Progress/Outcome (Strength Goal 1, OT) goal ongoing  -SD       Row Name 11/30/23 1249          Therapy Assessment/Plan (OT)    Planned Therapy Interventions (OT) activity tolerance training;adaptive equipment training;BADL retraining;patient/caregiver education/training;strengthening exercise;transfer/mobility retraining  -SD                User Key  (r) = Recorded By, (t) = Taken By, (c) = Cosigned By      Initials Name Provider Type    Di Wood OT Occupational Therapist                   Clinical Impression       Row Name 11/30/23 1236          Pain Assessment    Additional Documentation Pain Scale: FACES Pre/Post-Treatment (Group)  -SD       Row Name 11/30/23 1236          Pain Scale: FACES Pre/Post-Treatment    Pain: FACES Scale, Pretreatment 6-->hurts even more  -SD     Posttreatment Pain Rating 4-->hurts little more  -SD     Pain Location - Side/Orientation Bilateral  -SD     Pain Location lower  -SD     Pain Location - extremity  -SD     Pre/Posttreatment Pain Comment intermittent muscle cramps  -SD       Row Name 11/30/23 1236          Plan of Care Review    Plan of Care Reviewed With patient  -SD     Progress no change  -SD     Outcome Evaluation OT eval completed. Patient supine in bed, on 4L O2 satting 97%, c/o intermittent LE muscle cramps. Patient is Ox4, reports she lives with her  and daughter. Walks with a cane in the home, a rollator in the community, has a SC and BSC. Patient is normally ind with ADLs, daughter assists as needed. Patient performed bed mobility with SBA, performed sit to stand and side steps along edge of bed with CGA using SPC; unable to progress mobility further d/t O2 dropped to 82%, patient increased to 5L and returned to 90% within 2 minutes. Patient able to perform ADLs with CGA-Min A. Patient is expected to benefit from continued OT services prior to DC and plans to return home with family assisting and  services.  -SD       Row Name 11/30/23 1236          Therapy Assessment/Plan (OT)    Patient/Family Therapy Goal Statement (OT) home  -SD     Rehab Potential (OT) good, to achieve stated therapy goals  -SD     Criteria for Skilled Therapeutic Interventions Met (OT) skilled treatment is necessary  -SD     Therapy Frequency (OT) 3 times/wk  5 times if indicated  -SD       Row Name  11/30/23 1236          Therapy Plan Review/Discharge Plan (OT)    Anticipated Discharge Disposition (OT) home with assist;home with home health  -SD       Row Name 11/30/23 1236          Vital Signs    Pre SpO2 (%) 97  -SD     O2 Delivery Pre Treatment supplemental O2  -SD     Intra SpO2 (%) 82  -SD     O2 Delivery Intra Treatment supplemental O2  -SD     Post SpO2 (%) 90  -SD     O2 Delivery Post Treatment supplemental O2  -SD       Row Name 11/30/23 1236          Positioning and Restraints    Pre-Treatment Position in bed  -SD     Post Treatment Position bed  -SD     In Bed supine;call light within reach;encouraged to call for assist;notified nsg  -SD               User Key  (r) = Recorded By, (t) = Taken By, (c) = Cosigned By      Initials Name Provider Type    Di Wood OT Occupational Therapist                   Outcome Measures       Row Name 11/30/23 1251          How much help from another is currently needed...    Putting on and taking off regular lower body clothing? 3  -SD     Bathing (including washing, rinsing, and drying) 3  -SD     Toileting (which includes using toilet bed pan or urinal) 3  -SD     Putting on and taking off regular upper body clothing 3  -SD     Taking care of personal grooming (such as brushing teeth) 3  -SD     Eating meals 3  -SD     AM-PAC 6 Clicks Score (OT) 18  -SD       Row Name 11/30/23 1234          How much help from another person do you currently need...    Turning from your back to your side while in flat bed without using bedrails? 3  -LH     Moving from lying on back to sitting on the side of a flat bed without bedrails? 3  -LH     Moving to and from a bed to a chair (including a wheelchair)? 3  -LH     Standing up from a chair using your arms (e.g., wheelchair, bedside chair)? 3  -LH     Climbing 3-5 steps with a railing? 3  -LH     To walk in hospital room? 3  -LH     AM-PAC 6 Clicks Score (PT) 18  -LH     Highest Level of Mobility Goal 6 --> Walk 10 steps  or more  -       Row Name 11/30/23 1251          Functional Assessment    Outcome Measure Options AM-PAC 6 Clicks Daily Activity (OT)  -SD               User Key  (r) = Recorded By, (t) = Taken By, (c) = Cosigned By      Initials Name Provider Type    SD Di Flores OT Occupational Therapist     Danyell Nichols, RN Registered Nurse                    Occupational Therapy Education       Title: PT OT SLP Therapies (In Progress)       Topic: Occupational Therapy (In Progress)       Point: ADL training (Done)       Description:   Instruct learner(s) on proper safety adaptation and remediation techniques during self care or transfers.   Instruct in proper use of assistive devices.                  Learning Progress Summary             Patient Acceptance, E,TB, VU by SD at 11/30/2023 1252    Comment: OT POC                         Point: Home exercise program (Not Started)       Description:   Instruct learner(s) on appropriate technique for monitoring, assisting and/or progressing therapeutic exercises/activities.                  Learner Progress:  Not documented in this visit.              Point: Precautions (Not Started)       Description:   Instruct learner(s) on prescribed precautions during self-care and functional transfers.                  Learner Progress:  Not documented in this visit.              Point: Body mechanics (Not Started)       Description:   Instruct learner(s) on proper positioning and spine alignment during self-care, functional mobility activities and/or exercises.                  Learner Progress:  Not documented in this visit.                              User Key       Initials Effective Dates Name Provider Type Discipline    SD 06/16/21 -  Di Flores OT Occupational Therapist OT                  OT Recommendation and Plan  Planned Therapy Interventions (OT): activity tolerance training, adaptive equipment training, BADL retraining, patient/caregiver education/training,  strengthening exercise, transfer/mobility retraining  Therapy Frequency (OT): 3 times/wk (5 times if indicated)  Plan of Care Review  Plan of Care Reviewed With: patient  Progress: no change  Outcome Evaluation: OT eval completed. Patient supine in bed, on 4L O2 satting 97%, c/o intermittent LE muscle cramps. Patient is Ox4, reports she lives with her  and daughter. Walks with a cane in the home, a rollator in the community, has a SC and BSC. Patient is normally ind with ADLs, daughter assists as needed. Patient performed bed mobility with SBA, performed sit to stand and side steps along edge of bed with CGA using SPC; unable to progress mobility further d/t O2 dropped to 82%, patient increased to 5L and returned to 90% within 2 minutes. Patient able to perform ADLs with CGA-Min A. Patient is expected to benefit from continued OT services prior to DC and plans to return home with family assisting and  services.     Time Calculation:   Evaluation Complexity (OT)  Review Occupational Profile/Medical/Therapy History Complexity: expanded/moderate complexity  Assessment, Occupational Performance/Identification of Deficit Complexity: 3-5 performance deficits  Clinical Decision Making Complexity (OT): detailed assessment/moderate complexity  Overall Complexity of Evaluation (OT): moderate complexity     Time Calculation- OT       Row Name 11/30/23 1252             Time Calculation- OT    OT Start Time 1033  -SD      OT Received On 11/30/23  -SD      OT Goal Re-Cert Due Date 12/12/23  -SD         Untimed Charges    OT Eval/Re-eval Minutes 45  -SD         Total Minutes    Untimed Charges Total Minutes 45  -SD       Total Minutes 45  -SD                User Key  (r) = Recorded By, (t) = Taken By, (c) = Cosigned By      Initials Name Provider Type    Di Wood OT Occupational Therapist                  Therapy Charges for Today       Code Description Service Date Service Provider Modifiers Qty     [id number]  OT EVAL MOD COMPLEXITY 3 11/30/2023 Di Flores OT GO 1                 Di Flores OT  11/30/2023

## 2023-11-30 NOTE — PLAN OF CARE
Goal Outcome Evaluation:  Plan of Care Reviewed With: patient        Progress: no change  Outcome Evaluation: OT eval completed. Patient supine in bed, on 4L O2 satting 97%, c/o intermittent LE muscle cramps. Patient is Ox4, reports she lives with her  and daughter. Walks with a cane in the home, a rollator in the community, has a SC and BSC. Patient is normally ind with ADLs, daughter assists as needed. Patient performed bed mobility with SBA, performed sit to stand and side steps along edge of bed with CGA using SPC; unable to progress mobility further d/t O2 dropped to 82%, patient increased to 5L and returned to 90% within 2 minutes. Patient able to perform ADLs with CGA-Min A. Patient is expected to benefit from continued OT services prior to DC and plans to return home with family assisting and  services.      Anticipated Discharge Disposition (OT): home with assist, home with home health

## 2023-11-30 NOTE — CASE MANAGEMENT/SOCIAL WORK
Discharge Planning Assessment  [facility]     Patient Name: Lesley Calero  MRN: [id number]  Today's Date: 11/30/2023    Admit Date: 11/29/2023    Plan: Patient is awake and able to answer questions.  She is a current patient of Dr. Smith and gets her medications from [employer].  She uses the following DME at home:  rollator, oxygen, nebulizer, and cane.  She gets her oxygen supplies from [facility].  She does not feel that she needs any additional DME or services when she returns home. At this time the patient plans to return to her home of 26 years that she shares with her .  Her daughter moved in 4 months ago to help provide care for the patient and her .  All questions and concerns were addressed at the time of this conversation.  Will provide additional resources and information upon patient request.   Discharge Needs Assessment       Row Name 11/30/23 1028       Living Environment    People in Home spouse;child(seth), adult    Name(s) of People in Home Roberto Calero,  and Anel Garcias, daughter    Unique Family Situation The patient's daughter moved in with the patient and her  4 months ago to provide care for them.    Current Living Arrangements home    Duration at Residence 26 years    Potentially Unsafe Housing Conditions none    In the past 12 months has the electric, gas, oil, or water company threatened to shut off services in your home? No    Primary Care Provided by child(seth)    Provides Primary Care For no one, unable/limited ability to care for self    Quality of Family Relationships helpful;involved;supportive    Able to Return to Prior Arrangements yes       Resource/Environmental Concerns    Resource/Environmental Concerns none       Food Insecurity    Within the past 12 months, you worried that your food would run out before you got the money to buy more. Never true    Within the past 12 months, the food you bought just didn't last and you didn't have money to  get more. Never true       Transition Planning    Patient/Family Anticipates Transition to home with family    Patient/Family Anticipated Services at Transition none    Transportation Anticipated family or friend will provide       Discharge Needs Assessment    Readmission Within the Last 30 Days no previous admission in last 30 days    Equipment Currently Used at Home rollator;cane, straight;wheelchair;oxygen;nebulizer  The patient receives her O2 supplies from  [facility]    Concerns to be Addressed denies needs/concerns at this time    Anticipated Changes Related to Illness none    Equipment Needed After Discharge none    Current Discharge Risk chronically ill                   Discharge Plan       Row Name 11/30/23 1033       Plan    Plan Patient is awake and able to answer questions.  She is a current patient of Dr. Smith and gets her medications from [facility].  She uses the following DME at home:  rollator, oxygen, nebulizer, and cane.  She gets her oxygen supplies from [facility].  She does not feel that she needs any additional DME or services when she returns home. At this time the patient plans to return to her home of 26 years that she shares with her .  Her daughter moved in 4 months ago to help provide care for the patient and her .  All questions and concerns were addressed at the time of this conversation.  Will provide additional resources and information upon patient request.    Final Discharge Disposition Code 01 - home or self-care                  Continued Care and Services - Admitted Since 11/29/2023    Coordination has not been started for this encounter.          Demographic Summary       Row Name 11/30/23 1027       General Information    Admission Type inpatient    Arrived From emergency department    Required Notices Provided Important Message from Medicare    Referral Source admission list    Reason for Consult discharge planning    Preferred Language English                    Functional Status       Row Name 11/30/23 1027       Functional Status    Usual Activity Tolerance moderate    Current Activity Tolerance poor       Physical Activity    On average, how many days per week do you engage in moderate to strenuous exercise (like a brisk walk)? 0 days    On average, how many minutes do you engage in exercise at this level? 0 min    Number of minutes of exercise per week 0       Assessment of Health Literacy    How often do you have someone help you read hospital materials? Occasionally    How often do you have problems learning about your medical condition because of difficulty understanding written information? Occasionally    How often do you have a problem understanding what is told to you about your medical condition? Occasionally    How confident are you filling out medical forms by yourself? Quite a bit    Health Literacy Good       Functional Status, IADL    Medications independent    Meal Preparation assistive person    Housekeeping assistive person    Laundry assistive person    Shopping assistive person       Mental Status    General Appearance WDL WDL       Mental Status Summary    Recent Changes in Mental Status/Cognitive Functioning no changes                   Psychosocial       Row Name 11/30/23 1027       Values/Beliefs    Spiritual, Cultural Beliefs, Sikh Practices, Values that Affect Care no       Behavior WDL    Behavior WDL WDL       Mental Health    Little Interest or Pleasure in Doing Things 0-->not at all    Feeling Down, Depressed or Hopeless 0-->not at all    Do you feel stress - tense, restless, nervous, or anxious, or unable to sleep at night because your mind is troubled all the time - these days? Not at all       Speech WDL    Speech WDL WDL       Perceptual State WDL    Perceptual State WDL WDL       Thought Process WDL    Thought Process WDL WDL       Intellectual Performance WDL    Intellectual Performance WDL WDL                    Abuse/Neglect       Row Name 11/30/23 1028       Personal Safety    Feels Unsafe at Home or Work/School no    Feels Threatened by Someone no    Does Anyone Try to Keep You From Having Contact with Others or Doing Things Outside Your Home? no    Physical Signs of Abuse Present no                   Legal       Row Name 11/30/23 1028       Financial Resource Strain    How hard is it for you to pay for the very basics like food, housing, medical care, and heating? Not hard                   Substance Abuse       Row Name 11/30/23 1028       Substance Use    Substance Use Status never used                   Patient Forms       Row Name 11/30/23 1036       Patient Forms    Important Message from Medicare (IMM) Delivered    Delivered to Patient    Method of delivery In person                      Rosa Maria Capellan

## 2023-11-30 NOTE — THERAPY EVALUATION
Patient Name: Lesley Calero  : 1940    MRN: [id number]                              Today's Date: 2023       Admit Date: 2023    Visit Dx:     ICD-10-CM ICD-9-CM   1. Acute on chronic congestive heart failure, unspecified heart failure type  I50.9 428.0   2. COPD with acute exacerbation  J44.1 491.21   3. Acute respiratory insufficiency  R06.89 518.82     Patient Active Problem List   Diagnosis    Knee strain, right, initial encounter    Chronic pain of right knee    Acute on chronic congestive heart failure, unspecified heart failure type    COPD (chronic obstructive pulmonary disease)    Essential hypertension    Status post placement of cardiac pacemaker    Nuclear sclerotic cataract of right eye    Acute exacerbation of CHF (congestive heart failure)     Past Medical History:   Diagnosis Date    Cataracts, bilateral     CHF (congestive heart failure)     COPD (chronic obstructive pulmonary disease)     Coronary artery disease     Hyperlipidemia     Hypertension     Impaired functional mobility, balance, gait, and endurance     Myocardial infarction     Oxygen deficit     Pneumonia     RLS (restless legs syndrome)     Seizures      Past Surgical History:   Procedure Laterality Date    CAROTID ENDARTERECTOMY Left     CATARACT EXTRACTION W/ INTRAOCULAR LENS IMPLANT Right 2023    Procedure: CATARACT PHACO EXTRACTION WITH INTRAOCULAR LENS IMPLANT RIGHT;  Surgeon: Dajuan Blackburn MD;  Location: [facility] OR;  Service: Ophthalmology;  Laterality: Right;    CATARACT EXTRACTION W/ INTRAOCULAR LENS IMPLANT Left 8/10/2023    Procedure: CATARACT PHACO EXTRACTION WITH INTRAOCULAR LENS IMPLANT LEFT;  Surgeon: Dajuan Blackburn MD;  Location: [facility];  Service: Ophthalmology;  Laterality: Left;    CORONARY ANGIOPLASTY WITH STENT PLACEMENT      x2    LUNG SURGERY Left     PACEMAKER IMPLANTATION        General Information       Row Name 23 [facility]          Physical Therapy Time and Intention     Document Type evaluation  -MS     Mode of Treatment physical therapy  -MS       Row Name 11/30/23 1257          General Information    Patient Profile Reviewed yes  -MS     Existing Precautions/Restrictions fall;oxygen therapy device and L/min  -MS     Barriers to Rehab medically complex  -MS       Row Name 11/30/23 1257          Living Environment    People in Home child(seth), adult;spouse  -MS       Row Name 11/30/23 1257          Home Main Entrance    Number of Stairs, Main Entrance four  -MS     Stair Railings, Main Entrance railings on both sides of stairs  -MS       Row Name 11/30/23 1257          Stairs Within Home, Primary    Number of Stairs, Within Home, Primary none  -MS       Row Name 11/30/23 1257          Cognition    Orientation Status (Cognition) oriented x 4  -MS       Row Name 11/30/23 1257          Safety Issues, Functional Mobility    Safety Issues Affecting Function (Mobility) safety precautions follow-through/compliance  -MS     Impairments Affecting Function (Mobility) balance;endurance/activity tolerance;shortness of breath;strength;pain  -MS               User Key  (r) = Recorded By, (t) = Taken By, (c) = Cosigned By      Initials Name Provider Type    MS Modesto Carreon, PT Physical Therapist                   Mobility       Row Name 11/30/23 1257          Bed Mobility    Bed Mobility supine-sit;sit-supine  -MS     Supine-Sit [place] (Bed Mobility) standby assist  -MS     Sit-Supine [place] (Bed Mobility) standby assist  -MS     Assistive Device (Bed Mobility) bed rails;head of bed elevated  -MS       Row Name 11/30/23 1257          Sit-Stand Transfer    Sit-Stand [place] (Transfers) contact guard  -MS     Assistive Device (Sit-Stand Transfers) cane, straight  -MS       Row Name 11/30/23 1257          Gait/Stairs (Locomotion)    [place] Level (Gait) contact guard  -MS     Assistive Device (Gait) cane, straight  -MS     Distance in Feet (Gait) 3  -MS      Deviations/Abnormal Patterns (Gait) festinating/shuffling  -MS     Bilateral Gait Deviations forward flexed posture  -MS               User Key  (r) = Recorded By, (t) = Taken By, (c) = Cosigned By      Initials Name Provider Type    Modesto Beaver, PT Physical Therapist                   Obj/Interventions       Row Name 11/30/23 1257          Range of Motion Comprehensive    General Range of Motion bilateral lower extremity ROM WFL  -MS       Row Name 11/30/23 1257          Strength Comprehensive (MMT)    General Manual Muscle Testing (MMT) Assessment lower extremity strength deficits identified  -MS     Comment, General Manual Muscle Testing (MMT) Assessment B LE 4-/5  -MS       Row Name 11/30/23 1257          Sensory Assessment (Somatosensory)    Sensory Assessment (Somatosensory) sensation intact  -MS               User Key  (r) = Recorded By, (t) = Taken By, (c) = Cosigned By      Initials Name Provider Type    Modesto Beaver, PT Physical Therapist                   Goals/Plan       Row Name 11/30/23 1314          Bed Mobility Goal 1 (PT)    Activity/Assistive Device (Bed Mobility Goal 1, PT) bed mobility activities, all  -MS     [place] Level/Cues Needed (Bed Mobility Goal 1, PT) modified independence  -MS     Time Frame (Bed Mobility Goal 1, PT) long term goal (LTG);2 weeks  -MS       Row Name 11/30/23 1314          Gait Training Goal 1 (PT)    Activity/Assistive Device (Gait Training Goal 1, PT) gait (walking locomotion);assistive device use  -MS     [place] Level (Gait Training Goal 1, PT) standby assist  -MS     Distance (Gait Training Goal 1, PT) 150ft  -MS     Time Frame (Gait Training Goal 1, PT) long term goal (LTG);2 weeks  -MS       Row Name 11/30/23 1314          Patient Education Goal (PT)    Activity (Patient Education Goal, PT) ther exer x15 reps ea  -MS     [place]/Cues/Accuracy (Memory Goal 2, PT) demonstrates adequately  -MS     Time Frame (Patient Education Goal, PT)  long term goal (LTG);2 weeks  -MS       Row Name 11/30/23 1314          Therapy Assessment/Plan (PT)    Planned Therapy Interventions (PT) balance training;bed mobility training;gait training;home exercise program;prosthetic fitting/training;postural re-education;transfer training;ROM (range of motion);stair training;strengthening;patient/family education;neuromuscular re-education  -MS               User Key  (r) = Recorded By, (t) = Taken By, (c) = Cosigned By      Initials Name Provider Type    MS Modesto Carreon, PT Physical Therapist                   Clinical Impression       Row Name 11/30/23 1300          Pain    Pretreatment Pain Rating 0/10 - no pain  -MS     Posttreatment Pain Rating 0/10 - no pain  -MS       Row Name 11/30/23 1300          Plan of Care Review    Plan of Care Reviewed With patient  -MS     Progress no change  -MS     Outcome Evaluation Pt participated in PT eval this date. Pt presents with deficits in endurance and activity tolerance. Pt able to transfer to EOB with SBA. Pt maintained balance at EOB. Pt stood took steps to HOB with CGA. Pt returned to sitting. O2 sats dropped to 82% on 3L, Pt reported no symptoms of SOA. Pt performed breathing techniques in supine to improve O2. Pt required 5L O2 to improve to 89-90%.  -MS       Row Name 11/30/23 1300          Therapy Assessment/Plan (PT)    Patient/Family Therapy Goals Statement (PT) to go home  -MS     Rehab Potential (PT) good, to achieve stated therapy goals  -MS     Criteria for Skilled Interventions Met (PT) yes;meets criteria  -MS     Therapy Frequency (PT) 5 times/wk  -MS       Row Name 11/30/23 1300          Vital Signs    Pre SpO2 (%) 97  -MS     O2 Delivery Pre Treatment supplemental O2  -MS     Intra SpO2 (%) 82  -MS     O2 Delivery Intra Treatment supplemental O2  -MS     Post SpO2 (%) 90  -MS     O2 Delivery Post Treatment supplemental O2  -MS     Pre Patient Position Supine  -MS     Intra Patient Position Standing  -MS      Post Patient Position Supine  -MS       Row Name 11/30/23 1300          Positioning and Restraints    Pre-Treatment Position in bed  -MS     Post Treatment Position bed  -MS     In Bed supine;encouraged to call for assist;call light within reach;notified nsg  -MS               User Key  (r) = Recorded By, (t) = Taken By, (c) = Cosigned By      Initials Name Provider Type    Modesto Beaver, PT Physical Therapist                   Outcome Measures       Row Name 11/30/23 1314 11/30/23 1234       How much help from another person do you currently need...    Turning from your back to your side while in flat bed without using bedrails? 4  -MS 3  -LH    Moving from lying on back to sitting on the side of a flat bed without bedrails? 4  -MS 3  -LH    Moving to and from a bed to a chair (including a wheelchair)? 3  -MS 3  -LH    Standing up from a chair using your arms (e.g., wheelchair, bedside chair)? 3  -MS 3  -LH    Climbing 3-5 steps with a railing? 3  -MS 3  -LH    To walk in hospital room? 3  -MS 3  -LH    AM-PAC 6 Clicks Score (PT) 20  -MS 18  -LH    Highest Level of Mobility Goal 6 --> Walk 10 steps or more  -MS 6 --> Walk 10 steps or more  -LH      Row Name 11/30/23 1251          Functional Assessment    Outcome Measure Options AM-PAC 6 Clicks Daily Activity (OT)  -SD               User Key  (r) = Recorded By, (t) = Taken By, (c) = Cosigned By      Initials Name Provider Type    Di Wood OT Occupational Therapist    Modesto Beaver, PT Physical Therapist     Danyell Nichols RN Registered Nurse                                 Physical Therapy Education       Title: PT OT SLP Therapies (In Progress)       Topic: Physical Therapy (In Progress)       Point: Mobility training (Done)       Learning Progress Summary             Patient Acceptance, E, VU by MS at 11/30/2023 1315    Comment: importance of mobility                         Point: Home exercise program (Done)       Learning Progress  Summary             Patient Acceptance, E, VU by MS at 11/30/2023 1515    Comment: importance of mobility                         Point: Body mechanics (Not Started)       Learner Progress:  Not documented in this visit.              Point: Precautions (Not Started)       Learner Progress:  Not documented in this visit.                              User Key       Initials Effective Dates Name Provider Type Discipline    MS 08/22/23 -  Modesto Carreon, PT Physical Therapist PT                  PT Recommendation and Plan  Planned Therapy Interventions (PT): balance training, bed mobility training, gait training, home exercise program, prosthetic fitting/training, postural re-education, transfer training, ROM (range of motion), stair training, strengthening, patient/family education, neuromuscular re-education  Plan of Care Reviewed With: patient  Progress: no change  Outcome Evaluation: Pt participated in PT eval this date. Pt presents with deficits in endurance and activity tolerance. Pt able to transfer to EOB with SBA. Pt maintained balance at EOB. Pt stood took steps to HOB with CGA. Pt returned to sitting. O2 sats dropped to 82% on 3L, Pt reported no symptoms of SOA. Pt performed breathing techniques in supine to improve O2. Pt required 5L O2 to improve to 89-90%.     Time Calculation:   PT Evaluation Complexity  History, PT Evaluation Complexity: 1-2 personal factors and/or comorbidities  Examination of Body Systems (PT Eval Complexity): total of 3 or more elements  Clinical Presentation (PT Evaluation Complexity): evolving  Clinical Decision Making (PT Evaluation Complexity): moderate complexity  Overall Complexity (PT Evaluation Complexity): moderate complexity     PT Charges       Row Name 11/30/23 1315             Time Calculation    Start Time 1030  -MS      PT Received On 11/30/23  -MS      PT Goal Re-Cert Due Date 12/10/23  -MS         Untimed Charges    PT Eval/Re-eval Minutes 55  -MS         Total  Minutes    Untimed Charges Total Minutes 55  -MS       Total Minutes 55  -MS                User Key  (r) = Recorded By, (t) = Taken By, (c) = Cosigned By      Initials Name Provider Type    Modesto Beaver, PT Physical Therapist                  Therapy Charges for Today       Code Description Service Date Service Provider Modifiers Qty    59862750102 HC PT EVAL MOD COMPLEXITY 4 11/30/2023 Modesto Carreon, VANDA GP 1            PT G-Codes  Outcome Measure Options: AM-PAC 6 Clicks Daily Activity (OT)  AM-PAC 6 Clicks Score (PT): 20  AM-PAC 6 Clicks Score (OT): 18  PT Discharge Summary  Anticipated Discharge Disposition (PT): home with assist, home with home health    Modesto Carreon, VANDA  11/30/2023

## 2023-12-01 ENCOUNTER — READMISSION MANAGEMENT (OUTPATIENT)
Dept: CALL CENTER | Facility: HOSPITAL | Age: 83
End: 2023-12-01
Payer: MEDICARE

## 2023-12-01 VITALS
DIASTOLIC BLOOD PRESSURE: 66 MMHG | WEIGHT: 102.95 LBS | RESPIRATION RATE: 16 BRPM | TEMPERATURE: 98.3 F | SYSTOLIC BLOOD PRESSURE: 127 MMHG | HEIGHT: 60 IN | HEART RATE: 77 BPM | BODY MASS INDEX: 20.21 KG/M2 | OXYGEN SATURATION: 97 %

## 2023-12-01 LAB
ANION GAP SERPL CALCULATED.3IONS-SCNC: 8.8 MMOL/L (ref 5–15)
BUN SERPL-MCNC: 26 MG/DL (ref 8–23)
BUN/CREAT SERPL: 26.5 (ref 7–25)
CALCIUM SPEC-SCNC: 9 MG/DL (ref 8.6–10.5)
CHLORIDE SERPL-SCNC: 99 MMOL/L (ref 98–107)
CO2 SERPL-SCNC: 38.2 MMOL/L (ref 22–29)
CREAT SERPL-MCNC: 0.98 MG/DL (ref 0.57–1)
EGFRCR SERPLBLD CKD-EPI 2021: 56.7 ML/MIN/1.73
GLUCOSE SERPL-MCNC: 112 MG/DL (ref 65–99)
POTASSIUM SERPL-SCNC: 3 MMOL/L (ref 3.5–5.2)
SODIUM SERPL-SCNC: 146 MMOL/L (ref 136–145)

## 2023-12-01 PROCEDURE — 97535 SELF CARE MNGMENT TRAINING: CPT

## 2023-12-01 PROCEDURE — 25010000002 HEPARIN (PORCINE) PER 1000 UNITS: Performed by: INTERNAL MEDICINE

## 2023-12-01 PROCEDURE — 99238 HOSP IP/OBS DSCHRG MGMT 30/<: CPT | Performed by: FAMILY MEDICINE

## 2023-12-01 PROCEDURE — 97116 GAIT TRAINING THERAPY: CPT

## 2023-12-01 PROCEDURE — 25010000002 FUROSEMIDE PER 20 MG: Performed by: INTERNAL MEDICINE

## 2023-12-01 PROCEDURE — 80048 BASIC METABOLIC PNL TOTAL CA: CPT | Performed by: FAMILY MEDICINE

## 2023-12-01 PROCEDURE — 97530 THERAPEUTIC ACTIVITIES: CPT

## 2023-12-01 RX ORDER — FUROSEMIDE 40 MG/1
TABLET ORAL
Qty: 18 TABLET | Refills: 0 | Status: SHIPPED | OUTPATIENT
Start: 2023-12-01 | End: 2024-01-03

## 2023-12-01 RX ORDER — CYCLOBENZAPRINE HCL 5 MG
5 TABLET ORAL 3 TIMES DAILY PRN
Qty: 30 TABLET | Refills: 0 | Status: SHIPPED | OUTPATIENT
Start: 2023-12-01 | End: 2023-12-11

## 2023-12-01 RX ORDER — POTASSIUM CHLORIDE 750 MG/1
20 CAPSULE, EXTENDED RELEASE ORAL 2 TIMES DAILY
Qty: 10 CAPSULE | Refills: 0 | Status: SHIPPED | OUTPATIENT
Start: 2023-12-01 | End: 2023-12-04

## 2023-12-01 RX ORDER — POTASSIUM CHLORIDE 750 MG/1
40 CAPSULE, EXTENDED RELEASE ORAL ONCE
Status: COMPLETED | OUTPATIENT
Start: 2023-12-01 | End: 2023-12-01

## 2023-12-01 RX ADMIN — ASPIRIN 81 MG: 81 TABLET, COATED ORAL at 08:52

## 2023-12-01 RX ADMIN — PANTOPRAZOLE SODIUM 40 MG: 40 TABLET, DELAYED RELEASE ORAL at 05:38

## 2023-12-01 RX ADMIN — GABAPENTIN 600 MG: 300 CAPSULE ORAL at 05:38

## 2023-12-01 RX ADMIN — HEPARIN SODIUM 5000 UNITS: 5000 INJECTION INTRAVENOUS; SUBCUTANEOUS at 08:52

## 2023-12-01 RX ADMIN — POTASSIUM CHLORIDE 40 MEQ: 10 CAPSULE, COATED, EXTENDED RELEASE ORAL at 10:52

## 2023-12-01 RX ADMIN — AMLODIPINE BESYLATE 5 MG: 5 TABLET ORAL at 08:54

## 2023-12-01 RX ADMIN — PRAVASTATIN SODIUM 80 MG: 20 TABLET ORAL at 08:53

## 2023-12-01 RX ADMIN — HYDROCODONE BITARTRATE AND ACETAMINOPHEN 1 TABLET: 7.5; 325 TABLET ORAL at 03:50

## 2023-12-01 RX ADMIN — Medication 10 ML: at 08:53

## 2023-12-01 RX ADMIN — CARVEDILOL 25 MG: 25 TABLET, FILM COATED ORAL at 08:52

## 2023-12-01 RX ADMIN — HYDROCODONE BITARTRATE AND ACETAMINOPHEN 1 TABLET: 7.5; 325 TABLET ORAL at 10:52

## 2023-12-01 RX ADMIN — FUROSEMIDE 40 MG: 10 INJECTION, SOLUTION INTRAMUSCULAR; INTRAVENOUS at 08:52

## 2023-12-01 NOTE — THERAPY TREATMENT NOTE
Patient Name: Lesley Calero  : 1940    MRN: [id number]                              Today's Date: 2023       Admit Date: 2023    Visit Dx:     ICD-10-CM ICD-9-CM   1. Acute on chronic congestive heart failure, unspecified heart failure type  I50.9 428.0   2. COPD with acute exacerbation  J44.1 491.21   3. Acute respiratory insufficiency  R06.89 518.82   4. Acute on chronic diastolic congestive heart failure  I50.33 428.33     428.0   5. Debility  R53.81 799.3     Patient Active Problem List   Diagnosis    Knee strain, right, initial encounter    Chronic pain of right knee    Acute on chronic congestive heart failure, unspecified heart failure type    COPD (chronic obstructive pulmonary disease)    Essential hypertension    Status post placement of cardiac pacemaker    Nuclear sclerotic cataract of right eye    Acute exacerbation of CHF (congestive heart failure)     Past Medical History:   Diagnosis Date    Cataracts, bilateral     CHF (congestive heart failure)     COPD (chronic obstructive pulmonary disease)     Coronary artery disease     Hyperlipidemia     Hypertension     Impaired functional mobility, balance, gait, and endurance     Myocardial infarction     Oxygen deficit     Pneumonia     RLS (restless legs syndrome)     Seizures      Past Surgical History:   Procedure Laterality Date    CAROTID ENDARTERECTOMY Left     CATARACT EXTRACTION W/ INTRAOCULAR LENS IMPLANT Right 2023    Procedure: CATARACT PHACO EXTRACTION WITH INTRAOCULAR LENS IMPLANT RIGHT;  Surgeon: Dajuan Blackburn MD;  Location: [facility] OR;  Service: Ophthalmology;  Laterality: Right;    CATARACT EXTRACTION W/ INTRAOCULAR LENS IMPLANT Left 8/10/2023    Procedure: CATARACT PHACO EXTRACTION WITH INTRAOCULAR LENS IMPLANT LEFT;  Surgeon: Dajuan Blackburn MD;  Location: [facility];  Service: Ophthalmology;  Laterality: Left;    CORONARY ANGIOPLASTY WITH STENT PLACEMENT      x2    LUNG SURGERY Left     PACEMAKER  IMPLANTATION        General Information       Row Name 12/01/23 1245          Physical Therapy Time and Intention    Document Type therapy note (daily note)  -RM     Mode of Treatment physical therapy  -RM       Row Name 12/01/23 1245          General Information    Patient Profile Reviewed yes  -RM     Existing Precautions/Restrictions fall;oxygen therapy device and L/min  3-4 L  -RM       Row Name 12/01/23 1245          Cognition    Orientation Status (Cognition) oriented x 4  -RM       Row Name 12/01/23 1245          Safety Issues, Functional Mobility    Safety Issues Affecting Function (Mobility) safety precautions follow-through/compliance;safety precaution awareness  -RM     Impairments Affecting Function (Mobility) endurance/activity tolerance;cognition;shortness of breath  -RM               User Key  (r) = Recorded By, (t) = Taken By, (c) = Cosigned By      Initials Name Provider Type    Tyler Eddy, MAYUR Physical Therapist Assistant                   Mobility       Row Name 12/01/23 1246          Bed Mobility    Supine-Sit [place] (Bed Mobility) standby assist  -RM     Assistive Device (Bed Mobility) bed rails;head of bed elevated  -RM       Row Name 12/01/23 1246          Sit-Stand Transfer    Sit-Stand [place] (Transfers) contact guard;nonverbal cues (demo/gesture);verbal cues  -RM     Assistive Device (Sit-Stand Transfers) walker, front-wheeled  -RM       Row Name 12/01/23 1246          Gait/Stairs (Locomotion)    [place] Level (Gait) contact guard  -RM     Assistive Device (Gait) walker, front-wheeled  -RM     Distance in Feet (Gait) 120',130' and 120'  -RM     Deviations/Abnormal Patterns (Gait) base of support, narrow  -RM     Bilateral Gait Deviations forward flexed posture  -RM               User Key  (r) = Recorded By, (t) = Taken By, (c) = Cosigned By      Initials Name Provider Type    Tyler Eddy, MAYUR Physical Therapist Assistant                    Obj/Interventions    No documentation.                  Goals/Plan    No documentation.                  Clinical Impression       Row Name 12/01/23 1247          Pain    Pretreatment Pain Rating 0/10 - no pain  -RM     Posttreatment Pain Rating 0/10 - no pain  -RM       Row Name 12/01/23 1247          Plan of Care Review    Plan of Care Reviewed With patient;daughter  -RM     Progress improving  -     Outcome Evaluation Pt supine in bed and willing to participate with treatment.  Pt presented with 3L O2 pnc. Pt performed gait distances of 120',130' and 120' cga with rw. Pt requires education of proper pursed lip breathing techniques with mobility.  Pt requires standing rest between each bout of gait with pt dropping to 86% upon first. Pt  durib=ng last two legs of gait ranged %.  Pt requires vc's for walker positioning with turns.  See flowsheet for details. Cont PT per POC progressing to goals as pt tolerates.  -RM       Row Name 12/01/23 1247          Vital Signs    Pre SpO2 (%) 95  -RM     O2 Delivery Pre Treatment supplemental O2  -RM     Intra SpO2 (%) 86  -RM     O2 Delivery Intra Treatment supplemental O2  -RM     Post SpO2 (%) 98  -RM     O2 Delivery Post Treatment supplemental O2  -RM     Pre Patient Position Supine  -RM     Intra Patient Position Standing  -RM     Post Patient Position Sitting  -RM       Row Name 12/01/23 1247          Positioning and Restraints    Pre-Treatment Position in bed  -RM     Post Treatment Position chair  -RM     In Chair reclined;call light within reach;encouraged to call for assist;exit alarm on;with family/caregiver;notified nsg  -               User Key  (r) = Recorded By, (t) = Taken By, (c) = Cosigned By      Initials Name Provider Type    Tyler Eddy, PTA Physical Therapist Assistant                   Outcome Measures       Row Name 12/01/23 1255 12/01/23 0854       How much help from another person do you currently need...    Turning from your back  to your side while in flat bed without using bedrails? 4  -RM 4  -LH    Moving from lying on back to sitting on the side of a flat bed without bedrails? 4  -RM 4  -LH    Moving to and from a bed to a chair (including a wheelchair)? 3  -RM 3  -LH    Standing up from a chair using your arms (e.g., wheelchair, bedside chair)? 3  -RM 3  -LH    Climbing 3-5 steps with a railing? 3  -RM 3  -LH    To walk in hospital room? 3  -RM 3  -LH    AM-PAC 6 Clicks Score (PT) 20  -RM 20  -LH    Highest Level of Mobility Goal 6 --> Walk 10 steps or more  -RM 6 --> Walk 10 steps or more  -LH      Row Name 12/01/23 1255 12/01/23 1231       Functional Assessment    Outcome Measure Options AM-PAC 6 Clicks Basic Mobility (PT)  -RM AM-PAC 6 Clicks Daily Activity (OT)  -SP              User Key  (r) = Recorded By, (t) = Taken By, (c) = Cosigned By      Initials Name Provider Type    RM Tyler Lynn, PTA Physical Therapist Assistant     Danyell Nichols, RN Registered Nurse    SP Tarah [place], OT Occupational Therapist                                 Physical Therapy Education       Title: PT OT SLP Therapies (Resolved)       Topic: Physical Therapy (Resolved)       Point: Mobility training (Resolved)       Learning Progress Summary             Patient Acceptance, E, VU by MS at 11/30/2023 1315    Comment: importance of mobility                         Point: Home exercise program (Resolved)       Learning Progress Summary             Patient Acceptance, E, VU by MS at 11/30/2023 1315    Comment: importance of mobility                         Point: Body mechanics (Resolved)       Learner Progress:  Not documented in this visit.              Point: Precautions (Resolved)       Learner Progress:  Not documented in this visit.                              User Key       Initials Effective Dates Name Provider Type Discipline    MS 08/22/23 -  Modesto Carreon, PT Physical Therapist PT                  PT Recommendation and Plan      Plan of Care Reviewed With: patient, daughter  Progress: improving  Outcome Evaluation: Pt supine in bed and willing to participate with treatment.  Pt presented with 3L O2 pnc. Pt performed gait distances of 120',130' and 120' cga with rw. Pt requires education of proper pursed lip breathing techniques with mobility.  Pt requires standing rest between each bout of gait with pt dropping to 86% upon first. Pt  durib=ng last two legs of gait ranged %.  Pt requires vc's for walker positioning with turns.  See flowsheet for details. Cont PT per POC progressing to goals as pt tolerates.     Time Calculation:         PT Charges       Row Name 12/01/23 1255             Time Calculation    Start Time 0951  -RM      Stop Time 1032  -RM      Time Calculation (min) 41 min  -RM      PT Received On 12/01/23  -RM      PT Goal Re-Cert Due Date 12/10/23  -RM         Time Calculation- PT    Total Timed Code Minutes- PT 41 minute(s)  -RM         Timed Charges    28164 - Gait Training Minutes  30  -RM      34617 - PT Therapeutic Activity Minutes 11  -RM         Total Minutes    Timed Charges Total Minutes 41  -RM       Total Minutes 41  -RM                User Key  (r) = Recorded By, (t) = Taken By, (c) = Cosigned By      Initials Name Provider Type    RM Tyler Lynn, PTA Physical Therapist Assistant                  Therapy Charges for Today       Code Description Service Date Service Provider Modifiers Qty    23971465909 HC GAIT TRAINING EA 15 MIN 12/1/2023 Tyler Lynn, MAYUR GP 2    55854037649 HC PT THERAPEUTIC ACT EA 15 MIN 12/1/2023 Tyler Lynn, PTA GP 1            PT G-Codes  Outcome Measure Options: AM-PAC 6 Clicks Basic Mobility (PT)  AM-PAC 6 Clicks Score (PT): 20  AM-PAC 6 Clicks Score (OT): 17       Tyler Lynn PTA  12/1/2023

## 2023-12-01 NOTE — DISCHARGE SUMMARY
"    [facility]      Name:  Lesley Calero   Age:  83 y.o.  Sex:  female  :  1940  MRN:  [id number]   Visit Number:  [id number]    Admission Date:  2023  Date of Discharge:  2023  Primary Care Physician:  Nj Smith MD    Important issues to note:    -Patient was discharged on her chronic supplemental oxygen at 3 L per nasal cannula.  -Continue Lasix 40 mg daily x 3 days then 20 mg daily as previously prescribed.  -potassium supplements while on Lasix  -Follow-up with Her cardiologist in [place]  -Follow-up with PCP in 2 to 3 days for labs recheck for potassium level and kidney function and continued care.    Discharge Diagnoses:     HFpEF with acute exacerbation POA, improved  Elevated troponin, likely demand ischemia, POA  Hypertension  Hyperlipidemia  CAD  Status post cardiac pacemaker  COPD on 3 L  Impaired mobility and ADL    Problem List:     Active Hospital Problems    Diagnosis  POA    **Acute exacerbation of CHF (congestive heart failure) [I50.9]  Yes      Resolved Hospital Problems   No resolved problems to display.     Presenting Problem:    Chief Complaint   Patient presents with    Shortness of Breath     [id number]    Weakness - Generalized      Consults:     Consulting Physician(s)                     None              Procedures Performed:        History of presenting illness/Hospital Course:    Patient is a chronically ill and frail 83-year-old female with history significant for HFpEF, COPD on 3 L chronically, CAD and hypertension who presents from home with family to the emergency room for progressively worsening weakness and shortness of breath.  Patient states that she has been taking half of her Lasix dose.  States that she has had worsening lower extremity swelling.  Normally she is able to ambulate \"from 1 end of her house to the other\" but has been too short of breath to move.  Admits to nonproductive cough.  Denies chest " pain, nausea vomiting or diarrhea.    Upon arrival to the ER, patient was afebrile hemodynamically stable hypoxic 86%.  Significant labs include plateaued troponins 34-31.  proBNP 8472.  Creatinine 1.1.  H&H 10/33, stable.  Chest x-ray personally reviewed, chronic changes, mild pulmonary edema and small bilateral effusions per my read.  Patient received IV Lasix 80 mg in the ER.  Patient ambulated and oxygen dropped to 83% on her home O2 requirement.  Hospital service asked to admit.    HFpEF with acute exacerbation/elevated troponin  -Patient responded well to IV Lasix 80 mg received in the emergency room.  -She was stable on home oxygen requirement of 3 L during hospitalization and on discharge  -Repeat echo showed EF of 60 to 65% with grade 2 diastolic dysfunction, moderate tricuspid regurgitation and pulmonary hypertension.  -Strict I's and O  -Received IV Lasix  -Suspect elevated troponin in setting of demand ischemia. Patient with no chest pain.  Low suspicion for ACS.  -Patient diuresed well, had -2.8 L balance  -Will be continued on 40 mg p.o. Lasix daily for another 3 days then go back to her previous 20 mg daily.  -Potassium supplements while on Lasix  -Patient instructed on following up with her cardiologist in [place], per daughter will call and make an appointment for her.    Patient was seen and examined on the day of discharge.  Patient sitting up comfortably in bed with no distress.  Daughter at bedside.  Patient reports doing very well today and is eager to go home.  Patient ambulatory with no difficulty.  Tolerating p.o. with no issues.  Patient was diuresing very well on Lasix.  She is currently on her baseline chronic oxygen requirement of 3 L. Patient instructed on management and plan in details.  Patient will be continued on double dosing Lasix 40 mg daily then goes back to her 20 mg previously prescribed.  Potassium supplements ordered.  Patient to follow-up with her personal cardiologist in  [place] soon. Patient to follow-up with PCP in 2 to 3 days for recheck and continued care. Clear return precautions discussed.  Patient verbalized understanding and agreeable to plan.  All questions were answered to the patient's satisfaction.    Vital Signs:    Temp:  [97.3 °F (36.3 °C)-98.3 °F (36.8 °C)] 98.3 °F (36.8 °C)  Heart Rate:  [70-77] 77  Resp:  [16-18] 16  BP: ()/(52-69) 127/66    Physical Exam:    General Appearance:  Alert and cooperative.  Chronically ill-appearing.   Head:  Atraumatic and normocephalic.   Eyes: Conjunctivae and sclerae normal, no icterus. No pallor.   Ears:  Ears with no abnormalities noted.   Throat: No oral lesions, no thrush, oral mucosa moist.   Neck: Supple, trachea midline, no thyromegaly.   Back:   No tenderness to palpation.   Lungs:   Breath sounds heard bilaterally equally.  No crackles or wheezing. No Pleural rub or bronchial breathing.  Unlabored.  Nontachypneic.  On supplemental oxygen (chronically).   Heart:  Normal S1 and S2, no murmur, no gallop, no rub. No JVD.   Abdomen:   Normal bowel sounds, no masses, no organomegaly. Soft, nontender, nondistended, no rebound tenderness.   Extremities: Supple, no edema, no cyanosis, no clubbing.   Pulses: Pulses palpable bilaterally.   Skin: No bleeding or rash.   Neurologic: Alert and oriented x 3. No facial asymmetry. Moves all four limbs. No tremors.     Pertinent Lab Results:     Results from last 7 days   Lab Units 12/01/23  0530 11/30/23  0556 11/29/23 2022   SODIUM mmol/L 146* 142 143   POTASSIUM mmol/L 3.0* 3.5 4.0   CHLORIDE mmol/L 99 96* 101   CO2 mmol/L 38.2* 37.7* 35.0*   BUN mg/dL 26* 19 22   CREATININE mg/dL 0.98 0.87 1.10*   CALCIUM mg/dL 9.0 9.3 9.2   BILIRUBIN mg/dL  --   --  0.5   ALK PHOS U/L  --   --  91   ALT (SGPT) U/L  --   --  14   AST (SGOT) U/L  --   --  29   GLUCOSE mg/dL 112* 110* 98     Results from last 7 days   Lab Units 11/30/23  0556 11/29/23 2022   WBC 10*3/mm3 7.50 6.60   HEMOGLOBIN  g/dL 10.5* 10.0*   HEMATOCRIT % 33.9* 33.1*   PLATELETS 10*3/mm3 168 166         Results from last 7 days   Lab Units 11/29/23  2254 11/29/23 2022   HSTROP T ng/L 31* 34*     Results from last 7 days   Lab Units 11/29/23 2022   PROBNP pg/mL 8,472.0*             Results from last 7 days   Lab Units 11/29/23  2308   PH, ARTERIAL pH units 7.395   PO2 ART mm Hg 64.3*   PCO2, ARTERIAL mm Hg 60.6*   HCO3 ART mmol/L 37.1*           Pertinent Radiology Results:    Imaging Results (All)       Procedure Component Value Units Date/Time    XR Chest 1 View [[id number]] Collected: 11/30/23 0754     Updated: 11/30/23 0756    Narrative:      PROCEDURE: XR CHEST 1 VW-     HISTORY: SOA Triage Protocol     COMPARISON: November 12, 2023..     FINDINGS: The heart is enlarged, stable. AICD noted. Aortic mural  calcifications noted.. Moderate interstitial disease bilaterally is  stable.. The mediastinum is unremarkable. There is no pneumothorax.   There are no acute osseous abnormalities.       Impression:      Stable chest..                 This report was signed and finalized on 11/30/2023 7:54 AM by Jeniffer Verduzco MD.               Echo:    Results for orders placed during the hospital encounter of 11/29/23    Adult Transthoracic Echo Complete w/ Color, Spectral and Contrast if necessary per protocol    Interpretation Summary  1.  Normal left ventricular size and systolic function, LVEF 60-65%.  2.  Mild concentric LVH.  3.  Grade 2 diastolic dysfunction.  4.  Mild right ventricular dilation with normal RV systolic function.  5.  Severely increased left atrial volume index.  6.  Severe calcification of the aortic valve without significant stenosis.  7.  Moderate tricuspid regurgitation.  8.  Moderate pulmonary hypertension, RVSP 52 mmHg.    Condition on Discharge:      Stable.    Code status during the hospital stay:    Code Status and Medical Interventions:   Ordered at: 11/30/23 0258     Code Status (Patient has no pulse and is not  breathing):    CPR (Attempt to Resuscitate)     Medical Interventions (Patient has pulse or is breathing):    Full Support     Discharge Disposition:    Home-Health Care [facility] [place] [facility]    Discharge Medications:       Discharge Medications        New Medications        Instructions Start Date   cyclobenzaprine 5 MG tablet  Commonly known as: FLEXERIL   5 mg, Oral, 3 Times Daily PRN, Medicine might make you drowsy. avoid driving while on the medicine.      potassium chloride 10 MEQ CR capsule  Commonly known as: MICRO-K   20 mEq, Oral, 2 Times Daily             Continue These Medications        Instructions Start Date   amLODIPine 5 MG tablet  Commonly known as: NORVASC   5 mg, Oral, Daily      aspirin 81 MG EC tablet   81 mg, Oral, Daily      carvedilol 25 MG tablet  Commonly known as: COREG   25 mg, Oral, 2 Times Daily With Meals      gabapentin 800 MG tablet  Commonly known as: NEURONTIN   800 mg, Oral, 3 Times Daily      HYDROcodone-acetaminophen 7.5-325 MG per tablet  Commonly known as: NORCO   1 tablet, Oral, Every 6 Hours PRN      ipratropium-albuterol 0.5-2.5 mg/3 ml nebulizer  Commonly known as: DUO-NEB   3 mL, Nebulization, Every 4 Hours PRN      nitroglycerin 0.4 MG SL tablet  Commonly known as: NITROSTAT   0.4 mg, Sublingual, Every 5 Minutes PRN, Take no more than 3 doses in 15 minutes.       omeprazole 40 MG capsule  Commonly known as: priLOSEC   40 mg, Oral, Daily      ondansetron ODT 4 MG disintegrating tablet  Commonly known as: ZOFRAN-ODT   4 mg, Translingual, Every 6 Hours PRN      pravastatin 40 MG tablet  Commonly known as: PRAVACHOL   80 mg, Oral, Daily             Stop These Medications      azithromycin 250 MG tablet  Commonly known as: ZITHROMAX     cefdinir 300 MG capsule  Commonly known as: OMNICEF     difluprednate 0.05 % ophthalmic emulsion  Commonly known as: DUREZOL            ASK your doctor about these medications        Instructions Start Date   furosemide 20 MG tablet  Commonly known as:  LASIX  Ask about: Which instructions should I use?   40 mg, Oral, Daily      furosemide 40 MG tablet  Commonly known as: LASIX  Ask about: Which instructions should I use?   Take 1 tablet by mouth Daily for 3 days, THEN 0.5 tablets Daily for 30 days.   Start Date: December 1, 2023            Discharge Diet:   Cardiac    Activity at Discharge:   As tolerated    Follow-up Appointments:    Additional Instructions for the Follow-ups that You Need to Schedule       Ambulatory Referral to Home Health (Hospital)   As directed      Face to Face Visit Date: 12/1/2023   Follow-up provider for Plan of Care?: I treated the patient in an acute care facility and will not continue treatment after discharge.   Follow-up provider: NJ SMITH [[id number]]   Reason/Clinical Findings: Debility, chronic respiratory failure, CHF   Describe mobility limitations that make leaving home difficult: Debility, chronic respiratory failure, CHF   Nursing/Therapeutic Services Requested: Skilled Nursing Physical Therapy Occupational Therapy   Skilled nursing orders: CHF management O2 instruction Cardiopulmonary assessments   PT orders: Strengthening Transfer training   Occupational orders: Activities of daily living Strengthening   Frequency: 1 Week 1               Follow-up Information       Nj Smith MD Follow up in 3 day(s).    Specialty: Family Medicine  Contact information:  [address] [place] [address] [address] [place] [address] [phone]                           No future appointments.  Test Results Pending at Discharge:           Bess Stevenson MD  12/01/23  10:34 EST    Time: I spent 28 minutes on this discharge activity which included: face-to-face encounter with the patient, reviewing the data in the system, coordination of the care with the nursing staff as well as consultants, documentation, and entering orders.     Dictated utilizing Dragon dictation.

## 2023-12-01 NOTE — CASE MANAGEMENT/SOCIAL WORK
Continued Stay Note   Keith     Patient Name: Lesley Calero  MRN: [id number]  Today's Date: 12/1/2023    Admit Date: 11/29/2023    Plan: Patient is awake and able to answer questions.  She is a current patient of Dr. Smith and gets her medications from [facility].  She uses the following DME at home:  rollator, oxygen, nebulizer, and cane.  She gets her oxygen supplies from [facility].  She does not feel that she needs any additional DME or services when she returns home. At this time the patient plans to return to her home of 26 years that she shares with her .  Her daughter moved in 4 months ago to help provide care for the patient and her .  All questions and concerns were addressed at the time of this conversation.  Will provide additional resources and information upon patient request.   Discharge Plan       Row Name 12/01/23 1043       Plan    Plan Comments Spoke  to  pt regarding home health order pt is declining home health notified MD Order DC                   Discharge Codes    No documentation.                 Expected Discharge Date and Time       Expected Discharge Date Expected Discharge Time    Dec 1, 2023               Riya Wynne RN

## 2023-12-01 NOTE — PLAN OF CARE
Goal Outcome Evaluation:  Plan of Care Reviewed With: patient, daughter        Progress: improving  Outcome Evaluation: Pt participated in OT interventions to address self-care, endurance, activity tolerance, safety awareness and functional mobility. Pt received in the bed on 3L nc with O2 sats at 95%. Upon moving to eob with SBA and use of the bed rail pt sats dropped to 88%. Pt cued to engage in pursed lip breathing and sats improved to >90%. Pt donned gown at eob with mod A due to impulsivity and difficulty concentrating on task. Pt moved to standing with CGA and the fww. O2 sats maintained >90% and pt ambulated to the commode and transferred with min A and increased cues. Pt engaged in perineal hygeine and clothing management with CGA seated on the commode and upon moving to standing she was able to wash her hands at the sink with CGA and increased cues for positioning with AD. Pt ambulated additional 120ft + standing rest break + 130ft + standing rest +120ft with CGA and close O2 monitoring. Pt sats dropped to 86% after initial 120ft and she was titrated to 4L. After engaging in PLB sats improved to 96% and O2 turned back to 3L and pt remained sats between 96% and 100% during additional 130ft. Pt demos difficulty managing AD frequently leaving it behind. Pt ambulated additional 120ft to her room and returned to the chair with min A due to poor AD positioning. Sats were 93% upon sitting and pt educated on use of PLB and eliminating conversation during functional mobilty. After resting for a few minutes sats were improved to 98% and pt left in the chair with the chair alarm and all needs in reach. Continue current POC.

## 2023-12-01 NOTE — PLAN OF CARE
Goal Outcome Evaluation:  Plan of Care Reviewed With: patient        Progress: no change  Outcome Evaluation: VSS, maintaining o2 sats >90% on 3L NC, pain controlled with PRN meds.

## 2023-12-01 NOTE — CASE MANAGEMENT/SOCIAL WORK
Continued Stay Note   Keith     Patient Name: Lesley Calero  MRN: [id number]  Today's Date: 12/1/2023    Admit Date: 11/29/2023    Plan: Patient is awake and able to answer questions.  She is a current patient of Dr. Smith and gets her medications from [facility].  She uses the following DME at home:  rollator, oxygen, nebulizer, and cane.  She gets her oxygen supplies from [facility].  She does not feel that she needs any additional DME or services when she returns home. At this time the patient plans to return to her home of 26 years that she shares with her .  Her daughter moved in 4 months ago to help provide care for the patient and her .  All questions and concerns were addressed at the time of this conversation.  Will provide additional resources and information upon patient request.   Discharge Plan       Row Name 12/01/23 1055       Plan    Final Discharge Disposition Code 01 - home or self-care      Row Name 12/01/23 1043       Plan    Plan Comments Spoke  to  pt regarding home health order pt is declining home health notified MD Order DC                   Discharge Codes    No documentation.                 Expected Discharge Date and Time       Expected Discharge Date Expected Discharge Time    Dec 1, 2023               Riya Wynne RN

## 2023-12-01 NOTE — DISCHARGE INSTRUCTIONS
-Continue Lasix as prescribed, you will be taking 40 mg x 3 days then go back to your 20 mg daily as discussed.  -Take potassium supplements along with your Lasix, follow-up with your PCP for repeat potassium level and medication adjustments as indicated.  -Follow-up with your cardiologist as an outpatient as directed, please call their office to schedule your appointment soon.   -Follow-up with PCP in 2 to 3 days for recheck on your labs, reevaluation and continued care.

## 2023-12-01 NOTE — THERAPY TREATMENT NOTE
Patient Name: Lesley Calero  : 1940    MRN: [id number]                              Today's Date: 2023       Admit Date: 2023    Visit Dx:     ICD-10-CM ICD-9-CM   1. Acute on chronic congestive heart failure, unspecified heart failure type  I50.9 428.0   2. COPD with acute exacerbation  J44.1 491.21   3. Acute respiratory insufficiency  R06.89 518.82   4. Acute on chronic diastolic congestive heart failure  I50.33 428.33     428.0   5. Debility  R53.81 799.3     Patient Active Problem List   Diagnosis    Knee strain, right, initial encounter    Chronic pain of right knee    Acute on chronic congestive heart failure, unspecified heart failure type    COPD (chronic obstructive pulmonary disease)    Essential hypertension    Status post placement of cardiac pacemaker    Nuclear sclerotic cataract of right eye    Acute exacerbation of CHF (congestive heart failure)     Past Medical History:   Diagnosis Date    Cataracts, bilateral     CHF (congestive heart failure)     COPD (chronic obstructive pulmonary disease)     Coronary artery disease     Hyperlipidemia     Hypertension     Impaired functional mobility, balance, gait, and endurance     Myocardial infarction     Oxygen deficit     Pneumonia     RLS (restless legs syndrome)     Seizures      Past Surgical History:   Procedure Laterality Date    CAROTID ENDARTERECTOMY Left     CATARACT EXTRACTION W/ INTRAOCULAR LENS IMPLANT Right 2023    Procedure: CATARACT PHACO EXTRACTION WITH INTRAOCULAR LENS IMPLANT RIGHT;  Surgeon: Dajuan Blackburn MD;  Location: [facility] OR;  Service: Ophthalmology;  Laterality: Right;    CATARACT EXTRACTION W/ INTRAOCULAR LENS IMPLANT Left 8/10/2023    Procedure: CATARACT PHACO EXTRACTION WITH INTRAOCULAR LENS IMPLANT LEFT;  Surgeon: Dajuan Blackburn MD;  Location: [facility];  Service: Ophthalmology;  Laterality: Left;    CORONARY ANGIOPLASTY WITH STENT PLACEMENT      x2    LUNG SURGERY Left     PACEMAKER  IMPLANTATION        General Information       Row Name 12/01/23 1209          OT Time and Intention    Document Type therapy note (daily note)  -SP     Mode of Treatment occupational therapy  -SP       Row Name 12/01/23 1209          General Information    Patient Profile Reviewed yes  -SP     Existing Precautions/Restrictions fall;oxygen therapy device and L/min  -SP       Row Name 12/01/23 1209          Cognition    Orientation Status (Cognition) oriented x 4  -SP       Row Name 12/01/23 1209          Safety Issues, Functional Mobility    Impairments Affecting Function (Mobility) endurance/activity tolerance;cognition;shortness of breath  -SP     Cognitive Impairments, Mobility Safety/Performance awareness, need for assistance;attention;impulsivity  -SP               User Key  (r) = Recorded By, (t) = Taken By, (c) = Cosigned By      Initials Name Provider Type    SP Tarah Spring OT Occupational Therapist                     Mobility/ADL's       Row Name 12/01/23 1209          Bed Mobility    Bed Mobility supine-sit  -SP     Supine-Sit [place] (Bed Mobility) standby assist  -SP     Assistive Device (Bed Mobility) bed rails;head of bed elevated  -SP       Row Name 12/01/23 1209          Transfers    Transfers sit-stand transfer;stand-sit transfer;toilet transfer  -SP       Row Name 12/01/23 1209          Sit-Stand Transfer    Sit-Stand [place] (Transfers) contact guard;nonverbal cues (demo/gesture);verbal cues  -SP     Assistive Device (Sit-Stand Transfers) walker, front-wheeled  -SP       Row Name 12/01/23 1209          Stand-Sit Transfer    Stand-Sit [place] (Transfers) contact guard;nonverbal cues (demo/gesture);verbal cues  -SP     Assistive Device (Stand-Sit Transfers) walker, front-wheeled  -SP       Row Name 12/01/23 1209          Toilet Transfer    [place] Level (Toilet Transfer) minimum assist (75% patient effort);nonverbal cues (demo/gesture);verbal cues  -SP     Assistive Device  (Toilet Transfer) commode;walker, front-wheeled;grab bars/safety frame  -SP       Row Name 12/01/23 1209          Functional Mobility    Functional Mobility- Ind. Level contact guard assist  -SP     Functional Mobility- Device walker, front-wheeled  -SP     Functional Mobility-Distance (Feet) --  120 +130 + 120  -SP     Functional Mobility- Safety Issues balance decreased during turns;supplemental O2  -SP       Row Name 12/01/23 1209          Upper Body Dressing Assessment/Training    [place] Level (Upper Body Dressing) don;front opening garment;moderate assist (50% patient effort);verbal cues;nonverbal cues (demo/gesture)  -SP     Position (Upper Body Dressing) edge of bed sitting  -SP       Row Name 12/01/23 1209          Toileting Assessment/Training    [place] Level (Toileting) contact guard assist;verbal cues;nonverbal cues (demo/gesture)  -SP     Assistive Devices (Toileting) commode  -SP     Position (Toileting) unsupported sitting  -SP               User Key  (r) = Recorded By, (t) = Taken By, (c) = Cosigned By      Initials Name Provider Type    Tarah Lu, OT Occupational Therapist                   Obj/Interventions    No documentation.                  Goals/Plan    No documentation.                  Clinical Impression       Row Name 12/01/23 1216          Pain Assessment    Pretreatment Pain Rating 0/10 - no pain  -SP     Posttreatment Pain Rating 0/10 - no pain  -SP       Row Name 12/01/23 1216          Plan of Care Review    Plan of Care Reviewed With patient;daughter  -SP     Progress improving  -SP     Outcome Evaluation Pt participated in OT interventions to address self-care, endurance, activity tolerance, safety awareness and functional mobility. Pt received in the bed on 3L nc with O2 sats at 95%. Upon moving to eob with SBA and use of the bed rail pt sats dropped to 88%. Pt cued to engage in pursed lip breathing and sats improved to >90%. Pt donned gown at eob with mod A due to  impulsivity and difficulty concentrating on task. Pt moved to standing with CGA and the fww. O2 sats maintained >90% and pt ambulated to the commode and transferred with min A and increased cues. Pt engaged in perineal hygeine and clothing management with CGA seated on the commode and upon moving to standing she was able to wash her hands at the sink with CGA and increased cues for positioning with AD. Pt ambulated additional 120ft + standing rest break + 130ft + standing rest +120ft with CGA and close O2 monitoring. Pt sats dropped to 86% after initial 120ft and she was titrated to 4L. After engaging in PLB sats improved to 96% and O2 turned back to 3L and pt remained sats between 96% and 100% during the next 130ft. Pt demos difficulty managing AD frequently leaving it behind. Pt ambulated additional 120ft to her room and returned to the chair with min A due to poor AD positioning. Sats were 93% upon sitting and pt educated on use of PLB and eliminating conversation during functional mobilty. After resting for a few minutes sats were improved to 98% and pt left in the chair with the chair alarm and all needs in reach. Continue current POC.  -SP       Row Name 12/01/23 1216          Vital Signs    Pre SpO2 (%) 95  -SP     O2 Delivery Pre Treatment supplemental O2  3L  -SP     Intra SpO2 (%) 86  -SP     O2 Delivery Intra Treatment supplemental O2  -SP     Post SpO2 (%) 98  -SP     O2 Delivery Post Treatment supplemental O2  -SP     Pre Patient Position Supine  -SP     Intra Patient Position Standing  -SP     Post Patient Position Sitting  -SP       Row Name 12/01/23 1216          Positioning and Restraints    Pre-Treatment Position in bed  -SP     Post Treatment Position chair  -SP     In Chair reclined;call light within reach;encouraged to call for assist;exit alarm on;with family/caregiver  -SP               User Key  (r) = Recorded By, (t) = Taken By, (c) = Cosigned By      Initials Name Provider Type    VEENA Spring  EDWARD Rascon Occupational Therapist                   Outcome Measures       Row Name 12/01/23 1231          How much help from another is currently needed...    Putting on and taking off regular lower body clothing? 3  -SP     Bathing (including washing, rinsing, and drying) 3  -SP     Toileting (which includes using toilet bed pan or urinal) 3  -SP     Putting on and taking off regular upper body clothing 2  -SP     Taking care of personal grooming (such as brushing teeth) 3  -SP     Eating meals 3  -SP     AM-PAC 6 Clicks Score (OT) 17  -SP       Row Name 12/01/23 0854          How much help from another person do you currently need...    Turning from your back to your side while in flat bed without using bedrails? 4  -LH     Moving from lying on back to sitting on the side of a flat bed without bedrails? 4  -LH     Moving to and from a bed to a chair (including a wheelchair)? 3  -LH     Standing up from a chair using your arms (e.g., wheelchair, bedside chair)? 3  -LH     Climbing 3-5 steps with a railing? 3  -LH     To walk in hospital room? 3  -LH     AM-PAC 6 Clicks Score (PT) 20  -     Highest Level of Mobility Goal 6 --> Walk 10 steps or more  -       Row Name 12/01/23 1231          Functional Assessment    Outcome Measure Options AM-PAC 6 Clicks Daily Activity (OT)  -SP               User Key  (r) = Recorded By, (t) = Taken By, (c) = Cosigned By      Initials Name Provider Type     Danyell Nichols RN Registered Nurse    Tarah Lu OT Occupational Therapist                    Occupational Therapy Education       Title: PT OT SLP Therapies (Resolved)       Topic: Occupational Therapy (Resolved)       Point: ADL training (Resolved)       Description:   Instruct learner(s) on proper safety adaptation and remediation techniques during self care or transfers.   Instruct in proper use of assistive devices.                  Learning Progress Summary             Patient Acceptance, E,TB, VU by SD at  11/30/2023 1252    Comment: OT POC                         Point: Home exercise program (Resolved)       Description:   Instruct learner(s) on appropriate technique for monitoring, assisting and/or progressing therapeutic exercises/activities.                  Learner Progress:  Not documented in this visit.              Point: Precautions (Resolved)       Description:   Instruct learner(s) on prescribed precautions during self-care and functional transfers.                  Learner Progress:  Not documented in this visit.              Point: Body mechanics (Resolved)       Description:   Instruct learner(s) on proper positioning and spine alignment during self-care, functional mobility activities and/or exercises.                  Learner Progress:  Not documented in this visit.                              User Key       Initials Effective Dates Name Provider Type Discipline    SD 06/16/21 -  Di Flores OT Occupational Therapist OT                  OT Recommendation and Plan     Plan of Care Review  Plan of Care Reviewed With: patient, daughter  Progress: improving  Outcome Evaluation: Pt participated in OT interventions to address self-care, endurance, activity tolerance, safety awareness and functional mobility. Pt received in the bed on 3L nc with O2 sats at 95%. Upon moving to eob with SBA and use of the bed rail pt sats dropped to 88%. Pt cued to engage in pursed lip breathing and sats improved to >90%. Pt donned gown at eob with mod A due to impulsivity and difficulty concentrating on task. Pt moved to standing with CGA and the fww. O2 sats maintained >90% and pt ambulated to the commode and transferred with min A and increased cues. Pt engaged in perineal hygeine and clothing management with CGA seated on the commode and upon moving to standing she was able to wash her hands at the sink with CGA and increased cues for positioning with AD. Pt ambulated additional 120ft + standing rest break + 130ft +  standing rest +120ft with CGA and close O2 monitoring. Pt sats dropped to 86% after initial 120ft and she was titrated to 4L. After engaging in PLB sats improved to 96% and O2 turned back to 3L and pt remained sats between 96% and 100% during the next 130ft. Pt demos difficulty managing AD frequently leaving it behind. Pt ambulated additional 120ft to her room and returned to the chair with min A due to poor AD positioning. Sats were 93% upon sitting and pt educated on use of PLB and eliminating conversation during functional mobilty. After resting for a few minutes sats were improved to 98% and pt left in the chair with the chair alarm and all needs in reach. Continue current POC.     Time Calculation:         Time Calculation- OT       Row Name 12/01/23 1234             Time Calculation- OT    OT Start Time 0949  -SP      OT Stop Time 1027  -SP      OT Time Calculation (min) 38 min  -SP      OT Received On 12/01/23  -SP      OT Goal Re-Cert Due Date 12/12/23  -SP         Timed Charges    56652 - OT Therapeutic Activity Minutes 17  -SP      75695 - OT Self Care/Mgmt Minutes 21  -SP         Total Minutes    Timed Charges Total Minutes 38  -SP       Total Minutes 38  -SP                User Key  (r) = Recorded By, (t) = Taken By, (c) = Cosigned By      Initials Name Provider Type    SP Tarah Spring OT Occupational Therapist                  Therapy Charges for Today       Code Description Service Date Service Provider Modifiers Qty    56305233572 HC OT THERAPEUTIC ACT EA 15 MIN 12/1/2023 Tarah Spring OT GO 1    66744404638 HC OT SELF CARE/MGMT/TRAIN EA 15 MIN 12/1/2023 Tarah Spring OT GO 2                 Tarah Spring OT  12/1/2023

## 2023-12-02 NOTE — OUTREACH NOTE
Prep Survey      Flowsheet Row Responses   [facility] facility patient discharged from? [facility]   Is LACE score < 7 ? No   Eligibility Readm Mgmt   Discharge diagnosis Acute CHF exacerbation   Does the patient have one of the following disease processes/diagnoses(primary or secondary)? CHF   Does the patient have Home health ordered? No   Is there a DME ordered? No   Prep survey completed? Yes            Linnea SILVERIO - Registered Nurse

## 2023-12-04 ENCOUNTER — READMISSION MANAGEMENT (OUTPATIENT)
Dept: CALL CENTER | Facility: HOSPITAL | Age: 83
End: 2023-12-04
Payer: MEDICARE

## 2023-12-04 NOTE — OUTREACH NOTE
CHF Week 1 Survey      Flowsheet Row Responses   [facility] patient discharged from? [facility]   Does the patient have one of the following disease processes/diagnoses(primary or secondary)? CHF   CHF Week 1 attempt successful? Yes   Call start time 1653   Call end time 1657   Discharge diagnosis Acute CHF exacerbation   Meds reviewed with patient/caregiver? Yes   Is the patient having any side effects they believe may be caused by any medication additions or changes? Yes   Side effects comments  muscle relaxer makes her sleepy   Does the patient have all medications ordered at discharge? Yes   Is the patient taking all medications as directed (includes completed medication regime)? Yes   Does the patient have a primary care provider?  Yes   Does the patient have an appointment with their PCP within 7 days of discharge? Yes   Has the patient kept scheduled appointments due by today? N/A   DME comments using home nebs and O2 @ 3L   Pulse Ox monitoring Intermittent   Pulse Ox device source Patient   O2 Sat comments 3LO2- 90's   Psychosocial issues? No   Comments Pt reports that her SOA is better, she is doing the breathing exercises. LE edema is resolved.   Did the patient receive a copy of their discharge instructions? Yes   Nursing interventions Reviewed instructions with patient   What is the patient's perception of their health status since discharge? Improving   Nursing interventions Nurse provided patient education   Is the patient able to teach back signs and symptoms of worsening condition? (i.e. weight gain, shortness of air, etc.) Yes   If the patient is a current smoker, are they able to teach back resources for cessation? Not a smoker   Is the patient/caregiver able to teach back the hierarchy of who to call/visit for symptoms/problems? PCP, Specialist, Home health nurse, Urgent Care, ED, 911 Yes   CHF Zone this Call Green Zone   Green Zone Patient reports doing well, No new or worsening shortness of  breath, Physical activity level is normal for you, Weight check stable, No chest pain, No new swelling -  feet, ankles and legs look normal for you   Green Zone Interventions Daily weight check, Meds as directed, Follow up visits planned    CHF Week 1 call completed? Yes   Revoked No further contact(revokes)-requires comment   Is the patient interested in additional calls from an ambulatory ? No   Would this patient benefit from a Referral to [facility] Social Work? No   Call end time 0908            Nay FAJARDO - Registered Nurse

## 2023-12-20 ENCOUNTER — APPOINTMENT (OUTPATIENT)
Dept: GENERAL RADIOLOGY | Facility: HOSPITAL | Age: 83
End: 2023-12-20
Payer: MEDICARE

## 2023-12-20 ENCOUNTER — HOSPITAL ENCOUNTER (OUTPATIENT)
Facility: HOSPITAL | Age: 83
Setting detail: OBSERVATION
Discharge: HOME HEALTH | End: 2023-12-22
Attending: PHYSICIAN ASSISTANT | Admitting: INTERNAL MEDICINE
Payer: MEDICARE

## 2023-12-20 DIAGNOSIS — I50.9 ACUTE ON CHRONIC CONGESTIVE HEART FAILURE, UNSPECIFIED HEART FAILURE TYPE: Primary | ICD-10-CM

## 2023-12-20 LAB
ALBUMIN SERPL-MCNC: 3.9 G/DL (ref 3.5–5.2)
ALBUMIN/GLOB SERPL: 1.1 G/DL
ALP SERPL-CCNC: 88 U/L (ref 39–117)
ALT SERPL W P-5'-P-CCNC: 12 U/L (ref 1–33)
ANION GAP SERPL CALCULATED.3IONS-SCNC: 7.5 MMOL/L (ref 5–15)
AST SERPL-CCNC: 38 U/L (ref 1–32)
B PARAPERT DNA SPEC QL NAA+PROBE: NOT DETECTED
B PERT DNA SPEC QL NAA+PROBE: NOT DETECTED
BASOPHILS # BLD AUTO: 0.02 10*3/MM3 (ref 0–0.2)
BASOPHILS NFR BLD AUTO: 0.3 % (ref 0–1.5)
BILIRUB SERPL-MCNC: 0.5 MG/DL (ref 0–1.2)
BILIRUB UR QL STRIP: NEGATIVE
BUN SERPL-MCNC: 30 MG/DL (ref 8–23)
BUN/CREAT SERPL: 34.5 (ref 7–25)
C PNEUM DNA NPH QL NAA+NON-PROBE: NOT DETECTED
CALCIUM SPEC-SCNC: 9.4 MG/DL (ref 8.6–10.5)
CHLORIDE SERPL-SCNC: 98 MMOL/L (ref 98–107)
CLARITY UR: CLEAR
CO2 SERPL-SCNC: 35.5 MMOL/L (ref 22–29)
COLOR UR: YELLOW
CREAT SERPL-MCNC: 0.87 MG/DL (ref 0.57–1)
D-LACTATE SERPL-SCNC: 0.7 MMOL/L (ref 0.5–2)
DEPRECATED RDW RBC AUTO: 51.6 FL (ref 37–54)
EGFRCR SERPLBLD CKD-EPI 2021: 65.4 ML/MIN/1.73
EOSINOPHIL # BLD AUTO: 0.14 10*3/MM3 (ref 0–0.4)
EOSINOPHIL NFR BLD AUTO: 2 % (ref 0.3–6.2)
ERYTHROCYTE [DISTWIDTH] IN BLOOD BY AUTOMATED COUNT: 14.6 % (ref 12.3–15.4)
FLUAV SUBTYP SPEC NAA+PROBE: NOT DETECTED
FLUBV RNA ISLT QL NAA+PROBE: NOT DETECTED
GEN 5 2HR TROPONIN T REFLEX: 81 NG/L
GLOBULIN UR ELPH-MCNC: 3.4 GM/DL
GLUCOSE SERPL-MCNC: 99 MG/DL (ref 65–99)
GLUCOSE UR STRIP-MCNC: NEGATIVE MG/DL
HADV DNA SPEC NAA+PROBE: NOT DETECTED
HCOV 229E RNA SPEC QL NAA+PROBE: NOT DETECTED
HCOV HKU1 RNA SPEC QL NAA+PROBE: NOT DETECTED
HCOV NL63 RNA SPEC QL NAA+PROBE: NOT DETECTED
HCOV OC43 RNA SPEC QL NAA+PROBE: NOT DETECTED
HCT VFR BLD AUTO: 36.5 % (ref 34–46.6)
HGB BLD-MCNC: 11.4 G/DL (ref 12–15.9)
HGB UR QL STRIP.AUTO: NEGATIVE
HMPV RNA NPH QL NAA+NON-PROBE: NOT DETECTED
HOLD SPECIMEN: NORMAL
HOLD SPECIMEN: NORMAL
HPIV1 RNA ISLT QL NAA+PROBE: NOT DETECTED
HPIV2 RNA SPEC QL NAA+PROBE: NOT DETECTED
HPIV3 RNA NPH QL NAA+PROBE: NOT DETECTED
HPIV4 P GENE NPH QL NAA+PROBE: NOT DETECTED
IMM GRANULOCYTES # BLD AUTO: 0.02 10*3/MM3 (ref 0–0.05)
IMM GRANULOCYTES NFR BLD AUTO: 0.3 % (ref 0–0.5)
KETONES UR QL STRIP: NEGATIVE
LEUKOCYTE ESTERASE UR QL STRIP.AUTO: NEGATIVE
LYMPHOCYTES # BLD AUTO: 0.57 10*3/MM3 (ref 0.7–3.1)
LYMPHOCYTES NFR BLD AUTO: 8.2 % (ref 19.6–45.3)
M PNEUMO IGG SER IA-ACNC: NOT DETECTED
MCH RBC QN AUTO: 30.2 PG (ref 26.6–33)
MCHC RBC AUTO-ENTMCNC: 31.2 G/DL (ref 31.5–35.7)
MCV RBC AUTO: 96.6 FL (ref 79–97)
MONOCYTES # BLD AUTO: 0.38 10*3/MM3 (ref 0.1–0.9)
MONOCYTES NFR BLD AUTO: 5.5 % (ref 5–12)
NEUTROPHILS NFR BLD AUTO: 5.83 10*3/MM3 (ref 1.7–7)
NEUTROPHILS NFR BLD AUTO: 83.7 % (ref 42.7–76)
NITRITE UR QL STRIP: NEGATIVE
NRBC BLD AUTO-RTO: 0 /100 WBC (ref 0–0.2)
NT-PROBNP SERPL-MCNC: 7638 PG/ML (ref 0–1800)
PH UR STRIP.AUTO: 8 [PH] (ref 5–8)
PLATELET # BLD AUTO: 163 10*3/MM3 (ref 140–450)
PMV BLD AUTO: 10.4 FL (ref 6–12)
POTASSIUM SERPL-SCNC: 3.8 MMOL/L (ref 3.5–5.2)
PROT SERPL-MCNC: 7.3 G/DL (ref 6–8.5)
PROT UR QL STRIP: NEGATIVE
RBC # BLD AUTO: 3.78 10*6/MM3 (ref 3.77–5.28)
RHINOVIRUS RNA SPEC NAA+PROBE: NOT DETECTED
RSV RNA NPH QL NAA+NON-PROBE: NOT DETECTED
SARS-COV-2 RNA NPH QL NAA+NON-PROBE: NOT DETECTED
SODIUM SERPL-SCNC: 141 MMOL/L (ref 136–145)
SP GR UR STRIP: 1.01 (ref 1–1.03)
TROPONIN T DELTA: -15 NG/L
TROPONIN T SERPL HS-MCNC: 76 NG/L
TROPONIN T SERPL HS-MCNC: 96 NG/L
UROBILINOGEN UR QL STRIP: NORMAL
WBC NRBC COR # BLD AUTO: 6.96 10*3/MM3 (ref 3.4–10.8)
WHOLE BLOOD HOLD COAG: NORMAL
WHOLE BLOOD HOLD SPECIMEN: NORMAL

## 2023-12-20 PROCEDURE — 94640 AIRWAY INHALATION TREATMENT: CPT

## 2023-12-20 PROCEDURE — 96372 THER/PROPH/DIAG INJ SC/IM: CPT

## 2023-12-20 PROCEDURE — 99284 EMERGENCY DEPT VISIT MOD MDM: CPT

## 2023-12-20 PROCEDURE — 80053 COMPREHEN METABOLIC PANEL: CPT | Performed by: PHYSICIAN ASSISTANT

## 2023-12-20 PROCEDURE — 25010000002 METHYLPREDNISOLONE PER 125 MG: Performed by: PHYSICIAN ASSISTANT

## 2023-12-20 PROCEDURE — 0202U NFCT DS 22 TRGT SARS-COV-2: CPT | Performed by: PHYSICIAN ASSISTANT

## 2023-12-20 PROCEDURE — 25010000002 FUROSEMIDE PER 20 MG: Performed by: PHYSICIAN ASSISTANT

## 2023-12-20 PROCEDURE — 96375 TX/PRO/DX INJ NEW DRUG ADDON: CPT

## 2023-12-20 PROCEDURE — 96374 THER/PROPH/DIAG INJ IV PUSH: CPT

## 2023-12-20 PROCEDURE — 84484 ASSAY OF TROPONIN QUANT: CPT | Performed by: PHYSICIAN ASSISTANT

## 2023-12-20 PROCEDURE — 81003 URINALYSIS AUTO W/O SCOPE: CPT | Performed by: PHYSICIAN ASSISTANT

## 2023-12-20 PROCEDURE — G0378 HOSPITAL OBSERVATION PER HR: HCPCS

## 2023-12-20 PROCEDURE — 83605 ASSAY OF LACTIC ACID: CPT | Performed by: PHYSICIAN ASSISTANT

## 2023-12-20 PROCEDURE — 93005 ELECTROCARDIOGRAM TRACING: CPT | Performed by: PHYSICIAN ASSISTANT

## 2023-12-20 PROCEDURE — 71045 X-RAY EXAM CHEST 1 VIEW: CPT

## 2023-12-20 PROCEDURE — 25010000002 ENOXAPARIN PER 10 MG: Performed by: NURSE PRACTITIONER

## 2023-12-20 PROCEDURE — 94799 UNLISTED PULMONARY SVC/PX: CPT

## 2023-12-20 PROCEDURE — 36415 COLL VENOUS BLD VENIPUNCTURE: CPT

## 2023-12-20 PROCEDURE — 99222 1ST HOSP IP/OBS MODERATE 55: CPT | Performed by: NURSE PRACTITIONER

## 2023-12-20 PROCEDURE — 85025 COMPLETE CBC W/AUTO DIFF WBC: CPT | Performed by: PHYSICIAN ASSISTANT

## 2023-12-20 PROCEDURE — 84484 ASSAY OF TROPONIN QUANT: CPT | Performed by: NURSE PRACTITIONER

## 2023-12-20 PROCEDURE — 83880 ASSAY OF NATRIURETIC PEPTIDE: CPT | Performed by: PHYSICIAN ASSISTANT

## 2023-12-20 RX ORDER — ENOXAPARIN SODIUM 100 MG/ML
40 INJECTION SUBCUTANEOUS EVERY 24 HOURS
Status: DISCONTINUED | OUTPATIENT
Start: 2023-12-20 | End: 2023-12-20

## 2023-12-20 RX ORDER — SODIUM CHLORIDE 0.9 % (FLUSH) 0.9 %
10 SYRINGE (ML) INJECTION AS NEEDED
Status: DISCONTINUED | OUTPATIENT
Start: 2023-12-20 | End: 2023-12-22 | Stop reason: HOSPADM

## 2023-12-20 RX ORDER — HYDROCODONE BITARTRATE AND ACETAMINOPHEN 7.5; 325 MG/1; MG/1
1 TABLET ORAL
COMMUNITY
Start: 2023-12-15 | End: 2024-01-14

## 2023-12-20 RX ORDER — FUROSEMIDE 10 MG/ML
80 INJECTION INTRAMUSCULAR; INTRAVENOUS ONCE
Status: COMPLETED | OUTPATIENT
Start: 2023-12-20 | End: 2023-12-20

## 2023-12-20 RX ORDER — HYDROCODONE BITARTRATE AND ACETAMINOPHEN 7.5; 325 MG/1; MG/1
1 TABLET ORAL ONCE
Status: COMPLETED | OUTPATIENT
Start: 2023-12-20 | End: 2023-12-20

## 2023-12-20 RX ORDER — CARVEDILOL 25 MG/1
25 TABLET ORAL 2 TIMES DAILY WITH MEALS
Status: DISCONTINUED | OUTPATIENT
Start: 2023-12-20 | End: 2023-12-22 | Stop reason: HOSPADM

## 2023-12-20 RX ORDER — BISACODYL 5 MG/1
5 TABLET, DELAYED RELEASE ORAL DAILY PRN
Status: DISCONTINUED | OUTPATIENT
Start: 2023-12-20 | End: 2023-12-22 | Stop reason: HOSPADM

## 2023-12-20 RX ORDER — BISACODYL 10 MG
10 SUPPOSITORY, RECTAL RECTAL DAILY PRN
Status: DISCONTINUED | OUTPATIENT
Start: 2023-12-20 | End: 2023-12-22 | Stop reason: HOSPADM

## 2023-12-20 RX ORDER — PANTOPRAZOLE SODIUM 40 MG/1
40 TABLET, DELAYED RELEASE ORAL
Status: DISCONTINUED | OUTPATIENT
Start: 2023-12-21 | End: 2023-12-22 | Stop reason: HOSPADM

## 2023-12-20 RX ORDER — METHYLPREDNISOLONE SODIUM SUCCINATE 125 MG/2ML
125 INJECTION, POWDER, LYOPHILIZED, FOR SOLUTION INTRAMUSCULAR; INTRAVENOUS ONCE
Status: COMPLETED | OUTPATIENT
Start: 2023-12-20 | End: 2023-12-20

## 2023-12-20 RX ORDER — ASPIRIN 81 MG/1
81 TABLET ORAL DAILY
Status: DISCONTINUED | OUTPATIENT
Start: 2023-12-20 | End: 2023-12-22 | Stop reason: HOSPADM

## 2023-12-20 RX ORDER — IPRATROPIUM BROMIDE AND ALBUTEROL SULFATE 2.5; .5 MG/3ML; MG/3ML
3 SOLUTION RESPIRATORY (INHALATION) EVERY 4 HOURS PRN
Status: DISCONTINUED | OUTPATIENT
Start: 2023-12-20 | End: 2023-12-22 | Stop reason: HOSPADM

## 2023-12-20 RX ORDER — IPRATROPIUM BROMIDE AND ALBUTEROL SULFATE 2.5; .5 MG/3ML; MG/3ML
3 SOLUTION RESPIRATORY (INHALATION) ONCE
Status: COMPLETED | OUTPATIENT
Start: 2023-12-20 | End: 2023-12-20

## 2023-12-20 RX ORDER — AMOXICILLIN 250 MG
2 CAPSULE ORAL 2 TIMES DAILY
Status: DISCONTINUED | OUTPATIENT
Start: 2023-12-20 | End: 2023-12-22 | Stop reason: HOSPADM

## 2023-12-20 RX ORDER — AMLODIPINE BESYLATE 5 MG/1
5 TABLET ORAL DAILY
Status: DISCONTINUED | OUTPATIENT
Start: 2023-12-20 | End: 2023-12-21

## 2023-12-20 RX ORDER — SODIUM CHLORIDE 0.9 % (FLUSH) 0.9 %
10 SYRINGE (ML) INJECTION EVERY 12 HOURS SCHEDULED
Status: DISCONTINUED | OUTPATIENT
Start: 2023-12-20 | End: 2023-12-22 | Stop reason: HOSPADM

## 2023-12-20 RX ORDER — ONDANSETRON 4 MG/1
4 TABLET, ORALLY DISINTEGRATING ORAL EVERY 6 HOURS PRN
Status: DISCONTINUED | OUTPATIENT
Start: 2023-12-20 | End: 2023-12-22 | Stop reason: HOSPADM

## 2023-12-20 RX ORDER — POLYETHYLENE GLYCOL 3350 17 G/17G
17 POWDER, FOR SOLUTION ORAL DAILY PRN
Status: DISCONTINUED | OUTPATIENT
Start: 2023-12-20 | End: 2023-12-22 | Stop reason: HOSPADM

## 2023-12-20 RX ORDER — ENOXAPARIN SODIUM 100 MG/ML
30 INJECTION SUBCUTANEOUS EVERY 24 HOURS
Status: DISCONTINUED | OUTPATIENT
Start: 2023-12-20 | End: 2023-12-22 | Stop reason: HOSPADM

## 2023-12-20 RX ORDER — GABAPENTIN 300 MG/1
300 CAPSULE ORAL 3 TIMES DAILY
Status: DISCONTINUED | OUTPATIENT
Start: 2023-12-20 | End: 2023-12-22 | Stop reason: HOSPADM

## 2023-12-20 RX ORDER — PANTOPRAZOLE SODIUM 40 MG/1
40 TABLET, DELAYED RELEASE ORAL
Status: DISCONTINUED | OUTPATIENT
Start: 2023-12-21 | End: 2023-12-20

## 2023-12-20 RX ORDER — NITROGLYCERIN 0.4 MG/1
0.4 TABLET SUBLINGUAL
Status: DISCONTINUED | OUTPATIENT
Start: 2023-12-20 | End: 2023-12-22 | Stop reason: HOSPADM

## 2023-12-20 RX ORDER — ONDANSETRON 2 MG/ML
4 INJECTION INTRAMUSCULAR; INTRAVENOUS EVERY 6 HOURS PRN
Status: DISCONTINUED | OUTPATIENT
Start: 2023-12-20 | End: 2023-12-22 | Stop reason: HOSPADM

## 2023-12-20 RX ORDER — HYDROCODONE BITARTRATE AND ACETAMINOPHEN 7.5; 325 MG/1; MG/1
1 TABLET ORAL EVERY 6 HOURS PRN
Status: DISCONTINUED | OUTPATIENT
Start: 2023-12-20 | End: 2023-12-22 | Stop reason: HOSPADM

## 2023-12-20 RX ORDER — ACETAMINOPHEN 325 MG/1
650 TABLET ORAL EVERY 4 HOURS PRN
Status: DISCONTINUED | OUTPATIENT
Start: 2023-12-20 | End: 2023-12-22 | Stop reason: HOSPADM

## 2023-12-20 RX ORDER — GABAPENTIN 400 MG/1
800 CAPSULE ORAL EVERY 8 HOURS SCHEDULED
Status: DISCONTINUED | OUTPATIENT
Start: 2023-12-20 | End: 2023-12-20

## 2023-12-20 RX ADMIN — METHYLPREDNISOLONE SODIUM SUCCINATE 125 MG: 125 INJECTION, POWDER, FOR SOLUTION INTRAMUSCULAR; INTRAVENOUS at 09:13

## 2023-12-20 RX ADMIN — Medication 10 ML: at 21:50

## 2023-12-20 RX ADMIN — IPRATROPIUM BROMIDE AND ALBUTEROL SULFATE 3 ML: .5; 3 SOLUTION RESPIRATORY (INHALATION) at 08:18

## 2023-12-20 RX ADMIN — ASPIRIN 81 MG: 81 TABLET, COATED ORAL at 17:49

## 2023-12-20 RX ADMIN — CARVEDILOL 25 MG: 25 TABLET, FILM COATED ORAL at 17:49

## 2023-12-20 RX ADMIN — GABAPENTIN 300 MG: 300 CAPSULE ORAL at 21:50

## 2023-12-20 RX ADMIN — AMLODIPINE BESYLATE 5 MG: 5 TABLET ORAL at 17:48

## 2023-12-20 RX ADMIN — HYDROCODONE BITARTRATE AND ACETAMINOPHEN 1 TABLET: 7.5; 325 TABLET ORAL at 21:50

## 2023-12-20 RX ADMIN — GABAPENTIN 300 MG: 300 CAPSULE ORAL at 17:48

## 2023-12-20 RX ADMIN — ENOXAPARIN SODIUM 30 MG: 30 INJECTION SUBCUTANEOUS at 17:49

## 2023-12-20 RX ADMIN — HYDROCODONE BITARTRATE AND ACETAMINOPHEN 1 TABLET: 7.5; 325 TABLET ORAL at 16:14

## 2023-12-20 RX ADMIN — FUROSEMIDE 80 MG: 10 INJECTION, SOLUTION INTRAMUSCULAR; INTRAVENOUS at 10:52

## 2023-12-20 NOTE — Clinical Note
Level of Care: Telemetry [5]   Diagnosis: Acute exacerbation of CHF (congestive heart failure) [938072]   Admitting Physician: DANA NGUYỄN [[id number]]   Attending Physician: MICHI GARCIA [[id number]]   5

## 2023-12-20 NOTE — H&P
[facility] HOSPITALIST   HISTORY AND PHYSICAL      Name:  Lesley Calero   Age:  83 y.o.  Sex:  female  :  1940  MRN:  [id number]   Visit Number:  [id number]  Admission Date:  2023  Date Of Service:  23  Primary Care Physician:  Nj Smith MD    Chief Complaint:     Short of breath    History Of Presenting Illness:      Patient is an 83 year old female who arrives to the hospital today with complaints of shortness of breath which has worsened over the past couple week, but significantly overnight.  States that her cough is at baseline.  Denies fevers or sick contacts.  States that she noticed more trouble ambulating to bedside commode which is by her bed in the past 2 nights.  Has been having to turn oxygen up to 4L.  Recent hospitalization for heart failure exacerbation, diuresed and sent home to follow up with Cardiologist (Perry) in [place].  Patient with past health history significant for hypertension, COPD on 3L at baseline, hyperlipidemia, CAD, HFpEF, and atrial fibrillation s/p pacemaker placement. Would like to establish care with cardiology in [place] if possible.  Lives with daughter and  and uses cane at baseline.    Work up in the emergency department noted patient to be afebrile and hemodynamically stable.  Labs notable for elevated troponins-96/81 with delta change -15 likely demand ischemia in the setting of heart failure exacerbation, BNP-7638, CMP noted CO2-35.5, BUN-30.  Lactate 0.7.  CBC noted WBC-6, H/H-11/36, platelets-163.  Urinalysis negative for infection.  Covid and influenza testing negative.  CXR noted cardiomegaly, vascular engorgement, probable mild interstitial edema, and chronic bronchitis.  Patient was given lasix 80mg IV, duo-neb, and solu-medrol 125mg IV.  Oxygen saturations noted to drop into 70s with ambulation on home oxygen at 3L.  Hospitalist was contacted for admission.    Review Of Systems:    All systems were reviewed  and negative except as mentioned in history of presenting illness, assessment and plan.    Past Medical History: Patient  has a past medical history of Cataracts, bilateral, CHF (congestive heart failure), COPD (chronic obstructive pulmonary disease), Coronary artery disease, Hyperlipidemia, Hypertension, Impaired functional mobility, balance, gait, and endurance, Myocardial infarction, Oxygen deficit, Pneumonia, RLS (restless legs syndrome), and Seizures.    Past Surgical History: Patient  has a past surgical history that includes Coronary angioplasty with stent; Pacemaker Implantation; Carotid endarterectomy (Left); Lung surgery (Left); Cataract extraction w/ intraocular lens implant (Right, 7/27/2023); and Cataract extraction w/ intraocular lens implant (Left, 8/10/2023).    Social History: Patient  reports that she has quit smoking. She does not have any smokeless tobacco history on file. She reports that she does not drink alcohol and does not use drugs.    Family History:  Patient's family history has been reviewed and found to be noncontributory.     Allergies:      Ceftin [cefuroxime axetil], Codeine, Crestor [rosuvastatin], Doxycycline, Morphine, Sulfa antibiotics, and Penicillins    Home Medications:    Prior to Admission Medications       Prescriptions Last Dose Informant Patient Reported? Taking?    amLODIPine (NORVASC) 5 MG tablet   Yes No    Take 1 tablet by mouth Daily.    aspirin 81 MG EC tablet   No No    Take 1 tablet by mouth Daily.    carvedilol (COREG) 25 MG tablet   Yes No    Take 1 tablet by mouth 2 (Two) Times a Day With Meals.    furosemide (LASIX) 40 MG tablet   No No    Take 1 tablet by mouth Daily for 3 days, THEN 0.5 tablets Daily for 30 days.    gabapentin (NEURONTIN) 800 MG tablet   Yes No    Take 1 tablet by mouth 3 (Three) Times a Day.    ipratropium-albuterol (DUO-NEB) 0.5-2.5 mg/3 ml nebulizer   No No    Take 3 mL by nebulization Every 4 (Four) Hours As Needed for Shortness of Air.  "   nitroglycerin (NITROSTAT) 0.4 MG SL tablet   Yes No    Place 1 tablet under the tongue Every 5 (Five) Minutes As Needed for Chest Pain. Take no more than 3 doses in 15 minutes.    omeprazole (priLOSEC) 40 MG capsule   Yes No    Take 1 capsule by mouth Daily.    ondansetron ODT (ZOFRAN-ODT) 4 MG disintegrating tablet   No No    Place 1 tablet on the tongue Every 6 (Six) Hours As Needed for Nausea or Vomiting.    pravastatin (PRAVACHOL) 40 MG tablet   Yes No    Take 2 tablets by mouth Daily.          ED Medications:    Medications   sodium chloride 0.9 % flush 10 mL (has no administration in time range)   ipratropium-albuterol (DUO-NEB) nebulizer solution 3 mL (3 mL Nebulization Given 12/20/23 0818)   methylPREDNISolone sodium succinate (SOLU-Medrol) injection 125 mg (125 mg Intravenous Given 12/20/23 0913)   furosemide (LASIX) injection 80 mg (80 mg Intravenous Given 12/20/23 1052)     Vital Signs:  Temp:  [98.3 °F (36.8 °C)-98.6 °F (37 °C)] 98.6 °F (37 °C)  Heart Rate:  [70-76] 73  Resp:  [18-20] 18  BP: (141-195)/(59-88) 141/59        12/20/23  0739   Weight: 50.8 kg (112 lb)     Body mass index is 21.87 kg/m².    Physical Exam:     Most recent vital Signs: /59   Pulse 73   Temp 98.6 °F (37 °C)   Resp 18   Ht 152.4 cm (60\")   Wt 50.8 kg (112 lb)   SpO2 98%   BMI 21.87 kg/m²     Physical Exam  Vitals and nursing note reviewed.   Constitutional:       General: She is not in acute distress.     Appearance: She is not toxic-appearing.   HENT:      Head: Normocephalic and atraumatic.      Mouth/Throat:      Mouth: Mucous membranes are moist.   Eyes:      Pupils: Pupils are equal, round, and reactive to light.   Cardiovascular:      Rate and Rhythm: Normal rate and regular rhythm.      Pulses: Normal pulses.      Heart sounds: Murmur heard.   Pulmonary:      Effort: Pulmonary effort is normal. No respiratory distress.      Breath sounds: Normal breath sounds.   Abdominal:      General: Bowel sounds are " normal.      Palpations: Abdomen is soft.   Musculoskeletal:         General: Normal range of motion.      Right lower leg: No edema.      Left lower leg: No edema.   Skin:     General: Skin is warm and dry.      Capillary Refill: Capillary refill takes less than 2 seconds.      Findings: Bruising (scattered to lower extremities) present.   Neurological:      General: No focal deficit present.      Mental Status: She is alert and oriented to person, place, and time. Mental status is at baseline.   Psychiatric:         Mood and Affect: Mood normal.         Behavior: Behavior normal.         Thought Content: Thought content normal.         Laboratory data:    I have reviewed the labs done in the emergency room.    Results from last 7 days   Lab Units 12/20/23  0906   SODIUM mmol/L 141   POTASSIUM mmol/L 3.8   CHLORIDE mmol/L 98   CO2 mmol/L 35.5*   BUN mg/dL 30*   CREATININE mg/dL 0.87   CALCIUM mg/dL 9.4   BILIRUBIN mg/dL 0.5   ALK PHOS U/L 88   ALT (SGPT) U/L 12   AST (SGOT) U/L 38*   GLUCOSE mg/dL 99     Results from last 7 days   Lab Units 12/20/23  0906   WBC 10*3/mm3 6.96   HEMOGLOBIN g/dL 11.4*   HEMATOCRIT % 36.5   PLATELETS 10*3/mm3 163         Results from last 7 days   Lab Units 12/20/23  1146 12/20/23  0906   HSTROP T ng/L 81* 96*     Results from last 7 days   Lab Units 12/20/23  0906   PROBNP pg/mL 7,638.0*                 Results from last 7 days   Lab Units 12/20/23  0850   COLOR UA  Yellow   GLUCOSE UA  Negative   KETONES UA  Negative   BLOOD UA  Negative   LEUKOCYTES UA  Negative   PH, URINE  8.0   BILIRUBIN UA  Negative   UROBILINOGEN UA  0.2 E.U./dL       Pain Management Panel           No data to display                EKG:      Paced rhythm with rate of 70bpm    Radiology:    XR Chest 1 View    Result Date: 12/20/2023  CLINICAL INDICATION:  CHF/COPD Protocol shortness of breath.  EXAMINATION TECHNIQUE: XR CHEST 1 VW-  COMPARISON: Radiographs 11/29/2023.  FINDINGS: Right chest wall subclavian  approach right atrial biventricular cardiac pacer in place. Mild cardiomegaly. Aortic atherosclerosis. Enlarged pulmonary artery, suggestive of pulmonary arterial hypertension. Mild bronchial wall thickening. Vascular engorgement, cephalization. No pneumothorax. No large pleural effusions. Probable trace effusions.      Cardiomegaly. Vascular engorgement. Probable mild interstitial edema. Chronic bronchitis.    Images personally reviewed, interpreted and dictated by JGIAR Jin.     This report was signed and finalized on 12/20/2023 8:53 AM by JIGAR Jin.       Assessment:    Acute exacerbation HFpEF, POA  Elevated troponins  Hypertension  Hyperlipidemia  CAD  History of Afib S/P pacemaker placement  COPD on 3L at baseline  Debility    Plan:    CHF exacerbation  -Received Lasix 80mg IV in ED, reassess volume status in am and consider further diuretics at that time  -Recent admission about 2 weeks ago for same  -Echo on last admission noted  EF of 60 to 65% with grade 2 diastolic dysfunction, moderate tricuspid regurgitation and pulmonary hypertension.   -Strict I&Os  -Daily weights  -Baseline oxygen 3L which she is currently on    Elevated Troponin  -No complaints chest pain  -In the setting of heart failure exacerbation suspect demand ischemia, trending down  -Monitor on telemetry  -Recheck one more troponin    Chronic  -Home medications as reconciled    Debility  -PT/OT consulted for evaluations    Patient is a full code on admission discussed with her at bedside and wishes to have all interventions for one week if needed.      Risk Assessment: High  DVT Prophylaxis: Lovenox  Code Status: Full code  Diet: Cardiac    Advance Care Planning   ACP discussion was held with the patient during this visit. Patient does not have an advance directive, information provided.       Maria Elena Bowman, APRN  12/20/23  15:09 EST    Dictated utilizing Dragon dictation.

## 2023-12-20 NOTE — PROGRESS NOTES
"Pharmacy Consult - Enoxaparin Dosing  Lesley Calero is a 83 y.o. female who has been consulted to dose enoxaparin for VTE ppx.     Allergies  Ceftin [cefuroxime axetil], Codeine, Crestor [rosuvastatin], Doxycycline, Morphine, Sulfa antibiotics, and Penicillins    Relevant clinical data and objective history reviewed:   [Ht: 152.4 cm (60\"); Wt: 50.8 kg (112 lb)]  Body mass index is 21.87 kg/m².  Estimated Creatinine Clearance: 37.9 mL/min (by C-G formula based on SCr of 0.87 mg/dL).  Results from last 7 days   Lab Units 12/20/23  0906   HEMOGLOBIN g/dL 11.4*   HEMATOCRIT % 36.5   PLATELETS 10*3/mm3 163   CREATININE mg/dL 0.87       Asessment/Plan  Initiate enoxaparin 30 mg SQ every 24 hours  Pharmacy will monitor Ms. Calero's renal function and clinical status and adjust the enoxaparin dose and/or frequency as needed.    Thanks,     Bernabe Cook, PharmD  12/20/2023 15:46 EST    "

## 2023-12-20 NOTE — ED NOTES
Lab called at this time to let us know there was not enough blood in the lab tube. At this time lab was ask to send someone down to try to draw labs on this patient.

## 2023-12-20 NOTE — ED PROVIDER NOTES
Subjective   History of Present Illness  Chief Complaint: Shortness of breath  History of Present Illness: 83-year-old female history of COPD and CHF on 3 L of nasal cannula oxygen at all times also has a history of a pacemaker for atrial fibrillation and coronary disease presents today via EMS with complaints of increased shortness of breath over the past few weeks but worse in the last night, denies any chest pain, mild increase in her productive cough, but no fevers.  No known sick contacts.  Denies any increased lower extremity swelling.      Nurses Notes reviewed and agree, including vitals, allergies, social history and prior medical history.    REVIEW OF SYSTEMS: All systems reviewed and not pertinent unless noted.    Positive for: Shortness of breath, cough    Negative for: Review of systems reviewed negative unspecified  Review of Systems    Past Medical History:   Diagnosis Date    Cataracts, bilateral     CHF (congestive heart failure)     COPD (chronic obstructive pulmonary disease)     Coronary artery disease     Hyperlipidemia     Hypertension     Impaired functional mobility, balance, gait, and endurance     Myocardial infarction     Oxygen deficit     Pneumonia     RLS (restless legs syndrome)     Seizures        Allergies   Allergen Reactions    Ceftin [Cefuroxime Axetil] Diarrhea    Codeine Delirium    Crestor [Rosuvastatin] Other (See Comments)     Muscle pain     Doxycycline Diarrhea    Morphine Nausea And Vomiting    Sulfa Antibiotics Hives    Penicillins Rash       Past Surgical History:   Procedure Laterality Date    CAROTID ENDARTERECTOMY Left     CATARACT EXTRACTION W/ INTRAOCULAR LENS IMPLANT Right 7/27/2023    Procedure: CATARACT PHACO EXTRACTION WITH INTRAOCULAR LENS IMPLANT RIGHT;  Surgeon: Dajuan Blackburn MD;  Location: [facility];  Service: Ophthalmology;  Laterality: Right;    CATARACT EXTRACTION W/ INTRAOCULAR LENS IMPLANT Left 8/10/2023    Procedure: CATARACT PHACO EXTRACTION  WITH INTRAOCULAR LENS IMPLANT LEFT;  Surgeon: Dajuan Blackburn MD;  Location: [facility];  Service: Ophthalmology;  Laterality: Left;    CORONARY ANGIOPLASTY WITH STENT PLACEMENT      x2    LUNG SURGERY Left     PACEMAKER IMPLANTATION         History reviewed. No pertinent family history.    Social History     Socioeconomic History    Marital status:    Tobacco Use    Smoking status: Former   Vaping Use    Vaping Use: Never used   Substance and Sexual Activity    Alcohol use: No    Drug use: No    Sexual activity: Not Currently           Objective   Physical Exam  Constitutional:       Comments: Frail-appearing   Eyes:      Extraocular Movements: Extraocular movements intact.      Pupils: Pupils are equal, round, and reactive to light.   Cardiovascular:      Rate and Rhythm: Normal rate and regular rhythm. No extrasystoles are present.     Pulses: No decreased pulses.      Heart sounds: No murmur heard.     No friction rub. No gallop.   Pulmonary:      Effort: Pulmonary effort is normal.      Breath sounds: Wheezing present. No decreased breath sounds or rhonchi.   Chest:      Chest wall: No deformity or tenderness.   Abdominal:      General: Bowel sounds are normal.      Palpations: Abdomen is soft.   Musculoskeletal:         General: Normal range of motion.      Cervical back: Neck supple.      Right lower leg: No tenderness. No edema.      Left lower leg: No tenderness. No edema.   Skin:     General: Skin is warm and dry.      Capillary Refill: Capillary refill takes less than 2 seconds.   Neurological:      General: No focal deficit present.      Mental Status: She is alert and oriented to person, place, and time.   Psychiatric:         Mood and Affect: Mood normal.         Behavior: Behavior normal.         Procedures           ED Course  ED Course as of 12/20/23 1217   Wed Dec 20, 2023   0825 EKG interpreted by me reveals paced rhythm at a rate of 70 no ectopy.  Similar morphology to November 29,  2023 [PF]   0958 proBNP(!): 7,638.0 [CC]   0958 HS Troponin T(!!): 96 [CC]      ED Course User Index  [CC] Aubrey Petty PA  [PF] Minesh Joshi,                                              Medical Decision Making  Initial impression of presenting illness: 83-year-old female history of CHF, COPD, A-fib with pacemaker who is on oxygen at all times presents today complains of increased shortness of breath over the past week but worsened since last night.    DDX includes but is not limited to COPD exacerbation, CHF exacerbation, pneumonia, ACS    Patient arrives hemodynamically stable, afebrile without respiratory distress, oxygenating very well on her home oxygen with vitals interpreted by me. Pertinent features from physical exam: Mild wheezes that are scattered, but otherwise physical exam is unremarkable.    Initial diagnostic plan: COPD workup, CHF workup, steroids, breathing treatment, chest x-ray    Results from initial plan were reviewed and interpreted by me revealing patient has an elevated BNP and elevated troponin which she does have at baseline, chest x-ray shows pulmonary edema, tried to ambulate but oxygen dropped into the 70s.    Diagnostic information from other sources: None    Interventions / Re-evaluation: DuoNeb, steroids and increased oxygen    Results/clinical rationale were discussed with patient    Consultations/Discussion of results with other physicians: Spoke to Dr. Singleton who accepts admission    Disposition plan: Admit to the hospital for increased oxygen requirement and hypoxia secondary to CHF exacerbation.    Amount and/or Complexity of Data Reviewed  Labs: ordered. Decision-making details documented in ED Course.  Radiology: ordered.  ECG/medicine tests: ordered.    Risk  Prescription drug management.        Final diagnoses:   Acute on chronic congestive heart failure, unspecified heart failure type       ED Disposition  ED Disposition       ED Disposition   Decision to Admit     Condition   --    Comment   Level of Care: Telemetry [5]   Diagnosis: Acute exacerbation of CHF (congestive heart failure) [627201]   Admitting Physician: DANA NGUYỄN [[id number]]   Attending Physician: MICHI GARCIA [[id number]]                 No follow-up provider specified.       Medication List      No changes were made to your prescriptions during this visit.            Aubrey Petty PA  12/20/23 1215

## 2023-12-21 LAB
ALBUMIN SERPL-MCNC: 3.6 G/DL (ref 3.5–5.2)
ALBUMIN/GLOB SERPL: 1.3 G/DL
ALP SERPL-CCNC: 84 U/L (ref 39–117)
ALT SERPL W P-5'-P-CCNC: 9 U/L (ref 1–33)
ANION GAP SERPL CALCULATED.3IONS-SCNC: 9.7 MMOL/L (ref 5–15)
AST SERPL-CCNC: 27 U/L (ref 1–32)
BILIRUB SERPL-MCNC: 0.4 MG/DL (ref 0–1.2)
BUN SERPL-MCNC: 36 MG/DL (ref 8–23)
BUN/CREAT SERPL: 34.3 (ref 7–25)
CALCIUM SPEC-SCNC: 9.1 MG/DL (ref 8.6–10.5)
CHLORIDE SERPL-SCNC: 96 MMOL/L (ref 98–107)
CO2 SERPL-SCNC: 35.3 MMOL/L (ref 22–29)
CREAT SERPL-MCNC: 1.05 MG/DL (ref 0.57–1)
DEPRECATED RDW RBC AUTO: 50.5 FL (ref 37–54)
EGFRCR SERPLBLD CKD-EPI 2021: 52.2 ML/MIN/1.73
ERYTHROCYTE [DISTWIDTH] IN BLOOD BY AUTOMATED COUNT: 14.5 % (ref 12.3–15.4)
GLOBULIN UR ELPH-MCNC: 2.8 GM/DL
GLUCOSE SERPL-MCNC: 122 MG/DL (ref 65–99)
HCT VFR BLD AUTO: 34.9 % (ref 34–46.6)
HGB BLD-MCNC: 11 G/DL (ref 12–15.9)
MCH RBC QN AUTO: 30.3 PG (ref 26.6–33)
MCHC RBC AUTO-ENTMCNC: 31.5 G/DL (ref 31.5–35.7)
MCV RBC AUTO: 96.1 FL (ref 79–97)
PLATELET # BLD AUTO: 181 10*3/MM3 (ref 140–450)
PMV BLD AUTO: 10.3 FL (ref 6–12)
POTASSIUM SERPL-SCNC: 3.8 MMOL/L (ref 3.5–5.2)
PROT SERPL-MCNC: 6.4 G/DL (ref 6–8.5)
RBC # BLD AUTO: 3.63 10*6/MM3 (ref 3.77–5.28)
SODIUM SERPL-SCNC: 141 MMOL/L (ref 136–145)
WBC NRBC COR # BLD AUTO: 5.16 10*3/MM3 (ref 3.4–10.8)

## 2023-12-21 PROCEDURE — 99232 SBSQ HOSP IP/OBS MODERATE 35: CPT | Performed by: NURSE PRACTITIONER

## 2023-12-21 PROCEDURE — 85027 COMPLETE CBC AUTOMATED: CPT | Performed by: NURSE PRACTITIONER

## 2023-12-21 PROCEDURE — 96372 THER/PROPH/DIAG INJ SC/IM: CPT

## 2023-12-21 PROCEDURE — 99204 OFFICE O/P NEW MOD 45 MIN: CPT | Performed by: INTERNAL MEDICINE

## 2023-12-21 PROCEDURE — 97161 PT EVAL LOW COMPLEX 20 MIN: CPT

## 2023-12-21 PROCEDURE — G0378 HOSPITAL OBSERVATION PER HR: HCPCS

## 2023-12-21 PROCEDURE — 80053 COMPREHEN METABOLIC PANEL: CPT | Performed by: NURSE PRACTITIONER

## 2023-12-21 PROCEDURE — 25010000002 ENOXAPARIN PER 10 MG: Performed by: NURSE PRACTITIONER

## 2023-12-21 PROCEDURE — 97166 OT EVAL MOD COMPLEX 45 MIN: CPT

## 2023-12-21 RX ORDER — SIMETHICONE 80 MG
80 TABLET,CHEWABLE ORAL EVERY 6 HOURS PRN
COMMUNITY

## 2023-12-21 RX ORDER — SIMETHICONE 80 MG
80 TABLET,CHEWABLE ORAL 4 TIMES DAILY PRN
Status: DISCONTINUED | OUTPATIENT
Start: 2023-12-21 | End: 2023-12-22 | Stop reason: HOSPADM

## 2023-12-21 RX ADMIN — HYDROCODONE BITARTRATE AND ACETAMINOPHEN 1 TABLET: 7.5; 325 TABLET ORAL at 20:30

## 2023-12-21 RX ADMIN — DOCUSATE SODIUM 50MG AND SENNOSIDES 8.6MG 2 TABLET: 8.6; 5 TABLET, FILM COATED ORAL at 20:30

## 2023-12-21 RX ADMIN — ASPIRIN 81 MG: 81 TABLET, COATED ORAL at 08:42

## 2023-12-21 RX ADMIN — HYDROCODONE BITARTRATE AND ACETAMINOPHEN 1 TABLET: 7.5; 325 TABLET ORAL at 14:41

## 2023-12-21 RX ADMIN — CARVEDILOL 25 MG: 25 TABLET, FILM COATED ORAL at 08:42

## 2023-12-21 RX ADMIN — ENOXAPARIN SODIUM 30 MG: 30 INJECTION SUBCUTANEOUS at 17:17

## 2023-12-21 RX ADMIN — CARVEDILOL 25 MG: 25 TABLET, FILM COATED ORAL at 17:17

## 2023-12-21 RX ADMIN — SACUBITRIL AND VALSARTAN 1 TABLET: 24; 26 TABLET, FILM COATED ORAL at 14:41

## 2023-12-21 RX ADMIN — Medication 10 ML: at 20:31

## 2023-12-21 RX ADMIN — AMLODIPINE BESYLATE 5 MG: 5 TABLET ORAL at 08:42

## 2023-12-21 RX ADMIN — HYDROCODONE BITARTRATE AND ACETAMINOPHEN 1 TABLET: 7.5; 325 TABLET ORAL at 08:42

## 2023-12-21 RX ADMIN — PANTOPRAZOLE SODIUM 40 MG: 40 TABLET, DELAYED RELEASE ORAL at 11:48

## 2023-12-21 RX ADMIN — Medication 10 ML: at 08:42

## 2023-12-21 NOTE — PLAN OF CARE
Goal Outcome Evaluation:  Plan of Care Reviewed With: patient        Progress: no change  Outcome Evaluation: pleasant patient with complaint of chronic pain-PRN meds given for patient's comfort-medications given per hospitalist's orders-Maria Elena OSORIO saw patient today-monitor labs and continue to monitor patient-

## 2023-12-21 NOTE — CONSULTS
"[facility] Consult Note    Referring Provider: Areli  Reason for Consultation: CHF    Patient Care Team:  Nj Smith MD as PCP - General (Family Medicine)    Chief complaint : Shortness of breath    Subjective:    History of present illness: This is an 83-year-old female patient with known congestive heart failure with preserved ejection fraction.  She was admitted for worsening dyspnea.  She has been admitted 3 times in the last month for heart failure decompensation.  She reports shortness of breath both at rest and with activity.  She has had increasing peripheral edema and orthopnea.  She has responded favorably to diuresis.  She was previously followed by  at [facility] but indicates she can no longer drive there.  proBNP was elevated.  Chest x-ray showed cardiomegaly with pulmonary venous congestion.  Her echocardiogram shows a preserved ejection fraction.  She is a former smoker.  Cardiac troponins were minimally elevated in a \"plateau-type\" pattern.  Troponins have been persistently elevated in this range for the last month.    Review of Systems   Review of Systems   Constitutional: Negative for chills, diaphoresis, fever, malaise/fatigue, weight gain and weight loss.   HENT:  Negative for ear discharge, hearing loss, hoarse voice and nosebleeds.    Eyes:  Negative for discharge, double vision, pain and photophobia.   Cardiovascular:  Positive for dyspnea on exertion, leg swelling and orthopnea. Negative for chest pain, claudication, cyanosis, irregular heartbeat, near-syncope, palpitations, paroxysmal nocturnal dyspnea and syncope.   Respiratory:  Positive for shortness of breath. Negative for cough, hemoptysis, sputum production and wheezing.    Endocrine: Negative for cold intolerance, heat intolerance, polydipsia, polyphagia and polyuria.   Hematologic/Lymphatic: Negative for adenopathy and bleeding problem. Does not bruise/bleed easily.   Skin:  Negative for color change, " flushing, itching and rash.   Musculoskeletal:  Negative for muscle cramps, muscle weakness, myalgias and stiffness.   Gastrointestinal:  Negative for abdominal pain, diarrhea, hematemesis, hematochezia, nausea and vomiting.   Genitourinary:  Negative for dysuria, frequency and nocturia.   Neurological:  Negative for focal weakness, loss of balance, numbness, paresthesias and seizures.   Psychiatric/Behavioral:  Negative for altered mental status, hallucinations and suicidal ideas.    Allergic/Immunologic: Negative for HIV exposure, hives and persistent infections.       History  Past Medical History:   Diagnosis Date    Cataracts, bilateral     CHF (congestive heart failure)     COPD (chronic obstructive pulmonary disease)     Coronary artery disease     Hyperlipidemia     Hypertension     Impaired functional mobility, balance, gait, and endurance     Myocardial infarction     Oxygen deficit     Pneumonia     RLS (restless legs syndrome)     Seizures    ,   Past Surgical History:   Procedure Laterality Date    CAROTID ENDARTERECTOMY Left     CATARACT EXTRACTION W/ INTRAOCULAR LENS IMPLANT Right 7/27/2023    Procedure: CATARACT PHACO EXTRACTION WITH INTRAOCULAR LENS IMPLANT RIGHT;  Surgeon: Dajuan Blackburn MD;  Location: [facility];  Service: Ophthalmology;  Laterality: Right;    CATARACT EXTRACTION W/ INTRAOCULAR LENS IMPLANT Left 8/10/2023    Procedure: CATARACT PHACO EXTRACTION WITH INTRAOCULAR LENS IMPLANT LEFT;  Surgeon: Dajuan Blackburn MD;  Location: [facility];  Service: Ophthalmology;  Laterality: Left;    CORONARY ANGIOPLASTY WITH STENT PLACEMENT      x2    LUNG SURGERY Left     PACEMAKER IMPLANTATION     , History reviewed. No pertinent family history.,   Social History     Tobacco Use    Smoking status: Former   Vaping Use    Vaping Use: Never used   Substance Use Topics    Alcohol use: No    Drug use: No   ,   Medications Prior to Admission   Medication Sig Dispense Refill Last Dose    amLODIPine  "(NORVASC) 5 MG tablet Take 1 tablet by mouth Daily.   12/19/2023    aspirin 81 MG EC tablet Take 1 tablet by mouth Daily. 30 tablet 0 12/19/2023    carvedilol (COREG) 25 MG tablet Take 1 tablet by mouth 2 (Two) Times a Day With Meals.   12/19/2023    furosemide (LASIX) 40 MG tablet Take 1 tablet by mouth Daily for 3 days, THEN 0.5 tablets Daily for 30 days. 18 tablet 0 12/19/2023    gabapentin (NEURONTIN) 800 MG tablet Take 1 tablet by mouth 3 (Three) Times a Day.   12/19/2023    HYDROcodone-acetaminophen (NORCO) 7.5-325 MG per tablet Take 1 tablet by mouth.   12/20/2023    omeprazole (priLOSEC) 40 MG capsule Take 1 capsule by mouth Daily.   12/19/2023    pravastatin (PRAVACHOL) 40 MG tablet Take 2 tablets by mouth Daily.   12/19/2023    ipratropium-albuterol (DUO-NEB) 0.5-2.5 mg/3 ml nebulizer Take 3 mL by nebulization Every 4 (Four) Hours As Needed for Shortness of Air. 360 mL 0     nitroglycerin (NITROSTAT) 0.4 MG SL tablet Place 1 tablet under the tongue Every 5 (Five) Minutes As Needed for Chest Pain. Take no more than 3 doses in 15 minutes.       ondansetron ODT (ZOFRAN-ODT) 4 MG disintegrating tablet Place 1 tablet on the tongue Every 6 (Six) Hours As Needed for Nausea or Vomiting. 12 tablet 0 Unknown    simethicone (MYLICON) 80 MG chewable tablet Chew 1 tablet Every 6 (Six) Hours As Needed for Flatulence.       and Allergies:  Ceftin [cefuroxime axetil], Codeine, Crestor [rosuvastatin], Doxycycline, Morphine, Sulfa antibiotics, and Penicillins    Objective:    Vital Sign Min/Max for last 24 hours  Temp  Min: 97.5 °F (36.4 °C)  Max: 99 °F (37.2 °C)   BP  Min: 119/53  Max: 151/57   Pulse  Min: 70  Max: 83   Resp  Min: 18  Max: 18   SpO2  Min: 92 %  Max: 100 %   Flow (L/min)  Min: 3  Max: 4   Weight  Min: 50.2 kg (110 lb 10.7 oz)  Max: 50.4 kg (111 lb 1.8 oz)     Flowsheet Rows      Flowsheet Row First Filed Value   Admission Height 152.4 cm (60\") Documented at 12/20/2023 0739   Admission Weight 50.8 kg (112 " lb) Documented at 12/20/2023 0739                   Echo EF Estimated  Lab Results   Component Value Date    ECHOEFEST 62.0 04/13/2023         Physical Exam:   Vitals and nursing note reviewed.   Constitutional:       Appearance: Healthy appearance. Not in distress.   Neck:      Vascular: No JVR. JVD normal.   Pulmonary:      Effort: Pulmonary effort is normal.      Breath sounds: Normal breath sounds. No wheezing. No rhonchi. No rales.   Chest:      Chest wall: Not tender to palpatation.   Cardiovascular:      PMI at left midclavicular line. Normal rate. Regular rhythm. Normal S1. Normal S2.       Murmurs: There is no murmur.      No gallop.  No click. No rub.   Pulses:     Intact distal pulses.   Edema:     Peripheral edema absent.   Abdominal:      General: Bowel sounds are normal.      Palpations: Abdomen is soft.      Tenderness: There is no abdominal tenderness.   Musculoskeletal: Normal range of motion.         General: No tenderness. Skin:     General: Skin is warm and dry.   Neurological:      General: No focal deficit present.      Mental Status: Alert and oriented to person, place and time.         Results Review:   I reviewed the patient's new clinical results.  Results from last 7 days   Lab Units 12/21/23  0604 12/20/23  0906   WBC 10*3/mm3 5.16 6.96   HEMOGLOBIN g/dL 11.0* 11.4*   HEMATOCRIT % 34.9 36.5   PLATELETS 10*3/mm3 181 163     Results from last 7 days   Lab Units 12/21/23  0604 12/20/23  0906   SODIUM mmol/L 141 141   POTASSIUM mmol/L 3.8 3.8   CHLORIDE mmol/L 96* 98   CO2 mmol/L 35.3* 35.5*   BUN mg/dL 36* 30*   CREATININE mg/dL 1.05* 0.87   GLUCOSE mg/dL 122* 99   CALCIUM mg/dL 9.1 9.4     Lab Results   Lab Value Date/Time    TROPONINT 76 (C) 12/20/2023 1717    TROPONINT 81 (C) 12/20/2023 1146    TROPONINT 96 (C) 12/20/2023 0906    TROPONINT 31 (H) 11/29/2023 2254    TROPONINT 34 (H) 11/29/2023 2022    TROPONINT 26 (H) 11/12/2023 0952    TROPONINT 29 (H) 09/08/2023 2100    TROPONINT 30 (H)  09/08/2023 1848    TROPONINT 25 (H) 04/13/2023 0714    TROPONINT 26 (H) 04/13/2023 0527    TROPONINT 23 (H) 04/12/2023 1439    TROPONINT <0.010 04/07/2021 1422    TROPONINT <0.010 12/08/2019 1858    TROPONINT <0.010 12/08/2019 1716    TROPONINT <0.010 11/21/2019 0912             Assessment/Plan:      Acute exacerbation of CHF (congestive heart failure)    Heart failure, unspecified      I have recommended changing her amlodipine to Entresto.  Coreg will remain unchanged.  Given her advanced age, severe frailty and functional limitations, she is not a candidate for any invasive cardiovascular testing or procedures.    The patient can follow-up in cardiology clinic at the [facility] location given her inability for continued travel to [facility].    I discussed the patient's findings and my recommendations with patient    José Manuel Nguyen MD  12/21/23  13:02 EST

## 2023-12-21 NOTE — PLAN OF CARE
Goal Outcome Evaluation:  Plan of Care Reviewed With: patient           Outcome Evaluation: New Admission

## 2023-12-21 NOTE — THERAPY EVALUATION
Patient Name: Lesley Calero  : 1940    MRN: [id number]                              Today's Date: 2023       Admit Date: 2023    Visit Dx:     ICD-10-CM ICD-9-CM   1. Acute on chronic congestive heart failure, unspecified heart failure type  I50.9 428.0     Patient Active Problem List   Diagnosis    Knee strain, right, initial encounter    Chronic pain of right knee    Acute on chronic congestive heart failure, unspecified heart failure type    COPD (chronic obstructive pulmonary disease)    Essential hypertension    Status post placement of cardiac pacemaker    Nuclear sclerotic cataract of right eye    Acute exacerbation of CHF (congestive heart failure)    Heart failure, unspecified     Past Medical History:   Diagnosis Date    Cataracts, bilateral     CHF (congestive heart failure)     COPD (chronic obstructive pulmonary disease)     Coronary artery disease     Hyperlipidemia     Hypertension     Impaired functional mobility, balance, gait, and endurance     Myocardial infarction     Oxygen deficit     Pneumonia     RLS (restless legs syndrome)     Seizures      Past Surgical History:   Procedure Laterality Date    CAROTID ENDARTERECTOMY Left     CATARACT EXTRACTION W/ INTRAOCULAR LENS IMPLANT Right 2023    Procedure: CATARACT PHACO EXTRACTION WITH INTRAOCULAR LENS IMPLANT RIGHT;  Surgeon: Dajuan Blackburn MD;  Location: [facility] OR;  Service: Ophthalmology;  Laterality: Right;    CATARACT EXTRACTION W/ INTRAOCULAR LENS IMPLANT Left 8/10/2023    Procedure: CATARACT PHACO EXTRACTION WITH INTRAOCULAR LENS IMPLANT LEFT;  Surgeon: Dajuan Blackburn MD;  Location: [facility] OR;  Service: Ophthalmology;  Laterality: Left;    CORONARY ANGIOPLASTY WITH STENT PLACEMENT      x2    LUNG SURGERY Left     PACEMAKER IMPLANTATION        General Information       Row Name 23 0958          Physical Therapy Time and Intention    Document Type evaluation  -JR     Mode of Treatment physical  therapy  -       Row Name 12/21/23 0950          General Information    Patient Profile Reviewed yes  -JR     Prior Level of Function independent:;all household mobility;community mobility  -     Existing Precautions/Restrictions fall;oxygen therapy device and L/min  -     Barriers to Rehab none identified  -       Row Name 12/21/23 0950          Living Environment    People in Home child(seth), adult;spouse  -       Row Name 12/21/23 0950          Home Main Entrance    Number of Stairs, Main Entrance none  -       Row Name 12/21/23 0950          Stairs Within Home, Primary    Number of Stairs, Within Home, Primary none  -       Row Name 12/21/23 0950          Cognition    Orientation Status (Cognition) oriented x 4  -       Row Name 12/21/23 0950          Safety Issues, Functional Mobility    Safety Issues Affecting Function (Mobility) safety precautions follow-through/compliance;awareness of need for assistance;safety precaution awareness  -     Impairments Affecting Function (Mobility) strength;endurance/activity tolerance;shortness of breath;postural/trunk control  -               User Key  (r) = Recorded By, (t) = Taken By, (c) = Cosigned By      Initials Name Provider Type     Susan Crum, PT Physical Therapist                   Mobility       Row Name 12/21/23 0950          Bed Mobility    Bed Mobility supine-sit  -     Supine-Sit [place] (Bed Mobility) contact guard  -     Assistive Device (Bed Mobility) head of bed elevated;bed rails  -[facility] Name 12/21/23 0950          Sit-Stand Transfer    Sit-Stand [place] (Transfers) contact guard;verbal cues  -     Assistive Device (Sit-Stand Transfers) cane, straight  -[facility] Name 12/21/23 0950          Gait/Stairs (Locomotion)    [place] Level (Gait) contact guard  -     Assistive Device (Gait) cane, straight  -     Distance in Feet (Gait) 12  -JR     Deviations/Abnormal Patterns (Gait) base of support,  narrow;festinating/shuffling;stride length decreased  -JR     Bilateral Gait Deviations forward flexed posture;heel strike decreased  -JR               User Key  (r) = Recorded By, (t) = Taken By, (c) = Cosigned By      Initials Name Provider Type    Susan Clayton PT Physical Therapist                   Obj/Interventions       Row Name 12/21/23 0950          Range of Motion Comprehensive    General Range of Motion bilateral lower extremity ROM WFL  -JR       Row Name 12/21/23 0950          Strength Comprehensive (MMT)    Comment, General Manual Muscle Testing (MMT) Assessment BLE grossly 3+/5  -JR       Row Name 12/21/23 0950          Balance    Balance Assessment sitting static balance;sitting dynamic balance;sit to stand dynamic balance;standing static balance;standing dynamic balance  -JR     Static Sitting Balance supervision  -JR     Dynamic Sitting Balance supervision  -JR     Position, Sitting Balance unsupported;sitting edge of bed  -JR     Sit to Stand Dynamic Balance contact guard  -JR     Static Standing Balance contact guard  -JR     Dynamic Standing Balance contact guard  -JR     Position/Device Used, Standing Balance cane, straight  -JR               User Key  (r) = Recorded By, (t) = Taken By, (c) = Cosigned By      Initials Name Provider Type    Susan Clayton PT Physical Therapist                   Goals/Plan       Row Name 12/21/23 0950          Bed Mobility Goal 1 (PT)    Activity/Assistive Device (Bed Mobility Goal 1, PT) bed mobility activities, all  -JR     [place] Level/Cues Needed (Bed Mobility Goal 1, PT) modified independence  -JR     Time Frame (Bed Mobility Goal 1, PT) short term goal (STG)  -JR     Progress/Outcomes (Bed Mobility Goal 1, PT) goal ongoing  -JR       Row Name 12/21/23 0950          Transfer Goal 1 (PT)    Activity/Assistive Device (Transfer Goal 1, PT) sit-to-stand/stand-to-sit;bed-to-chair/chair-to-bed;cane, straight  -JR     [place] Level/Cues Needed  (Transfer Goal 1, PT) supervision required  -JR     Time Frame (Transfer Goal 1, PT) long term goal (LTG)  -JR     Strategies/Barriers (Transfers Goal 1, PT) with minimal shortness of breath  -JR     Progress/Outcome (Transfer Goal 1, PT) goal ongoing  -JR       Row Name 12/21/23 0950          Gait Training Goal 1 (PT)    Activity/Assistive Device (Gait Training Goal 1, PT) gait (walking locomotion);cane, straight;increase endurance/gait distance;decrease fall risk;improve balance and speed  -JR     [place] Level (Gait Training Goal 1, PT) supervision required  -JR     Distance (Gait Training Goal 1, PT) 200  -JR     Time Frame (Gait Training Goal 1, PT) long term goal (LTG)  -JR     Strategies/Barriers (Gait Training Goal 1, PT) with minimal shortness of breath  -JR     Progress/Outcome (Gait Training Goal 1, PT) goal ongoing  -JR       Row Name 12/21/23 0950          Patient Education Goal (PT)    Activity (Patient Education Goal, PT) Perform standing BLE exercises x 15  -JR     [place]/Cues/Accuracy (Memory Goal 2, PT) demonstrates adequately  -JR     Time Frame (Patient Education Goal, PT) 4 days  -JR     Barriers (Patient Education Goal, PT) with good breathing coordination  -JR     Progress/Outcome (Patient Education Goal, PT) goal ongoing  -JR       Row Name 12/21/23 0950          Therapy Assessment/Plan (PT)    Planned Therapy Interventions (PT) strengthening;balance training;bed mobility training;transfer training;gait training;home exercise program;patient/family education  -JR               User Key  (r) = Recorded By, (t) = Taken By, (c) = Cosigned By      Initials Name Provider Type    Susan Clayton, PT Physical Therapist                   Clinical Impression       Row Name 12/21/23 0950          Pain    Pretreatment Pain Rating 0/10 - no pain  -JR     Posttreatment Pain Rating 0/10 - no pain  -JR       Row Name 12/21/23 0950          Plan of Care Review    Plan of Care Reviewed With patient   -JR     Progress no change  -     Outcome Evaluation Patient participated well in PT evaluation and demonstrated decreased overall mobility.  She required contact guard assistance for bed mobility and transfers.  She walked 12 feet with her straight cane and contact guard assistance.  She is expected to benefit from additional PT services while hospitalized and upon discharge to home with home health care services.  -       Row Name 12/21/23 0950          Therapy Assessment/Plan (PT)    Patient/Family Therapy Goals Statement (PT) Patient wants to go home  -     Rehab Potential (PT) good, to achieve stated therapy goals  -     Criteria for Skilled Interventions Met (PT) yes;meets criteria;skilled treatment is necessary  -     Therapy Frequency (PT) 5 times/wk  -       Row Name 12/21/23 0950          Vital Signs    Pre SpO2 (%) 95  -JR     O2 Delivery Pre Treatment nasal cannula  3 LPM  -JR     Intra SpO2 (%) 92  -JR     O2 Delivery Intra Treatment nasal cannula  3 LPM  -JR     Post SpO2 (%) 94  -JR     O2 Delivery Post Treatment nasal cannula  3 LPM  -JR     Pre Patient Position Supine  -JR     Intra Patient Position Standing  -JR     Post Patient Position Sitting  -JR       Row Name 12/21/23 0950          Positioning and Restraints    Pre-Treatment Position in bed  -JR     Post Treatment Position chair  -JR     In Chair sitting;call light within reach;encouraged to call for assist;notified ns  -               User Key  (r) = Recorded By, (t) = Taken By, (c) = Cosigned By      Initials Name Provider Type    Susan Clayton, PT Physical Therapist                   Outcome Measures       Row Name 12/21/23 0936          How much help from another person do you currently need...    Turning from your back to your side while in flat bed without using bedrails? 4  -JR     Moving from lying on back to sitting on the side of a flat bed without bedrails? 3  -JR     Moving to and from a bed to a chair (including  a wheelchair)? 3  -JR     Standing up from a chair using your arms (e.g., wheelchair, bedside chair)? 3  -JR     Climbing 3-5 steps with a railing? 2  -JR     To walk in hospital room? 3  -JR     AM-PAC 6 Clicks Score (PT) 18  -JR     Highest Level of Mobility Goal 6 --> Walk 10 steps or more  -       Row Name 12/21/23 1514 12/21/23 0950       Functional Assessment    Outcome Measure Options AM-PAC 6 Clicks Daily Activity (OT)  -DB AM-PAC 6 Clicks Basic Mobility (PT)  -              User Key  (r) = Recorded By, (t) = Taken By, (c) = Cosigned By      Initials Name Provider Type    Susan Clayton, PT Physical Therapist    Bridgett Chavis, OT Occupational Therapist                                 Physical Therapy Education       Title: PT OT SLP Therapies (In Progress)       Topic: Physical Therapy (In Progress)       Point: Mobility training (Done)       Learning Progress Summary             Patient Acceptance, E,TB, VU by  at 12/21/2023 1550    Comment: Role of PT and POC                         Point: Home exercise program (Not Started)       Learner Progress:  Not documented in this visit.              Point: Body mechanics (Not Started)       Learner Progress:  Not documented in this visit.              Point: Precautions (Not Started)       Learner Progress:  Not documented in this visit.                              User Key       Initials Effective Dates Name Provider Type Discipline     08/22/23 -  Susan Crum, PT Physical Therapist PT                  PT Recommendation and Plan  Planned Therapy Interventions (PT): strengthening, balance training, bed mobility training, transfer training, gait training, home exercise program, patient/family education  Plan of Care Reviewed With: patient  Progress: no change  Outcome Evaluation: Patient participated well in PT evaluation and demonstrated decreased overall mobility.  She required contact guard assistance for bed mobility and transfers.  She  walked 12 feet with her straight cane and contact guard assistance.  She is expected to benefit from additional PT services while hospitalized and upon discharge to home with home health care services.     Time Calculation:   PT Evaluation Complexity  History, PT Evaluation Complexity: 1-2 personal factors and/or comorbidities  Examination of Body Systems (PT Eval Complexity): 1-2 elements  Clinical Presentation (PT Evaluation Complexity): evolving  Clinical Decision Making (PT Evaluation Complexity): low complexity  Overall Complexity (PT Evaluation Complexity): low complexity     PT Charges       Row Name 12/21/23 0950             Time Calculation    Start Time 0950  -JR      PT Received On 12/21/23  -JR      PT Goal Re-Cert Due Date 12/31/23  -JR         Untimed Charges    PT Eval/Re-eval Minutes 44  -JR         Total Minutes    Untimed Charges Total Minutes 44  -JR       Total Minutes 44  -JR                User Key  (r) = Recorded By, (t) = Taken By, (c) = Cosigned By      Initials Name Provider Type    Susan Clayton, PT Physical Therapist                  Therapy Charges for Today       Code Description Service Date Service Provider Modifiers Qty    [id number]  PT EVAL LOW COMPLEXITY 3 12/21/2023 Susan Crum, PT GP 1            PT G-Codes  Outcome Measure Options: AM-PAC 6 Clicks Daily Activity (OT)  AM-PAC 6 Clicks Score (PT): 18  AM-PAC 6 Clicks Score (OT): 18  PT Discharge Summary  Anticipated Discharge Disposition (PT): home with home health    Susan Crum, PT  12/21/2023

## 2023-12-21 NOTE — PLAN OF CARE
Goal Outcome Evaluation:  Plan of Care Reviewed With: patient        Progress: no change  Outcome Evaluation: OT eval completed. Pt lying supine upon OT arrival. Pt agreeable to tx. Pt A&O x4. Pt reports she lives in one level home with daughter and . Pt able to complete supine to sit EOB with CGA. Pt able to complete fxl mobility from bed to chair with use of RW and CGA. Pt CGA to min A for all ADLs. Pt on 3L O2. Pt left seated in w/c with needs in reach. Pt to benefit from skilled OT services for strengthening, fxl mobility, endurance and ADL mgmt. Pt plans to d/c home with home health if needed.      Anticipated Discharge Disposition (OT): home with home health

## 2023-12-21 NOTE — PLAN OF CARE
Goal Outcome Evaluation:  Plan of Care Reviewed With: patient        Progress: no change  Outcome Evaluation: Patient participated well in PT evaluation and demonstrated decreased overall mobility.  She required contact guard assistance for bed mobility and transfers.  She walked 12 feet with her straight cane and contact guard assistance.  She is expected to benefit from additional PT services while hospitalized and upon discharge to home with home health care services.      Anticipated Discharge Disposition (PT): home with home health

## 2023-12-21 NOTE — PLAN OF CARE
Goal Outcome Evaluation:  Plan of Care Reviewed With: patient        Progress: improving  Outcome Evaluation: Vital Signs Remain Stable. 3LNC in use. Shortness of breath has improved. Possible Discharge in 1-2 days. Will continue to monitor.

## 2023-12-21 NOTE — CASE MANAGEMENT/SOCIAL WORK
Discharge Planning Assessment   Keith     Patient Name: Lesley Calero  MRN: [id number]  Today's Date: 12/21/2023    Admit Date: 12/20/2023    Plan: Dcp initiated at bedside with pt providing demographics. Her primary is Dr. DEANDRE Smith and [facility] is [facility] preferred [facility]. Home DME includes cane, rollator,bsc, and3L O2 provided by [employer]. General financial concerns are for fixed income and increased expenses. Pt and her spouse have been at current address for 26 years, her daughter moved in 4 months ago to assist them, and provides transpotation. She is planning on returning home with family transporting.   Discharge Needs Assessment       Row Name 12/21/23 1223       Living Environment    People in Home child(seth), adult;spouse    Name(s) of People in Home Roberto Galilea and Anel Garcias    Current Living Arrangements home    Duration at Residence 26 yrs    Potentially Unsafe Housing Conditions none    In the past 12 months has the electric, gas, oil, or water company threatened to shut off services in your home? No    Primary Care Provided by self    Family Caregiver if Needed child(seth), adult;spouse    Quality of Family Relationships helpful;involved    Able to Return to Prior Arrangements yes       Resource/Environmental Concerns    Resource/Environmental Concerns none    Transportation Concerns none       Food Insecurity    Within the past 12 months, you worried that your food would run out before you got the money to buy more. Never true    Within the past 12 months, the food you bought just didn't last and you didn't have money to get more. Never true       Transition Planning    Patient/Family Anticipates Transition to home with family    Patient/Family Anticipated Services at Transition none    Transportation Anticipated family or friend will provide       Discharge Needs Assessment    Readmission Within the Last 30 Days unable to assess    Equipment Currently Used at Home commode    Concerns to be  Addressed denies needs/concerns at this time    Anticipated Changes Related to Illness none    Equipment Needed After Discharge none                   Discharge Plan       Row Name 12/21/23 1227       Plan    Plan Dcp initiated at bedside with pt providing demographics. Her primary is Dr. DEANDRE Smith and [facility] is [facility] preferred [facility]. Home DME includes cane, rollator,bsc, and3L O2 provided by [employer]. General financial concerns are for fixed income and increased expenses. Pt and her spouse have been at current address for 26 years, her daughter moved in 4 months ago to assist them, and provides transpotation. She is planning on returning home with family transporting.                  Continued Care and Services - Admitted Since 12/20/2023    Coordination has not been started for this encounter.       Expected Discharge Date and Time       Expected Discharge Date Expected Discharge Time    Dec 22, 2023            Demographic Summary       Row Name 12/21/23 1221       General Information    Admission Type observation    Arrived From emergency department    Required Notices Provided Observation Status Notice    Referral Source admission list    Reason for Consult discharge planning    Preferred Language English       Contact Information    Permission Granted to Share Info With family/designee                   Functional Status       Row Name 12/21/23 1222       Functional Status    Usual Activity Tolerance moderate    Current Activity Tolerance fair       Functional Status, IADL    Medications independent    Meal Preparation assistive equipment    Housekeeping assistive equipment    Laundry assistive equipment    Shopping assistive equipment and person       Mental Status    General Appearance WDL WDL       Mental Status Summary    Recent Changes in Mental Status/Cognitive Functioning no changes       Employment/    Employment Status retired                   Psychosocial    No documentation.                   Abuse/Neglect    No documentation.                  Legal    No documentation.                  Substance Abuse    No documentation.                  Patient Forms    No documentation.                     Deedee Bacon RN

## 2023-12-21 NOTE — PROGRESS NOTES
[facility] HOSPITALIST    PROGRESS NOTE    Name:  Lesley Calero   Age:  83 y.o.  Sex:  female  :  1940  MRN:  [id number]   Visit Number:  [id number]  Admission Date:  2023  Date Of Service:  23  Primary Care Physician:  Nj Smith MD     LOS: 0 days :    Chief Complaint:      SOA    Subjective:    Patient seen and examined at bedside this morning.  She states she rested well overnight.  States she has not been up.  Discussed PT evaluation today and will see if oxygen drops significantly with activity.  Discussed Cardiology consult and patient is thankful for that.  There is no family present at time of exam.  Resting comfortably on baseline oxygen.    Hospital Course:    Patient is an 83 year old female who arrives to the hospital today with complaints of shortness of breath which has worsened over the past couple week, but significantly overnight.  States that her cough is at baseline.  Denies fevers or sick contacts.  States that she noticed more trouble ambulating to bedside commode which is by her bed in the past 2 nights.  Has been having to turn oxygen up to 4L.  Recent hospitalization for heart failure exacerbation, diuresed and sent home to follow up with Cardiologist (Perry) in [place].  Patient with past health history significant for hypertension, COPD on 3L at baseline, hyperlipidemia, CAD, HFpEF, and atrial fibrillation s/p pacemaker placement. Would like to establish care with cardiology in [place] if possible.  Lives with daughter and  and uses cane at baseline.     Work up in the emergency department noted patient to be afebrile and hemodynamically stable.  Labs notable for elevated troponins-96/81 with delta change -15 likely demand ischemia in the setting of heart failure exacerbation, BNP-7638, CMP noted CO2-35.5, BUN-30.  Lactate 0.7.  CBC noted WBC-6, H/H-11/36, platelets-163.  Urinalysis negative for infection.  Covid and influenza  testing negative.  CXR noted cardiomegaly, vascular engorgement, probable mild interstitial edema, and chronic bronchitis.  Patient was given lasix 80mg IV, duo-neb, and solu-medrol 125mg IV.  Oxygen saturations noted to drop into 70s with ambulation on home oxygen at 3L.  Hospitalist was contacted for admission    Review of Systems:     All systems were reviewed and negative except as mentioned in subjective, assessment and plan.    Vital Signs:    Temp:  [97.6 °F (36.4 °C)-99 °F (37.2 °C)] 98.5 °F (36.9 °C)  Heart Rate:  [70-78] 70  Resp:  [18] 18  BP: (127-195)/(51-88) 142/56    Intake and output:    I/O last 3 completed shifts:  In: 537 [P.O.:527; I.V.:10]  Out: 1300 [Urine:1300]  No intake/output data recorded.    Physical Examination:    General Appearance:  Alert and cooperative, pleasant elderly female, no acute distress on exam, appears chronically ill and frail   Head:  Atraumatic and normocephalic.   Eyes: Conjunctivae and sclerae normal, no icterus. No pallor.   Throat: No oral lesions, no thrush, oral mucosa moist.   Neck: Supple, trachea midline   Lungs:   Breath sounds heard bilaterally equally.  No wheezing with fine trace crackles to left base. Unlabored on baseline 3L   Heart:  Normal S1 and S2, + murmur, no gallop, no rub. No JVD.   Abdomen:   Normal bowel sounds, no masses, no organomegaly. Soft, nontender, nondistended, no rebound tenderness.   Extremities: Supple, no edema, no cyanosis, no clubbing.   Skin: No bleeding or rash, scattered ecchymosis to lower extremities   Neurologic: Alert and oriented x 3. No facial asymmetry. Moves all four limbs. No tremors.      Laboratory results:    Results from last 7 days   Lab Units 12/21/23  0604 12/20/23  0906   SODIUM mmol/L 141 141   POTASSIUM mmol/L 3.8 3.8   CHLORIDE mmol/L 96* 98   CO2 mmol/L 35.3* 35.5*   BUN mg/dL 36* 30*   CREATININE mg/dL 1.05* 0.87   CALCIUM mg/dL 9.1 9.4   BILIRUBIN mg/dL 0.4 0.5   ALK PHOS U/L 84 88   ALT (SGPT) U/L 9 12    AST (SGOT) U/L 27 38*   GLUCOSE mg/dL 122* 99     Results from last 7 days   Lab Units 12/21/23  0604 12/20/23  0906   WBC 10*3/mm3 5.16 6.96   HEMOGLOBIN g/dL 11.0* 11.4*   HEMATOCRIT % 34.9 36.5   PLATELETS 10*3/mm3 181 163         Results from last 7 days   Lab Units 12/20/23  1717 12/20/23  1146 12/20/23  0906   HSTROP T ng/L 76* 81* 96*         Recent Labs     11/12/23  1017 11/29/23  2308   PHART 7.411 7.395   MAP9PTQ 59.0* 60.6*   PO2ART 71.2* 64.3*   SVH3EAM 37.5* 37.1*   BASEEXCESS 11.1* 10.5*      I have reviewed the patient's laboratory results.    Radiology results:    XR Chest 1 View    Result Date: 12/20/2023  CLINICAL INDICATION:  CHF/COPD Protocol shortness of breath.  EXAMINATION TECHNIQUE: XR CHEST 1 VW-  COMPARISON: Radiographs 11/29/2023.  FINDINGS: Right chest wall subclavian approach right atrial biventricular cardiac pacer in place. Mild cardiomegaly. Aortic atherosclerosis. Enlarged pulmonary artery, suggestive of pulmonary arterial hypertension. Mild bronchial wall thickening. Vascular engorgement, cephalization. No pneumothorax. No large pleural effusions. Probable trace effusions.      Impression: Cardiomegaly. Vascular engorgement. Probable mild interstitial edema. Chronic bronchitis.    Images personally reviewed, interpreted and dictated by JIGAR Jin.     This report was signed and finalized on 12/20/2023 8:53 AM by JIGAR Jin.     I have reviewed the patient's radiology reports.    Medication Review:     I have reviewed the patient's active and prn medications.     Problem List:      Acute exacerbation of CHF (congestive heart failure)    Heart failure, unspecified      Assessment:    Acute exacerbation HFpEF, POA  Elevated troponins  Hypertension  Hyperlipidemia  CAD  History of Afib S/P pacemaker placement  COPD on 3L at baseline  Debility    Plan:    CHF exacerbation  -Received Lasix 80mg IV in ED with good diuresis.  Will give another 40mg IV today  -Echo  on last admission noted  EF of 60 to 65% with grade 2 diastolic dysfunction, moderate tricuspid regurgitation and pulmonary hypertension.   -Strict I&Os  -Daily weights  -Baseline oxygen 3L which she is currently on  -Will see if she has increased oxygen needs with PT today  -Given 2nd admission in less than one month, will consult with cardiology for recommendations and potential medication changes.  Patient would also like to establish care with Cardiology here in [place].     Elevated Troponin  -No complaints chest pain  -In the setting of heart failure exacerbation suspect demand ischemia, trending down  -Monitor on telemetry  -Recheck one more troponin--trending down     Chronic  -Home medications as reconciled     Debility  -PT/OT consulted for evaluations    DVT Prophylaxis: Lovenox  Code Status: Full code  Diet: Cardiac  Discharge Plan: Likely home tomorrow    Maria Elena Bowman, APRN  12/21/23  10:47 EST    Dictated utilizing Dragon dictation.

## 2023-12-22 ENCOUNTER — READMISSION MANAGEMENT (OUTPATIENT)
Dept: CALL CENTER | Facility: HOSPITAL | Age: 83
End: 2023-12-22
Payer: MEDICARE

## 2023-12-22 ENCOUNTER — HOME HEALTH ADMISSION (OUTPATIENT)
Dept: HOME HEALTH SERVICES | Facility: HOME HEALTHCARE | Age: 83
End: 2023-12-22
Payer: MEDICARE

## 2023-12-22 VITALS
HEIGHT: 60 IN | HEART RATE: 73 BPM | TEMPERATURE: 98.1 F | RESPIRATION RATE: 18 BRPM | SYSTOLIC BLOOD PRESSURE: 143 MMHG | OXYGEN SATURATION: 100 % | BODY MASS INDEX: 21.51 KG/M2 | DIASTOLIC BLOOD PRESSURE: 66 MMHG | WEIGHT: 109.57 LBS

## 2023-12-22 PROBLEM — I50.33 ACUTE ON CHRONIC HEART FAILURE WITH PRESERVED EJECTION FRACTION (HFPEF): Status: ACTIVE | Noted: 2023-12-22

## 2023-12-22 LAB
ANION GAP SERPL CALCULATED.3IONS-SCNC: 6.7 MMOL/L (ref 5–15)
BUN SERPL-MCNC: 29 MG/DL (ref 8–23)
BUN/CREAT SERPL: 37.7 (ref 7–25)
CALCIUM SPEC-SCNC: 9.1 MG/DL (ref 8.6–10.5)
CHLORIDE SERPL-SCNC: 100 MMOL/L (ref 98–107)
CO2 SERPL-SCNC: 38.3 MMOL/L (ref 22–29)
CREAT SERPL-MCNC: 0.77 MG/DL (ref 0.57–1)
EGFRCR SERPLBLD CKD-EPI 2021: 75.7 ML/MIN/1.73
GLUCOSE SERPL-MCNC: 87 MG/DL (ref 65–99)
POTASSIUM SERPL-SCNC: 3.5 MMOL/L (ref 3.5–5.2)
SODIUM SERPL-SCNC: 145 MMOL/L (ref 136–145)

## 2023-12-22 PROCEDURE — 80048 BASIC METABOLIC PNL TOTAL CA: CPT | Performed by: NURSE PRACTITIONER

## 2023-12-22 PROCEDURE — 94618 PULMONARY STRESS TESTING: CPT

## 2023-12-22 PROCEDURE — 99238 HOSP IP/OBS DSCHRG MGMT 30/<: CPT | Performed by: NURSE PRACTITIONER

## 2023-12-22 PROCEDURE — G0378 HOSPITAL OBSERVATION PER HR: HCPCS

## 2023-12-22 RX ADMIN — SACUBITRIL AND VALSARTAN 1 TABLET: 24; 26 TABLET, FILM COATED ORAL at 08:25

## 2023-12-22 RX ADMIN — HYDROCODONE BITARTRATE AND ACETAMINOPHEN 1 TABLET: 7.5; 325 TABLET ORAL at 09:11

## 2023-12-22 RX ADMIN — Medication 10 ML: at 08:24

## 2023-12-22 RX ADMIN — CARVEDILOL 25 MG: 25 TABLET, FILM COATED ORAL at 08:25

## 2023-12-22 RX ADMIN — ASPIRIN 81 MG: 81 TABLET, COATED ORAL at 08:25

## 2023-12-22 RX ADMIN — PANTOPRAZOLE SODIUM 40 MG: 40 TABLET, DELAYED RELEASE ORAL at 05:16

## 2023-12-22 NOTE — CASE MANAGEMENT/SOCIAL WORK
Met with pt to confirm dcp of returning home with HH services. She would like provider that spouse uses, unable to reach dtr or spouse by phone. Will try later.    Accepted bt [facility]. Updated daughter Anel's phone in [employer] as .No dc concerns.

## 2023-12-22 NOTE — CASE MANAGEMENT/SOCIAL WORK
Case Management Discharge Note                Selected Continued Care - Discharged on 12/22/2023 Admission date: 12/20/2023 - Discharge disposition: Home-Health Care Svc      Destination    No services have been selected for the patient.                Durable Medical Equipment    No services have been selected for the patient.                Dialysis/Infusion    No services have been selected for the patient.                Home Medical Care Coordination complete.      Service Provider Selected Services Address Phone Fax Patient Preferred    [facility] [address] [facility] [address] [phone] --              Therapy    No services have been selected for the patient.                Community Resources    No services have been selected for the patient.                Community & DME    No services have been selected for the patient.                    Transportation Services  Private: Car    Final Discharge Disposition Code: 06 - home with home health care

## 2023-12-22 NOTE — DISCHARGE INSTRUCTIONS
You are being discharged home.    Stop taking Amlodipine.  Continue with Coreg as prescribed.  Take Entresto as prescribed.    Follow up with PCP next week for recheck.    Follow up with Cardiology as scheduled.      Return to the hospital with any increased shortness of breath or oxygen use, chest pain, or palpitations.      Home health has been ordered.

## 2023-12-22 NOTE — PROGRESS NOTES
Exercise Oximetry    Patient Name:Lesley Calero   MRN: [id number]   Date: 12/22/23             ROOM AIR BASELINE   SpO2% 91   Heart Rate 76   Blood Pressure      EXERCISE ON ROOM AIR SpO2% EXERCISE ON O2 @3 LPM SpO2%   1 MINUTE 87 1 MINUTE 2 90   2 MINUTES  2 MINUTES 2 89   3 MINUTES  3 MINUTES 3 97   4 MINUTES  4 MINUTES 97   5 MINUTES  5 MINUTES 96   6 MINUTES  6 MINUTES 94              Distance Walked  10ft Distance Walked 100ft   Dyspnea (Alexis Scale)   Dyspnea (Alexis Scale) 2   Fatigue (Alexis Scale)   Fatigue (Alexis Scale)3   SpO2% Post Exercise   SpO2% Post Exercise   HR Post Exercise   HR Post Exercise 88   Time to Recovery   Time to Recovery     Comments: pt requires 3LPM NC on Exertion.

## 2023-12-22 NOTE — DISCHARGE SUMMARY
[facility] HOSPITALIST   DISCHARGE SUMMARY      Name:  Lesley Calero   Age:  83 y.o.  Sex:  female  :  1940  MRN:  [id number]   Visit Number:  [id number]    Admission Date:  2023  Date of Discharge:  2023  Primary Care Physician:  Nj Smith MD    Important issues to note:    Admitted to the hospital for heart failure exacerbation and noted to have elevated troponin in plateau fashion.  Diuresed with IV Lasix.  Cardiology consulted for recommendations as this was the 2nd admission in less than a month.  Dr. Nguyen stopped Amlodipine and started patient on Entresto recommending to continue Coreg.  Recommended follow up with [facility] at discharge.  Evaluated by therapy and noted that home health would be beneficial.    Stable for discharge home today.  Maintaining saturations on baseline oxygen.  Home health ordered.  Will continue with medication changes as above.  Follow up with Dr. Nguyen in 6-8-weeks.  Follow up with PCP in 1-2 weeks for recheck.  Return to the hospital with any increased oxygen use or shortness of breath, chest pain, or palpitations.    Discharge Diagnoses:     Acute exacerbation HFpEF, POA  Elevated troponins likely demand in setting of heart failure exacerbation  Hypertension  Hyperlipidemia  CAD  History of Afib S/P pacemaker placement  COPD on 3L at baseline  Debility    Problem List:     Active Hospital Problems    Diagnosis  POA    **Acute exacerbation of CHF (congestive heart failure) [I50.9]  Yes    Acute on chronic heart failure with preserved ejection fraction (HFpEF) [I50.33]  Yes    Heart failure, unspecified [I50.9]  Yes    COPD (chronic obstructive pulmonary disease) [J44.9]  Yes    Essential hypertension [I10]  Yes    Status post placement of cardiac pacemaker [Z95.0]  Yes      Resolved Hospital Problems   No resolved problems to display.     Presenting Problem:    Chief Complaint   Patient presents with    Shortness of  Breath      Consults:     Consulting Physician(s)                     None              History of presenting illness/Hospital Course:    Patient is an 83 year old female who arrives to the hospital 12/20/2023 with complaints of shortness of breath which has worsened over the past couple week, but significantly overnight.  Stated that her cough is at baseline. Stated that she noticed more trouble ambulating to bedside commode which is by her bed in the past 2 nights.  Has been having to turn oxygen up to 4L.  Recent hospitalization for heart failure exacerbation, diuresed and sent home to follow up with Cardiologist (Perry) in [place].  Patient with past health history significant for hypertension, COPD on 3L at baseline, hyperlipidemia, CAD, HFpEF, and atrial fibrillation s/p pacemaker placement. Would like to establish care with cardiology in [place] if possible.  Lives with daughter and  and uses cane at baseline.     Work up in the emergency department noted patient to be afebrile and hemodynamically stable.  Labs notable for elevated troponins-96/81 with delta change -15 likely demand ischemia in the setting of heart failure exacerbation, BNP-7638, CMP noted CO2-35.5, BUN-30.  Lactate 0.7.  CBC noted WBC-6, H/H-11/36, platelets-163.  Urinalysis negative for infection.  Covid and influenza testing negative.  CXR noted cardiomegaly, vascular engorgement, probable mild interstitial edema, and chronic bronchitis.  Patient was given lasix 80mg IV, duo-neb, and solu-medrol 125mg IV.  Oxygen saturations noted to drop into 70s with ambulation on home oxygen at 3L.  Hospitalist was contacted for admission.    Admitted to the hospital for further care.  Diuresed well.  Cardiology consulted for recommendations as this was the 2nd admission in less than a month.  Dr. Nguyen stopped Amlodipine and started patient on Entresto recommending to continue Coreg.  Recommended follow up with [facility] at  discharge.  Evaluated by therapy and noted that home health would be beneficial.      Stable for discharge home today.  Maintaining saturations on baseline oxygen.  Home health ordered.  Will continue with medication changes as above.  Follow up with Dr. Nguyen in 6-8-weeks.  Follow up with PCP in 1-2 weeks for recheck.  Return to the hospital with any increased oxygen use or shortness of breath, chest pain, or palpitations.    Vital Signs:    Temp:  [97.3 °F (36.3 °C)-98.3 °F (36.8 °C)] 98.1 °F (36.7 °C)  Heart Rate:  [70-83] 73  Resp:  [18] 18  BP: (105-146)/(48-66) 143/66    Physical Exam:    General Appearance:  Alert and cooperative, pleasant elderly female, no acute distress on exam, appears frail and chronically ill   Head:  Atraumatic and normocephalic.   Eyes: Conjunctivae and sclerae normal, no icterus. No pallor.   Ears:  Ears with no abnormalities noted.   Throat: No oral lesions, no thrush, oral mucosa moist.   Neck: Supple, trachea midline   Back:   No kyphoscoliosis present. No tenderness to palpation.   Lungs:   Breath sounds heard bilaterally equally.  No crackles or wheezing. Coarse breath sounds.  Unlabored on baseline 3L   Heart:  Normal S1 and S2, no murmur, no gallop, no rub. No JVD.   Abdomen:   Normal bowel sounds, no masses, no organomegaly. Soft, nontender, nondistended, no rebound tenderness.   Extremities: Supple, no edema, no cyanosis, no clubbing.   Pulses: Pulses palpable bilaterally.   Skin: No bleeding or rash.   Neurologic: Alert and oriented x 3. No facial asymmetry. Moves all four limbs. No tremors.  Generalized weakness is present.     Pertinent Lab Results:     Results from last 7 days   Lab Units 12/22/23  0533 12/21/23  0604 12/20/23  0906   SODIUM mmol/L 145 141 141   POTASSIUM mmol/L 3.5 3.8 3.8   CHLORIDE mmol/L 100 96* 98   CO2 mmol/L 38.3* 35.3* 35.5*   BUN mg/dL 29* 36* 30*   CREATININE mg/dL 0.77 1.05* 0.87   CALCIUM mg/dL 9.1 9.1 9.4   BILIRUBIN mg/dL  --  0.4 0.5    ALK PHOS U/L  --  84 88   ALT (SGPT) U/L  --  9 12   AST (SGOT) U/L  --  27 38*   GLUCOSE mg/dL 87 122* 99     Results from last 7 days   Lab Units 12/21/23  0604 12/20/23  0906   WBC 10*3/mm3 5.16 6.96   HEMOGLOBIN g/dL 11.0* 11.4*   HEMATOCRIT % 34.9 36.5   PLATELETS 10*3/mm3 181 163         Results from last 7 days   Lab Units 12/20/23  1717 12/20/23  1146 12/20/23  0906   HSTROP T ng/L 76* 81* 96*     Results from last 7 days   Lab Units 12/20/23  0906   PROBNP pg/mL 7,638.0*                       Pertinent Radiology Results:    Imaging Results (All)       Procedure Component Value Units Date/Time    XR Chest 1 View [[id number]] Collected: 12/20/23 0851     Updated: 12/20/23 0855    Narrative:      CLINICAL INDICATION:    CHF/COPD Protocol shortness of breath.     EXAMINATION TECHNIQUE:  XR CHEST 1 VW-     COMPARISON:  Radiographs 11/29/2023.     FINDINGS:  Right chest wall subclavian approach right atrial biventricular cardiac  pacer in place. Mild cardiomegaly. Aortic atherosclerosis. Enlarged  pulmonary artery, suggestive of pulmonary arterial hypertension. Mild  bronchial wall thickening. Vascular engorgement, cephalization. No  pneumothorax. No large pleural effusions. Probable trace effusions.       Impression:      Cardiomegaly. Vascular engorgement. Probable mild interstitial edema.  Chronic bronchitis.           Images personally reviewed, interpreted and dictated by JIGAR Jin.              This report was signed and finalized on 12/20/2023 8:53 AM by JIGAR Jin.               Echo:    Results for orders placed during the hospital encounter of 11/29/23    Adult Transthoracic Echo Complete w/ Color, Spectral and Contrast if necessary per protocol    Interpretation Summary  1.  Normal left ventricular size and systolic function, LVEF 60-65%.  2.  Mild concentric LVH.  3.  Grade 2 diastolic dysfunction.  4.  Mild right ventricular dilation with normal RV systolic function.  5.   Severely increased left atrial volume index.  6.  Severe calcification of the aortic valve without significant stenosis.  7.  Moderate tricuspid regurgitation.  8.  Moderate pulmonary hypertension, RVSP 52 mmHg.    Condition on Discharge:      Stable.    Code status during the hospital stay:    Code Status and Medical Interventions:   Ordered at: 12/20/23 1934     Code Status (Patient has no pulse and is not breathing):    CPR (Attempt to Resuscitate)     Medical Interventions (Patient has pulse or is breathing):    Full Support     Discharge Disposition:    Home    Discharge Medications:       Discharge Medications        New Medications        Instructions Start Date   sacubitril-valsartan 24-26 MG tablet  Commonly known as: ENTRESTO   1 tablet, Oral, Every 12 Hours Scheduled             Continue These Medications        Instructions Start Date   aspirin 81 MG EC tablet   81 mg, Oral, Daily      carvedilol 25 MG tablet  Commonly known as: COREG   25 mg, Oral, 2 Times Daily With Meals      furosemide 40 MG tablet  Commonly known as: LASIX   Take 1 tablet by mouth Daily for 3 days, THEN 0.5 tablets Daily for 30 days.   Start Date: December 1, 2023     gabapentin 800 MG tablet  Commonly known as: NEURONTIN   800 mg, Oral, 3 Times Daily      HYDROcodone-acetaminophen 7.5-325 MG per tablet  Commonly known as: NORCO   1 tablet, Oral      ipratropium-albuterol 0.5-2.5 mg/3 ml nebulizer  Commonly known as: DUO-NEB   3 mL, Nebulization, Every 4 Hours PRN      nitroglycerin 0.4 MG SL tablet  Commonly known as: NITROSTAT   0.4 mg, Sublingual, Every 5 Minutes PRN, Take no more than 3 doses in 15 minutes.       omeprazole 40 MG capsule  Commonly known as: priLOSEC   40 mg, Oral, Daily      ondansetron ODT 4 MG disintegrating tablet  Commonly known as: ZOFRAN-ODT   4 mg, Translingual, Every 6 Hours PRN      pravastatin 40 MG tablet  Commonly known as: PRAVACHOL   80 mg, Oral, Daily      simethicone 80 MG chewable  tablet  Commonly known as: MYLICON   80 mg, Oral, Every 6 Hours PRN             Stop These Medications      amLODIPine 5 MG tablet  Commonly known as: NORVASC            Discharge Diet:     Diet Instructions       Diet: Cardiac Diets; Healthy Heart (2-3 Na+); Regular Texture (IDDSI 7); Thin (IDDSI 0)      Discharge Diet: Cardiac Diets    Cardiac Diet: Healthy Heart (2-3 Na+)    Texture: Regular Texture (IDDSI 7)    Fluid Consistency: Thin (IDDSI 0)          Activity at Discharge:     Activity Instructions       Activity as Tolerated      Gradually Increase Activity Until at Pre-Hospitalization Level            Follow-up Appointments:    Additional Instructions for the Follow-ups that You Need to Schedule       Ambulatory Referral to Home Health (Hospital)   As directed      Face to Face Visit Date: 12/22/2023   Follow-up provider for Plan of Care?: I treated the patient in an acute care facility and will not continue treatment after discharge.   Follow-up provider: NJ SMITH [[id number]]   Reason/Clinical Findings: debility   Describe mobility limitations that make leaving home difficult: debility   Nursing/Therapeutic Services Requested: Skilled Nursing Physical Therapy Occupational Therapy   Skilled nursing orders: Medication education CHF management O2 instruction COPD management Cardiopulmonary assessments   PT orders: Therapeutic exercise Gait Training Transfer training Strengthening Home safety assessment   Weight Bearing Status: As Tolerated   Occupational orders: Activities of daily living Energy conservation Strengthening Cognition Fine motor Home safety assessment   Frequency: 1 Week 1               Follow-up Information       Nj Smith MD Follow up.    Specialty: Family Medicine  Why: Needs f/u scheduled for next week for recheck  Contact information:  [address] [place] [address] [address] [place] [address] [phone]               José Manuel Nguyen MD Follow up.    Specialties: Cardiology, Interventional  Radiology  Why: Please schedule first available appt, probably in Feb  Contact information:  [address] [facility] [address]  [address] [address] [phone]                           No future appointments.           Maria Elena Bowman, APRN  12/22/23  10:43 EST    Time: I spent 25 minutes on this discharge activity which included: face-to-face encounter with the patient, reviewing the data in the system, coordination of the care with the nursing staff as well as consultants, documentation, and entering orders.     Dictated utilizing Dragon dictation.

## 2023-12-22 NOTE — PLAN OF CARE
Goal Outcome Evaluation:  Plan of Care Reviewed With: patient        Progress: no change  Outcome Evaluation: PT LYING IN BED RESTING COMFORTABLY THIS SHIFT. PT HAS BEEN AMBULATING WITH STAND BY ASSIST TO BEDSIDE COMMODE BUT HAS HAD FREQUENT, SMALL AMOUNTS OF URINE OUTPUT. VSS. WILL CONTINUE TO MONITOR.

## 2023-12-23 NOTE — OUTREACH NOTE
Prep Survey      Flowsheet Row Responses   [facility] facility patient discharged from? [facility]   Is LACE score < 7 ? No   Eligibility Readm Mgmt   Discharge diagnosis A/C CHF   Does the patient have one of the following disease processes/diagnoses(primary or secondary)? CHF   Does the patient have Home health ordered? Yes   What is the Home health agency?  [facility]   Is there a DME ordered? No   Prep survey completed? Yes            Linnea SILVERIO - Registered Nurse

## 2023-12-25 ENCOUNTER — HOME CARE VISIT (OUTPATIENT)
Dept: HOME HEALTH SERVICES | Facility: HOME HEALTHCARE | Age: 83
End: 2023-12-25
Payer: MEDICARE

## 2023-12-27 ENCOUNTER — READMISSION MANAGEMENT (OUTPATIENT)
Dept: CALL CENTER | Facility: HOSPITAL | Age: 83
End: 2023-12-27
Payer: MEDICARE

## 2023-12-27 NOTE — OUTREACH NOTE
CHF Week 1 Survey      Flowsheet Row Responses   [facility] facility patient discharged from? [facility]   Does the patient have one of the following disease processes/diagnoses(primary or secondary)? CHF   CHF Week 1 attempt successful? No   Unsuccessful attempts Attempt 1            Nay FAJARDO - Registered Nurse

## 2024-01-01 ENCOUNTER — HOME CARE VISIT (OUTPATIENT)
Dept: HOME HEALTH SERVICES | Facility: HOME HEALTHCARE | Age: 84
End: 2024-01-01

## 2024-01-02 ENCOUNTER — HOME CARE VISIT (OUTPATIENT)
Dept: HOME HEALTH SERVICES | Facility: HOME HEALTHCARE | Age: 84
End: 2024-01-02

## 2024-01-02 ENCOUNTER — TELEPHONE (OUTPATIENT)
Dept: CARDIOLOGY | Facility: CLINIC | Age: 84
End: 2024-01-02

## 2024-01-02 ENCOUNTER — HOME CARE VISIT (OUTPATIENT)
Dept: HOME HEALTH SERVICES | Facility: HOME HEALTHCARE | Age: 84
End: 2024-01-02
Payer: MEDICARE

## 2024-01-02 NOTE — TELEPHONE ENCOUNTER
Caller: Lesley Calero    Relationship: Self    Best call back number: [phone]    Which medication are you concerned about: ENTRESTO    Who prescribed you this medication: HOSPITAL    When did you start taking this medication: RECENT    What are your concerns: THE PRICE. PATIENT IS VERY CONCERNED ABOUT THE COST OF THE ENTRESTO AND WHETHER OR NOT SHE CAN AFFORD IT. PLEASE CALL HER TO DISCUSS.     How long have you had these concerns: SHE JUST FOUND OUT.

## 2024-01-03 ENCOUNTER — READMISSION MANAGEMENT (OUTPATIENT)
Dept: CALL CENTER | Facility: HOSPITAL | Age: 84
End: 2024-01-03
Payer: MEDICARE

## 2024-01-03 NOTE — OUTREACH NOTE
CHF Week 2 Survey      Flowsheet Row Responses   [facility] patient discharged from? [facility]   Does the patient have one of the following disease processes/diagnoses(primary or secondary)? CHF   Week 2 attempt successful? Yes   Call start time 1609   Call end time 1624   Discharge diagnosis A/C CHF   Is patient permission given to speak with other caregiver? Yes   List who call center can speak with KEYA BRYANT Daughter   Person spoke with today (if not patient) and relationship daughter Keya and patient   Meds reviewed with patient/caregiver? Yes  [New Entresto]   Does the patient have all medications ordered at discharge? Yes   Is the patient taking all medications as directed (includes completed medication regime)? Yes   Medication comments Patient with concerns over how to get the entresto when the 30day supply from hospital runs out. Her cardiology appt is not until 2/20 and she will be out before then. She has financial concerns regarding filling this meds and asking about savings cards or samples. She reports that she has left messages but no returned call. This nurse contacted cardiology office and left message on JUDY Talley's voicemail regarding medication concerns and request to call patient back.   Does the patient have a primary care provider?  Yes   Does the patient have an appointment with their PCP within 7 days of discharge? Greater than 7 days   Comments regarding PCP Follow up with Nj Smith PCP. Patient has appt in place   Nursing Interventions Verified appointment date/time/provider   Has the patient kept scheduled appointments due by today? N/A   Comments Cardiology appt 2/20   What is the Home health agency?  [facility]   DME comments using home nebs and O2 @ 3L   Pulse Ox monitoring Intermittent   Pulse Ox device source Patient   Psychosocial issues? No   Did the patient receive a copy of their discharge instructions? Yes   Nursing interventions Reviewed instructions with patient    What is the patient's perception of their health status since discharge? Improving  [Patient reports that she is having some really good days and thinks the new medicine is making a difference]   Is the patient able to teach back signs and symptoms of worsening condition? (i.e. weight gain, shortness of air, etc.) Yes   If the patient is a current smoker, are they able to teach back resources for cessation? Not a smoker   Is the patient/caregiver able to teach back the hierarchy of who to call/visit for symptoms/problems? PCP, Specialist, Home health nurse, Urgent Care, ED, 911 Yes   Is the patient able to teach back Heart Failure Zones? --  [Unable to review zones today. Focus on medications this call.]   CHF Week 2 call completed? Yes   Is the patient interested in additional calls from an ambulatory ? No   Would this patient benefit from a Referral to Amb Social Work? No   Call end time [phone]            MILLY CALLAHAN - Registered Nurse

## 2024-01-03 NOTE — TELEPHONE ENCOUNTER
Attempted to contact patient to let her know we can help her apply for patient assistance or try and supply her with samples for even try a PA to get the cost lower. No answer, unable to leave voicemail

## 2024-01-12 NOTE — TELEPHONE ENCOUNTER
Acute Care - Occupational Therapy Treatment Note  [facility]     Patient Name: Lukasz Savage  : 1968  MRN: [id number]  Today's Date: 2019  Onset of Illness/Injury or Date of Surgery: 19  Date of Referral to OT: 19  Referring Physician: Dr. Webb    Admit Date: 2019       ICD-10-CM ICD-9-CM   1. Status epilepticus (CMS/HCC) G40.901 345.3   2. 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 Dysphagia, unspecified type R13.10 787.20   4. Impaired mobility Z74.09 799.89   5. Impaired mobility and ADLs Z74.09 799.89     Patient Active Problem List   Diagnosis   • Hyperlipidemia   • HTN (hypertension)   • Gout   • Anxiety   • Arthritis   • Erectile dysfunction   • Angio-edema   • Seizures (CMS/HCC)   • HCAP (healthcare-associated pneumonia)   • MSSA (methicillin susceptible Staphylococcus aureus) infection   • Foot deformity   • Chronic gout of right foot   • Peripheral edema   • S/P shoulder surgery   • Claudication (CMS/HCC)   • Cigarette smoker   • BMI 30.0-30.9,adult   • BMI 33.0-33.9,adult   • Astrocytoma brain tumor (CMS/HCC)   • Encounter for consultation   • Benign neoplasm of brain (CMS/HCC)   • Status epilepticus (CMS/HCC)   • Hyperkalemia   • Acute kidney injury (CMS/HCC) on chronic kidney disease stage 3   • Leukocytosis   • Anemia     Past Medical History:   Diagnosis Date   • Anemia 2019   • Angio-edema 7/3/2017   • Anxiety    • Arthritis    • Cancer (CMS/HCC) 2019    Brain cancer diagnoised yesterday  Dr Trevino    • Clostridium difficile colitis    • Erectile dysfunction 10/6/2016   • GERD (gastroesophageal reflux disease)    • Gout    • HCAP (healthcare-associated pneumonia) 2017   • Hyperlipidemia    • Malignant hypertension    • MSSA (methicillin susceptible Staphylococcus aureus) infection 2017   • Obstructive sleep apnea    • Seizures (CMS/HCC) 2017     Past Surgical History:   Procedure Laterality Date   • COLONOSCOPY     • CRANIOTOMY  Pts daughter brought in paperwork. Waiting on provider signature   Right 4/15/2019    Procedure: CRANIOTOMY WITH BIOPSY RIGHT;  Surgeon: Dillon Trevino MD;  Location: [facility] OR;  Service: Neurosurgery   • SHOULDER ARTHROSCOPY Left    • TRACHEOSTOMY N/A 7/12/2017    Procedure: TRACHEOSTOMY WITH TRACHEOSCOPY;  Surgeon: Jairon Pierson MD;  Location: [facility] OR;  Service:        Therapy Treatment    Rehabilitation Treatment Summary     Row Name 06/18/19 1326 06/18/19 1317 06/18/19 0824       Treatment Time/Intention    Discipline  speech language pathologist  -BN  occupational therapist  -JJ  physical therapy assistant  -SUKHDEEP    Document Type  therapy note (daily note)  -BN  therapy note (daily note)  -JJ  therapy note (daily note)  -SUKHDEEP    Subjective Information  no complaints  -BN  no complaints  -JJ  no complaints  -JP2    Mode of Treatment  individual therapy;speech-language pathology  -BN  occupational therapy  -J  --    Patient/Family Observations  pt family present  -BN  family present in room   -JJ  --    Therapy Frequency (OT Eval)  --  daily  -JJ  --    Patient Effort  good  -BN  good  -JJ  --    Existing Precautions/Restrictions  --  fall  -JJ  fall  -SUKHDEEP    Treatment Considerations/Comments  --  --  can be slightly confused at times  -JP3    Recorded by [BN] Harriet Gilbert CCC-SLP 06/18/19 1342 [JJ] Sri Gamble OTR/SRINIVAS 06/18/19 1353 [SUKHDEEP] Britt Fuller, PTA 06/18/19 0825  [JP2] Britt Fuller, PTA 06/18/19 0913  [JP3] Britt Fuller, PTA 06/18/19 0945    Row Name 06/18/19 0824             Bed Mobility Assessment/Treatment    Scooting/Bridging [place] (Bed Mobility)  supervision;verbal cues  -SUKHDEEP      Supine-Sit [place] (Bed Mobility)  supervision  -SUKHDEEP      Sit-Supine [place] (Bed Mobility)  supervision  -SUKHDEEP      Assistive Device (Bed Mobility)  head of bed elevated  -SUKHDEEP      Recorded by [SUKHDEEP] Britt Fuller, PTA 06/18/19 0913      Row Name 06/18/19 0824             Sit-Stand Transfer    Sit-Stand [place] (Transfers)  verbal  cues;stand by assist  -SUKHDEEP      Recorded by [SUKHDEEP] Britt Fuller, PTA 06/18/19 0913      Row Name 06/18/19 0824             Stand-Sit Transfer    Stand-Sit [place] (Transfers)  supervision;verbal cues  -SUKHDEEP      Recorded by [SUKHDEEP] Britt Fuller, PTA 06/18/19 0913      Row Name 06/18/19 0824             Gait/Stairs Assessment/Training    [place] Level (Gait)  contact guard  -SUKHDEEP      Distance in Feet (Gait)  200  -SUKHDEEP      Deviations/Abnormal Patterns (Gait)  gait speed decreased  -SUKHDEEP      Recorded by [SUKHDEEP] Britt Fuller, PTA 06/18/19 0914      Row Name 06/18/19 1317             ADL Assessment/Intervention    BADL Assessment/Intervention  feeding  -JJ      Recorded by [JJ] Sri Gamble OTR/L 06/18/19 1353      Row Name 06/18/19 1317             Self-Feeding Assessment/Training    [place] Level (Feeding)  liquids to mouth;set up;scoop food and bring to mouth;verbal cues;prepare tray/open items;minimum assist (75% patient effort)  -JJ      Self-Feeding Assess/Train, Spillage Amount  moderate  -JJ      Position (Self-Feeding)  supported sitting  -JJ      Comment (Feeding)  Required verbal cues for use of L hand with feeding skills.   -JJ      Recorded by [JJ] Sri Gamble OTR/L 06/18/19 1353      Row Name 06/18/19 1317             BADL Safety/Performance    Impairments, BADL Safety/Performance  balance;cognition;endurance/activity tolerance;motor planning;trunk/postural control  -JJ      Skilled BADL Treatment/Intervention  BADL process/adaptation training;adaptive equipment training  -JJ      Recorded by [JJEB] Sri Gamble OTR/L 06/18/19 1353      Row Name 06/18/19 0824             Therapeutic Exercise    Lower Extremity Range of Motion (Therapeutic Exercise)  hip flexion/extension, bilateral;hip abduction/adduction, bilateral;knee flexion/extension, bilateral;ankle dorsiflexion/plantar flexion, bilateral  -SUKHDEEP      Exercise Type (Therapeutic Exercise)  AROM (active range of motion)   -SUKHDEEP      Position (Therapeutic Exercise)  seated;standing  -SUKHDEEP      Sets/Reps (Therapeutic Exercise)  20  -SUKHDEEP      Recorded by [SUKHDEEP] Britt Fuller, PTA 06/18/19 0917      Row Name 06/18/19 0824             Standing Balance Activity    Comment (Standing, Balance Training)  side steps, backwards,braiding, one leg stance.cga to min assist  -SUKHDEEP      Recorded by [SUKHDEEP] Britt Fuller, PTA 06/18/19 0917      Row Name 06/18/19 1317 06/18/19 0824          Positioning and Restraints    Pre-Treatment Position  in bed  -JJ  in bed  -SUKHDEEP     Post Treatment Position  bed  -JJ  bed  -SUKHDEEP     In Bed  notified nsg;fowlers;call light within reach;encouraged to call for assist;with family/caregiver;side rails up x2  -JJ  notified nsg;fowlers;call light within reach;encouraged to call for assist;exit alarm on;with family/caregiver  -SUKHDEEP     Recorded by [JJ] Sri Gamble OTR/SRINIVAS 06/18/19 1353 [SUKHDEEP] Britt Fuller, PTA 06/18/19 0917     Row Name 06/18/19 1326 06/18/19 1317          Pain Assessment    Additional Documentation  Pain Scale: FACES Pre/Post-Treatment (Group)  -  Pain Scale: FACES Pre/Post-Treatment (Group)  -J     Recorded by [BN] Harriet Gilbert CCC-SLP 06/18/19 1342 [JJ] Sri Gamble OTR/L 06/18/19 1353     Row Name 06/18/19 0824             Pain Scale: Numbers Pre/Post-Treatment    Pain Scale: Numbers, Pretreatment  0/10 - no pain  -SUKHDEEP      Recorded by [SUKHDEEP] Britt Fuller, PTA 06/18/19 0917      Row Name 06/18/19 1326 06/18/19 1317          Pain Scale: FACES Pre/Post-Treatment    Pain: FACES Scale, Pretreatment  0-->no hurt  -BN  0-->no hurt  -JJ     Pain: FACES Scale, Post-Treatment  0-->no hurt  -BN  0-->no hurt  -JJ     Recorded by [BN] Harriet Gilbert CCC-SLP 06/18/19 1342 [JJ] Sri Gamble OTR/L 06/18/19 1353     Row Name 06/18/19 1317             Coping    Verbalized Emotional State  acceptance  -JJ      Recorded by [JJ] Sri Gamble OTR/L 06/18/19 4477      Row  Name 06/18/19 1317             Plan of Care Review    Plan of Care Reviewed With  patient  -JJ      Recorded by [JJ] Sri Gamble OTR/L 06/18/19 1353      Row Name 06/18/19 1317             Outcome Summary/Treatment Plan (OT)    Daily Summary of Progress (OT)  progress toward functional goals is good  -JJ      Anticipated Discharge Disposition (OT)  home with assist;home with home health;home with OP services  -JJ      Recorded by [JJ] Sri Gamble OTR/L 06/18/19 1353      Row Name 06/18/19 1326             Outcome Summary/Treatment Plan (SLP)    Daily Summary of Progress (SLP)  progress toward functional goals is good  -BN      Barriers to Overall Progress (SLP)  n/a  -BN      Plan for Continued Treatment (SLP)  continue to follow  -BN      Anticipated Dischage Disposition  unknown  -BN      Recorded by [BN] Harriet Gilbert CCC-SLP 06/18/19 1342        User Key  (r) = Recorded By, (t) = Taken By, (c) = Cosigned By    Initials Name Effective Dates Discipline    SUKHDEEP Britt Fuller, PTA 08/02/16 -  PT    BN Harriet Gilbert CCC-SLP 04/03/18 -  SLP    Sri Newby OTR/L 10/12/18 -  OT           Rehab Goal Summary     Row Name 06/18/19 1326             Swallow Goals (SLP)    Oral Nutrition/Hydration Goal Selection (SLP)  oral nutrition/hydration, SLP goal 1  -BN      Labial Strengthening Goal Selection (SLP)  labial strengthening, SLP goal 1  -BN      Lingual Strengthening Goal Selection (SLP)  lingual strengthening, SLP goal 1  -BN      Additional Documentation  labial strengthening goal selection (SLP);lingual strengthening goal selection (SLP)  -BN         Oral Nutrition/Hydration Goal 1 (SLP)    Oral Nutrition/Hydration Goal 1, SLP  Pt will tolerate LRD without overt s/s of aspiration.  -BN      Time Frame (Oral Nutrition/Hydration Goal 1, SLP)  by discharge  -BN      Barriers (Oral Nutrition/Hydration Goal 1, SLP)  Cognition   -BN      Progress/Outcomes (Oral  Nutrition/Hydration Goal 1, SLP)  continuing progress toward goal  -BN         Labial Strengthening Goal 1 (SLP)    Activity (Labial Strengthening Goal 1, SLP)  increase labial tone  -BN      Increase Labial Tone  labial resistance exercises  -BN      [place]/Accuracy (Labial Strengthening Goal 1, SLP)  independently (over 90% accuracy)  -BN      Time Frame (Labial Strengthening Goal 1, SLP)  by discharge  -BN      Barriers (Labial Strengthening Goal 1, SLP)  Cognition  -BN      Progress/Outcomes (Labial Strengthening Goal 1, SLP)  goal ongoing  -BN         Lingual Strengthening Goal 1 (SLP)    Activity (Lingual Strengthening Goal 1, SLP)  increase lingual tone/sensation/control/coordination/movement  -BN      Increase Lingual Tone/Sensation/Control/Coordination/Movement  lingual movement exercises  -BN      [place]/Accuracy (Lingual Strengthening Goal 1, SLP)  independently (over 90% accuracy)  -BN      Time Frame (Lingual Strengthening Goal 1, SLP)  by discharge  -BN      Barriers (Lingual Strengthening Goal 1, SLP)  Cognition  -BN      Progress/Outcomes (Lingual Strengthening Goal 1, SLP)  goal ongoing  -BN        User Key  (r) = Recorded By, (t) = Taken By, (c) = Cosigned By    Initials Name Provider Type Discipline    Harriet Huston, CCC-SLP Speech and Language Pathologist SLP        Occupational Therapy Education     Title: PT OT SLP Therapies (Not Started)     Topic: Occupational Therapy (In Progress)     Point: ADL training (Done)     Description: Instruct learner(s) on proper safety adaptation and remediation techniques during self care or transfers.   Instruct in proper use of assistive devices.    Learning Progress Summary           Patient Acceptance, E, VU by BERNARDO at 6/18/2019  1:55 PM    Comment:  Incorportating LUE in self-care tasks, adaptive equipment use.    Acceptance, E, VU,NR by MM at 6/17/2019 11:30 AM    Comment:  OT role, benefits, POC, d/c planning.   Family Acceptance, E,  VU by BERNARDO at 6/18/2019  1:55 PM    Comment:  Incorportating LUE in self-care tasks, adaptive equipment use.    Acceptance, E, VU,NR by SARI at 6/17/2019 11:30 AM    Comment:  OT role, benefits, POC, d/c planning.                   Point: Home exercise program (Done)     Description: Instruct learner(s) on appropriate technique for monitoring, assisting and/or progressing therapeutic exercises/activities.    Learning Progress Summary           Patient Acceptance, E, VU by BERNARDO at 6/18/2019  1:55 PM    Comment:  Incorportating LUE in self-care tasks, adaptive equipment use.   Family Acceptance, E, VU by BERNARDO at 6/18/2019  1:55 PM    Comment:  Incorportating LUE in self-care tasks, adaptive equipment use.                   Point: Precautions (Done)     Description: Instruct learner(s) on prescribed precautions during self-care and functional transfers.    Learning Progress Summary           Patient Acceptance, E, VU,NR by SARI at 6/17/2019 11:30 AM    Comment:  OT role, benefits, POC, d/c planning.   Family Acceptance, E, VU,NR by SARI at 6/17/2019 11:30 AM    Comment:  OT role, benefits, POC, d/c planning.                               User Key     Initials Effective Dates Name Provider Type Discipline     04/03/18 -  Aaron Barron, OTR/L Occupational Therapist OT     10/12/18 -  Sri Gamble OTR/L Occupational Therapist OT                OT Recommendation and Plan  Outcome Summary/Treatment Plan (OT)  Daily Summary of Progress (OT): progress toward functional goals is good  Anticipated Discharge Disposition (OT): home with assist, home with home health, home with OP services  Therapy Frequency (OT Eval): daily  Daily Summary of Progress (OT): progress toward functional goals is good  Plan of Care Review  Plan of Care Reviewed With: patient  Plan of Care Reviewed With: patient  Outcome Summary: OT tx completed. Pt completed self-feeding task for increased coordination and motor planning of LUE. Min A opening  packages. Set-up and verbal cues for fork and spoon use and bringing liquids to mouth, in L hand, Verbal cues for use of LUE in task. Mod spillage noted with self-feeding. Continue OT POC.   Outcome Measures     Row Name 06/17/19 1100 06/17/19 0915          How much help from another person do you currently need...    Turning from your back to your side while in flat bed without using bedrails?  --  3  -MARJORIE     Moving from lying on back to sitting on the side of a flat bed without bedrails?  --  3  -MARJORIE     Moving to and from a bed to a chair (including a wheelchair)?  --  3  -MARJORIE     Standing up from a chair using your arms (e.g., wheelchair, bedside chair)?  --  3  -MARJORIE     Climbing 3-5 steps with a railing?  --  3  -MARJORIE     To walk in hospital room?  --  3  -MARJORIE     AM-PAC 6 Clicks Score  --  18  -MARJORIE        How much help from another is currently needed...    Putting on and taking off regular lower body clothing?  2  -MM  --     Bathing (including washing, rinsing, and drying)  2  -MM  --     Toileting (which includes using toilet bed pan or urinal)  2  -MM  --     Putting on and taking off regular upper body clothing  2  -MM  --     Taking care of personal grooming (such as brushing teeth)  3  -MM  --     Eating meals  3  -MM  --     Score  14  -MM  --        Functional Assessment    Outcome Measure Options  AM-PAC 6 Clicks Daily Activity (OT)  -MM  AM-PAC 6 Clicks Basic Mobility (PT)  -MARJORIE       User Key  (r) = Recorded By, (t) = Taken By, (c) = Cosigned By    Initials Name Provider Type    MARJORIE Jaime Olsen, PT DPT Physical Therapist    MM Aaron Barron, OTR/L Occupational Therapist           Time Calculation:   Time Calculation- OT     Row Name 06/18/19 1353 06/18/19 0919          Time Calculation- OT    OT Start Time  1312  -JJ  --     OT Stop Time  1351  -JJ  --     OT Time Calculation (min)  39 min  -JJ  --     Total Timed Code Minutes- OT  39 minute(s)  -JJ  --     OT Received On  06/18/19  -JJ  --         Timed Charges    62719 - Gait Training Minutes   --  12  -SUKHDEEP       User Key  (r) = Recorded By, (t) = Taken By, (c) = Cosigned By    Initials Name Provider Type    Britt Zeng, NATALIE Physical Therapy Assistant    Sri Newby OTR/L Occupational Therapist        Therapy Charges for Today     Code Description Service Date Service Provider Modifiers Qty    [id number] HC OT SELF CARE/MGMT/TRAIN EA 15 MIN 6/18/2019 Sri Gamble OTR/L GO 3               FRANTZ Wells/SRINIVAS  6/18/2019

## 2024-01-12 NOTE — TELEPHONE ENCOUNTER
Pt and her daughter called stating that Entresto is going to cost her a lot since she doesn't have Medication coverage. Pt was offered to try Pt assistance and was given 2 weeks worth of samples. Pts daughter will bring in paperwork required for Pt assistance.   Entresto 24/26   LOT: [id number]  EXP: 08/25  1 BOTTLE GIVEN

## 2024-01-12 NOTE — TELEPHONE ENCOUNTER
Thank you for helping her with patient assistance.  She has been admitted for CHF ~ 4 times within the past year in addition to multiple ED visits for exacerbations.  Continue to supplement her as long as we have samples.

## 2024-02-09 ENCOUNTER — TELEPHONE (OUTPATIENT)
Dept: CARDIOLOGY | Facility: CLINIC | Age: 84
End: 2024-02-09
Payer: MEDICARE

## 2024-02-09 NOTE — PROGRESS NOTES
[facility] Cardiology Office Follow Up Note    Lesley Calero  [id number]  2024    Primary Care Provider: Nj Smith MD   Referring Provider: No ref. provider found    Chief Complaint: Hosp ([facility]) F/U CHF    History of Present Illness:   Mrs. Lesley Calero is a 83 y.o. female who presents to the Cardiology Clinic for hospital follow-up of CHF exacerbation.  In , the patient was admitted to [facility] with acute on chronic exacerbation of HFpEF.  The patient has a past medical history of hypertension, hyperlipidemia, coronary artery disease, atrial fibrillation, pacemaker, COPD on continuous O2, and previous tobacco use with complete resolution in .  The patient was hospitalized with acute on chronic HFpEF twice in .  She is accompanied by her daughter and presents today for hospital follow-up.  At the time of her hospital admission, her chest x-ray showed cardiomegaly, vascular engorgement, interstitial edema, and chronic bronchitis.  At her most recent hospitalization, cardiology noted that procedures or invasive testing would be deferred secondary to her overall frailty and age.  The patient reports feeling better since discharge in late December.  She previously followed with Dr. Mancuso at [facility], but wishes to establish cardiology care closer to home secondary to transportation issues.  Today she denies having chest discomfort.  She does report shortness of breath that is consistent with her COPD baseline.  She specifically denies recent weight gain and lower extremity edema.  She denies palpitations, dizziness, and syncope.  She continues to take her medications as prescribed without perceived side effects.  She has not been taking Lasix at home and denies needing any additional doses at this time.  He reports having recently restarted on amlodipine for hypertension.  She offers no other complaints or concerns at this time.    Past Cardiac Testin. Last Coronary  Angio: None  2. Prior Stress Testing: None  3. Last Echo: 11/30/2023   1.  Normal left ventricular size and systolic function, LVEF 60-65%.  2.  Mild concentric LVH.  3.  Grade 2 diastolic dysfunction.  4.  Mild right ventricular dilation with normal RV systolic function.  5.  Severely increased left atrial volume index.  6.  Severe calcification of the aortic valve without significant stenosis.  7.  Moderate tricuspid regurgitation.  8.  Moderate pulmonary hypertension, RVSP 52 mmHg.  4. Prior Holter Monitor: None    Review of Systems:   Review of Systems  As Noted in HPI.   I have reviewed and confirmed the accuracy of the ROS as documented by the MA/LPN/RN DEVON Landers    I have reviewed and/or updated the patient's past medical, past surgical, family, social history, problem list and allergies as appropriate.     Medications:     Current Outpatient Medications:     amLODIPine (NORVASC) 5 MG tablet, Take 1 tablet by mouth Daily., Disp: , Rfl:     aspirin 81 MG EC tablet, Take 1 tablet by mouth Daily., Disp: 30 tablet, Rfl: 0    carvedilol (COREG) 25 MG tablet, Take 1 tablet by mouth 2 (Two) Times a Day With Meals., Disp: , Rfl:     difluprednate (DUREZOL) 0.05 % ophthalmic emulsion, 1 drop 4 (Four) Times a Day., Disp: , Rfl:     furosemide (LASIX) 40 MG tablet, Take 1 tablet by mouth Daily for 3 days, THEN 0.5 tablets Daily for 30 days., Disp: 18 tablet, Rfl: 0    HYDROcodone-acetaminophen (NORCO) 5-325 MG per tablet, Take 1 tablet by mouth Every 6 (Six) Hours As Needed., Disp: , Rfl:     ipratropium-albuterol (DUO-NEB) 0.5-2.5 mg/3 ml nebulizer, Take 3 mL by nebulization Every 4 (Four) Hours As Needed for Shortness of Air., Disp: 360 mL, Rfl: 0    nitroglycerin (NITROSTAT) 0.4 MG SL tablet, Place 1 tablet under the tongue Every 5 (Five) Minutes As Needed for Chest Pain. Take no more than 3 doses in 15 minutes., Disp: , Rfl:     omeprazole (priLOSEC) 40 MG capsule, Take 1 capsule by mouth Daily.,  "Disp: , Rfl:     ondansetron ODT (ZOFRAN-ODT) 4 MG disintegrating tablet, Place 1 tablet on the tongue Every 6 (Six) Hours As Needed for Nausea or Vomiting., Disp: 12 tablet, Rfl: 0    pravastatin (PRAVACHOL) 40 MG tablet, Take 2 tablets by mouth Daily., Disp: , Rfl:     sacubitril-valsartan (ENTRESTO) 24-26 MG tablet, Take 1 tablet by mouth Every 12 (Twelve) Hours for 30 days., Disp: 56 tablet, Rfl: 0    Physical Exam:    0 Result Notes            Component  Ref Range & Units 2 mo ago  (12/20/23) 2 mo ago  (11/29/23) 3 mo ago  (11/12/23) 5 mo ago  (9/8/23) 10 mo ago  (4/12/23) 12 mo ago  (2/25/23) 4 yr ago  (12/8/19)   proBNP  0.0 - 1,800.0 pg/mL 7,638.0 High  8,472.0 High  7,616.0 High  7,800.0 High  9,424.0 High  5,448.0 High  4,589.0 High  R        Component  Ref Range & Units 2 mo ago  (12/22/23) 2 mo ago  (12/21/23) 2 mo ago  (12/20/23) 2 mo ago  (12/1/23) 2 mo ago  (11/30/23) 2 mo ago  (11/29/23) 3 mo ago  (11/12/23)     Creatinine  0.57 - 1.00 mg/dL 0.77 1.05 High  0.87 0.98 0.87 1.10 High  0.82     eGFR  >60.0 mL/min/1.73 75.7 52.2 Low  65.4 56.7 Low  65.4 49.3 Low  70.6     Vital Signs:   Vitals:    02/20/24 1508   BP: 146/64   BP Location: Left arm   Patient Position: Sitting   Cuff Size: Adult   Pulse: 70   Resp: 16   Temp: 98 °F (36.7 °C)   TempSrc: Infrared   SpO2: 92%  Comment: 3L NC   Weight: 54.4 kg (120 lb)   Height: 152.4 cm (60\")     Body mass index is 23.44 kg/m².    Physical Exam  Vitals and nursing note reviewed.   Constitutional:       General: She is not in acute distress.     Appearance: She is ill-appearing.      Comments: Frail, on continuous O2 at 3 L.  Ambulates via walker   HENT:      Head: Normocephalic and atraumatic.   Neck:      Trachea: Trachea normal.   Cardiovascular:      Rate and Rhythm: Normal rate and regular rhythm.      Pulses: Normal pulses.      Heart sounds: Normal heart sounds. No murmur heard.     No friction rub. No gallop.   Pulmonary:      Effort: Pulmonary effort " is normal.      Breath sounds: Normal breath sounds.   Musculoskeletal:      Cervical back: Neck supple.      Right lower leg: No edema.      Left lower leg: No edema.   Skin:     General: Skin is warm and dry.   Neurological:      Mental Status: She is alert and oriented to person, place, and time.   Psychiatric:         Mood and Affect: Mood normal.         Behavior: Behavior normal. Behavior is cooperative.         Thought Content: Thought content does not include suicidal ideation.         Results Review:   I reviewed the patient's new clinical results.    Procedures    Assessment / Plan:   Diagnoses and all orders for this visit:    1. Chronic heart failure with preserved ejection fraction (HFpEF) (Primary)  Etiology unknown, but given her age, ischemia is a possibility  11/23, LVEF 60-65%  Stage D  NYHA functional class III  Had originally planned to do BMP, (and if possible), uptitrate Entresto to 49/51 mg dosing, discontinue amlodipine, HOWEVER, patient would like to defer this today  Ambulatory Referral to Disease State Management  Patient was provided with the heart failure handbook, AHA: The salty 6, AHA blood pressure log, AHA eat smart: How to read nutrition labels, daily weight log, information on fluid and sodium restriction  Most importantly, she was encouraged to weigh every day and call our office with a greater than 2 to 3 pound weight gain overnight or greater than 5 pounds in a week's time  She was provided with a copy of the zones of heart failure and we discussed indications that should prompt an office visit or more importantly, an ER visit  Patient was also provided with an advance directive booklet    2. Essential hypertension  Uncontrolled  Plan for GDMT at heart failure clinic initial visit    3.  Pulmonary hypertension  11/23, moderate per echocardiogram  Group 2 and 3 etiologies    4.  COPD  On continuous oxygen with stable symptoms at this time    5.  Hyperlipidemia  Last lipid panel  was from 2017  Patient is currently taking low intensity statin without difficulty    Preventative Cardiology:   Tobacco Cessation: N/A   Advance Care Planning: ACP discussion was declined by the patient. Patient does not have an advance directive, information provided.     Follow Up:   Follow-up in heart failure clinic with plans to follow-up with primary cardiologist in 6 months      Thank you for allowing me to participate in the care of your patient. Please do not hesitate to contact me with additional questions or concerns.     Lisa Villafuerte APRN

## 2024-02-20 ENCOUNTER — OFFICE VISIT (OUTPATIENT)
Dept: CARDIOLOGY | Facility: CLINIC | Age: 84
End: 2024-02-20
Payer: MEDICARE

## 2024-02-20 VITALS
HEIGHT: 60 IN | RESPIRATION RATE: 16 BRPM | TEMPERATURE: 98 F | OXYGEN SATURATION: 92 % | HEART RATE: 70 BPM | DIASTOLIC BLOOD PRESSURE: 64 MMHG | BODY MASS INDEX: 23.56 KG/M2 | WEIGHT: 120 LBS | SYSTOLIC BLOOD PRESSURE: 146 MMHG

## 2024-02-20 DIAGNOSIS — I50.32 CHRONIC HEART FAILURE WITH PRESERVED EJECTION FRACTION (HFPEF): Primary | ICD-10-CM

## 2024-02-20 DIAGNOSIS — I10 ESSENTIAL HYPERTENSION: ICD-10-CM

## 2024-02-20 PROCEDURE — 1159F MED LIST DOCD IN RCRD: CPT | Performed by: NURSE PRACTITIONER

## 2024-02-20 PROCEDURE — 3078F DIAST BP <80 MM HG: CPT | Performed by: NURSE PRACTITIONER

## 2024-02-20 PROCEDURE — 3077F SYST BP >= 140 MM HG: CPT | Performed by: NURSE PRACTITIONER

## 2024-02-20 PROCEDURE — 1160F RVW MEDS BY RX/DR IN RCRD: CPT | Performed by: NURSE PRACTITIONER

## 2024-02-20 PROCEDURE — 99214 OFFICE O/P EST MOD 30 MIN: CPT | Performed by: NURSE PRACTITIONER

## 2024-02-20 RX ORDER — DIFLUPREDNATE OPHTHALMIC 0.5 MG/ML
1 EMULSION OPHTHALMIC 4 TIMES DAILY
COMMUNITY

## 2024-02-20 RX ORDER — AMLODIPINE BESYLATE 5 MG/1
5 TABLET ORAL DAILY
COMMUNITY

## 2024-02-20 RX ORDER — HYDROCODONE BITARTRATE AND ACETAMINOPHEN 5; 325 MG/1; MG/1
1 TABLET ORAL EVERY 6 HOURS PRN
COMMUNITY

## 2024-02-26 ENCOUNTER — TELEPHONE (OUTPATIENT)
Dept: CARDIOLOGY | Facility: CLINIC | Age: 84
End: 2024-02-26

## 2024-02-26 NOTE — TELEPHONE ENCOUNTER
Caller: KEYA BRYANT     Relationship:DAUGHTER     Callback number: [phone]   Is it ok to leave a message: [x] Yes [] No    Requested medication for samples: ENTRESTO 24-26 MG    How much medication does the patient currently have left: 2 DAYS     Who will be picking up the samples: KEYA BRYANT     Do you need information about patient financial assistance for this medication: [] Yes [x] No    Additional details provided: PATIENT HAS 2 DAYS REMAINING OF THIS MEDICATION.

## 2024-03-05 PROBLEM — I50.9 ACUTE ON CHRONIC CONGESTIVE HEART FAILURE, UNSPECIFIED HEART FAILURE TYPE: Status: RESOLVED | Noted: 2023-04-12 | Resolved: 2024-03-05

## 2024-03-05 PROBLEM — I50.9 HEART FAILURE, UNSPECIFIED: Status: RESOLVED | Noted: 2023-12-20 | Resolved: 2024-03-05

## 2024-03-05 PROBLEM — I50.9 ACUTE EXACERBATION OF CHF (CONGESTIVE HEART FAILURE): Status: RESOLVED | Noted: 2023-11-30 | Resolved: 2024-03-05

## 2024-03-05 PROBLEM — I50.32 CHRONIC HEART FAILURE WITH PRESERVED EJECTION FRACTION (HFPEF): Status: ACTIVE | Noted: 2023-12-22

## 2024-03-06 NOTE — PLAN OF CARE
Goal Outcome Evaluation:  Plan of Care Reviewed With: patient, daughter        Progress: improving  Outcome Evaluation: Pt supine in bed and willing to participate with treatment.  Pt presented with 3L O2 pnc. Pt performed gait distances of 120',130' and 120' cga with rw. Pt requires education of proper pursed lip breathing techniques with mobility.  Pt requires standing rest between each bout of gait with pt dropping to 86% upon first. Pt  durib=ng last two legs of gait ranged %.  Pt requires vc's for walker positioning with turns.  See flowsheet for details. Cont PT per POC progressing to goals as pt tolerates.          [facility] Follow Up Office Note     Encounter Date:24  Patient:Renato Marquez  :1950  MRN:[id number]    Referring Provider:  Mauricio Scott MD    Chief Complaint:   Chief Complaint   Patient presents with    1 year post watchman       History of Presenting Illness:      Mr. Marquez is a 73 y.o. gentleman with past medical history notable for permanent atrial fibrillation, Carotid stenosis s/p CEA, coronary artery disease with prior CABG, chronic systolic heart failure, KATIE on cpap at night, pulmonary hypertension, and prior GI bleeding who presents to our office for scheduled follow up after Watchman.  In general patient is doing well he is tolerating his dual antiplatelet therapy without any bleeding issues.        Review of Systems:  Review of Systems   Constitutional: Negative.   HENT: Negative.     Eyes: Negative.    Cardiovascular: Negative.    Respiratory: Negative.     Endocrine: Negative.    Hematologic/Lymphatic: Negative.    Skin: Negative.    Musculoskeletal: Negative.    Gastrointestinal: Negative.    Genitourinary: Negative.    Neurological: Negative.    Psychiatric/Behavioral: Negative.     Allergic/Immunologic: Negative.        Current Outpatient Medications on File Prior to Visit   Medication Sig Dispense Refill    albuterol sulfate  (90 Base) MCG/ACT inhaler Inhale 2 puffs every 4 hours when necessary wheeze, dyspnea, cough 8 g 0    aspirin (ASPIR) 81 MG EC tablet Take 1 tablet by mouth Daily. 90 tablet 3    clopidogrel (PLAVIX) 75 MG tablet Take 1 tablet by mouth Daily. 90 tablet 3    empagliflozin (JARDIANCE) 10 MG tablet tablet Take 1 tablet by mouth Daily. 30 tablet 3    famotidine (PEPCID) 40 MG tablet TAKE 1 TABLET BY MOUTH TWICE DAILY AT LUNCH AND AT BEDTIME 180 tablet 3    guaiFENesin (MUCINEX) 600 MG 12 hr tablet Take 2 tablets by mouth 2 (Two) Times a Day.      Magnesium 200 MG tablet Take 250 mg by mouth Daily.      metoprolol succinate XL  (TOPROL-XL) 50 MG 24 hr tablet Take 1 tablet by mouth Daily. 90 tablet 1    pantoprazole (PROTONIX) 40 MG EC tablet TAKE 1 TABLET BY MOUTH TWICE DAILY 180 tablet 2    pravastatin (PRAVACHOL) 40 MG tablet TAKE 1 TABLET BY MOUTH DAILY 90 tablet 0    spironolactone (ALDACTONE) 25 MG tablet Take 1 tablet by mouth Daily. 90 tablet 3    sucralfate (CARAFATE) 1 g tablet Take 1 tablet by mouth 2 (Two) Times a Day. 180 tablet 3    torsemide (DEMADEX) 20 MG tablet Take 1 tablet by mouth Daily. 90 tablet 0     No current facility-administered medications on file prior to visit.       No Known Allergies    Past Medical History:   Diagnosis Date    Abnormal ECG     CAD (coronary artery disease)     Cancer     skin cancer    Carotid arterial disease     Chronic atrial fibrillation     Complex sleep apnea syndrome     BiPAP ST    Hemorrhagic disorder due to circulating anticoagulants 06/21/2021    Hyperlipidemia     Hypertension     Ischemic cardiomyopathy     Mitral regurgitation     Rectal bleeding        Past Surgical History:   Procedure Laterality Date    ATRIAL APPENDAGE EXCLUSION LEFT WITH TRANSESOPHAGEAL ECHOCARDIOGRAM N/A 4/19/2023    Procedure: Atrial Appendage Occlusion;  Surgeon: Dustin Neal MD;  Location: [facility] CATH INVASIVE LOCATION;  Service: Cardiovascular;  Laterality: N/A;    CAROTID ENDARTERECTOMY Right 2009    Dr Lewis    COLONOSCOPY      COLONOSCOPY N/A 6/22/2021    Procedure: COLONOSCOPY INTO CECUM AND TI;  Surgeon: Artur Jacobson MD;  Location: [facility] ENDOSCOPY;  Service: Gastroenterology;  Laterality: N/A;  PRE: RECTAL BLEEDING  POST: DIVERTICULOSIS, HEMORRHOIDS    COLONOSCOPY W/ POLYPECTOMY N/A 6/3/2021    Procedure: COLONOSCOPY, polypectomies;  Surgeon: Luan Meek MD;  Location: [facility] OR;  Service: Gastroenterology;  Laterality: N/A;  Diverticulosis  Ascending colon polyp x 4 (3 cold snare)  Cecal polyp (cold snare)  Rectal polyp    CORONARY ARTERY BYPASS GRAFT  2009     "Dr Bhagat, LIMA to LAD, SVG to OM, SVG Post Desc    ENDOSCOPY N/A 6/3/2021    Procedure: ESOPHAGOGASTRODUODENOSCOPY with biopsies;  Surgeon: Luan Meek MD;  Location: [facility];  Service: Gastroenterology;  Laterality: N/A;  Reflux  Gastric and duodenal ulcers  Biopsies: gastric, distal esophagus    FINGER SURGERY         Social History     Socioeconomic History    Marital status:    Tobacco Use    Smoking status: Former     Current packs/day: 0.00     Types: Cigarettes, Cigars     Quit date: 1991     Years since quittin.2    Smokeless tobacco: Never   Vaping Use    Vaping status: Never Used   Substance and Sexual Activity    Alcohol use: Yes     Alcohol/week: 2.0 standard drinks of alcohol     Types: 2 Shots of liquor per week     Comment: NIGHTLY/caffeine use    Drug use: No    Sexual activity: Not Currently       Family History   Problem Relation Age of Onset    No Known Problems Mother     Heart disease Father     Stroke Father     Hypertension Father        The following portions of the patient's history were reviewed and updated as appropriate: allergies, current medications, past family history, past medical history, past social history, past surgical history and problem list.       Objective:       Vitals:    24 1116   BP: 108/62   BP Location: Left arm   Patient Position: Sitting   Pulse: 92   Weight: 98.9 kg (218 lb)   Height: 182.9 cm (72.01\")       Body mass index is 29.56 kg/m².    Physical Exam:  Constitutional: Well appearing, Well-developed, No acute distress   HENT: Oropharynx clear and membrane moist  Eyes: Normal conjunctiva, no sclera icterus.  Neck: Supple, no carotid bruit bilaterally.  Cardiovascular: Irregularly irregular rate and rhythm, No Murmur, Trace bilateral lower extremity edema.  Pulmonary: Normal respiratory effort, normal lung sounds, no wheezing.  Neurological: Alert and orient x 3.   Skin: Warm, dry, no ecchymosis, no rash.  Psych: " Appropriate mood and affect. Normal judgment and insight.       Lab Results   Component Value Date     02/27/2024     08/14/2023    K 4.3 02/27/2024    K 4.7 08/14/2023     02/27/2024     08/14/2023    CO2 26.0 02/27/2024    CO2 24.0 08/14/2023    BUN 43 (H) 02/27/2024    BUN 40 (H) 08/14/2023    CREATININE 1.76 (H) 02/27/2024    CREATININE 1.70 (H) 09/21/2023    EGFRIFNONA 56 (L) 09/07/2021    EGFRIFNONA 77 06/21/2021    EGFRIFAFRI 77 09/29/2020    GLUCOSE 149 (H) 02/27/2024    GLUCOSE 97 08/14/2023    CALCIUM 9.3 02/27/2024    CALCIUM 9.2 08/14/2023    PROTENTOTREF 6.6 07/18/2023    PROTENTOTREF 6.2 09/29/2020    ALBUMIN 4.1 02/27/2024    ALBUMIN 3.6 07/18/2023    BILITOT 0.9 02/27/2024    BILITOT 0.8 04/14/2023    AST 22 02/27/2024    AST 27 04/14/2023    ALT 14 02/27/2024    ALT 19 04/14/2023     Lab Results   Component Value Date    WBC 5.48 02/27/2024    WBC 6.78 05/18/2023    HGB 11.4 (L) 02/27/2024    HGB 11.9 (L) 05/18/2023    HCT 37.5 02/27/2024    HCT 35.4 (L) 05/18/2023    MCV 87.8 02/27/2024    MCV 92.9 05/18/2023     (L) 02/27/2024     05/18/2023     Lab Results   Component Value Date    CHOL 142 02/27/2024    CHOL 156 11/23/2022    TRIG 74 02/27/2024    TRIG 117 11/23/2022    HDL 43 02/27/2024    HDL 50 11/23/2022    LDL 84 02/27/2024    LDL 85 11/23/2022     Lab Results   Component Value Date    PROBNP 5,421.0 (H) 08/14/2023    PROBNP 6,028.0 (H) 08/01/2023     Lab Results   Component Value Date    TROPONINT <0.010 06/20/2021     Lab Results   Component Value Date    TSH 1.510 06/22/2021    TSH 1.260 09/29/2020       ECG 12 Lead    Date/Time: 3/6/2024 11:36 AM  Performed by: Dustin Neal MD    Authorized by: Dustin Neal MD  Comparison: compared with previous ECG from 9/20/2023  Similar to previous ECG  Rhythm: atrial fibrillation      Transesophageal echocardiogram 5/23/2023:  Left ventricular systolic function is normal. Left ventricular ejection  fraction appears to be 56 - 60%.  Moderately reduced right ventricular systolic function noted.  The right ventricular cavity is mildly dilated.  The left atrial cavity is moderate to severely dilated.  There is an occlusion device in the left atrial appendage that is well-seated with no clear flow through or around the device.  The right atrial cavity is severely  dilated.  Moderate tricuspid valve regurgitation is present.  Estimated right ventricular systolic pressure from tricuspid regurgitation is mildly elevated (35-45 mmHg).  Moderate pulmonic valve regurgitation is present.  There is moderate plaque in the aortic arch present. There is moderate plaque in the descending aorta present.    Limited Echocardiogram 4/20/2023:  There is no evidence of pericardial effusion. .  Normal left ventricular cavity size noted. Left ventricular wall thickness is consistent with mild to moderate concentric hypertrophy. All left ventricular wall segments contract normally.     Watchman Implantation 4/19/2023  Right common femoral venotomy utilizing micropuncture kit and ultrasound guidance.  Left common femoral venotomy utilizing micropuncture kit and ultrasound guidance.  Transseptal puncture of inter-atrial septum under fluoroscopic and transesophageal guidance   Successful placement of a 31 mm watchman left atrial appendage closure device.  Percutaneous closure of right femoral venotomy access site with #1 suture mediated closure device and figure of 8 suture.    Echocardiogram 11/17/2022:  Left ventricular ejection fraction appears to be 41 - 45%.  Left ventricular wall thickness is consistent with moderate concentric hypertrophy.  Left ventricular diastolic function was indeterminate.  The right atrial cavity is severely  dilated.  There is calcification of the aortic valve.  Estimated right ventricular systolic pressure from tricuspid regurgitation is moderately elevated (45-55 mmHg). Calculated right ventricular  systolic pressure from tricuspid regurgitation is 50 mmHg.  Moderate pulmonic valve regurgitation is present.    Holter Monitor 6/21/2021:  An abnormal monitor study.  Atrial fibrillation is present throughout  Average heart beat 93 bpm, range during A. fib  bpm, frequent episodes of heart rate greater than 100 are seen.  Frequent ventricular ectopy is seen  1 episode of wide-complex tachycardia that appears to be consistent with ventricular tachycardia is present at 1:25 PM, 29 beats duration, 170 bpm          Assessment:          Diagnosis Plan   1. Presence of Watchman left atrial appendage closure device  ECG 12 Lead      2. Permanent atrial fibrillation  ECG 12 Lead               Plan:       Mr. Marquez is a 73 y.o. gentleman with past medical history notable for permanent atrial fibrillation, Carotid stenosis s/p CEA, coronary artery disease with prior CABG, chronic systolic heart failure, KATIE on cpap at night, pulmonary hypertension, and prior GI bleeding who presents to our office for scheduled follow-up after watchman implantation.  From a watchman standpoint he is doing well with no bleeding on dual antiplatelet therapy.  From my standpoint he could stop his clopidogrel and remain just on 81 mg of aspirin.  He is due for his 1 year ADDY follow-up to reevaluate his Watchman we have been able to schedule that today currently scheduled for 3/13 we will follow-up on those results for him..    Permanent Atrial Fibrillation with Watchman Present:  Status post watchman 4/2023 transesophageal echocardiogram this week demonstrates normal device function  Eliquis discontinued 5/2023  On dual antiplatelet therapy currently.  From a watchman standpoint could stop clopidogrel but has been continued on dual antiplatelet therapy for management of his peripheral vascular disease but from my standpoint if any problems with bleeding complications on dual antiplatelet therapy could transition to monotherapy at any  time.      We will work on scheduling his 1 year follow-up Watchman which is now scheduled for 3/13 will follow-up on these results      Follow up:  Plan for ADDY on 3/13 to evaluate his watchman 1 year out from procedure to.      Thank you for allowing me to participate in the care of Renatopadma Marquez. Feel free to contact me directly with any further questions or concerns.    Dustin Neal MD  [facility]  03/06/24  11:39 EST

## 2024-03-25 ENCOUNTER — DISEASE STATE MANAGEMENT VISIT (OUTPATIENT)
Dept: PHARMACY | Facility: HOSPITAL | Age: 84
End: 2024-03-25
Payer: MEDICARE

## 2024-03-25 VITALS
SYSTOLIC BLOOD PRESSURE: 135 MMHG | DIASTOLIC BLOOD PRESSURE: 55 MMHG | HEART RATE: 76 BPM | BODY MASS INDEX: 23.95 KG/M2 | RESPIRATION RATE: 16 BRPM | OXYGEN SATURATION: 93 % | WEIGHT: 122 LBS | HEIGHT: 60 IN | TEMPERATURE: 98.2 F

## 2024-03-25 DIAGNOSIS — I50.32 CHRONIC HEART FAILURE WITH PRESERVED EJECTION FRACTION (HFPEF): Primary | ICD-10-CM

## 2024-03-25 PROCEDURE — G0463 HOSPITAL OUTPT CLINIC VISIT: HCPCS

## 2024-03-25 RX ORDER — METOPROLOL SUCCINATE 50 MG/1
50 TABLET, EXTENDED RELEASE ORAL DAILY
Qty: 30 TABLET | Refills: 0 | Status: SHIPPED | OUTPATIENT
Start: 2024-03-25 | End: 2024-03-27 | Stop reason: SDUPTHER

## 2024-03-25 RX ORDER — GABAPENTIN 800 MG/1
800 TABLET ORAL 3 TIMES DAILY
COMMUNITY

## 2024-03-25 RX ORDER — SPIRONOLACTONE 25 MG/1
12.5 TABLET ORAL DAILY
Qty: 30 TABLET | Refills: 0 | Status: SHIPPED | OUTPATIENT
Start: 2024-03-25 | End: 2024-03-27 | Stop reason: SDUPTHER

## 2024-03-25 NOTE — PATIENT INSTRUCTIONS
STOP CARVEDILOL.  YOU CAN THROW THIS AWAY.  YOU WON'T BE TAKING THIS AGAIN.  INSTEAD, YOU WILL BE TAKING METOPROLOL ONCE DAILY.    YOU WILL ALSO BE STARTING A LOW-DOSE BLOOD PRESSURE PILL USED SPECIFICALLY FOR HEART FAILURE.  WE WILL DRAW LABS ON YOU THE NEXT TIME YOU COME BACK TO THE OFFICE.    YOU HAVE BEEN GIVEN ANOTHER TWO WEEKS OF ENTRESTO SAMPLES.

## 2024-03-25 NOTE — PROGRESS NOTES
[facility] Heart Failure Clinic Note    Lesley Calero  [id number]  2024    Primary Care Provider: Nj Smith MD   Referring Provider: Lisa Villafuerte A*    Chief Complaint: Initial evaluation    History of Present Illness:   Mrs. Lesley Calero is a 83 y.o. female who presents to the HF Clinic for initial evaluation.  In , the patient was admitted to [facility] with acute on chronic exacerbation of HFpEF.  The patient has a past medical history of hypertension, hyperlipidemia, coronary artery disease, atrial fibrillation, pacemaker, COPD on continuous O2, and previous tobacco use with complete resolution in .  The patient was hospitalized with acute on chronic HFpEF twice in .  She was recently seen in the cardiology clinic, but deferred any medication changes at that time.  She presents today for initial evaluation.  She reports shortness of breath and notes she tires easily with minimal activity.  She reports a cough sometimes that is worse when she wakes up in the morning, also notes this can be worse if she is having a post-nasal drip.  She denies chest pain, palpitations, syncope, orthopnea, and PND.  She reports fluctuating lower extremity edema that improves with elevation of legs.  She offers no other complaints or concerns at this time.    Past Cardiac Testin. Last Coronary Angio: None  2. Prior Stress Testing: None  3. Last Echo: 2023              1.  Normal left ventricular size and systolic function, LVEF 60-65%.  2.  Mild concentric LVH.  3.  Grade 2 diastolic dysfunction.  4.  Mild right ventricular dilation with normal RV systolic function.  5.  Severely increased left atrial volume index.  6.  Severe calcification of the aortic valve without significant stenosis.  7.  Moderate tricuspid regurgitation.  8.  Moderate pulmonary hypertension, RVSP 52 mmHg.  4. Prior Holter Monitor: None        Review of Systems:   Review of Systems  As Noted  in HPI.   I have reviewed and confirmed the accuracy of the ROS as documented by the MA/LPN/RN DEVON Landers    I have reviewed and/or updated the patient's past medical, past surgical, family, social history, problem list and allergies as appropriate.     Medications:     Current Outpatient Medications:     amLODIPine (NORVASC) 5 MG tablet, Take 1 tablet by mouth Daily., Disp: , Rfl:     aspirin 81 MG EC tablet, Take 1 tablet by mouth Daily., Disp: 30 tablet, Rfl: 0    carvedilol (COREG) 25 MG tablet, Take 1 tablet by mouth 2 (Two) Times a Day With Meals., Disp: , Rfl:     furosemide (LASIX) 40 MG tablet, Take 1 tablet by mouth Daily for 3 days, THEN 0.5 tablets Daily for 30 days., Disp: 18 tablet, Rfl: 0    gabapentin (NEURONTIN) 800 MG tablet, Take 1 tablet by mouth 3 (Three) Times a Day., Disp: , Rfl:     HYDROcodone-acetaminophen (NORCO) 5-325 MG per tablet, Take 1 tablet by mouth Every 6 (Six) Hours As Needed., Disp: , Rfl:     ipratropium-albuterol (DUO-NEB) 0.5-2.5 mg/3 ml nebulizer, Take 3 mL by nebulization Every 4 (Four) Hours As Needed for Shortness of Air., Disp: 360 mL, Rfl: 0    nitroglycerin (NITROSTAT) 0.4 MG SL tablet, Place 1 tablet under the tongue Every 5 (Five) Minutes As Needed for Chest Pain. Take no more than 3 doses in 15 minutes., Disp: , Rfl:     omeprazole (priLOSEC) 40 MG capsule, Take 1 capsule by mouth Daily., Disp: , Rfl:     pravastatin (PRAVACHOL) 40 MG tablet, Take 2 tablets by mouth Daily., Disp: , Rfl:     sacubitril-valsartan (ENTRESTO) 24-26 MG tablet, Take 1 tablet by mouth Every 12 (Twelve) Hours for 30 days., Disp: 56 tablet, Rfl: 0    difluprednate (DUREZOL) 0.05 % ophthalmic emulsion, 1 drop 4 (Four) Times a Day. (Patient not taking: Reported on 3/25/2024), Disp: , Rfl:     ondansetron ODT (ZOFRAN-ODT) 4 MG disintegrating tablet, Place 1 tablet on the tongue Every 6 (Six) Hours As Needed for Nausea or Vomiting. (Patient not taking: Reported on 3/25/2024),  "Disp: 12 tablet, Rfl: 0    Physical Exam:  Vital Signs:   Vitals:    03/25/24 1405 03/25/24 1407   BP: 116/47 135/55   BP Location: Left arm Right arm   Patient Position: Sitting Sitting   Cuff Size: Adult Adult   Pulse: 71 76   Resp: 16    Temp: 98.2 °F (36.8 °C)    TempSrc: Infrared    SpO2: 93%  Comment: 3L NC    Weight: 55.3 kg (122 lb)    Height: 152.4 cm (60\")      Body mass index is 23.83 kg/m².    Physical Exam  Vitals and nursing note reviewed.   Constitutional:       General: She is not in acute distress.     Comments: He will on continuous O2.  Ambulates via bench walker, accompanied by daughter Anel WATERS:      Head: Normocephalic and atraumatic.   Neck:      Trachea: Trachea normal.   Cardiovascular:      Rate and Rhythm: Normal rate and regular rhythm.      Pulses: Normal pulses.      Heart sounds: Normal heart sounds. No murmur heard.     No friction rub. No gallop.   Pulmonary:      Effort: Pulmonary effort is normal.      Breath sounds: Normal breath sounds.   Musculoskeletal:      Cervical back: Neck supple.      Right lower leg: No edema.      Left lower leg: No edema.   Skin:     General: Skin is warm and dry.   Neurological:      Mental Status: She is alert and oriented to person, place, and time.   Psychiatric:         Mood and Affect: Mood normal.         Behavior: Behavior normal. Behavior is cooperative.         Thought Content: Thought content does not include suicidal ideation.         Results Review:   I reviewed the patient's new clinical results.    Procedures      Assessment / Plan:   Diagnoses and all orders for this visit:     1. Chronic heart failure with preserved ejection fraction (HFpEF) (Primary)  Etiology unknown, but given her age, ischemia is a possibility  11/23, LVEF 60-65%  Stage D  NYHA functional class III  CHANGE carvedilol to metoprolol  Continue Entresto (will check the status of patient's financial assistance)  Start low-dose spironolactone  Will likely " discontinue amlodipine on follow-up     2. Essential hypertension  Ongoing titration of GDMT      Patient Instructions   STOP CARVEDILOL.  YOU CAN THROW THIS AWAY.  YOU WON'T BE TAKING THIS AGAIN.  INSTEAD, YOU WILL BE TAKING METOPROLOL ONCE DAILY.    YOU WILL ALSO BE STARTING A LOW-DOSE BLOOD PRESSURE PILL USED SPECIFICALLY FOR HEART FAILURE.  WE WILL DRAW LABS ON YOU THE NEXT TIME YOU COME BACK TO THE OFFICE.    YOU HAVE BEEN GIVEN ANOTHER TWO WEEKS OF ENTRESTO SAMPLES.        Preventative Cardiology:   Tobacco Cessation: N/A   Advance Care Planning: ACP discussion was declined by the patient. Patient does not have an advance directive, declines further assistance.     Follow Up:   Return in about 1 week (around 4/1/2024), or DSM.      Thank you for allowing me to participate in the care of your patient. Please to not hesitate to contact me with additional questions or concerns.     DEVON Gasca

## 2024-03-25 NOTE — TELEPHONE ENCOUNTER
Dennis Talley said they didn't bring in their financial paperwork so they would need to do that. Thank you for checking.

## 2024-03-27 ENCOUNTER — TELEPHONE (OUTPATIENT)
Dept: CARDIOLOGY | Facility: CLINIC | Age: 84
End: 2024-03-27
Payer: MEDICARE

## 2024-03-27 DIAGNOSIS — I50.32 CHRONIC HEART FAILURE WITH PRESERVED EJECTION FRACTION (HFPEF): ICD-10-CM

## 2024-03-27 RX ORDER — SPIRONOLACTONE 25 MG/1
12.5 TABLET ORAL DAILY
Qty: 30 TABLET | Refills: 2 | Status: SHIPPED | OUTPATIENT
Start: 2024-03-27

## 2024-03-27 RX ORDER — METOPROLOL SUCCINATE 50 MG/1
50 TABLET, EXTENDED RELEASE ORAL DAILY
Qty: 30 TABLET | Refills: 2 | Status: SHIPPED | OUTPATIENT
Start: 2024-03-27

## 2024-03-27 NOTE — PROGRESS NOTES
Ambulatory Care Clinic  Heart Failure Pharmacist Note     Patient Name: Lesley Calero  :1940  Age: 83 y.o.  Sex: female  Referring Provider: Lias Villafuerte A*   Primary Cardiologist:   Encounter Provider:  DEVON Landers    HPI: Patient presents for initial evaluation in heart failure clinic for HFpEF (most recent EF 60-65% w/ grade 2 diastolic dysfunction 2023). PMH otherwise significant for HTN, HLD, CAD, Afib (PPM), COPD (continuous O2). Patient was recently seen in the cardiology office on  at which time patient deferred any medication changes. Today, she reports that she continues to experience some SOB with activity and gets fatigued easier. She does have a cough in the AM that improves throughout the day. She denies any worsening swelling in lower extremities and will elevated her legs to help at the end of the day. She continues to have good UOP. Patient does experience some episodes of dizziness, notes low DBP today. She reports compliance with all medications.    Heart Failure GDMT:      Drug Class   Drug   Dose Last Dose Adjustment   Additional Titration   Notes   ACEi/ARB/ARNI Entresto 24/26 mg BID Initiated by other provider BP likely will not tolerate titration    Beta Blocker Metoprolol XL 50 mg QD Changed from coreg 3/25     MRA Spironolactone 12.5 mg QD Started 3/25     SGLT2i Deferred for now - reassess next visit   N/A      Other CV Meds:  Amlodipine -- possibly d/c to allow titration of spironolactone/initiation of SGLT2i  Furosemide 20 mg daily  Pravastatin 40 mg daily    Medication Use:  Adherence:   Past hx of medication use/intolerance:  Affordability:    Social Determinants:    Social Determinants of Health     Tobacco Use: Medium Risk (3/27/2024)    Patient History     Smoking Tobacco Use: Former     Smokeless Tobacco Use: Former     Passive Exposure: Not on file   Alcohol Use: Not At Risk (2023)    AUDIT-C     Frequency of Alcohol Consumption:  Never     Average Number of Drinks: Patient does not drink     Frequency of Binge Drinking: Never   Financial Resource Strain: Medium Risk (12/21/2023)    Overall Financial Resource Strain (CARDIA)     Difficulty of Paying Living Expenses: Somewhat hard   Food Insecurity: No Food Insecurity (1/11/2024)    Received from [facility] [facility]    Food Insecurity     : Not on file     : Not on file   Transportation Needs: No Transportation Needs (12/21/2023)    PRAPARE - Transportation     Lack of Transportation (Medical): No     Lack of Transportation (Non-Medical): No   Physical Activity: Inactive (11/30/2023)    Exercise Vital Sign     Days of Exercise per Week: 0 days     Minutes of Exercise per Session: 0 min   Stress: No Stress Concern Present (11/30/2023)    [employer] - Occupational Stress Questionnaire     Feeling of Stress : Only a little   Social Connections: Low Risk  (1/11/2024)    Received from [facility] [facility]    Family and Community Support     : Not on file     : Not on file   Interpersonal Safety: Not At Risk (12/20/2023)    Abuse Screen     Unsafe at Home or Work/School: no     Feels Threatened by Someone?: no     Does Anyone Keep You from Contacting Others or Doint Things Outside the Home?: no     Physical Sign of Abuse Present: no   Depression: Unknown (1/11/2024)    Received from [employer] [facility]    Depression     : Not on file   Housing Stability: Low Risk  (1/11/2024)    Received from [facility] [facility]    Housing Stability     : Not on file     : Not on file   Utilities: Not At Risk (12/21/2023)    [facility] Utilities     Threatened with loss of utilities: No   Health Literacy: Not At Risk (12/21/2023)    Education     Help with school or training?: No     Preferred Language: English   Employment: Unknown (1/11/2024)     "Received from [employer]    Employment     : Not on file   Disabilities: At Risk (12/20/2023)    Disabilities     Concentrating, Remembering, or Making Decisions Difficulty: no     Doing Errands Independently Difficulty: yes       Immunization Status:  Pneumococcal: vaccinated x1  Influenza: yearly  COVID-19: vaccinated    OBJECTIVE:  /55 (BP Location: Right arm, Patient Position: Sitting, Cuff Size: Adult)   Pulse 76   Temp 98.2 °F (36.8 °C) (Infrared)   Resp 16   Ht 152.4 cm (60\")   Wt 55.3 kg (122 lb)   SpO2 93% Comment: 3L NC  BMI 23.83 kg/m²     Body mass index is 23.83 kg/m².  Wt Readings from Last 1 Encounters:   03/25/24 55.3 kg (122 lb)       Home BP Readings: encouraged daily monitoring of BP  10-yr ASCVD risk: The ASCVD Risk score (Latha MOREL, et al., 2019) failed to calculate for the following reasons:    The 2019 ASCVD risk score is only valid for ages 40 to 79    The patient has a prior MI or stroke diagnosis    Lab Review:  Renal Function: CrCl cannot be calculated (Patient's most recent lab result is older than the maximum 30 days allowed.).    Lab Results   Component Value Date    PROBNP 7,638.0 (H) 12/20/2023       Results for orders placed during the hospital encounter of 11/29/23    Adult Transthoracic Echo Complete w/ Color, Spectral and Contrast if necessary per protocol    Interpretation Summary  1.  Normal left ventricular size and systolic function, LVEF 60-65%.  2.  Mild concentric LVH.  3.  Grade 2 diastolic dysfunction.  4.  Mild right ventricular dilation with normal RV systolic function.  5.  Severely increased left atrial volume index.  6.  Severe calcification of the aortic valve without significant stenosis.  7.  Moderate tricuspid regurgitation.  8.  Moderate pulmonary hypertension, RVSP 52 mmHg.      Patient Education:  Lesley Calero was provided written and verbal education during their clinic visit today. Topics reviewed " include:  The basics of heart failure (defining heart failure, ejection fraction, stages of disease)  Signs and symptoms of heart failure, self track zones, and when to contact clinic or seek emergency care  Overview of heart failure treatment plan (pharmacologic and non-pharmacologic) and goals of treatment  Tips for success with managing heart failure medications  List of medications that may be used to treat heart failure and how they work in the body    Patient was given a heart failure tracker calendar to document weight, blood pressure, and symptoms/overall feeling each day. Patient was encouraged to complete this daily in order to help guide therapy adjustments. Lesley Calero expressed understanding.    ASSESSMENT/PLAN:  HFpEF (EF 60-65% w/ grade 2 diastolic dysfunction)  Continue Entresto 24/26 mg BID as prescribed by other provider; will need financial documents for PAP  Patient experiencing episodes of dizziness and lower BP at home; switch carvedilol to metoprolol XL 50 mg daily.  Start spironolactone 12.5 mg daily  Defer SGLT2-inhibitor for now; reassess at next visit  Continue furosemide 20mg daily with possible switch to prn dosing after starting spironolactone. Advised patient to call clinic with 2-3 lb weight gain in 24 hrs or >5 lb weight gain in 1 week    RTC in ~1 week for reassessment after medication changes.       Darrel Mcallister [facility] Heart Failure Clinic  03/27/24  16:13 EDT

## 2024-03-28 NOTE — TELEPHONE ENCOUNTER
Attempted to reach daughter and home phone to notify we need financials to begin patient assistance for Entresto. No answer, no vm available on either contact.

## 2024-04-18 ENCOUNTER — DISEASE STATE MANAGEMENT VISIT (OUTPATIENT)
Dept: PHARMACY | Facility: HOSPITAL | Age: 84
End: 2024-04-18
Payer: MEDICARE

## 2024-04-18 ENCOUNTER — LAB REQUISITION (OUTPATIENT)
Dept: LAB | Facility: HOSPITAL | Age: 84
End: 2024-04-18
Payer: MEDICARE

## 2024-04-18 VITALS
SYSTOLIC BLOOD PRESSURE: 103 MMHG | DIASTOLIC BLOOD PRESSURE: 51 MMHG | OXYGEN SATURATION: 94 % | HEART RATE: 74 BPM | WEIGHT: 123 LBS | TEMPERATURE: 97.5 F | RESPIRATION RATE: 20 BRPM | BODY MASS INDEX: 24.15 KG/M2 | HEIGHT: 60 IN

## 2024-04-18 DIAGNOSIS — I50.32 CHRONIC DIASTOLIC (CONGESTIVE) HEART FAILURE: ICD-10-CM

## 2024-04-18 DIAGNOSIS — I50.32 CHRONIC HEART FAILURE WITH PRESERVED EJECTION FRACTION (HFPEF): ICD-10-CM

## 2024-04-18 DIAGNOSIS — I50.32 CHRONIC HEART FAILURE WITH PRESERVED EJECTION FRACTION (HFPEF): Primary | ICD-10-CM

## 2024-04-18 LAB
ANION GAP SERPL CALCULATED.3IONS-SCNC: 8.6 MMOL/L (ref 5–15)
BUN SERPL-MCNC: 38 MG/DL (ref 8–23)
BUN/CREAT SERPL: 26 (ref 7–25)
CALCIUM SPEC-SCNC: 9.4 MG/DL (ref 8.6–10.5)
CHLORIDE SERPL-SCNC: 96 MMOL/L (ref 98–107)
CO2 SERPL-SCNC: 32.4 MMOL/L (ref 22–29)
CREAT SERPL-MCNC: 1.46 MG/DL (ref 0.57–1)
EGFRCR SERPLBLD CKD-EPI 2021: 35.1 ML/MIN/1.73
GLUCOSE SERPL-MCNC: 124 MG/DL (ref 65–99)
POTASSIUM SERPL-SCNC: 5.5 MMOL/L (ref 3.5–5.2)
SODIUM SERPL-SCNC: 137 MMOL/L (ref 136–145)

## 2024-04-18 PROCEDURE — G0463 HOSPITAL OUTPT CLINIC VISIT: HCPCS

## 2024-04-18 PROCEDURE — 80048 BASIC METABOLIC PNL TOTAL CA: CPT | Performed by: NURSE PRACTITIONER

## 2024-04-18 RX ORDER — SPIRONOLACTONE 25 MG/1
12.5 TABLET ORAL DAILY
Qty: 45 TABLET | Refills: 2 | Status: ON HOLD | OUTPATIENT
Start: 2024-04-18

## 2024-04-18 NOTE — PROGRESS NOTES
Ambulatory Care Clinic  Heart Failure Pharmacist Note     Patient Name: Lesley Calero  :1940  Age: 83 y.o.  Sex: female  Referring Provider: Lisa Villafuerte A*   Primary Cardiologist:   Encounter Provider:  DEVON Landers    HPI: Patient presents for follow-up evaluation in heart failure clinic for HFpEF (most recent EF 60-65% w/ grade 2 diastolic dysfunction 2023). PMH otherwise significant for HTN, HLD, CAD, Afib (PPM), COPD (continuous O2).     Today, she reports that she continues to experience some SOB with activity and gets fatigued easier. She has expressed some hand tremors and pain. Patient referred to primary care for management. Patient and daughter would like to defer any medication changes until she gets answers about her tremors. She reports compliance with all medications.    Heart Failure GDMT:      Drug Class   Drug   Dose Last Dose Adjustment   Additional Titration   Notes   ACEi/ARB/ARNI Entresto 24/26 mg BID Initiated by other provider BP likely will not tolerate titration Samples   Beta Blocker Metoprolol XL 50 mg QD Changed from coreg 3/25     MRA Spironolactone 12.5 mg QD Started 3/25     SGLT2i Deferred for now - reassess next visit   N/A Hx of UTI     Other CV Meds:  Amlodipine -- possibly d/c to allow titration of spironolactone  Furosemide 20 mg daily  Pravastatin 40 mg daily    Medication Use:  Adherence:   Past hx of medication use/intolerance:  Affordability: Entresto samples    Social Determinants:    Social Determinants of Health     Tobacco Use: Medium Risk (3/27/2024)    Patient History     Smoking Tobacco Use: Former     Smokeless Tobacco Use: Former     Passive Exposure: Not on file   Alcohol Use: Not At Risk (2023)    AUDIT-C     Frequency of Alcohol Consumption: Never     Average Number of Drinks: Patient does not drink     Frequency of Binge Drinking: Never   Financial Resource Strain: Medium Risk (2023)    Overall Financial Resource  Strain (CARDIA)     Difficulty of Paying Living Expenses: Somewhat hard   Food Insecurity: No Food Insecurity (1/11/2024)    Received from [employer] [facility]    Food Insecurity     : Not on file     : Not on file   Transportation Needs: No Transportation Needs (12/21/2023)    PRAPARE - Transportation     Lack of Transportation (Medical): No     Lack of Transportation (Non-Medical): No   Physical Activity: Inactive (11/30/2023)    Exercise Vital Sign     Days of Exercise per Week: 0 days     Minutes of Exercise per Session: 0 min   Stress: No Stress Concern Present (11/30/2023)    [employer] - Occupational Stress Questionnaire     Feeling of Stress : Only a little   Social Connections: Low Risk  (1/11/2024)    Received from [facility]    Family and Community Support     : Not on file     : Not on file   Interpersonal Safety: Not At Risk (12/20/2023)    Abuse Screen     Unsafe at Home or Work/School: no     Feels Threatened by Someone?: no     Does Anyone Keep You from Contacting Others or Doint Things Outside the Home?: no     Physical Sign of Abuse Present: no   Depression: Unknown (1/11/2024)    Received from [facility]    Depression     : Not on file   Housing Stability: Low Risk  (1/11/2024)    Received from [employer] [facility]    Housing Stability     : Not on file     : Not on file   Utilities: Not At Risk (12/21/2023)    [facility] Utilities     Threatened with loss of utilities: No   Health Literacy: Not At Risk (12/21/2023)    Education     Help with school or training?: No     Preferred Language: English   Employment: Unknown (1/11/2024)    Received from [employer]    Employment     : Not on file   Disabilities: At Risk (12/20/2023)    Disabilities     Concentrating,  "Remembering, or Making Decisions Difficulty: no     Doing Errands Independently Difficulty: yes       Immunization Status:  Pneumococcal: vaccinated x1  Influenza: yearly  COVID-19: vaccinated    OBJECTIVE:  /51 (BP Location: Left arm, Patient Position: Sitting, Cuff Size: Adult)   Pulse 74   Temp 97.5 °F (36.4 °C) (Infrared)   Resp 20   Ht 152.4 cm (60\")   Wt 55.8 kg (123 lb)   SpO2 94% Comment: 3L NC  BMI 24.02 kg/m²     Body mass index is 24.02 kg/m².  Wt Readings from Last 1 Encounters:   04/18/24 55.8 kg (123 lb)       Home BP Readings: encouraged daily monitoring of BP  10-yr ASCVD risk: The ASCVD Risk score (Latha MOREL, et al., 2019) failed to calculate for the following reasons:    The 2019 ASCVD risk score is only valid for ages 40 to 79    The patient has a prior MI or stroke diagnosis    Lab Review:  Renal Function: CrCl cannot be calculated (Patient's most recent lab result is older than the maximum 30 days allowed.).    Lab Results   Component Value Date    PROBNP 7,638.0 (H) 12/20/2023       Results for orders placed during the hospital encounter of 11/29/23    Adult Transthoracic Echo Complete w/ Color, Spectral and Contrast if necessary per protocol    Interpretation Summary  1.  Normal left ventricular size and systolic function, LVEF 60-65%.  2.  Mild concentric LVH.  3.  Grade 2 diastolic dysfunction.  4.  Mild right ventricular dilation with normal RV systolic function.  5.  Severely increased left atrial volume index.  6.  Severe calcification of the aortic valve without significant stenosis.  7.  Moderate tricuspid regurgitation.  8.  Moderate pulmonary hypertension, RVSP 52 mmHg.      Patient Education:  Lesley Calero was provided written and verbal education during their clinic visit today. Topics reviewed include:  The basics of heart failure (defining heart failure, ejection fraction, stages of disease)  Signs and symptoms of heart failure, self track zones, and when to contact " clinic or seek emergency care  Overview of heart failure treatment plan (pharmacologic and non-pharmacologic) and goals of treatment  Tips for success with managing heart failure medications  List of medications that may be used to treat heart failure and how they work in the body    Patient was given a heart failure tracker calendar to document weight, blood pressure, and symptoms/overall feeling each day. Patient was encouraged to complete this daily in order to help guide therapy adjustments. Lesley Shaffer Galilea expressed understanding.    ASSESSMENT/PLAN:  HFpEF (EF 60-65% w/ grade 2 diastolic dysfunction)  Continue Entresto 24/26 mg BID as prescribed by other provider. Patient brought in income forms for PAP; samples given today  Continue metoprolol XL 50 mg daily.  Continue spironolactone 12.5 mg daily. Will call patient with results from lab  Defer SGLT2-inhibitor for now  Continue furosemide 20mg daily. Discuss PRN next visit. Pt did not want to make changes today. Advised patient to call clinic with 2-3 lb weight gain in 24 hrs or >5 lb weight gain in 1 week           Sharmaine Perez, Pharmacy Intern  [facility] Heart Failure Clinic  04/18/24  16:13 EDT

## 2024-04-18 NOTE — TELEPHONE ENCOUNTER
[facility] pharmacy sent a 90 day refill request for patients Spironolactone 25 mg one half tablet daily

## 2024-04-18 NOTE — PROGRESS NOTES
[facility] Heart Failure Follow Up Note    Lesley Calero  [id number]  2024    Primary Care Provider: Nj Smith MD   Referring Provider: Lisa Villafuerte A*    Chief Complaint: Follow-up CHF    History of Present Illness:   Mrs. Lesley Calero is a 83 y.o. female who presents to the HF Clinic for follow-up.  In , the patient was admitted to [facility] with acute on chronic exacerbation of HFpEF.  The patient has a past medical history of hypertension, hyperlipidemia, coronary artery disease, atrial fibrillation, pacemaker, COPD on continuous O2, and previous tobacco use with complete resolution in .          The patient was hospitalized with acute on chronic HFpEF twice in .  She was recently seen in the cardiology clinic, but deferred any medication changes at that time.  She presents today for initial evaluation.  She reports shortness of breath and notes she tires easily with minimal activity.  She reports a cough sometimes that is worse when she wakes up in the morning, also notes this can be worse if she is having a post-nasal drip.  She denies chest pain, palpitations, syncope, orthopnea, and PND.  She reports fluctuating lower extremity edema that improves with elevation of legs.  She offers no other complaints or concerns at this time.     Past Cardiac Testin. Last Coronary Angio: None  2. Prior Stress Testing: None  3. Last Echo: 2023              1.  Normal left ventricular size and systolic function, LVEF 60-65%.  2.  Mild concentric LVH.  3.  Grade 2 diastolic dysfunction.  4.  Mild right ventricular dilation with normal RV systolic function.  5.  Severely increased left atrial volume index.  6.  Severe calcification of the aortic valve without significant stenosis.  7.  Moderate tricuspid regurgitation.  8.  Moderate pulmonary hypertension, RVSP 52 mmHg.  4. Prior Holter Monitor: None      Review of Systems:   Review of Systems  As Noted in  HPI.   I have reviewed and confirmed the accuracy of the ROS as documented by the MA/LPN/RN DEVON Landers    I have reviewed and/or updated the patient's past medical, past surgical, family, social history, problem list and allergies as appropriate.     Medications:     Current Outpatient Medications:     amLODIPine (NORVASC) 5 MG tablet, Take 1 tablet by mouth Daily., Disp: , Rfl:     aspirin 81 MG EC tablet, Take 1 tablet by mouth Daily., Disp: 30 tablet, Rfl: 0    furosemide (LASIX) 40 MG tablet, Take 1 tablet by mouth Daily for 3 days, THEN 0.5 tablets Daily for 30 days., Disp: 18 tablet, Rfl: 0    gabapentin (NEURONTIN) 800 MG tablet, Take 1 tablet by mouth 3 (Three) Times a Day., Disp: , Rfl:     HYDROcodone-acetaminophen (NORCO) 5-325 MG per tablet, Take 1 tablet by mouth Every 6 (Six) Hours As Needed., Disp: , Rfl:     ipratropium-albuterol (DUO-NEB) 0.5-2.5 mg/3 ml nebulizer, Take 3 mL by nebulization Every 4 (Four) Hours As Needed for Shortness of Air., Disp: 360 mL, Rfl: 0    metoprolol succinate XL (TOPROL-XL) 50 MG 24 hr tablet, Take 1 tablet by mouth Daily., Disp: 30 tablet, Rfl: 2    nitroglycerin (NITROSTAT) 0.4 MG SL tablet, Place 1 tablet under the tongue Every 5 (Five) Minutes As Needed for Chest Pain. Take no more than 3 doses in 15 minutes., Disp: , Rfl:     omeprazole (priLOSEC) 40 MG capsule, Take 1 capsule by mouth Daily., Disp: , Rfl:     pravastatin (PRAVACHOL) 40 MG tablet, Take 2 tablets by mouth Daily., Disp: , Rfl:     sacubitril-valsartan (ENTRESTO) 49-51 MG tablet, Take 0.5 tablets by mouth 2 (Two) Times a Day., Disp: 28 tablet, Rfl: 0    spironolactone (ALDACTONE) 25 MG tablet, Take 1/2 tablet by mouth Daily., Disp: 30 tablet, Rfl: 2    Physical Exam:    Vitals:     03/25/24 1405 03/25/24 1407   BP: 116/47 135/55   BP Location: Left arm Right arm   Patient Position: Sitting Sitting   Cuff Size: Adult Adult   Pulse: 71 76   Resp: 16     Temp: 98.2 °F (36.8 °C)     TempSrc:  "Infrared     SpO2: 93%  Comment: 3L NC     Weight: 55.3 kg (122 lb)     Height: 152.4 cm (60\")           Body mass index is 23.83 kg/m².      Vital Signs: There were no vitals filed for this visit.  There is no height or weight on file to calculate BMI.    Physical Exam  Vitals and nursing note reviewed.   Constitutional:       General: She is not in acute distress.  HENT:      Head: Normocephalic and atraumatic.   Neck:      Trachea: Trachea normal.   Cardiovascular:      Rate and Rhythm: Normal rate and regular rhythm.      Pulses: Normal pulses.      Heart sounds: Normal heart sounds. No murmur heard.     No friction rub. No gallop.   Pulmonary:      Effort: Pulmonary effort is normal.      Breath sounds: Normal breath sounds.   Musculoskeletal:      Cervical back: Neck supple.      Right lower leg: No edema.      Left lower leg: No edema.   Skin:     General: Skin is warm and dry.   Neurological:      Mental Status: She is alert and oriented to person, place, and time.   Psychiatric:         Mood and Affect: Mood normal.         Behavior: Behavior normal. Behavior is cooperative.         Thought Content: Thought content does not include suicidal ideation.         Results Review:   I reviewed the patient's new clinical results.    Procedures    Assessment / Plan:   There are no diagnoses linked to this encounter.  -------------------------------------------------------------------------------  1. Chronic heart failure with preserved ejection fraction (HFpEF) (Primary)  Etiology unknown, but given her age, ischemia is a possibility  11/23, LVEF 60-65%  Stage D  NYHA functional class III  CHANGE carvedilol to metoprolol  Continue Entresto (will check the status of patient's financial assistance)  Start low-dose spironolactone  Will likely discontinue amlodipine on follow-up     2. Essential hypertension  Ongoing titration of GDMT        Patient Instructions   STOP CARVEDILOL.  YOU CAN THROW THIS AWAY.  YOU WON'T BE " TAKING THIS AGAIN.  INSTEAD, YOU WILL BE TAKING METOPROLOL ONCE DAILY.     YOU WILL ALSO BE STARTING A LOW-DOSE BLOOD PRESSURE PILL USED SPECIFICALLY FOR HEART FAILURE.  WE WILL DRAW LABS ON YOU THE NEXT TIME YOU COME BACK TO THE OFFICE.     YOU HAVE BEEN GIVEN ANOTHER TWO WEEKS OF ENTRESTO SAMPLES.       Preventative Cardiology:   Tobacco Cessation: N/A   Advance Care Planning: ACP discussion was declined by the patient. Patient does not have an advance directive, declines further assistance.     Follow Up:   No follow-ups on file.      Thank you for allowing me to participate in the care of your patient. Please to not hesitate to contact me with additional questions or concerns.     DEVON Gasca

## 2024-04-19 ENCOUNTER — TELEPHONE (OUTPATIENT)
Dept: PHARMACY | Facility: HOSPITAL | Age: 84
End: 2024-04-19
Payer: MEDICARE

## 2024-04-19 NOTE — TELEPHONE ENCOUNTER
Potassium was high on labs drawn from yesterday.  Spoke with patients daughter, Anel, about medication plan. Per DEVON Villafuerte, patient is to hold spironolactone for 1 week and Entresto today (4/19), tomorrow (4/20) and Sunday (4/21). We will follow-up with her on Thursday (4/25).  Patient's daughter expressed understanding.

## 2024-04-21 ENCOUNTER — APPOINTMENT (OUTPATIENT)
Dept: GENERAL RADIOLOGY | Facility: HOSPITAL | Age: 84
End: 2024-04-21
Payer: MEDICARE

## 2024-04-21 ENCOUNTER — HOSPITAL ENCOUNTER (OUTPATIENT)
Facility: HOSPITAL | Age: 84
Setting detail: OBSERVATION
Discharge: ANOTHER HOSPITAL | End: 2024-04-23
Attending: EMERGENCY MEDICINE | Admitting: INTERNAL MEDICINE
Payer: MEDICARE

## 2024-04-21 DIAGNOSIS — R07.9 CHEST PAIN, UNSPECIFIED TYPE: Primary | ICD-10-CM

## 2024-04-21 LAB
ALBUMIN SERPL-MCNC: 3.9 G/DL (ref 3.5–5.2)
ALBUMIN/GLOB SERPL: 1.6 G/DL
ALP SERPL-CCNC: 76 U/L (ref 39–117)
ALT SERPL W P-5'-P-CCNC: 17 U/L (ref 1–33)
ANION GAP SERPL CALCULATED.3IONS-SCNC: 6.5 MMOL/L (ref 5–15)
AST SERPL-CCNC: 42 U/L (ref 1–32)
BASOPHILS # BLD AUTO: 0.02 10*3/MM3 (ref 0–0.2)
BASOPHILS NFR BLD AUTO: 0.5 % (ref 0–1.5)
BILIRUB SERPL-MCNC: 0.3 MG/DL (ref 0–1.2)
BUN SERPL-MCNC: 40 MG/DL (ref 8–23)
BUN/CREAT SERPL: 34.5 (ref 7–25)
CALCIUM SPEC-SCNC: 9.6 MG/DL (ref 8.6–10.5)
CHLORIDE SERPL-SCNC: 101 MMOL/L (ref 98–107)
CO2 SERPL-SCNC: 33.5 MMOL/L (ref 22–29)
CREAT SERPL-MCNC: 1.16 MG/DL (ref 0.57–1)
DEPRECATED RDW RBC AUTO: 51.8 FL (ref 37–54)
EGFRCR SERPLBLD CKD-EPI 2021: 46.3 ML/MIN/1.73
EOSINOPHIL # BLD AUTO: 0.23 10*3/MM3 (ref 0–0.4)
EOSINOPHIL NFR BLD AUTO: 5.2 % (ref 0.3–6.2)
ERYTHROCYTE [DISTWIDTH] IN BLOOD BY AUTOMATED COUNT: 13.5 % (ref 12.3–15.4)
FLUAV RNA RESP QL NAA+PROBE: NOT DETECTED
FLUBV RNA RESP QL NAA+PROBE: NOT DETECTED
GEN 5 2HR TROPONIN T REFLEX: 49 NG/L
GLOBULIN UR ELPH-MCNC: 2.5 GM/DL
GLUCOSE SERPL-MCNC: 82 MG/DL (ref 65–99)
HCT VFR BLD AUTO: 35.6 % (ref 34–46.6)
HGB BLD-MCNC: 11.1 G/DL (ref 12–15.9)
HOLD SPECIMEN: NORMAL
HOLD SPECIMEN: NORMAL
IMM GRANULOCYTES # BLD AUTO: 0.01 10*3/MM3 (ref 0–0.05)
IMM GRANULOCYTES NFR BLD AUTO: 0.2 % (ref 0–0.5)
LYMPHOCYTES # BLD AUTO: 0.8 10*3/MM3 (ref 0.7–3.1)
LYMPHOCYTES NFR BLD AUTO: 18.1 % (ref 19.6–45.3)
MCH RBC QN AUTO: 32.4 PG (ref 26.6–33)
MCHC RBC AUTO-ENTMCNC: 31.2 G/DL (ref 31.5–35.7)
MCV RBC AUTO: 103.8 FL (ref 79–97)
MONOCYTES # BLD AUTO: 0.34 10*3/MM3 (ref 0.1–0.9)
MONOCYTES NFR BLD AUTO: 7.7 % (ref 5–12)
NEUTROPHILS NFR BLD AUTO: 3.01 10*3/MM3 (ref 1.7–7)
NEUTROPHILS NFR BLD AUTO: 68.3 % (ref 42.7–76)
NRBC BLD AUTO-RTO: 0 /100 WBC (ref 0–0.2)
NT-PROBNP SERPL-MCNC: ABNORMAL PG/ML (ref 0–1800)
PLATELET # BLD AUTO: 148 10*3/MM3 (ref 140–450)
PMV BLD AUTO: 10.5 FL (ref 6–12)
POTASSIUM SERPL-SCNC: 5 MMOL/L (ref 3.5–5.2)
PROT SERPL-MCNC: 6.4 G/DL (ref 6–8.5)
RBC # BLD AUTO: 3.43 10*6/MM3 (ref 3.77–5.28)
SARS-COV-2 RNA RESP QL NAA+PROBE: NOT DETECTED
SODIUM SERPL-SCNC: 141 MMOL/L (ref 136–145)
TROPONIN T DELTA: 6 NG/L
TROPONIN T SERPL HS-MCNC: 43 NG/L
WBC NRBC COR # BLD AUTO: 4.41 10*3/MM3 (ref 3.4–10.8)
WHOLE BLOOD HOLD COAG: NORMAL
WHOLE BLOOD HOLD SPECIMEN: NORMAL

## 2024-04-21 PROCEDURE — 25010000002 ENOXAPARIN PER 10 MG: Performed by: INTERNAL MEDICINE

## 2024-04-21 PROCEDURE — 84484 ASSAY OF TROPONIN QUANT: CPT | Performed by: EMERGENCY MEDICINE

## 2024-04-21 PROCEDURE — 85025 COMPLETE CBC W/AUTO DIFF WBC: CPT | Performed by: EMERGENCY MEDICINE

## 2024-04-21 PROCEDURE — 80053 COMPREHEN METABOLIC PANEL: CPT | Performed by: EMERGENCY MEDICINE

## 2024-04-21 PROCEDURE — 99285 EMERGENCY DEPT VISIT HI MDM: CPT

## 2024-04-21 PROCEDURE — 87636 SARSCOV2 & INF A&B AMP PRB: CPT | Performed by: EMERGENCY MEDICINE

## 2024-04-21 PROCEDURE — G0378 HOSPITAL OBSERVATION PER HR: HCPCS

## 2024-04-21 PROCEDURE — 93005 ELECTROCARDIOGRAM TRACING: CPT | Performed by: EMERGENCY MEDICINE

## 2024-04-21 PROCEDURE — 99223 1ST HOSP IP/OBS HIGH 75: CPT | Performed by: INTERNAL MEDICINE

## 2024-04-21 PROCEDURE — 83880 ASSAY OF NATRIURETIC PEPTIDE: CPT | Performed by: EMERGENCY MEDICINE

## 2024-04-21 PROCEDURE — 71045 X-RAY EXAM CHEST 1 VIEW: CPT

## 2024-04-21 PROCEDURE — 96372 THER/PROPH/DIAG INJ SC/IM: CPT

## 2024-04-21 PROCEDURE — 36415 COLL VENOUS BLD VENIPUNCTURE: CPT

## 2024-04-21 RX ORDER — BISACODYL 10 MG
10 SUPPOSITORY, RECTAL RECTAL DAILY PRN
Status: DISCONTINUED | OUTPATIENT
Start: 2024-04-21 | End: 2024-04-23 | Stop reason: HOSPADM

## 2024-04-21 RX ORDER — SODIUM CHLORIDE 0.9 % (FLUSH) 0.9 %
10 SYRINGE (ML) INJECTION AS NEEDED
Status: DISCONTINUED | OUTPATIENT
Start: 2024-04-21 | End: 2024-04-23 | Stop reason: HOSPADM

## 2024-04-21 RX ORDER — SODIUM CHLORIDE 9 MG/ML
40 INJECTION, SOLUTION INTRAVENOUS AS NEEDED
Status: DISCONTINUED | OUTPATIENT
Start: 2024-04-21 | End: 2024-04-23 | Stop reason: HOSPADM

## 2024-04-21 RX ORDER — AMOXICILLIN 250 MG
2 CAPSULE ORAL 2 TIMES DAILY
Status: DISCONTINUED | OUTPATIENT
Start: 2024-04-21 | End: 2024-04-23 | Stop reason: HOSPADM

## 2024-04-21 RX ORDER — IPRATROPIUM BROMIDE AND ALBUTEROL SULFATE 2.5; .5 MG/3ML; MG/3ML
3 SOLUTION RESPIRATORY (INHALATION) EVERY 4 HOURS PRN
Status: DISCONTINUED | OUTPATIENT
Start: 2024-04-21 | End: 2024-04-23 | Stop reason: HOSPADM

## 2024-04-21 RX ORDER — AMLODIPINE BESYLATE 5 MG/1
5 TABLET ORAL DAILY
Status: DISCONTINUED | OUTPATIENT
Start: 2024-04-22 | End: 2024-04-23 | Stop reason: HOSPADM

## 2024-04-21 RX ORDER — ENOXAPARIN SODIUM 100 MG/ML
30 INJECTION SUBCUTANEOUS NIGHTLY
Status: DISCONTINUED | OUTPATIENT
Start: 2024-04-21 | End: 2024-04-23 | Stop reason: HOSPADM

## 2024-04-21 RX ORDER — METOPROLOL SUCCINATE 50 MG/1
50 TABLET, EXTENDED RELEASE ORAL DAILY
Status: DISCONTINUED | OUTPATIENT
Start: 2024-04-22 | End: 2024-04-23 | Stop reason: HOSPADM

## 2024-04-21 RX ORDER — ACETAMINOPHEN 160 MG/5ML
650 SOLUTION ORAL EVERY 4 HOURS PRN
Status: DISCONTINUED | OUTPATIENT
Start: 2024-04-21 | End: 2024-04-23 | Stop reason: HOSPADM

## 2024-04-21 RX ORDER — POLYETHYLENE GLYCOL 3350 17 G/17G
17 POWDER, FOR SOLUTION ORAL DAILY PRN
Status: DISCONTINUED | OUTPATIENT
Start: 2024-04-21 | End: 2024-04-23 | Stop reason: HOSPADM

## 2024-04-21 RX ORDER — ONDANSETRON 2 MG/ML
4 INJECTION INTRAMUSCULAR; INTRAVENOUS EVERY 6 HOURS PRN
Status: DISCONTINUED | OUTPATIENT
Start: 2024-04-21 | End: 2024-04-23 | Stop reason: HOSPADM

## 2024-04-21 RX ORDER — GABAPENTIN 400 MG/1
800 CAPSULE ORAL EVERY 8 HOURS SCHEDULED
Status: DISCONTINUED | OUTPATIENT
Start: 2024-04-21 | End: 2024-04-23 | Stop reason: HOSPADM

## 2024-04-21 RX ORDER — SPIRONOLACTONE 25 MG/1
12.5 TABLET ORAL DAILY
Status: DISCONTINUED | OUTPATIENT
Start: 2024-04-22 | End: 2024-04-23 | Stop reason: HOSPADM

## 2024-04-21 RX ORDER — SODIUM CHLORIDE 0.9 % (FLUSH) 0.9 %
10 SYRINGE (ML) INJECTION EVERY 12 HOURS SCHEDULED
Status: DISCONTINUED | OUTPATIENT
Start: 2024-04-21 | End: 2024-04-23 | Stop reason: HOSPADM

## 2024-04-21 RX ORDER — HYDROCODONE BITARTRATE AND ACETAMINOPHEN 5; 325 MG/1; MG/1
1 TABLET ORAL EVERY 6 HOURS PRN
Status: DISCONTINUED | OUTPATIENT
Start: 2024-04-21 | End: 2024-04-23 | Stop reason: HOSPADM

## 2024-04-21 RX ORDER — ASPIRIN 81 MG/1
81 TABLET ORAL DAILY
Status: DISCONTINUED | OUTPATIENT
Start: 2024-04-22 | End: 2024-04-23 | Stop reason: HOSPADM

## 2024-04-21 RX ORDER — PRAVASTATIN SODIUM 20 MG
80 TABLET ORAL DAILY
Status: DISCONTINUED | OUTPATIENT
Start: 2024-04-22 | End: 2024-04-23 | Stop reason: HOSPADM

## 2024-04-21 RX ORDER — NITROGLYCERIN 0.4 MG/1
0.4 TABLET SUBLINGUAL
Status: DISCONTINUED | OUTPATIENT
Start: 2024-04-21 | End: 2024-04-23 | Stop reason: HOSPADM

## 2024-04-21 RX ORDER — BISACODYL 5 MG/1
5 TABLET, DELAYED RELEASE ORAL DAILY PRN
Status: DISCONTINUED | OUTPATIENT
Start: 2024-04-21 | End: 2024-04-23 | Stop reason: HOSPADM

## 2024-04-21 RX ORDER — ACETAMINOPHEN 650 MG/1
650 SUPPOSITORY RECTAL EVERY 4 HOURS PRN
Status: DISCONTINUED | OUTPATIENT
Start: 2024-04-21 | End: 2024-04-23 | Stop reason: HOSPADM

## 2024-04-21 RX ORDER — ASPIRIN 81 MG/1
324 TABLET, CHEWABLE ORAL ONCE
Status: DISCONTINUED | OUTPATIENT
Start: 2024-04-21 | End: 2024-04-21

## 2024-04-21 RX ORDER — PANTOPRAZOLE SODIUM 40 MG/1
40 TABLET, DELAYED RELEASE ORAL
Status: DISCONTINUED | OUTPATIENT
Start: 2024-04-22 | End: 2024-04-23 | Stop reason: HOSPADM

## 2024-04-21 RX ORDER — ACETAMINOPHEN 325 MG/1
650 TABLET ORAL EVERY 4 HOURS PRN
Status: DISCONTINUED | OUTPATIENT
Start: 2024-04-21 | End: 2024-04-23 | Stop reason: HOSPADM

## 2024-04-21 RX ADMIN — SACUBITRIL AND VALSARTAN 1 TABLET: 24; 26 TABLET, FILM COATED ORAL at 22:09

## 2024-04-21 RX ADMIN — GABAPENTIN 800 MG: 400 CAPSULE ORAL at 22:09

## 2024-04-21 RX ADMIN — Medication 10 ML: at 22:10

## 2024-04-21 RX ADMIN — ENOXAPARIN SODIUM 30 MG: 100 INJECTION SUBCUTANEOUS at 22:11

## 2024-04-21 RX ADMIN — HYDROCODONE BITARTRATE AND ACETAMINOPHEN 1 TABLET: 5; 325 TABLET ORAL at 22:10

## 2024-04-21 NOTE — ED PROVIDER NOTES
[facility] EMERGENCY DEPARTMENT  Emergency Department Encounter  Emergency Medicine Physician Note     Pt Name:Lesley Calero  MRN: [id number]  Birthdate 1940  Date of evaluation: 4/21/2024  PCP:  Nj Smith MD  Note Started: 4:50 PM EDT      CHIEF COMPLAINT       Chief Complaint   Patient presents with    Chest Pain    Shortness of Breath     Pt called ems for chest pain and soa. Pt is on O2 all the time. Asa and 1 nitro given. Pt has hx of chf, pacemaker. Pt had blood draw on Friday and her Potasium was high.        HISTORY OF PRESENT ILLNESS  (Location/Symptom, Timing/Onset, Context/Setting, Quality, Duration, Modifying Factors, Severity.)      Lesley Calero is a 83 y.o. female who presents with severe chest pain that started about an hour and a half ago.  Patient having no chest pain at this time.  Patient describes having severe high blood pressure this moment that was over 200 systolic.  Patient describes the chest pain as something sitting on her chest.  Patient describes associated shortness of breath at that time.  Patient does have a history of CHF.    PAST MEDICAL / SURGICAL / SOCIAL / FAMILY HISTORY     Past Medical History:   Diagnosis Date    Cataracts, bilateral     CHF (congestive heart failure)     COPD (chronic obstructive pulmonary disease)     Coronary artery disease     Hyperlipidemia     Hypertension     Impaired functional mobility, balance, gait, and endurance     Myocardial infarction     Oxygen deficit     Pneumonia     RLS (restless legs syndrome)     Seizures      No additional pertinent       Past Surgical History:   Procedure Laterality Date    CAROTID ENDARTERECTOMY Left     CATARACT EXTRACTION W/ INTRAOCULAR LENS IMPLANT Right 7/27/2023    Procedure: CATARACT PHACO EXTRACTION WITH INTRAOCULAR LENS IMPLANT RIGHT;  Surgeon: Dajuan Blackburn MD;  Location: [facility];  Service: Ophthalmology;  Laterality: Right;    CATARACT EXTRACTION W/ INTRAOCULAR LENS  IMPLANT Left 8/10/2023    Procedure: CATARACT PHACO EXTRACTION WITH INTRAOCULAR LENS IMPLANT LEFT;  Surgeon: Dajuan Blackburn MD;  Location: [facility];  Service: Ophthalmology;  Laterality: Left;    CORONARY ANGIOPLASTY WITH STENT PLACEMENT      x2    LUNG SURGERY Left     PACEMAKER IMPLANTATION       No additional pertinent       Social History     Socioeconomic History    Marital status:    Tobacco Use    Smoking status: Former    Smokeless tobacco: Former     Quit date: 2001   Vaping Use    Vaping status: Never Used   Substance and Sexual Activity    Alcohol use: No    Drug use: No    Sexual activity: Not Currently       History reviewed. No pertinent family history.    Allergies:  Ceftin [cefuroxime axetil], Codeine, Crestor [rosuvastatin], Doxycycline, Morphine, Sulfa antibiotics, and Penicillins    Home Medications:  Prior to Admission medications    Medication Sig Start Date End Date Taking? Authorizing Provider   amLODIPine (NORVASC) 5 MG tablet Take 1 tablet by mouth Daily.    ProviderAna MD   aspirin 81 MG EC tablet Take 1 tablet by mouth Daily. 4/14/23   Dawood Ruiz MD   furosemide (LASIX) 40 MG tablet Take 1 tablet by mouth Daily for 3 days, THEN 0.5 tablets Daily for 30 days. 12/1/23 3/29/24  Bess Stevenson MD   gabapentin (NEURONTIN) 800 MG tablet Take 1 tablet by mouth 3 (Three) Times a Day.    ProviderAna MD   HYDROcodone-acetaminophen (NORCO) 5-325 MG per tablet Take 1 tablet by mouth Every 6 (Six) Hours As Needed.    ProviderAna MD   ipratropium-albuterol (DUO-NEB) 0.5-2.5 mg/3 ml nebulizer Take 3 mL by nebulization Every 4 (Four) Hours As Needed for Shortness of Air. 4/14/23   Dawood Ruiz MD   metoprolol succinate XL (TOPROL-XL) 50 MG 24 hr tablet Take 1 tablet by mouth Daily. 3/27/24   Lisa Villafuerte APRN   nitroglycerin (NITROSTAT) 0.4 MG SL tablet Place 1 tablet under the tongue Every 5 (Five) Minutes As Needed for Chest Pain. Take no more  than 3 doses in 15 minutes.    Provider, MD Ana   omeprazole (priLOSEC) 40 MG capsule Take 1 capsule by mouth Daily.    Provider, MD Ana   pravastatin (PRAVACHOL) 40 MG tablet Take 2 tablets by mouth Daily.    Provider, MD Ana   sacubitril-valsartan (ENTRESTO) 24-26 MG tablet Take 1 tablet by mouth 2 (Two) Times a Day. 4/18/24   Lisa Villafuerte APRN   spironolactone (ALDACTONE) 25 MG tablet Take 1/2 tablet by mouth Daily. 4/18/24   Lisa Villafuerte APRN         REVIEW OF SYSTEMS       Review of Systems   Constitutional:  Negative for diaphoresis and fever.   Respiratory:  Positive for shortness of breath. Negative for chest tightness.    Cardiovascular:  Positive for chest pain.   Gastrointestinal:  Negative for abdominal pain, nausea and vomiting.   Endocrine: Negative for polyuria.   Genitourinary:  Negative for difficulty urinating and frequency.       PHYSICAL EXAM      INITIAL VITALS:   /80   Pulse 70   Temp 98.6 °F (37 °C)   Resp 18   SpO2 100%     Physical Exam  Constitutional:       Appearance: Normal appearance. She is ill-appearing.   HENT:      Head: Normocephalic and atraumatic.      Mouth/Throat:      Mouth: Mucous membranes are moist.      Pharynx: Oropharynx is clear.   Cardiovascular:      Rate and Rhythm: Normal rate and regular rhythm.      Pulses: Normal pulses.   Pulmonary:      Effort: Pulmonary effort is normal.      Breath sounds: Normal breath sounds.   Abdominal:      General: Abdomen is flat. There is no distension.      Palpations: Abdomen is soft.      Tenderness: There is no abdominal tenderness. There is no guarding.   Musculoskeletal:         General: Normal range of motion.      Comments: Severe kyphosis noted   Skin:     General: Skin is warm and dry.   Neurological:      General: No focal deficit present.      Mental Status: She is alert and oriented to person, place, and time.   Psychiatric:         Mood and Affect: Mood normal.          Behavior: Behavior normal.           DDX/DIAGNOSTIC RESULTS / EMERGENCY DEPARTMENT COURSE / MDM     Differential Diagnosis included but not limited: ACS, pneumonia, CHF exacerbation, COPD exacerbation    Diagnoses Considered but Do Not Suspect: Low concern for thoracic dissection causing patient's chest pain.  Low concern for pulmonary embolism    Decision Rules/Scores utilized: N/A     Tests considered but not ordered and why:  N/A     MIPS: N/A     Code Status Discussion:  Not Discussed    Additional Patient Education Provided: None     Medical Decision Making    Medical Decision Making    Patient is an 83-year-old female with elevated heart score that presented with sudden onset chest pain earlier today and dyspnea with exertion.  Patient was noted to have severely high blood pressure at home.  Patient had only 1 episode of high blood pressure here, has been relatively normotensive with 150 systolic.  Patient does not appear diaphoretic or experiencing acute coronary syndrome at this time.  Patient does have uptrending troponin though.  Discussed patient's case with Dr. Astorga, he does recommend admission for cardiac observation.  He will evaluate tomorrow.  Patient did receive aspirin.  Patient does have uptrending BNP, does not appear fluid overloaded on x-ray or lower extremity exam, Lasix not given.  Patient does not take Lasix regularly.  Patient is on 3 L of oxygen for COPD, no increasing oxygen at this time, patient's dyspnea is only on ambulation    Problems Addressed:  Chest pain, unspecified type: complicated acute illness or injury    Amount and/or Complexity of Data Reviewed  External Data Reviewed: labs.     Details: Previous troponins, creatinine, and BMP evaluated  Labs: ordered. Decision-making details documented in ED Course.  Radiology: ordered.     Details: Personally evaluated patient's chest x-ray, no signs for fluid overload or severe pneumonia.  ECG/medicine tests: ordered.  Discussion of  management or test interpretation with external provider(s): Patient case discussed with hospitalist concerning admission, patient's case also discussed with Dr. Astorga of cardiology concerning cardiac recommendations.    Risk  OTC drugs.  Prescription drug management.  Decision regarding hospitalization.        See ED COURSE for additional MDM statements    EKG  EKG Interpretation    Evaluated and interpreted by emergency department physician    Rhythm: Paced  Rate: Normal  Axis: Left  Ectopy: NONE  Conduction: Nonspecific Interventricular Conduction Block  ST Segments: Nonspecific Changes  T Waves: Non Specific Changes  Q Waves: II, III, V3, V4, V5, V6, and aVf    Clinical Impression: non-specific EKG and paced rhythm    Wilver Escamilla DO      All EKG's are interpreted by the Emergency Department Physician who either signs or Co-signs this chart in the absence of a cardiologist.    Additional Scores      HEART Score: 6              EMERGENCY DEPARTMENT COURSE:    ED Course as of 04/21/24 2144   Sun Apr 21, 2024 1956 proBNP(!): 13,580.0  Noted to be severely elevated, [CR]   1956 Patient's chest x-ray demonstrated no concerns for fluid overload.   [CR]   1956 Patient did have delta troponin of 6, plan for admission for cardiac observation. [CR]   2015 Did discuss patient case with Dr. Astorga, with discussion of uptrending troponin of 6, elevated BNP with normal ejection fraction in the past.  Patient with dyspnea on exertion and elevated blood pressure with concerns for hypertensive emergency on arrival.  Though she has had normotensive throughout stay here.  He does recommend bringing her in for cardiac observation and he will evaluate her tomorrow. [CR]      ED Course User Index  [CR] Wilver Escamilla DO       PROCEDURES:  None Performed   Procedures    DATA FOR LAB AND RADIOLOGY TESTS ORDERED BELOW ARE REVIEWED BY THE ED CLINICIAN:    RADIOLOGY: All x-rays, CT, MRI, and formal ultrasound  images (except ED bedside ultrasound) are read by the radiologist, see reports below, unless otherwise noted in MDM or here.  Reports below are reviewed by myself.  XR Chest 1 View   Final Result   Chronic lung changes.               This report was signed and finalized on 4/21/2024 8:59 PM by Man Rea MD.              LABS: Lab orders shown below, the results are reviewed by myself, and all abnormals are listed below.  Labs Reviewed   COMPREHENSIVE METABOLIC PANEL - Abnormal; Notable for the following components:       Result Value    BUN 40 (*)     Creatinine 1.16 (*)     CO2 33.5 (*)     AST (SGOT) 42 (*)     BUN/Creatinine Ratio 34.5 (*)     eGFR 46.3 (*)     All other components within normal limits    Narrative:     GFR Normal >60  Chronic Kidney Disease <60  Kidney Failure <15    The GFR formula is only valid for adults with stable renal function between ages 18 and 70.   TROPONIN - Abnormal; Notable for the following components:    HS Troponin T 43 (*)     All other components within normal limits    Narrative:     High Sensitive Troponin T Reference Range:  <14.0 ng/L- Negative Female for AMI  <22.0 ng/L- Negative Male for AMI  >=14 - Abnormal Female indicating possible myocardial injury.  >=22 - Abnormal Male indicating possible myocardial injury.   Clinicians would have to utilize clinical acumen, EKG, Troponin, and serial changes to determine if it is an Acute Myocardial Infarction or myocardial injury due to an underlying chronic condition.        CBC WITH AUTO DIFFERENTIAL - Abnormal; Notable for the following components:    RBC 3.43 (*)     Hemoglobin 11.1 (*)     .8 (*)     MCHC 31.2 (*)     Lymphocyte % 18.1 (*)     All other components within normal limits   HIGH SENSITIVITIY TROPONIN T 2HR - Abnormal; Notable for the following components:    HS Troponin T 49 (*)     Troponin T Delta 6 (*)     All other components within normal limits    Narrative:     High Sensitive Troponin T  Reference Range:  <14.0 ng/L- Negative Female for AMI  <22.0 ng/L- Negative Male for AMI  >=14 - Abnormal Female indicating possible myocardial injury.  >=22 - Abnormal Male indicating possible myocardial injury.   Clinicians would have to utilize clinical acumen, EKG, Troponin, and serial changes to determine if it is an Acute Myocardial Infarction or myocardial injury due to an underlying chronic condition.        BNP (IN-HOUSE) - Abnormal; Notable for the following components:    proBNP 13,580.0 (*)     All other components within normal limits    Narrative:     This assay is used as an aid in the diagnosis of individuals suspected of having heart failure. It can be used as an aid in the diagnosis of acute decompensated heart failure (ADHF) in patients presenting with signs and symptoms of ADHF to the emergency department (ED). In addition, NT-proBNP of <300 pg/mL indicates ADHF is not likely.    Age Range Result Interpretation  NT-proBNP Concentration (pg/mL:      <50             Positive            >450                   Gray                 300-450                    Negative             <300    50-75           Positive            >900                  Gray                300-900                  Negative            <300      >75             Positive            >1800                  Gray                300-1800                  Negative            <300   COVID-19 AND FLU A/B, NP SWAB IN TRANSPORT MEDIA 1 HR TAT - Normal    Narrative:     Fact sheet for providers: https://www.fda.gov/media/143790/download    Fact sheet for patients: https://www.fda.gov/media/569193/download    Test performed by PCR.   RAINBOW DRAW    Narrative:     The following orders were created for panel order [place] Draw.  Procedure                               Abnormality         Status                     ---------                               -----------         ------                     Green Top (Gel)[[id number]]                                   Final result               Lavender Top[[id number]]                                     Final result               Gold Top - SST[[id number]]                                   Final result               Light Blue Top[[id number]]                                   Final result                 Please view results for these tests on the individual orders.   CBC AND DIFFERENTIAL    Narrative:     The following orders were created for panel order CBC & Differential.  Procedure                               Abnormality         Status                     ---------                               -----------         ------                     CBC Auto Differential[[id number]]        Abnormal            Final result                 Please view results for these tests on the individual orders.   GREEN TOP   LAVENDER TOP   GOLD TOP - SST   LIGHT BLUE TOP       Vitals Reviewed:    Vitals:    04/21/24 1915 04/21/24 2000 04/21/24 2045 04/21/24 2115   BP: 155/77 157/74 150/72 165/80   BP Location:       Patient Position:       Pulse: 72 74 73 70   Resp: 18 18 18    Temp:       SpO2: 99% 100% 100% 100%       MEDICATIONS GIVEN TO PATIENT THIS ENCOUNTER:  Medications   sodium chloride 0.9 % flush 10 mL (has no administration in time range)   amLODIPine (NORVASC) tablet 5 mg (has no administration in time range)   aspirin EC tablet 81 mg (has no administration in time range)   gabapentin (NEURONTIN) capsule 800 mg (has no administration in time range)   HYDROcodone-acetaminophen (NORCO) 5-325 MG per tablet 1 tablet (has no administration in time range)   ipratropium-albuterol (DUO-NEB) nebulizer solution 3 mL (has no administration in time range)   metoprolol succinate XL (TOPROL-XL) 24 hr tablet 50 mg (has no administration in time range)   nitroglycerin (NITROSTAT) SL tablet 0.4 mg (has no administration in time range)   pantoprazole (PROTONIX) EC tablet 40 mg (has no administration in time range)   pravastatin (PRAVACHOL)  tablet 80 mg (has no administration in time range)   sacubitril-valsartan (ENTRESTO) 24-26 MG tablet 1 tablet (has no administration in time range)   spironolactone (ALDACTONE) tablet 12.5 mg (has no administration in time range)       CONSULTS:  IP CONSULT TO CARDIOLOGY    CRITICAL CARE:  There was significant risk of life threatening deterioration of patient's condition requiring my direct management. Critical care time 0 minutes, excluding any documented procedures.    FINAL IMPRESSION      1. Chest pain, unspecified type          DISPOSITION / PLAN     ED Disposition       ED Disposition   Decision to Admit    Condition   --    Comment   Level of Care: Telemetry [5]   Diagnosis: Chest pain [865983]   Admitting Physician: VANE PARK [[id number]]   Attending Physician: VANE PARK [[id number]]                 PATIENT REFERRED TO:  No follow-up provider specified.    DISCHARGE MEDICATIONS:     Medication List        ASK your doctor about these medications      amLODIPine 5 MG tablet  Commonly known as: NORVASC     aspirin 81 MG EC tablet  Take 1 tablet by mouth Daily.     furosemide 40 MG tablet  Commonly known as: LASIX  Take 1 tablet by mouth Daily for 3 days, THEN 0.5 tablets Daily for 30 days.  Start taking on: December 1, 2023     gabapentin 800 MG tablet  Commonly known as: NEURONTIN     HYDROcodone-acetaminophen 5-325 MG per tablet  Commonly known as: NORCO     ipratropium-albuterol 0.5-2.5 mg/3 ml nebulizer  Commonly known as: DUO-NEB  Take 3 mL by nebulization Every 4 (Four) Hours As Needed for Shortness of Air.     metoprolol succinate XL 50 MG 24 hr tablet  Commonly known as: TOPROL-XL  Take 1 tablet by mouth Daily.     nitroglycerin 0.4 MG SL tablet  Commonly known as: NITROSTAT     omeprazole 40 MG capsule  Commonly known as: priLOSEC     pravastatin 40 MG tablet  Commonly known as: PRAVACHOL     sacubitril-valsartan 24-26 MG tablet  Commonly known as: ENTRESTO  Take 1 tablet by mouth 2 (Two) Times a Day.      spironolactone 25 MG tablet  Commonly known as: ALDACTONE  Take 1/2 tablet by mouth Daily.              Electronically signed by Wilver Escamilla DO, 04/21/24, 4:50 PM EDT.    Emergency Medicine Physician  [facility]  (Please note that portions of thisnote were completed with a voice recognition program.  Efforts were made to edit the dictations but occasionally words are mis-transcribed.)       Wilver Escamilla DO  04/21/24 2145       Wilver Escamilla DO  04/21/24 2144

## 2024-04-22 ENCOUNTER — APPOINTMENT (OUTPATIENT)
Dept: CARDIOLOGY | Facility: HOSPITAL | Age: 84
End: 2024-04-22
Payer: MEDICARE

## 2024-04-22 LAB
ANION GAP SERPL CALCULATED.3IONS-SCNC: 6.7 MMOL/L (ref 5–15)
BH CV ECHO MEAS - AO MAX PG: 20.2 MMHG
BH CV ECHO MEAS - AO MEAN PG: 11.7 MMHG
BH CV ECHO MEAS - AO ROOT DIAM: 2.8 CM
BH CV ECHO MEAS - AO V2 MAX: 224.3 CM/SEC
BH CV ECHO MEAS - AO V2 VTI: 56.2 CM
BH CV ECHO MEAS - AVA(I,D): 0.68 CM2
BH CV ECHO MEAS - EDV(CUBED): 105.8 ML
BH CV ECHO MEAS - EDV(MOD-SP2): 80.9 ML
BH CV ECHO MEAS - EDV(MOD-SP4): 59.4 ML
BH CV ECHO MEAS - EF(MOD-BP): 59.5 %
BH CV ECHO MEAS - EF(MOD-SP2): 62.5 %
BH CV ECHO MEAS - EF(MOD-SP4): 55.7 %
BH CV ECHO MEAS - EF_3D-VOL: 59 %
BH CV ECHO MEAS - ESV(CUBED): 37.3 ML
BH CV ECHO MEAS - ESV(MOD-SP2): 30.3 ML
BH CV ECHO MEAS - ESV(MOD-SP4): 26.3 ML
BH CV ECHO MEAS - FS: 29.4 %
BH CV ECHO MEAS - IVS/LVPW: 1.03 CM
BH CV ECHO MEAS - IVSD: 0.92 CM
BH CV ECHO MEAS - LA DIMENSION: 5.4 CM
BH CV ECHO MEAS - LAT PEAK E' VEL: 9.3 CM/SEC
BH CV ECHO MEAS - LV DIASTOLIC VOL/BSA (35-75): 39.1 CM2
BH CV ECHO MEAS - LV MASS(C)D: 145.3 GRAMS
BH CV ECHO MEAS - LV MAX PG: 1.55 MMHG
BH CV ECHO MEAS - LV MEAN PG: 1 MMHG
BH CV ECHO MEAS - LV SYSTOLIC VOL/BSA (12-30): 17.3 CM2
BH CV ECHO MEAS - LV V1 MAX: 62.2 CM/SEC
BH CV ECHO MEAS - LV V1 VTI: 15.1 CM
BH CV ECHO MEAS - LVIDD: 4.7 CM
BH CV ECHO MEAS - LVIDS: 3.3 CM
BH CV ECHO MEAS - LVOT AREA: 2.5 CM2
BH CV ECHO MEAS - LVOT DIAM: 1.8 CM
BH CV ECHO MEAS - LVPWD: 0.89 CM
BH CV ECHO MEAS - MED PEAK E' VEL: 6.7 CM/SEC
BH CV ECHO MEAS - MV A MAX VEL: 46.1 CM/SEC
BH CV ECHO MEAS - MV DEC SLOPE: 557 CM/SEC2
BH CV ECHO MEAS - MV DEC TIME: 0.19 SEC
BH CV ECHO MEAS - MV E MAX VEL: 107 CM/SEC
BH CV ECHO MEAS - MV E/A: 2.32
BH CV ECHO MEAS - MV MAX PG: 11.3 MMHG
BH CV ECHO MEAS - MV MEAN PG: 3 MMHG
BH CV ECHO MEAS - MV V2 VTI: 33.9 CM
BH CV ECHO MEAS - MVA(VTI): 1.13 CM2
BH CV ECHO MEAS - PA ACC TIME: 0.09 SEC
BH CV ECHO MEAS - PA V2 MAX: 151 CM/SEC
BH CV ECHO MEAS - RAP SYSTOLE: 8 MMHG
BH CV ECHO MEAS - RV MAX PG: 0.68 MMHG
BH CV ECHO MEAS - RV V1 MAX: 41.1 CM/SEC
BH CV ECHO MEAS - RV V1 VTI: 9.1 CM
BH CV ECHO MEAS - RVSP: 64 MMHG
BH CV ECHO MEAS - SV(LVOT): 38.4 ML
BH CV ECHO MEAS - SV(MOD-SP2): 50.6 ML
BH CV ECHO MEAS - SV(MOD-SP4): 33.1 ML
BH CV ECHO MEAS - SVI(MOD-SP2): 33.3 ML/M2
BH CV ECHO MEAS - SVI(MOD-SP4): 21.8 ML/M2
BH CV ECHO MEAS - TAPSE (>1.6): 1.62 CM
BH CV ECHO MEAS - TR MAX PG: 56 MMHG
BH CV ECHO MEAS - TR MAX VEL: 376 CM/SEC
BH CV ECHO MEASUREMENTS AVERAGE E/E' RATIO: 13.38
BH CV XLRA - RV BASE: 4.7 CM
BH CV XLRA - RV LENGTH: 7.6 CM
BH CV XLRA - RV MID: 3.9 CM
BH CV XLRA - TDI S': 9.4 CM/SEC
BUN SERPL-MCNC: 37 MG/DL (ref 8–23)
BUN/CREAT SERPL: 33.9 (ref 7–25)
CALCIUM SPEC-SCNC: 9.2 MG/DL (ref 8.6–10.5)
CHLORIDE SERPL-SCNC: 102 MMOL/L (ref 98–107)
CO2 SERPL-SCNC: 35.3 MMOL/L (ref 22–29)
CREAT SERPL-MCNC: 1.09 MG/DL (ref 0.57–1)
DEPRECATED RDW RBC AUTO: 51.4 FL (ref 37–54)
EGFRCR SERPLBLD CKD-EPI 2021: 49.9 ML/MIN/1.73
ERYTHROCYTE [DISTWIDTH] IN BLOOD BY AUTOMATED COUNT: 13.5 % (ref 12.3–15.4)
GLUCOSE SERPL-MCNC: 88 MG/DL (ref 65–99)
HCT VFR BLD AUTO: 36.4 % (ref 34–46.6)
HGB BLD-MCNC: 11.1 G/DL (ref 12–15.9)
LEFT ATRIUM VOLUME INDEX: 26.8 ML/M2
MCH RBC QN AUTO: 31.4 PG (ref 26.6–33)
MCHC RBC AUTO-ENTMCNC: 30.5 G/DL (ref 31.5–35.7)
MCV RBC AUTO: 103.1 FL (ref 79–97)
PLATELET # BLD AUTO: 138 10*3/MM3 (ref 140–450)
PMV BLD AUTO: 10.5 FL (ref 6–12)
POTASSIUM SERPL-SCNC: 4.3 MMOL/L (ref 3.5–5.2)
RBC # BLD AUTO: 3.53 10*6/MM3 (ref 3.77–5.28)
SODIUM SERPL-SCNC: 144 MMOL/L (ref 136–145)
TROPONIN T SERPL HS-MCNC: 44 NG/L
WBC NRBC COR # BLD AUTO: 4.45 10*3/MM3 (ref 3.4–10.8)

## 2024-04-22 PROCEDURE — 99214 OFFICE O/P EST MOD 30 MIN: CPT | Performed by: INTERNAL MEDICINE

## 2024-04-22 PROCEDURE — G0378 HOSPITAL OBSERVATION PER HR: HCPCS

## 2024-04-22 PROCEDURE — 93306 TTE W/DOPPLER COMPLETE: CPT | Performed by: INTERNAL MEDICINE

## 2024-04-22 PROCEDURE — 84484 ASSAY OF TROPONIN QUANT: CPT | Performed by: INTERNAL MEDICINE

## 2024-04-22 PROCEDURE — 93306 TTE W/DOPPLER COMPLETE: CPT

## 2024-04-22 PROCEDURE — 85027 COMPLETE CBC AUTOMATED: CPT | Performed by: INTERNAL MEDICINE

## 2024-04-22 PROCEDURE — 25010000002 ENOXAPARIN PER 10 MG: Performed by: INTERNAL MEDICINE

## 2024-04-22 PROCEDURE — 99232 SBSQ HOSP IP/OBS MODERATE 35: CPT | Performed by: STUDENT IN AN ORGANIZED HEALTH CARE EDUCATION/TRAINING PROGRAM

## 2024-04-22 PROCEDURE — 96372 THER/PROPH/DIAG INJ SC/IM: CPT

## 2024-04-22 PROCEDURE — 80048 BASIC METABOLIC PNL TOTAL CA: CPT | Performed by: INTERNAL MEDICINE

## 2024-04-22 RX ADMIN — SACUBITRIL AND VALSARTAN 1 TABLET: 24; 26 TABLET, FILM COATED ORAL at 20:08

## 2024-04-22 RX ADMIN — Medication 10 ML: at 08:46

## 2024-04-22 RX ADMIN — HYDROCODONE BITARTRATE AND ACETAMINOPHEN 1 TABLET: 5; 325 TABLET ORAL at 16:02

## 2024-04-22 RX ADMIN — PANTOPRAZOLE SODIUM 40 MG: 40 TABLET, DELAYED RELEASE ORAL at 05:17

## 2024-04-22 RX ADMIN — GABAPENTIN 800 MG: 400 CAPSULE ORAL at 21:15

## 2024-04-22 RX ADMIN — GABAPENTIN 800 MG: 400 CAPSULE ORAL at 05:17

## 2024-04-22 RX ADMIN — METOPROLOL SUCCINATE 50 MG: 50 TABLET, EXTENDED RELEASE ORAL at 08:46

## 2024-04-22 RX ADMIN — ASPIRIN 81 MG: 81 TABLET, COATED ORAL at 08:46

## 2024-04-22 RX ADMIN — GABAPENTIN 800 MG: 400 CAPSULE ORAL at 14:06

## 2024-04-22 RX ADMIN — PRAVASTATIN SODIUM 80 MG: 20 TABLET ORAL at 08:45

## 2024-04-22 RX ADMIN — Medication 10 ML: at 20:08

## 2024-04-22 RX ADMIN — SPIRONOLACTONE 12.5 MG: 25 TABLET, FILM COATED ORAL at 08:45

## 2024-04-22 RX ADMIN — HYDROCODONE BITARTRATE AND ACETAMINOPHEN 1 TABLET: 5; 325 TABLET ORAL at 08:56

## 2024-04-22 RX ADMIN — SACUBITRIL AND VALSARTAN 1 TABLET: 24; 26 TABLET, FILM COATED ORAL at 08:56

## 2024-04-22 RX ADMIN — AMLODIPINE BESYLATE 5 MG: 5 TABLET ORAL at 08:45

## 2024-04-22 RX ADMIN — ENOXAPARIN SODIUM 30 MG: 100 INJECTION SUBCUTANEOUS at 20:08

## 2024-04-22 NOTE — PLAN OF CARE
Goal Outcome Evaluation:  Plan of Care Reviewed With: patient           Outcome Evaluation: New Admission-planned transfer to [facility]. Will continue to monitor.

## 2024-04-22 NOTE — CONSULTS
"[facility] Consult Note    Referring Provider: Kerley  Reason for Consultation: NSTEMI    Patient Care Team:  Nj Smith MD as PCP - General (Family Medicine)    Chief complaint : Chest pain    Subjective:    History of present illness: This is an 83-year-old female patient with known coronary artery disease who presented to the emergency room with chest discomfort.  The patient described having a diffuse anterior pressure sensation across her central chest described as a \"truck sitting on my chest\".  The discomfort radiated into her central back.  It was associated with shortness of breath.  It had a 10/10 intensity.  The discomfort lasted for over an hour before she sought medical attention.  The patient reports the quality of the discomfort was similar to that which she experienced prior to coronary stent placement.  The patient indicates that she has had at least 2 prior coronary stents placed, the most recent she feels to be around 2015.  Her previous cardiologist was Dr. Strauss.  In the emergency room her blood pressure was significantly elevated at 188/92.  Twelve-lead electrocardiogram showed a paced ventricular rhythm with underlying sinus mechanism.  Cardiac troponins were mildly elevated in a \"plateau-type\" pattern.  She is a reformed smoker.  She has a history of hypertension and reports compliance with her medications.  She has a history of COPD on 3 L of oxygen at home.  She has a history of sick sinus syndrome and has a previously placed dual-chamber rate responsive pacemaker.  She has a history of chronic diastolic heart failure.  An echocardiogram was performed at bedside which showed an ejection fraction of greater than 55% with no regional wall motion abnormalities.  She was demonstrated to have senile calcific valvular aortic sclerosis without significant stenosis.  She had mild mitral and tricuspid regurgitation.  Estimated right ventricular systolic pressures were between 60-70 mmHg.  " The patient's daughter thinks that she has had a total of 2 prior coronary stents placed.  The details are unknown.  These were all performed at [facility] in [facility].  The patient's daughter thinks that her last coronary stent was placed in 2015.  The patient also reports that the last attempt to do a heart catheterization from her right wrist resulted in a severe hematoma.  Initially it was felt that she would require vascular surgery but apparently this complication resolved without surgical intervention.  She indicates that she has had at least 3 heart catheterizations from her groin.    Review of Systems   Review of Systems   Constitutional: Negative for chills, diaphoresis, fever, malaise/fatigue, weight gain and weight loss.   HENT:  Negative for ear discharge, hearing loss, hoarse voice and nosebleeds.    Eyes:  Negative for discharge, double vision, pain and photophobia.   Cardiovascular:  Positive for chest pain. Negative for claudication, cyanosis, dyspnea on exertion, irregular heartbeat, leg swelling, near-syncope, orthopnea, palpitations, paroxysmal nocturnal dyspnea and syncope.   Respiratory:  Positive for shortness of breath. Negative for cough, hemoptysis, sputum production and wheezing.    Endocrine: Negative for cold intolerance, heat intolerance, polydipsia, polyphagia and polyuria.   Hematologic/Lymphatic: Negative for adenopathy and bleeding problem. Does not bruise/bleed easily.   Skin:  Negative for color change, flushing, itching and rash.   Musculoskeletal:  Negative for muscle cramps, muscle weakness, myalgias and stiffness.   Gastrointestinal:  Negative for abdominal pain, diarrhea, hematemesis, hematochezia, nausea and vomiting.   Genitourinary:  Negative for dysuria, frequency and nocturia.   Neurological:  Negative for focal weakness, loss of balance, numbness, paresthesias and seizures.   Psychiatric/Behavioral:  Negative for altered mental status,  hallucinations and suicidal ideas.    Allergic/Immunologic: Negative for HIV exposure, hives and persistent infections.       History  Past Medical History:   Diagnosis Date    Cataracts, bilateral     CHF (congestive heart failure)     COPD (chronic obstructive pulmonary disease)     Coronary artery disease     Hyperlipidemia     Hypertension     Impaired functional mobility, balance, gait, and endurance     Myocardial infarction     Oxygen deficit     Pneumonia     RLS (restless legs syndrome)     Seizures    ,   Past Surgical History:   Procedure Laterality Date    CAROTID ENDARTERECTOMY Left     CATARACT EXTRACTION W/ INTRAOCULAR LENS IMPLANT Right 7/27/2023    Procedure: CATARACT PHACO EXTRACTION WITH INTRAOCULAR LENS IMPLANT RIGHT;  Surgeon: Dajuan Blackburn MD;  Location: [facility];  Service: Ophthalmology;  Laterality: Right;    CATARACT EXTRACTION W/ INTRAOCULAR LENS IMPLANT Left 8/10/2023    Procedure: CATARACT PHACO EXTRACTION WITH INTRAOCULAR LENS IMPLANT LEFT;  Surgeon: Dajuan Blackburn MD;  Location: [facility];  Service: Ophthalmology;  Laterality: Left;    CORONARY ANGIOPLASTY WITH STENT PLACEMENT      x2    LUNG SURGERY Left     PACEMAKER IMPLANTATION     , History reviewed. No pertinent family history.,   Social History     Tobacco Use    Smoking status: Former    Smokeless tobacco: Former     Quit date: 2001   Vaping Use    Vaping status: Never Used   Substance Use Topics    Alcohol use: No    Drug use: No   ,   Medications Prior to Admission   Medication Sig Dispense Refill Last Dose    amLODIPine (NORVASC) 5 MG tablet Take 1 tablet by mouth Daily.       aspirin 81 MG EC tablet Take 1 tablet by mouth Daily. 30 tablet 0     furosemide (LASIX) 40 MG tablet Take 1 tablet by mouth Daily for 3 days, THEN 0.5 tablets Daily for 30 days. 18 tablet 0     gabapentin (NEURONTIN) 800 MG tablet Take 1 tablet by mouth 3 (Three) Times a Day.       HYDROcodone-acetaminophen (NORCO) 5-325 MG per tablet  "Take 1 tablet by mouth Every 6 (Six) Hours As Needed.       ipratropium-albuterol (DUO-NEB) 0.5-2.5 mg/3 ml nebulizer Take 3 mL by nebulization Every 4 (Four) Hours As Needed for Shortness of Air. 360 mL 0     metoprolol succinate XL (TOPROL-XL) 50 MG 24 hr tablet Take 1 tablet by mouth Daily. 30 tablet 2     nitroglycerin (NITROSTAT) 0.4 MG SL tablet Place 1 tablet under the tongue Every 5 (Five) Minutes As Needed for Chest Pain. Take no more than 3 doses in 15 minutes.       omeprazole (priLOSEC) 40 MG capsule Take 1 capsule by mouth Daily.       pravastatin (PRAVACHOL) 40 MG tablet Take 2 tablets by mouth Daily.       sacubitril-valsartan (ENTRESTO) 24-26 MG tablet Take 1 tablet by mouth 2 (Two) Times a Day. 28 tablet 0     spironolactone (ALDACTONE) 25 MG tablet Take 1/2 tablet by mouth Daily. 45 tablet 2     and Allergies:  Ceftin [cefuroxime axetil], Codeine, Crestor [rosuvastatin], Doxycycline, Milk-related compounds, Morphine, Sulfa antibiotics, and Penicillins    Objective:    Vital Sign Min/Max for last 24 hours  Temp  Min: 98 °F (36.7 °C)  Max: 98.6 °F (37 °C)   BP  Min: 115/54  Max: 188/92   Pulse  Min: 70  Max: 81   Resp  Min: 16  Max: 20   SpO2  Min: 91 %  Max: 100 %   Flow (L/min)  Min: 3  Max: 3   Weight  Min: 52.4 kg (115 lb 8.3 oz)  Max: 55.8 kg (123 lb 0.3 oz)     Flowsheet Rows      Flowsheet Row First Filed Value   Admission Height 152.4 cm (60\") Documented at 04/22/2024 0720   Admission Weight 55.8 kg (123 lb 0.3 oz) Documented at 04/22/2024 0720                 Echo EF Estimated  Lab Results   Component Value Date    ECHOEFEST 62.0 04/13/2023           Physical Exam:   Vitals and nursing note reviewed.   Constitutional:       Appearance: Not in distress. Cachectic and frail. Chronically ill-appearing.   Neck:      Vascular: No JVR. JVD normal.   Pulmonary:      Effort: Pulmonary effort is normal.      Breath sounds: Normal breath sounds. No wheezing. No rhonchi. No rales.   Chest:      Chest " wall: Not tender to palpatation.   Cardiovascular:      PMI at left midclavicular line. Normal rate. Regular rhythm. Normal S1. Normal S2.       Murmurs: There is no murmur.      No gallop.  No click. No rub.   Pulses:     Intact distal pulses.   Edema:     Peripheral edema absent.   Abdominal:      General: Bowel sounds are normal.      Palpations: Abdomen is soft.      Tenderness: There is no abdominal tenderness.   Musculoskeletal: Normal range of motion.         General: No tenderness. Skin:     General: Skin is warm and dry.   Neurological:      General: No focal deficit present.      Mental Status: Alert and oriented to person, place and time.         Results Review:   I reviewed the patient's new clinical results.  Results from last 7 days   Lab Units 04/22/24  0652 04/21/24  1713   WBC 10*3/mm3 4.45 4.41   HEMOGLOBIN g/dL 11.1* 11.1*   HEMATOCRIT % 36.4 35.6   PLATELETS 10*3/mm3 138* 148     Results from last 7 days   Lab Units 04/22/24  0652 04/21/24  1713 04/18/24  1516   SODIUM mmol/L 144 141 137   POTASSIUM mmol/L 4.3 5.0 5.5*   CHLORIDE mmol/L 102 101 96*   CO2 mmol/L 35.3* 33.5* 32.4*   BUN mg/dL 37* 40* 38*   CREATININE mg/dL 1.09* 1.16* 1.46*   GLUCOSE mg/dL 88 82 124*   CALCIUM mg/dL 9.2 9.6 9.4     Lab Results   Lab Value Date/Time    TROPONINT 44 (H) 04/22/2024 0652    TROPONINT 49 (H) 04/21/2024 1919    TROPONINT 43 (H) 04/21/2024 1713    TROPONINT 76 (C) 12/20/2023 1717    TROPONINT 81 (C) 12/20/2023 1146    TROPONINT 96 (C) 12/20/2023 0906    TROPONINT 31 (H) 11/29/2023 2254    TROPONINT 34 (H) 11/29/2023 2022    TROPONINT 26 (H) 11/12/2023 0952    TROPONINT 29 (H) 09/08/2023 2100    TROPONINT 30 (H) 09/08/2023 1848    TROPONINT 25 (H) 04/13/2023 0714    TROPONINT 26 (H) 04/13/2023 0527    TROPONINT 23 (H) 04/12/2023 1439    TROPONINT <0.010 04/07/2021 1422    TROPONINT <0.010 12/08/2019 1858    TROPONINT <0.010 12/08/2019 1716    TROPONINT <0.010 11/21/2019 0912        "      Assessment/Plan:      Chest pain    COPD (chronic obstructive pulmonary disease)    Essential hypertension    Status post placement of cardiac pacemaker    Chronic heart failure with preserved ejection fraction (HFpEF)      I have recommended invasive coronary angiography with interventional standby.  Mrs. Calero has been engaged in a patient level discussion explaining the rationale for invasive testing.  The procedure of coronary angiography with catheter-based coronary revascularization has been explained in detail to the patient and her daughter.  Using layman's terminology, the benefits, risks and alternatives were explained in detail.  The patient expresses understanding.  I have explained to the patient that she is at high risk of a complication given her age, frailty, overall debility and vascular complications experienced with previous coronary angiography.  For this reason, I have recommended the procedure be performed at a university hospital setting or tertiary \"[facility]\".  The patient and daughter request transfer to [facility].  She is stable for transportation by ambulance.  I have spoken with the [facility] transfer center and the patient has been accepted by Dr. Myers and will transfer by ambulance once a bed is available.    I discussed the patient's findings and my recommendations with patient, family, and consulting provider    José Manuel Nguyen MD  04/22/24  15:33 EDT          "

## 2024-04-22 NOTE — H&P
[facility]   HISTORY AND PHYSICAL      Name:  Lesley Calero   Age:  83 y.o.  Sex:  female  :  1940  MRN:  [id number]   Visit Number:  [id number]  Admission Date:  2024  Date Of Service:  24  Primary Care Physician:  Nj Smith MD    Chief Complaint:     Chest pain and shortness of breath.    History Of Presenting Illness:      Lesley Calero is an 83-year-old female with history of coronary artery disease, diastolic heart failure, COPD on home oxygen at 3 L, hypertension, sick sinus syndrome status post pacemaker was brought to the emergency room by EMS with symptoms of chest pain and shortness of breath.  Patient states that her blood pressure went really high at home in the 200s and she started developing chest pressure and shortness of breath.  She lives with her  and her daughter lives with them to take care of them.  She does use home oxygen.  She currently denies any chest pain.  Patient did get aspirin and nitroglycerin by EMS.    In the emergency room, she was afebrile and hemodynamically stable saturating at 96% on 3 L of nasal cannula oxygen.  Initial troponin 43 with repeat at 49.  proBNP 13,580.  CMP was remarkable for a BUN of 40 (baseline) and creatinine of 1.16 (baseline).  CBC was unremarkable except for hemoglobin of 11 and MCV of 104.  COVID and flu test were negative.  EKG showed paced rhythm without any ST elevations.  Chest x-ray did not show any infiltrates but showed cardiomegaly.  Patient was given aspirin 324 mg in the emergency room.  Her condition was discussed with Dr. Astorga from cardiology and the patient was admitted to the medical floor with telemetry for evaluation of chest pain and elevated troponins.    Review Of Systems:    All systems were reviewed and negative except as mentioned in history of presenting illness, assessment and plan.    Past Medical History: Patient  has a past medical history of Cataracts, bilateral,  CHF (congestive heart failure), COPD (chronic obstructive pulmonary disease), Coronary artery disease, Hyperlipidemia, Hypertension, Impaired functional mobility, balance, gait, and endurance, Myocardial infarction, Oxygen deficit, Pneumonia, RLS (restless legs syndrome), and Seizures.    Past Surgical History: Patient  has a past surgical history that includes Coronary angioplasty with stent; Pacemaker Implantation; Carotid endarterectomy (Left); Lung surgery (Left); Cataract extraction w/ intraocular lens implant (Right, 7/27/2023); and Cataract extraction w/ intraocular lens implant (Left, 8/10/2023).    Social History: Patient  reports that she has quit smoking. She quit smokeless tobacco use about 23 years ago. She reports that she does not drink alcohol and does not use drugs.    Family History:  Patient denies any significant family history.    Allergies:      Ceftin [cefuroxime axetil], Codeine, Crestor [rosuvastatin], Doxycycline, Morphine, Sulfa antibiotics, and Penicillins    Home Medications:    Prior to Admission Medications       Prescriptions Last Dose Informant Patient Reported? Taking?    amLODIPine (NORVASC) 5 MG tablet   Yes No    Take 1 tablet by mouth Daily.    aspirin 81 MG EC tablet   No No    Take 1 tablet by mouth Daily.    furosemide (LASIX) 40 MG tablet   No No    Take 1 tablet by mouth Daily for 3 days, THEN 0.5 tablets Daily for 30 days.    gabapentin (NEURONTIN) 800 MG tablet   Yes No    Take 1 tablet by mouth 3 (Three) Times a Day.    HYDROcodone-acetaminophen (NORCO) 5-325 MG per tablet   Yes No    Take 1 tablet by mouth Every 6 (Six) Hours As Needed.    ipratropium-albuterol (DUO-NEB) 0.5-2.5 mg/3 ml nebulizer   No No    Take 3 mL by nebulization Every 4 (Four) Hours As Needed for Shortness of Air.    metoprolol succinate XL (TOPROL-XL) 50 MG 24 hr tablet   No No    Take 1 tablet by mouth Daily.    nitroglycerin (NITROSTAT) 0.4 MG SL tablet   Yes No    Place 1 tablet under the tongue  Every 5 (Five) Minutes As Needed for Chest Pain. Take no more than 3 doses in 15 minutes.    omeprazole (priLOSEC) 40 MG capsule   Yes No    Take 1 capsule by mouth Daily.    pravastatin (PRAVACHOL) 40 MG tablet   Yes No    Take 2 tablets by mouth Daily.    sacubitril-valsartan (ENTRESTO) 24-26 MG tablet   No No    Take 1 tablet by mouth 2 (Two) Times a Day.    spironolactone (ALDACTONE) 25 MG tablet   No No    Take 1/2 tablet by mouth Daily.     ED Medications:    Medications   sodium chloride 0.9 % flush 10 mL (has no administration in time range)   aspirin chewable tablet 324 mg (324 mg Oral Not Given 4/21/24 1717)     Vital Signs:  Temp:  [98.6 °F (37 °C)] 98.6 °F (37 °C)  Heart Rate:  [70-74] 74  Resp:  [18-20] 18  BP: (146-188)/(71-92) 157/74    There were no vitals filed for this visit.  There is no height or weight on file to calculate BMI.    Physical Exam:     Most recent vital Signs: /74   Pulse 74   Temp 98.6 °F (37 °C)   Resp 18   SpO2 100%     Physical Exam  Constitutional:       General: She is not in acute distress.     Appearance: Normal appearance. She is not ill-appearing.      Comments: Elderly frail female.   HENT:      Head: Normocephalic and atraumatic.      Right Ear: External ear normal.      Left Ear: External ear normal.      Nose: Nose normal.      Mouth/Throat:      Mouth: Mucous membranes are moist.   Eyes:      Conjunctiva/sclera: Conjunctivae normal.   Cardiovascular:      Rate and Rhythm: Normal rate and regular rhythm.      Pulses: Normal pulses.      Heart sounds: Normal heart sounds. No murmur heard.     Comments: Pacemaker palpated in the right upper chest.  Pulmonary:      Effort: Pulmonary effort is normal.      Breath sounds: Rales present. No wheezing.      Comments: Occasional basal crackles heard.  Abdominal:      General: Bowel sounds are normal.      Palpations: Abdomen is soft.      Tenderness: There is no abdominal tenderness. There is no guarding or rebound.    Musculoskeletal:         General: Normal range of motion.      Right lower leg: No edema.      Left lower leg: No edema.   Skin:     General: Skin is warm.      Findings: No erythema or rash.   Neurological:      General: No focal deficit present.      Mental Status: She is alert and oriented to person, place, and time. Mental status is at baseline.   Psychiatric:         Mood and Affect: Mood normal.         Behavior: Behavior normal.       Laboratory data:    I have reviewed the labs done in the emergency room.    Results from last 7 days   Lab Units 04/21/24  1713 04/18/24  1516   SODIUM mmol/L 141 137   POTASSIUM mmol/L 5.0 5.5*   CHLORIDE mmol/L 101 96*   CO2 mmol/L 33.5* 32.4*   BUN mg/dL 40* 38*   CREATININE mg/dL 1.16* 1.46*   CALCIUM mg/dL 9.6 9.4   BILIRUBIN mg/dL 0.3  --    ALK PHOS U/L 76  --    ALT (SGPT) U/L 17  --    AST (SGOT) U/L 42*  --    GLUCOSE mg/dL 82 124*     Results from last 7 days   Lab Units 04/21/24  1713   WBC 10*3/mm3 4.41   HEMOGLOBIN g/dL 11.1*   HEMATOCRIT % 35.6   PLATELETS 10*3/mm3 148         Results from last 7 days   Lab Units 04/21/24  1919 04/21/24  1713   HSTROP T ng/L 49* 43*     Results from last 7 days   Lab Units 04/21/24 1919   PROBNP pg/mL 13,580.0*     EKG:      EKG done in the emergency room was reviewed by me.  It shows ventricular paced rhythm at a rate of 70 bpm with broad QRS complexes.  Nonspecific ST-T changes were noted.    Radiology:    XR Chest 1 View    Result Date: 4/21/2024  PROCEDURE: XR CHEST 1 VW-  HISTORY: Chest pain protocol  COMPARISON: 12/20/2023  FINDINGS:  Portable view of the chest demonstrates chronic lung changes. No acute infiltrate is seen. There is no evidence of effusion, pneumothorax or other significant pleural disease. The mediastinum is unremarkable.  The heart size is mildly enlarged. Right-sided pacer is present.      Chronic lung changes.         Assessment:    Chest pain with elevated troponin levels, POA.  Chronic diastolic  heart failure, POA.  Coronary artery disease.  Hypertensive heart disease.  Chronic kidney disease stage II.  COPD, no evidence of exacerbation.  Chronic hypoxic respiratory failure on home oxygen.  Sick sinus syndrome status post pacemaker.  Essential hypertension.    Plan:    Chest pain with elevated troponins.  - Patient did have elevated troponins with significant delta change and elevated proBNP levels.  - This could be related to demand ischemia and type II myocardial infarction due to elevated blood pressures.  - Continue aspirin, statin therapy, Toprol-XL.  - We will consult Dr. Nguyen in the morning for further recommendations.    Chronic diastolic heart failure.  - No evidence of exacerbation.  - Continue Toprol-XL, Entresto and spironolactone.    COPD  - Continue bronchodilators and oxygen.    Consult physical therapy.    Risk Assessment: Moderate  DVT Prophylaxis: Enoxaparin  Code Status: Full  Diet: Cardiac      Dawood Ruiz MD  04/21/24  20:53 EDT    Dictated utilizing Dragon dictation.

## 2024-04-22 NOTE — CASE MANAGEMENT/SOCIAL WORK
Discharge Planning Assessment  [facility]     Patient Name: Lesley Calero  MRN: [id number]  Today's Date: 4/22/2024    Admit Date: 4/21/2024    Plan: The patient is awake and able to answer questions.  She is a current patient of Dr. Smith and gets her medications from [facility].  She elects to enroll in Meds to Bed.  She uses a walker, wheelchair, cane and oxygen (Inogen) at home.  She denies the need for additional DME or services at [place].  At the time of DC the patient plans to return to the home she shares with her  and daughter.  The patient's daughter serves as the primary care giver for her and her .  Questions and concerns were addressed at the time of this conversation.  BALDERRAMA delivered at earlier time.  Will provide additional resources and information upon patient request.   Discharge Needs Assessment       Row Name 04/22/24 1149       Living Environment    People in Home child(seth), adult;spouse    Name(s) of People in Home Roberto Calero,  and Anel Garcias, daughter    Current Living Arrangements home    Duration at Residence 26 years    Potentially Unsafe Housing Conditions none    In the past 12 months has the electric, gas, oil, or water company threatened to shut off services in your home? No    Primary Care Provided by self;child(seth)    Provides Primary Care For no one;no one, unable/limited ability to care for self    Family Caregiver if Needed none    Quality of Family Relationships helpful;involved;supportive    Able to Return to Prior Arrangements yes       Resource/Environmental Concerns    Resource/Environmental Concerns none    Transportation Concerns none       Transportation Needs    In the past 12 months, has lack of transportation kept you from medical appointments or from getting medications? no    In the past 12 months, has lack of transportation kept you from meetings, work, or from getting things needed for daily living? No       Food Insecurity    Within the past  12 months, you worried that your food would run out before you got the money to buy more. Never true    Within the past 12 months, the food you bought just didn't last and you didn't have money to get more. Never true       Transition Planning    Patient/Family Anticipates Transition to home with family    Patient/Family Anticipated Services at Transition none    Transportation Anticipated family or friend will provide       Discharge Needs Assessment    Readmission Within the Last 30 Days no previous admission in last 30 days    Equipment Currently Used at Home walker, rolling;wheelchair;oxygen;cane, straight    Concerns to be Addressed denies needs/concerns at this time    Anticipated Changes Related to Illness none    Equipment Needed After Discharge none    Provided Post Acute Provider List? N/A    N/A Provider List Comment Patient plans to return home; no DME needs    Provided Post Acute Provider Quality & Resource List? N/A    N/A Quality & Resource List Comment Patient plans to return home; no DME needs    Current Discharge Risk chronically ill                   Discharge Plan       Row Name 04/22/24 5374       Plan    Plan The patient is awake and able to answer questions.  She is a current patient of Dr. Smith and [facility].  She elects to enroll in Meds to Bed.  She uses a walker, wheelchair, cane and oxygen (Inogen) at home.  She denies the need for additional DME or services at DC.  At the time of DC the patient plans to return to the home she shares with her  and daughter.  The patient's daughter serves as the primary care giver for her and her .  Questions and concerns were addressed at the time of this conversation.  BALDERRAMA delivered at earlier time.  Will provide additional resources and information upon patient request.    Patient/Family in Agreement with Plan yes    Provided Post Acute Provider List? N/A    N/A Provider List Comment Patient plans to return home; no  DME needs    Provided Post Acute Provider Quality & Resource List? N/A    N/A Quality & Resource List Comment Patient plans to return home; no DME needs    Plan Comments Denies needs at this time    Final Discharge Disposition Code 30 - still a patient    Final Note Plans to DC home with family                  Continued Care and Services - Admitted Since 4/21/2024    No active coordination exists for this encounter.       Selected Continued Care - Episodes Includes continued care and service providers with selected services from the active episodes listed below      Heart Failure Episode start date: 3/27/2024   There are no active outsourced providers for this episode.                    Demographic Summary       Row Name 04/22/24 1148       General Information    Admission Type observation    Arrived From emergency department    Required Notices Provided Observation Status Notice    Referral Source admission list    Reason for Consult discharge planning    Preferred Language English       Contact Information    Permission Granted to Share Info With                    Functional Status       Row Name 04/22/24 1148       Functional Status    Usual Activity Tolerance good    Current Activity Tolerance fair       Physical Activity    On average, how many days per week do you engage in moderate to strenuous exercise (like a brisk walk)? 0 days    On average, how many minutes do you engage in exercise at this level? 0 min    Number of minutes of exercise per week 0       Assessment of Health Literacy    How often do you have someone help you read hospital materials? Never    How often do you have problems learning about your medical condition because of difficulty understanding written information? Never    How often do you have a problem understanding what is told to you about your medical condition? Never    How confident are you filling out medical forms by yourself? Extremely    Health Literacy Excellent        Functional Status, IADL    Medications assistive person    Meal Preparation assistive person    Housekeeping assistive person    Laundry assistive person    Shopping assistive person       Mental Status    General Appearance WDL WDL       Mental Status Summary    Recent Changes in Mental Status/Cognitive Functioning no changes                   Psychosocial       Row Name 04/22/24 1148       Values/Beliefs    Spiritual, Cultural Beliefs, Gnosticism Practices, Values that Affect Care no       Behavior WDL    Behavior WDL WDL       Emotion Mood WDL    Emotion/Mood/Affect WDL WDL       Speech WDL    Speech WDL WDL       Perceptual State WDL    Perceptual State WDL WDL       Thought Process WDL    Thought Process WDL WDL       Intellectual Performance WDL    Intellectual Performance WDL WDL                   Abuse/Neglect       Row Name 04/22/24 1149       Personal Safety    Feels Unsafe at Home or Work/School no    Feels Threatened by Someone no    Does Anyone Try to Keep You From Having Contact with Others or Doing Things Outside Your Home? no    Physical Signs of Abuse Present no                   Legal       Row Name 04/22/24 1149       Financial Resource Strain    How hard is it for you to pay for the very basics like food, housing, medical care, and heating? Not hard                   Substance Abuse       Row Name 04/22/24 1149       Substance Use    Substance Use Status never used                   Patient Forms       Row Name 04/22/24 1153       Patient Forms    Patient Observation Letter Delivered    Delivered to Patient    Method of delivery In person                      Rosa Maria Capellan RN

## 2024-04-22 NOTE — DISCHARGE SUMMARY
[facility]   DISCHARGE SUMMARY      Name:  Lesley Calero   Age:  83 y.o.  Sex:  female  :  1940  MRN:  [id number]   Visit Number:  [id number]    Admission Date:  2024  Date of Discharge:  2024  Primary Care Physician:  Nj Smith MD    Important issues to note:    -Transfer to [facility] for invasive coronary angiography and due to high risk done in a facility with interventional cardiology on standby    Discharge Diagnoses:     Reduced ejection fraction right ventricle  Chest pain  Chronic heart failure with preserved ejection fraction  COPD  Hypertension  Status postplacement of cardiac pacemaker    Problem List:     Active Hospital Problems    Diagnosis  POA    **Chest pain [R07.9]  Yes    Chronic heart failure with preserved ejection fraction (HFpEF) [I50.32]  Yes    COPD (chronic obstructive pulmonary disease) [J44.9]  Yes    Essential hypertension [I10]  Yes    Status post placement of cardiac pacemaker [Z95.0]  Yes      Resolved Hospital Problems   No resolved problems to display.     Presenting Problem:    Chief Complaint   Patient presents with    Chest Pain    Shortness of Breath     Pt called ems for chest pain and soa. Pt is on O2 all the time. Asa and 1 nitro given. Pt has hx of chf, pacemaker. Pt had blood draw on Friday and her Potasium was high.       Consults:     Consulting Physician(s)         Provider   Role José Manuel Denney MD      Consulting Physician Cardiology          Procedures Performed:    none    History of presenting illness/Hospital Course:    Leslye Calero is an 83-year-old female with history of coronary artery disease, diastolic heart failure, COPD on home oxygen at 3 L, hypertension, sick sinus syndrome status post pacemaker was brought to the emergency room by EMS with symptoms of chest pain and shortness of breath.  Patient states that her blood pressure went really high at home in the 200s and  she started developing chest pressure and shortness of breath.  She lives with her  and her daughter lives with them to take care of them. Patient did get aspirin and nitroglycerin by EMS.     In the emergency room, she was afebrile and hemodynamically stable saturating at 96% on 3 L of nasal cannula oxygen.  Initial troponin 43 with repeat at 49.  proBNP 13,580.  CMP was remarkable for a BUN of 40 (baseline) and creatinine of 1.16 (baseline).  CBC was unremarkable except for hemoglobin of 11 and MCV of 104.  COVID and flu test were negative.  EKG showed paced rhythm without any ST elevations.  Chest x-ray did not show any infiltrates but showed cardiomegaly.  Patient was given aspirin 324 mg in the emergency room.     Assessed by cardiology-Dr. Nguyen, recommended invasive coronary angiography due to patient high risk, recommend this to take place at a tertiary care center with interventional on standby.  Patient has been accepted by Dr. Kredan [facility] for transfer.      I have reviewed the copied text and it is accurate as of 4/23/24    Vital Signs:    Temp:  [98 °F (36.7 °C)-98.6 °F (37 °C)] 98.4 °F (36.9 °C)  Heart Rate:  [70-81] 73  Resp:  [16-20] 18  BP: (115-188)/() 144/80    Physical Exam:    General Appearance:  Alert and cooperative.    Head:  Atraumatic and normocephalic.   Eyes: Conjunctivae and sclerae normal, no icterus. No pallor.   Ears:  Ears with no abnormalities noted.   Throat: No oral lesions, no thrush, oral mucosa moist.   Neck: Supple, trachea midline, no thyromegaly.   Back:   No kyphoscoliosis present. No tenderness to palpation.   Lungs:   Breath sounds heard bilaterally equally.  No crackles or wheezing. No Pleural rub or bronchial breathing.   Heart:  Normal S1 and S2, no murmur, no gallop, no rub. No JVD.   Abdomen:   Normal bowel sounds, no masses, no organomegaly. Soft, nontender, nondistended, no rebound tenderness.   Extremities: Supple, no edema, no  cyanosis, no clubbing.   Pulses: Pulses palpable bilaterally.   Skin: Warm.   Neurologic: Alert and oriented x 3. No facial asymmetry. Moves all four limbs. No tremors.   Exam stable 4/23/24  Pertinent Lab Results:     Results from last 7 days   Lab Units 04/22/24  0652 04/21/24  1713 04/18/24  1516   SODIUM mmol/L 144 141 137   POTASSIUM mmol/L 4.3 5.0 5.5*   CHLORIDE mmol/L 102 101 96*   CO2 mmol/L 35.3* 33.5* 32.4*   BUN mg/dL 37* 40* 38*   CREATININE mg/dL 1.09* 1.16* 1.46*   CALCIUM mg/dL 9.2 9.6 9.4   BILIRUBIN mg/dL  --  0.3  --    ALK PHOS U/L  --  76  --    ALT (SGPT) U/L  --  17  --    AST (SGOT) U/L  --  42*  --    GLUCOSE mg/dL 88 82 124*     Results from last 7 days   Lab Units 04/22/24  0652 04/21/24  1713   WBC 10*3/mm3 4.45 4.41   HEMOGLOBIN g/dL 11.1* 11.1*   HEMATOCRIT % 36.4 35.6   PLATELETS 10*3/mm3 138* 148         Results from last 7 days   Lab Units 04/22/24  0652 04/21/24  1919 04/21/24  1713   HSTROP T ng/L 44* 49* 43*     Results from last 7 days   Lab Units 04/21/24 1919   PROBNP pg/mL 13,580.0*                       Pertinent Radiology Results:    Imaging Results (All)       Procedure Component Value Units Date/Time    XR Chest 1 View [[id number]] Collected: 04/21/24 1820     Updated: 04/21/24 2101    Narrative:      PROCEDURE: XR CHEST 1 VW-     HISTORY: Chest pain protocol     COMPARISON: 12/20/2023     FINDINGS:  Portable view of the chest demonstrates chronic lung changes.  No acute infiltrate is seen. There is no evidence of effusion,  pneumothorax or other significant pleural disease. The mediastinum is  unremarkable.     The heart size is mildly enlarged. Right-sided pacer is present.       Impression:      Chronic lung changes.           This report was signed and finalized on 4/21/2024 8:59 PM by Man Rea MD.               Echo:    Results for orders placed during the hospital encounter of 04/21/24    Adult Transthoracic Echo Complete W/ Cont if Necessary Per  Protocol    Interpretation Summary    Left ventricular systolic function is normal. Calculated left ventricular 3D EF = 59% Left ventricular ejection fraction appears to be 56 - 60%. Left ventricular diastolic function was indeterminate.    The right ventricular cavity is mildly dilated with reduced systolic function.  Pacer lead present.    The right atrial cavity is mildly  dilated.    Significant aortic sclerosis and calcification without hemodynamically significant stenosis.    Mild mitral valve regurgitation is present.    Moderate tricuspid valve regurgitation is present. Estimated right ventricular systolic pressure from tricuspid regurgitation is markedly elevated (>55 mmHg). Calculated right ventricular systolic pressure from tricuspid regurgitation is 64 mmHg.    Condition on Discharge:      Stable.    Code status during the hospital stay:    Code Status and Medical Interventions:   Ordered at: 04/21/24 9811     Code Status (Patient has no pulse and is not breathing):    CPR (Attempt to Resuscitate)     Medical Interventions (Patient has pulse or is breathing):    Full Support     Discharge Disposition:    Short Term Hospital ([place] - External)    Discharge Medications:       Discharge Medications        Continue These Medications        Instructions Start Date   amLODIPine 5 MG tablet  Commonly known as: NORVASC   5 mg, Oral, Daily      aspirin 81 MG EC tablet   81 mg, Oral, Daily      furosemide 40 MG tablet  Commonly known as: LASIX   Take 1 tablet by mouth Daily for 3 days, THEN 0.5 tablets Daily for 30 days.   Start Date: December 1, 2023     gabapentin 800 MG tablet  Commonly known as: NEURONTIN   800 mg, Oral, 3 Times Daily      HYDROcodone-acetaminophen 5-325 MG per tablet  Commonly known as: NORCO   1 tablet, Oral, Every 6 Hours PRN      ipratropium-albuterol 0.5-2.5 mg/3 ml nebulizer  Commonly known as: DUO-NEB   3 mL, Nebulization, Every 4 Hours PRN      metoprolol succinate XL 50 MG 24 hr  tablet  Commonly known as: TOPROL-XL   50 mg, Oral, Daily      nitroglycerin 0.4 MG SL tablet  Commonly known as: NITROSTAT   0.4 mg, Sublingual, Every 5 Minutes PRN, Take no more than 3 doses in 15 minutes.       omeprazole 40 MG capsule  Commonly known as: priLOSEC   40 mg, Oral, Daily      pravastatin 40 MG tablet  Commonly known as: PRAVACHOL   80 mg, Oral, Daily      sacubitril-valsartan 24-26 MG tablet  Commonly known as: ENTRESTO   1 tablet, Oral, 2 Times Daily      spironolactone 25 MG tablet  Commonly known as: ALDACTONE   Take 1/2 tablet by mouth Daily.             Discharge Diet:     Diet Instructions       Diet: Nothing By Mouth      Discharge Diet: Nothing By Mouth          Activity at Discharge:     Activity Instructions       Activity as Tolerated            Follow-up Appointments:     Follow-up Information       Nj Smith MD .    Specialty: Family Medicine  Contact information:  [address] [place] [address] [address] [place] [address]  [phone]                           Future Appointments   Date Time Provider Department Center   4/25/2024  1:30 PM Lisa Villafuerte APRN  [facility]     Test Results Pending at Discharge:           Brian Joseph Kerley, DO  04/22/24  15:58 EDT    Time: I spent 45 minutes on this discharge activity which included: face-to-face encounter with the patient, reviewing the data in the system, coordination of the care with the nursing staff as well as consultants, documentation, and entering orders.     Dictated utilizing Dragon dictation.

## 2024-04-22 NOTE — PROGRESS NOTES
[facility]    Name:  Lesley Calero   Age:  83 y.o.  Sex:  female  :  1940  MRN:  [id number]   Visit Number:  [id number]  Admission Date:  2024  Date Of Service:  24  Primary Care Physician:  Nj Smith MD     LOS: 0 days :    Chief Complaint:      Follow-up; chest pain, shortness of air    Subjective:    Feeling much better, no chest pain, breathing feels good at rest in the bed.    Discussed assessment and plan, discussed echocardiogram showing reduced systolic function on the right side.  All questions answered.    Hospital Course:    Lesley Calero is an 83-year-old female with history of coronary artery disease, diastolic heart failure, COPD on home oxygen at 3 L, hypertension, sick sinus syndrome status post pacemaker was brought to the emergency room by EMS with symptoms of chest pain and shortness of breath.  Patient states that her blood pressure went really high at home in the 200s and she started developing chest pressure and shortness of breath.  She lives with her  and her daughter lives with them to take care of them. Patient did get aspirin and nitroglycerin by EMS.     In the emergency room, she was afebrile and hemodynamically stable saturating at 96% on 3 L of nasal cannula oxygen.  Initial troponin 43 with repeat at 49.  proBNP 13,580.  CMP was remarkable for a BUN of 40 (baseline) and creatinine of 1.16 (baseline).  CBC was unremarkable except for hemoglobin of 11 and MCV of 104.  COVID and flu test were negative.  EKG showed paced rhythm without any ST elevations.  Chest x-ray did not show any infiltrates but showed cardiomegaly.  Patient was given aspirin 324 mg in the emergency room.  Her condition was discussed with Dr. Astorga from cardiology and the patient was admitted to the medical floor with telemetry for evaluation of chest pain and elevated troponins.    I have reviewed the copied text and it is accurate as  of 4/22/2024     Review of Systems:     All systems were reviewed and negative except as mentioned in subjective, assessment and plan.    Vital Signs:    Temp:  [98.6 °F (37 °C)] 98.6 °F (37 °C)  Heart Rate:  [70-81] 73  Resp:  [16-20] 18  BP: (115-188)/() 143/68    Intake and output:    No intake/output data recorded.  No intake/output data recorded.    Physical Examination:    General Appearance:  Alert and cooperative.    Head:  Atraumatic and normocephalic.   Eyes: Conjunctivae and sclerae normal, no icterus. No pallor.   Throat: No oral lesions, no thrush, oral mucosa moist.   Neck: Supple, trachea midline, no thyromegaly.   Lungs:   Breath sounds heard bilaterally equally.  No wheezing or crackles. No Pleural rub or bronchial breathing.   Heart:  Normal S1 and S2, no murmur, no gallop, no rub. No JVD.   Abdomen:   Normal bowel sounds, no masses, no organomegaly. Soft, nontender, nondistended, no rebound tenderness.   Extremities: Supple, no edema, no cyanosis, no clubbing.   Skin: Warm.   Neurologic: Alert and oriented x 3. No facial asymmetry. Moves all four limbs. No tremors.      Laboratory results:    Results from last 7 days   Lab Units 04/22/24  0652 04/21/24  1713 04/18/24  1516   SODIUM mmol/L 144 141 137   POTASSIUM mmol/L 4.3 5.0 5.5*   CHLORIDE mmol/L 102 101 96*   CO2 mmol/L 35.3* 33.5* 32.4*   BUN mg/dL 37* 40* 38*   CREATININE mg/dL 1.09* 1.16* 1.46*   CALCIUM mg/dL 9.2 9.6 9.4   BILIRUBIN mg/dL  --  0.3  --    ALK PHOS U/L  --  76  --    ALT (SGPT) U/L  --  17  --    AST (SGOT) U/L  --  42*  --    GLUCOSE mg/dL 88 82 124*     Results from last 7 days   Lab Units 04/22/24  0652 04/21/24  1713   WBC 10*3/mm3 4.45 4.41   HEMOGLOBIN g/dL 11.1* 11.1*   HEMATOCRIT % 36.4 35.6   PLATELETS 10*3/mm3 138* 148         Results from last 7 days   Lab Units 04/22/24  0652 04/21/24  1919 04/21/24  1713   HSTROP T ng/L 44* 49* 43*         Recent Labs     11/12/23  1017 11/29/23  2308   PHART 7.411 7.395    QKS9WAV 59.0* 60.6*   PO2ART 71.2* 64.3*   LDV8EIC 37.5* 37.1*   BASEEXCESS 11.1* 10.5*      I have reviewed the patient's laboratory results.    Radiology results:    Adult Transthoracic Echo Complete W/ Cont if Necessary Per Protocol    Result Date: 4/22/2024    Left ventricular systolic function is normal. Calculated left ventricular 3D EF = 59% Left ventricular ejection fraction appears to be 56 - 60%. Left ventricular diastolic function was indeterminate.   The right ventricular cavity is mildly dilated with reduced systolic function.  Pacer lead present.   The right atrial cavity is mildly  dilated.   Significant aortic sclerosis and calcification without hemodynamically significant stenosis.   Mild mitral valve regurgitation is present.   Moderate tricuspid valve regurgitation is present. Estimated right ventricular systolic pressure from tricuspid regurgitation is markedly elevated (>55 mmHg). Calculated right ventricular systolic pressure from tricuspid regurgitation is 64 mmHg.     XR Chest 1 View    Result Date: 4/21/2024  PROCEDURE: XR CHEST 1 VW-  HISTORY: Chest pain protocol  COMPARISON: 12/20/2023  FINDINGS:  Portable view of the chest demonstrates chronic lung changes. No acute infiltrate is seen. There is no evidence of effusion, pneumothorax or other significant pleural disease. The mediastinum is unremarkable.  The heart size is mildly enlarged. Right-sided pacer is present.      Impression: Chronic lung changes.    This report was signed and finalized on 4/21/2024 8:59 PM by Man Rea MD.     I have reviewed the patient's radiology reports.    Medication Review:     I have reviewed the patient's active and prn medications.     Problem List:      Chest pain    COPD (chronic obstructive pulmonary disease)    Essential hypertension    Status post placement of cardiac pacemaker    Chronic heart failure with preserved ejection fraction (HFpEF)      Assessment:     Chest pain with elevated  troponin levels, POA.  Chronic diastolic heart failure, POA.  Coronary artery disease.  Hypertensive heart disease.  Chronic kidney disease stage II.  COPD, no evidence of exacerbation.  Chronic hypoxic respiratory failure on home oxygen.  Sick sinus syndrome status post pacemaker.  Essential hypertension.     Plan:     Comfortable in bed, pleasantly conversational.    Daughter updated at bedside.    Chest pain with elevated troponins  - Patient did have elevated troponins with significant delta change and elevated proBNP levels.  - This could be related to demand ischemia and type II myocardial infarction due to elevated blood pressures.  - Continue aspirin, statin therapy, Toprol-XL.  - Cardiology consultation, recommendations appreciated.  -Echocardiogram does show reduced systolic function right side which appears to be new compared to November 2023 echo.     Chronic diastolic heart failure  - No evidence of exacerbation  - Continue Toprol-XL, Entresto and spironolactone.     COPD  - Continue bronchodilators and oxygen.    OLLIE  Improving. Recent elevation?     Consult physical therapy.     Risk Assessment: Moderate  DVT Prophylaxis: Enoxaparin  Code Status: Full  Diet: Cardiac  Discharge Plan: Pending Cardiology eval Brian Joseph Kerley, DO  04/22/24  11:25 EDT    Dictated utilizing Dragon dictation.

## 2024-04-22 NOTE — PROGRESS NOTES
Pharmacy Consult - Enoxaparin Dosing    Lesley Calero is a 83 y.o. female who has been consulted to dose enoxaparin for VTE prophylaxis.     Allergies    Ceftin [cefuroxime axetil], Codeine, Crestor [rosuvastatin], Doxycycline, Morphine, Sulfa antibiotics, and Penicillins    Relevant clinical data and objective history reviewed:     [Ht: 152.4 ; Wt: 55.8 ]  There is no height or weight on file to calculate BMI.  Estimated Creatinine Clearance: 27.8 mL/min (A) (by C-G formula based on SCr of 1.16 mg/dL (H)).    Results from last 7 days   Lab Units 04/21/24  1713 04/18/24  1516   HEMOGLOBIN g/dL 11.1*  --    HEMATOCRIT % 35.6  --    PLATELETS 10*3/mm3 148  --    CREATININE mg/dL 1.16* 1.46*       Asessment/Plan    Initiate Enoxaparin 30 mg SQ every 24 hours  Pharmacy will monitor Ms. Caelro's renal function and clinical status and adjust the enoxaparin dose and/or frequency as needed.    Thank you,  [facility] Cochran [facility],PharmD  4/21/2024  23:24 EDT

## 2024-04-23 VITALS
HEIGHT: 60 IN | TEMPERATURE: 97.8 F | WEIGHT: 107.58 LBS | HEART RATE: 71 BPM | SYSTOLIC BLOOD PRESSURE: 150 MMHG | DIASTOLIC BLOOD PRESSURE: 58 MMHG | OXYGEN SATURATION: 94 % | BODY MASS INDEX: 21.12 KG/M2 | RESPIRATION RATE: 18 BRPM

## 2024-04-23 LAB
ANION GAP SERPL CALCULATED.3IONS-SCNC: 10.3 MMOL/L (ref 5–15)
BUN SERPL-MCNC: 32 MG/DL (ref 8–23)
BUN/CREAT SERPL: 28.3 (ref 7–25)
CALCIUM SPEC-SCNC: 9.6 MG/DL (ref 8.6–10.5)
CHLORIDE SERPL-SCNC: 102 MMOL/L (ref 98–107)
CO2 SERPL-SCNC: 27.7 MMOL/L (ref 22–29)
CREAT SERPL-MCNC: 1.13 MG/DL (ref 0.57–1)
EGFRCR SERPLBLD CKD-EPI 2021: 47.8 ML/MIN/1.73
GLUCOSE SERPL-MCNC: 126 MG/DL (ref 65–99)
POTASSIUM SERPL-SCNC: 4.6 MMOL/L (ref 3.5–5.2)
SODIUM SERPL-SCNC: 140 MMOL/L (ref 136–145)

## 2024-04-23 PROCEDURE — G0378 HOSPITAL OBSERVATION PER HR: HCPCS

## 2024-04-23 PROCEDURE — 25010000002 HYDRALAZINE PER 20 MG: Performed by: FAMILY MEDICINE

## 2024-04-23 PROCEDURE — 99239 HOSP IP/OBS DSCHRG MGMT >30: CPT | Performed by: INTERNAL MEDICINE

## 2024-04-23 PROCEDURE — 94799 UNLISTED PULMONARY SVC/PX: CPT

## 2024-04-23 PROCEDURE — 80048 BASIC METABOLIC PNL TOTAL CA: CPT | Performed by: STUDENT IN AN ORGANIZED HEALTH CARE EDUCATION/TRAINING PROGRAM

## 2024-04-23 PROCEDURE — 96374 THER/PROPH/DIAG INJ IV PUSH: CPT

## 2024-04-23 PROCEDURE — 25010000002 LORAZEPAM PER 2 MG: Performed by: FAMILY MEDICINE

## 2024-04-23 PROCEDURE — 96375 TX/PRO/DX INJ NEW DRUG ADDON: CPT

## 2024-04-23 PROCEDURE — 94640 AIRWAY INHALATION TREATMENT: CPT

## 2024-04-23 RX ORDER — LORAZEPAM 2 MG/ML
0.5 INJECTION INTRAMUSCULAR EVERY 4 HOURS PRN
Status: DISCONTINUED | OUTPATIENT
Start: 2024-04-23 | End: 2024-04-23 | Stop reason: HOSPADM

## 2024-04-23 RX ORDER — HYDRALAZINE HYDROCHLORIDE 20 MG/ML
10 INJECTION INTRAMUSCULAR; INTRAVENOUS EVERY 6 HOURS PRN
Status: DISCONTINUED | OUTPATIENT
Start: 2024-04-23 | End: 2024-04-23 | Stop reason: HOSPADM

## 2024-04-23 RX ADMIN — SACUBITRIL AND VALSARTAN 1 TABLET: 24; 26 TABLET, FILM COATED ORAL at 08:39

## 2024-04-23 RX ADMIN — LORAZEPAM 0.5 MG: 2 INJECTION INTRAMUSCULAR; INTRAVENOUS at 01:29

## 2024-04-23 RX ADMIN — IPRATROPIUM BROMIDE AND ALBUTEROL SULFATE 3 ML: .5; 3 SOLUTION RESPIRATORY (INHALATION) at 01:16

## 2024-04-23 RX ADMIN — HYDRALAZINE HYDROCHLORIDE 10 MG: 20 INJECTION, SOLUTION INTRAMUSCULAR; INTRAVENOUS at 00:39

## 2024-04-23 RX ADMIN — METOPROLOL SUCCINATE 50 MG: 50 TABLET, EXTENDED RELEASE ORAL at 08:39

## 2024-04-23 RX ADMIN — NITROGLYCERIN 0.4 MG: 0.4 TABLET SUBLINGUAL at 01:03

## 2024-04-23 RX ADMIN — AMLODIPINE BESYLATE 5 MG: 5 TABLET ORAL at 08:39

## 2024-04-23 NOTE — PLAN OF CARE
Goal Outcome Evaluation:  Plan of Care Reviewed With: patient           Outcome Evaluation: Transfer to [facility]

## 2024-04-23 NOTE — CASE MANAGEMENT/SOCIAL WORK
Case Management Discharge Note      Final Note: DC via [facility] to Higher level of care/[facility].    Provided Post Acute Provider List?: N/A  N/A Provider List Comment: Patient plans to return home; no DME needs  Provided Post Acute Provider Quality & Resource List?: N/A  N/A Quality & Resource List Comment: Patient plans to return home; no DME needs    Selected Continued Care - Discharged on 4/23/2024 Admission date: 4/21/2024 - Discharge disposition: Short Term Hospital (DC - External)      Destination Coordination complete.      Service Provider Selected Services Address Phone Fax Patient Preferred    [facility] [place] [facility] [address], [place] [address] [phone] --              Durable Medical Equipment    No services have been selected for the patient.                Dialysis/Infusion    No services have been selected for the patient.                Home Medical Care    No services have been selected for the patient.                Therapy    No services have been selected for the patient.                Community Resources    No services have been selected for the patient.                Community & DME    No services have been selected for the patient.                    Selected Continued Care - Episodes Includes continued care and service providers with selected services from the active episodes listed below      Heart Failure Episode start date: 3/27/2024   There are no active outsourced providers for this episode.                 Transportation Services  Ambulance: [facility]    Final Discharge Disposition Code: 02 - short term hospital for  care

## 2024-04-23 NOTE — PLAN OF CARE
Goal Outcome Evaluation:              Outcome Evaluation: Patient's blood pressure was elevated throughout the night, MD notified, PRN Hydralazine ordered and given. Shortly after, patient was complaining of chest pain, shortness of breath and high anixety. MD notified, see orders. Patient is now resting and blood pressure has improved.

## 2024-05-01 ENCOUNTER — HOSPITAL ENCOUNTER (EMERGENCY)
Facility: HOSPITAL | Age: 84
Discharge: HOME | End: 2024-05-01
Attending: STUDENT IN AN ORGANIZED HEALTH CARE EDUCATION/TRAINING PROGRAM
Payer: MEDICARE

## 2024-05-01 ENCOUNTER — APPOINTMENT (OUTPATIENT)
Dept: GENERAL RADIOLOGY | Facility: HOSPITAL | Age: 84
End: 2024-05-01
Payer: MEDICARE

## 2024-05-01 VITALS
HEART RATE: 70 BPM | HEIGHT: 57 IN | DIASTOLIC BLOOD PRESSURE: 60 MMHG | RESPIRATION RATE: 12 BRPM | TEMPERATURE: 98 F | OXYGEN SATURATION: 96 % | SYSTOLIC BLOOD PRESSURE: 125 MMHG | BODY MASS INDEX: 22.65 KG/M2 | WEIGHT: 105 LBS

## 2024-05-01 DIAGNOSIS — M19.90 INFLAMMATORY ARTHRITIS: ICD-10-CM

## 2024-05-01 DIAGNOSIS — R19.7 DIARRHEA, UNSPECIFIED TYPE: ICD-10-CM

## 2024-05-01 DIAGNOSIS — J18.9 PNEUMONIA OF RIGHT LUNG DUE TO INFECTIOUS ORGANISM, UNSPECIFIED PART OF LUNG: Primary | ICD-10-CM

## 2024-05-01 LAB
ALBUMIN SERPL-MCNC: 3.2 G/DL (ref 3.5–5.2)
ALBUMIN/GLOB SERPL: 1.2 G/DL
ALP SERPL-CCNC: 72 U/L (ref 39–117)
ALT SERPL W P-5'-P-CCNC: 14 U/L (ref 1–33)
ANION GAP SERPL CALCULATED.3IONS-SCNC: 8.3 MMOL/L (ref 5–15)
AST SERPL-CCNC: 22 U/L (ref 1–32)
BILIRUB SERPL-MCNC: 0.3 MG/DL (ref 0–1.2)
BUN SERPL-MCNC: 23 MG/DL (ref 8–23)
BUN/CREAT SERPL: 31.5 (ref 7–25)
CALCIUM SPEC-SCNC: 9.2 MG/DL (ref 8.6–10.5)
CHLORIDE SERPL-SCNC: 102 MMOL/L (ref 98–107)
CO2 SERPL-SCNC: 28.7 MMOL/L (ref 22–29)
CREAT SERPL-MCNC: 0.73 MG/DL (ref 0.57–1)
DEPRECATED RDW RBC AUTO: 47.8 FL (ref 37–54)
EGFRCR SERPLBLD CKD-EPI 2021: 80.7 ML/MIN/1.73
EOSINOPHIL # BLD MANUAL: 0.37 10*3/MM3 (ref 0–0.4)
EOSINOPHIL NFR BLD MANUAL: 3 % (ref 0.3–6.2)
ERYTHROCYTE [DISTWIDTH] IN BLOOD BY AUTOMATED COUNT: 13.3 % (ref 12.3–15.4)
GLOBULIN UR ELPH-MCNC: 2.6 GM/DL
GLUCOSE SERPL-MCNC: 124 MG/DL (ref 65–99)
HCT VFR BLD AUTO: 36.3 % (ref 34–46.6)
HGB BLD-MCNC: 11.9 G/DL (ref 12–15.9)
LYMPHOCYTES # BLD MANUAL: 0.5 10*3/MM3 (ref 0.7–3.1)
LYMPHOCYTES NFR BLD MANUAL: 4 % (ref 5–12)
MCH RBC QN AUTO: 31.8 PG (ref 26.6–33)
MCHC RBC AUTO-ENTMCNC: 32.8 G/DL (ref 31.5–35.7)
MCV RBC AUTO: 97.1 FL (ref 79–97)
MONOCYTES # BLD: 0.5 10*3/MM3 (ref 0.1–0.9)
NEUTROPHILS # BLD AUTO: 11.04 10*3/MM3 (ref 1.7–7)
NEUTROPHILS NFR BLD MANUAL: 88 % (ref 42.7–76)
NEUTS BAND NFR BLD MANUAL: 1 % (ref 0–5)
PLATELET # BLD AUTO: 249 10*3/MM3 (ref 140–450)
PMV BLD AUTO: 10.2 FL (ref 6–12)
POTASSIUM SERPL-SCNC: 4.3 MMOL/L (ref 3.5–5.2)
PROT SERPL-MCNC: 5.8 G/DL (ref 6–8.5)
RBC # BLD AUTO: 3.74 10*6/MM3 (ref 3.77–5.28)
RBC MORPH BLD: NORMAL
SCAN SLIDE: NORMAL
SMALL PLATELETS BLD QL SMEAR: ADEQUATE
SODIUM SERPL-SCNC: 139 MMOL/L (ref 136–145)
VARIANT LYMPHS NFR BLD MANUAL: 4 % (ref 19.6–45.3)
WBC MORPH BLD: NORMAL
WBC NRBC COR # BLD AUTO: 12.41 10*3/MM3 (ref 3.4–10.8)

## 2024-05-01 PROCEDURE — 85025 COMPLETE CBC W/AUTO DIFF WBC: CPT | Performed by: STUDENT IN AN ORGANIZED HEALTH CARE EDUCATION/TRAINING PROGRAM

## 2024-05-01 PROCEDURE — 25810000003 SODIUM CHLORIDE 0.9 % SOLUTION: Performed by: STUDENT IN AN ORGANIZED HEALTH CARE EDUCATION/TRAINING PROGRAM

## 2024-05-01 PROCEDURE — 73140 X-RAY EXAM OF FINGER(S): CPT

## 2024-05-01 PROCEDURE — 99284 EMERGENCY DEPT VISIT MOD MDM: CPT

## 2024-05-01 PROCEDURE — 80053 COMPREHEN METABOLIC PANEL: CPT | Performed by: STUDENT IN AN ORGANIZED HEALTH CARE EDUCATION/TRAINING PROGRAM

## 2024-05-01 PROCEDURE — 71045 X-RAY EXAM CHEST 1 VIEW: CPT

## 2024-05-01 PROCEDURE — 85007 BL SMEAR W/DIFF WBC COUNT: CPT | Performed by: STUDENT IN AN ORGANIZED HEALTH CARE EDUCATION/TRAINING PROGRAM

## 2024-05-01 RX ORDER — PREDNISONE 50 MG/1
50 TABLET ORAL DAILY
Qty: 5 TABLET | Refills: 0 | Status: ON HOLD | OUTPATIENT
Start: 2024-05-01 | End: 2024-05-11

## 2024-05-01 RX ORDER — HYDROCODONE BITARTRATE AND ACETAMINOPHEN 7.5; 325 MG/1; MG/1
1 TABLET ORAL ONCE
Status: COMPLETED | OUTPATIENT
Start: 2024-05-01 | End: 2024-05-01

## 2024-05-01 RX ADMIN — SODIUM CHLORIDE 500 ML: 9 INJECTION, SOLUTION INTRAVENOUS at 14:58

## 2024-05-01 RX ADMIN — HYDROCODONE BITARTRATE AND ACETAMINOPHEN 1 TABLET: 7.5; 325 TABLET ORAL at 14:47

## 2024-05-01 RX ADMIN — HYDROCODONE BITARTRATE AND ACETAMINOPHEN 1 TABLET: 7.5; 325 TABLET ORAL at 20:04

## 2024-05-01 NOTE — ED PROVIDER NOTES
EMERGENCY DEPARTMENT ENCOUNTER    Pt Name: Lesley Calero  MRN: [id number]  Pt :   1940  Room Number:    Date of encounter:  2024  PCP: Nj Smith MD  ED Physician: Hayden Braxton DO      HPI:  Chief Complaint: Multiple complaints    Context: Lesley Calero is a 83 y.o. female who presents to the ED with multiple complaints.     Complaint #1 is diarrhea.  Patient reports that she was hospitalized at [facility] for pneumonia and elevated cardiac enzymes.  She was discharged on oral antibiotics 10 days ago.  Shortly after discharge developed multiple episodes of watery, nonbloody diarrhea.  Estimates 6-10 episodes of diarrhea that have been occurring every day.  She stopped her oral antibiotics 2 days ago secondary to symptoms.  She is concerned about C. difficile infection.    Complaint #2 is left middle finger pain, redness, and swelling.  Started 2 days ago.  Involves the entire finger.  Duration constant.  Worsened by movement and touch.  No active drainage.    No fever, chills, chest pain or shortness of breath, abdominal pain, flank pain, problems with urination, bloody stools, constipation, numbness or weakness, lightheadedness or dizziness.    Past medical history of COPD, CHF, chronic respiratory failure on baseline 3 L nasal cannula, pneumonia, seizures, CAD.    PAST MEDICAL HISTORY  Past Medical History:   Diagnosis Date    Cataracts, bilateral     CHF (congestive heart failure)     COPD (chronic obstructive pulmonary disease)     Coronary artery disease     Hyperlipidemia     Hypertension     Impaired functional mobility, balance, gait, and endurance     Myocardial infarction     Oxygen deficit     Pneumonia     RLS (restless legs syndrome)     Seizures      Current Outpatient Medications   Medication Instructions    amLODIPine (NORVASC) 5 mg, Oral, Daily    aspirin 81 mg, Oral, Daily    furosemide (LASIX) 40 MG tablet Take 1 tablet by mouth Daily for 3 days, THEN 0.5  tablets Daily for 30 days.    gabapentin (NEURONTIN) 800 mg, Oral, 3 Times Daily    HYDROcodone-acetaminophen (NORCO) 5-325 MG per tablet 1 tablet, Oral, Every 6 Hours PRN    ipratropium-albuterol (DUO-NEB) 0.5-2.5 mg/3 ml nebulizer 3 mL, Nebulization, Every 4 Hours PRN    metoprolol succinate XL (TOPROL-XL) 50 mg, Oral, Daily    nitroglycerin (NITROSTAT) 0.4 mg, Sublingual, Every 5 Minutes PRN, Take no more than 3 doses in 15 minutes.     omeprazole (PRILOSEC) 40 mg, Oral, Daily    pravastatin (PRAVACHOL) 80 mg, Oral, Daily    predniSONE (DELTASONE) 50 mg, Oral, Daily    sacubitril-valsartan (ENTRESTO) 24-26 MG tablet 1 tablet, Oral, 2 Times Daily    spironolactone (ALDACTONE) 25 MG tablet Take 1/2 tablet by mouth Daily.        PAST SURGICAL HISTORY  Past Surgical History:   Procedure Laterality Date    CAROTID ENDARTERECTOMY Left     CATARACT EXTRACTION W/ INTRAOCULAR LENS IMPLANT Right 7/27/2023    Procedure: CATARACT PHACO EXTRACTION WITH INTRAOCULAR LENS IMPLANT RIGHT;  Surgeon: Dajuan Blackburn MD;  Location: [facility] OR;  Service: Ophthalmology;  Laterality: Right;    CATARACT EXTRACTION W/ INTRAOCULAR LENS IMPLANT Left 8/10/2023    Procedure: CATARACT PHACO EXTRACTION WITH INTRAOCULAR LENS IMPLANT LEFT;  Surgeon: Dajuan Blackburn MD;  Location: [facility];  Service: Ophthalmology;  Laterality: Left;    CORONARY ANGIOPLASTY WITH STENT PLACEMENT      x2    LUNG SURGERY Left     PACEMAKER IMPLANTATION         FAMILY HISTORY  History reviewed. No pertinent family history.    SOCIAL HISTORY  Social History     Socioeconomic History    Marital status:    Tobacco Use    Smoking status: Former    Smokeless tobacco: Former     Quit date: 2001   Vaping Use    Vaping status: Never Used   Substance and Sexual Activity    Alcohol use: No    Drug use: No    Sexual activity: Not Currently       ALLERGIES  Ativan [lorazepam], Ceftin [cefuroxime axetil], Codeine, Crestor [rosuvastatin], Doxycycline, Milk-related  compounds, Morphine, Sulfa antibiotics, and Penicillins    REVIEW OF SYSTEMS  All systems reviewed and negative except for those discussed in HPI.     PHYSICAL EXAM  ED Triage Vitals [05/01/24 1403]   Temp Heart Rate Resp BP SpO2   98.4 °F (36.9 °C) 77 12 162/79 96 %      Temp src Heart Rate Source Patient Position BP Location FiO2 (%)   Oral Monitor Sitting Right arm --     I have reviewed the triage vital signs and nursing notes.    General: Alert.  Appears chronically ill, but nontoxic.  No acute distress.  On slight oxygen.  Head: Normocephalic.  Atraumatic.  Eyes: No scleral icterus.  ENT: Moist mucous membranes.  Cardiovascular: Regular rate and rhythm.  No murmurs.  No rubs.  2+ distal pulses bilaterally.  Respiratory: Equal breath sounds bilaterally.  No rales.  No rhonchi.  No wheezing.  GI: Abdomen is soft.  Nondistended.  Nontender to palpation.  No rebound.  No guarding.  No CVA tenderness.  Neurologic: Oriented x 3.  No focal deficits.  MSK: Left hand: Middle finger appears erythematous and edematous. + tenderness to palpation lateral DIP. No tenderness to the palmar finger pad. No induration or crepitus.  Skin: No pallor. No cyanosis.  Psych: Normal mood and affect.    LAB RESULTS  Recent Results (from the past 24 hour(s))   Comprehensive Metabolic Panel    Collection Time: 05/01/24  2:59 PM    Specimen: Blood   Result Value Ref Range    Glucose 124 (H) 65 - 99 mg/dL    BUN 23 8 - 23 mg/dL    Creatinine 0.73 0.57 - 1.00 mg/dL    Sodium 139 136 - 145 mmol/L    Potassium 4.3 3.5 - 5.2 mmol/L    Chloride 102 98 - 107 mmol/L    CO2 28.7 22.0 - 29.0 mmol/L    Calcium 9.2 8.6 - 10.5 mg/dL    Total Protein 5.8 (L) 6.0 - 8.5 g/dL    Albumin 3.2 (L) 3.5 - 5.2 g/dL    ALT (SGPT) 14 1 - 33 U/L    AST (SGOT) 22 1 - 32 U/L    Alkaline Phosphatase 72 39 - 117 U/L    Total Bilirubin 0.3 0.0 - 1.2 mg/dL    Globulin 2.6 gm/dL    A/G Ratio 1.2 g/dL    BUN/Creatinine Ratio 31.5 (H) 7.0 - 25.0    Anion Gap 8.3 5.0 - 15.0  mmol/L    eGFR 80.7 >60.0 mL/min/1.73   CBC Auto Differential    Collection Time: 05/01/24  2:59 PM    Specimen: Blood   Result Value Ref Range    WBC 12.41 (H) 3.40 - 10.80 10*3/mm3    RBC 3.74 (L) 3.77 - 5.28 10*6/mm3    Hemoglobin 11.9 (L) 12.0 - 15.9 g/dL    Hematocrit 36.3 34.0 - 46.6 %    MCV 97.1 (H) 79.0 - 97.0 fL    MCH 31.8 26.6 - 33.0 pg    MCHC 32.8 31.5 - 35.7 g/dL    RDW 13.3 12.3 - 15.4 %    RDW-SD 47.8 37.0 - 54.0 fl    MPV 10.2 6.0 - 12.0 fL    Platelets 249 140 - 450 10*3/mm3   Scan Slide    Collection Time: 05/01/24  2:59 PM    Specimen: Blood   Result Value Ref Range    Scan Slide     Manual Differential    Collection Time: 05/01/24  2:59 PM    Specimen: Blood   Result Value Ref Range    Neutrophil % 88.0 (H) 42.7 - 76.0 %    Lymphocyte % 4.0 (L) 19.6 - 45.3 %    Monocyte % 4.0 (L) 5.0 - 12.0 %    Eosinophil % 3.0 0.3 - 6.2 %    Bands %  1.0 0.0 - 5.0 %    Neutrophils Absolute 11.04 (H) 1.70 - 7.00 10*3/mm3    Lymphocytes Absolute 0.50 (L) 0.70 - 3.10 10*3/mm3    Monocytes Absolute 0.50 0.10 - 0.90 10*3/mm3    Eosinophils Absolute 0.37 0.00 - 0.40 10*3/mm3    RBC Morphology Normal Normal    WBC Morphology Normal Normal    Platelet Estimate Adequate Normal       RADIOLOGY  XR Finger 2+ View Left    Result Date: 5/1/2024  PROCEDURE: XR FINGER 2+ VW LEFT-  History: Edema, third digit pain  COMPARISON: Bilateral hand series dated January 2018.  FINDINGS:  A 3 view exam demonstrates no acute fracture or dislocation. There is diffuse osteopenia. There is narrowing of all DIP and PIP joints, metacarpal joints, carpal joints, and first interphalangeal joint. There is ulnar deviation of the second through fifth digits. There is diffuse soft tissue swelling of the third digit. Juxta articular erosions are seen particularly in the head of the third middle phalanx suggestive of erosive arthritis. No soft tissue air is identified. There is no radiopaque foreign body.       1. Findings suggestive of erosive  arthritis particularly of the third DIP joint with soft tissue swelling. Coexistent cellulitis is not excluded. 2. No acute fracture identified.       Images were reviewed, interpreted, and dictated by Dr. Jeniffer Verduzco MD Transcribed by Shantal Machado PA-C.  This report was signed and finalized on 5/1/2024 3:58 PM by Jeniffer Verduzco MD.      XR Chest 1 View    Result Date: 5/1/2024  PROCEDURE: XR CHEST 1 VW-    HISTORY: Recent pneumonia  COMPARISON: April 21, 2024.  FINDINGS: The heart is enlarged, but stable. There are aortic mural calcifications. There is a small focus of airspace disease in the lateral right upper lung concerning for pneumonia. There is no pneumothorax. There are no acute osseous abnormalities. A stable AICD overlies the right chest.      Focal pneumonia in the right upper lung. Follow-up is recommended to document resolution.    Images were reviewed, interpreted, and dictated by Dr. Jeniffer Verduzco MD Transcribed by Shantal Machado PA-C.  This report was signed and finalized on 5/1/2024 3:58 PM by Jeniffer Verduzco MD.       PROCEDURES  Procedures    MEDICATIONS GIVEN IN ER  Medications   HYDROcodone-acetaminophen (NORCO) 7.5-325 MG per tablet 1 tablet (1 tablet Oral Given 5/1/24 1447)   sodium chloride 0.9 % bolus 500 mL (0 mL Intravenous Stopped 5/1/24 1714)   HYDROcodone-acetaminophen (NORCO) 7.5-325 MG per tablet 1 tablet (1 tablet Oral Given 5/1/24 2004)       MEDICAL DECISION MAKING  83 y.o. female with past medical history listed above who presents multiple complaints including diarrhea in the setting of antibiotic use for pneumonia and pain, redness, swelling to the left middle finger.    Patient arrives via EMS.  I have reviewed the EMS documentation/notes and included that information in my HPI.    Vital signs remarkable for hypertension, otherwise within normal limits.    Based on clinical presentation and physical exam, differential diagnosis includes, but is not limited to, adverse effects  of antibiotic use, C. difficile infection, viral gastroenteritis, dehydration, metabolic derangement.  Differential for finger pain and swelling includes cellulitis, osteomyelitis, dactylitis. No obvious abscess, felon, or flexor tenosynovitis.    I have discussed the indication, risk, and alternatives of the following high risk medications: Oral Norco    External notes reviewed.   - Hospitalist discharge summary from 4/23/2024 reviewed.  Patient admitted with chest pain and elevated cardiac enzymes (43 -> 49).  Patient was transferred to [facility] for possible invasive coronary angiography due to high risk procedure and need for CT surgery backup.  - Hospital documents from [facility] reviewed.  On evaluation there troponin trended downward to 35.  White blood cell count increased to 16 and chest x-ray showed a right upper lobe opacity, which was consistent with pneumonia.  Patient was evaluated by cardiology.  Continued on aspirin and Lipitor.  Cardiology did not think chest pain was related to her heart and was likely a COPD exacerbation.  Patient had a Lexiscan done on 4/24/24 which showed preserved left ventricular systolic function and thickening of all myocardial segments.  No fixed or reversible perfusion deficits noted.  Overall low risk perfusion study.  Patient to follow-up with her primary cardiologist in 1-2 weeks.  Patient was treated with IV steroids and nebulizers for COPD.  Remained on baseline 3 L nasal cannula throughout her course.  She was treated with IV antibiotics for pneumonia.  Ultimately did well and was discharged home on oral Omnicef and Flagyl.    At least 3 different tests have been ordered on this patient.    Please see ED course below for my interpretation of the ED workup.  ED Course as of 05/01/24 2017   Wed May 01, 2024   1424 ECG 12 Lead Other; weakness  EKG per my interpretation ventricular paced rhythm, rate 78, left axis deviation, no STEMI. [JS]   1711 XR  Finger 2+ View Left  I have independently reviewed and interpreted the x-ray left finger.  My interpretation is no fracture.  Radiologist notes erosive arthritis particularly of the third DIP joint with soft tissue swelling.   [JS]   1711 XR Chest 1 View  I have independently reviewed and interpreted the chest x-ray.  My interpretation is infiltrate right upper lobe.  Radiologist notes focal pneumonia right upper lung.   [JS]   1715 Case discussed with Dr. Farr (orthopedics).  He believes presentation is consistent with inflammatory arthritis.  Does not recommend antibiotics.  Recommends treatment with steroids and anti-inflammatory medications. [JS]      ED Course User Index  [JS] Hayden Braxton DO      On re-evaluation, patient resting comfortably.  States symptoms have improved following therapy. Vital signs remained stable on baseline supplemental oxygen.  Patient was ambulatory in the ED with steady gait.  Able to tolerate oral intake appropriately.    Unfortunately, patient was unable to provide stool sample during ED course, therefore unable to test for C-diff toxin.     I discussed the findings of the ED workup with the patient at bedside.  No clinical indication for admission. I recommended restarting medications previously prescribed for pneumonia, fluid intake for diarrhea, and outpatient follow-up with PCP/orthopedics. Rx oral steroids for inflammatory arthritis. Patient was deemed medically stable for discharge with close outpatient follow-up and strict ED return precautions. Patient agreeable with plan and disposition.    Home medications were reviewed.  Prescriptions for discharge: Prednisone    Chronic conditions affecting care: COPD, chronic respiratory failure    Social determinants of health impacting treatment or disposition: Medical nonadherence    REPEAT VITAL SIGNS  AS OF 20:17 EDT VITALS:  BP - 122/65  HR - 75  TEMP - 98.4 °F (36.9 °C) (Oral)  O2 SATS - 96%    DIAGNOSIS  Final diagnoses:    Pneumonia of right lung due to infectious organism, unspecified part of lung   Diarrhea, unspecified type   Inflammatory arthritis       DISPOSITION  ED Disposition       ED Disposition   Discharge    Condition   Stable    Comment   --                     Please note that portions of this document were completed with voice recognition software.        Hayden Braxton DO  05/02/24 4715

## 2024-05-02 NOTE — ED NOTES
Gave pt supplies to catch stool sample at home.Pt verbalizes understanding. VSS. NAD. Daughter at bedside.

## 2024-05-11 ENCOUNTER — APPOINTMENT (OUTPATIENT)
Dept: CT IMAGING | Facility: HOSPITAL | Age: 84
End: 2024-05-11
Payer: MEDICARE

## 2024-05-11 ENCOUNTER — APPOINTMENT (OUTPATIENT)
Dept: GENERAL RADIOLOGY | Facility: HOSPITAL | Age: 84
End: 2024-05-11
Payer: MEDICARE

## 2024-05-11 ENCOUNTER — HOSPITAL ENCOUNTER (INPATIENT)
Facility: HOSPITAL | Age: 84
LOS: 3 days | Discharge: HOME HEALTH | End: 2024-05-14
Attending: STUDENT IN AN ORGANIZED HEALTH CARE EDUCATION/TRAINING PROGRAM | Admitting: INTERNAL MEDICINE
Payer: MEDICARE

## 2024-05-11 DIAGNOSIS — J44.9 CHRONIC OBSTRUCTIVE PULMONARY DISEASE, UNSPECIFIED COPD TYPE: ICD-10-CM

## 2024-05-11 DIAGNOSIS — N17.9 AKI (ACUTE KIDNEY INJURY): Primary | ICD-10-CM

## 2024-05-11 DIAGNOSIS — R91.1 LUNG NODULE: ICD-10-CM

## 2024-05-11 DIAGNOSIS — J18.9 PNEUMONIA DUE TO INFECTIOUS ORGANISM, UNSPECIFIED LATERALITY, UNSPECIFIED PART OF LUNG: ICD-10-CM

## 2024-05-11 LAB
ALBUMIN SERPL-MCNC: 4 G/DL (ref 3.5–5.2)
ALBUMIN/GLOB SERPL: 0.9 G/DL
ALP SERPL-CCNC: 106 U/L (ref 39–117)
ALT SERPL W P-5'-P-CCNC: 13 U/L (ref 1–33)
ANION GAP SERPL CALCULATED.3IONS-SCNC: 18.2 MMOL/L (ref 5–15)
AST SERPL-CCNC: 25 U/L (ref 1–32)
BACTERIA UR QL AUTO: ABNORMAL /HPF
BASOPHILS # BLD AUTO: 0.02 10*3/MM3 (ref 0–0.2)
BASOPHILS NFR BLD AUTO: 0.2 % (ref 0–1.5)
BILIRUB SERPL-MCNC: 0.7 MG/DL (ref 0–1.2)
BILIRUB UR QL STRIP: NEGATIVE
BUN SERPL-MCNC: 62 MG/DL (ref 8–23)
BUN/CREAT SERPL: 37.6 (ref 7–25)
CALCIUM SPEC-SCNC: 10.3 MG/DL (ref 8.6–10.5)
CHLORIDE SERPL-SCNC: 88 MMOL/L (ref 98–107)
CLARITY UR: ABNORMAL
CO2 SERPL-SCNC: 27.8 MMOL/L (ref 22–29)
COLOR UR: ABNORMAL
CREAT SERPL-MCNC: 1.65 MG/DL (ref 0.57–1)
D-LACTATE SERPL-SCNC: 2 MMOL/L (ref 0.5–2)
DEPRECATED RDW RBC AUTO: 47.6 FL (ref 37–54)
EGFRCR SERPLBLD CKD-EPI 2021: 30.3 ML/MIN/1.73
EOSINOPHIL # BLD AUTO: 0 10*3/MM3 (ref 0–0.4)
EOSINOPHIL NFR BLD AUTO: 0 % (ref 0.3–6.2)
ERYTHROCYTE [DISTWIDTH] IN BLOOD BY AUTOMATED COUNT: 13.4 % (ref 12.3–15.4)
GEN 5 2HR TROPONIN T REFLEX: 71 NG/L
GLOBULIN UR ELPH-MCNC: 4.4 GM/DL
GLUCOSE SERPL-MCNC: 116 MG/DL (ref 65–99)
GLUCOSE UR STRIP-MCNC: NEGATIVE MG/DL
HCT VFR BLD AUTO: 41.2 % (ref 34–46.6)
HGB BLD-MCNC: 13.4 G/DL (ref 12–15.9)
HGB UR QL STRIP.AUTO: NEGATIVE
HOLD SPECIMEN: NORMAL
HOLD SPECIMEN: NORMAL
HYALINE CASTS UR QL AUTO: ABNORMAL /LPF
IMM GRANULOCYTES # BLD AUTO: 0.03 10*3/MM3 (ref 0–0.05)
IMM GRANULOCYTES NFR BLD AUTO: 0.3 % (ref 0–0.5)
KETONES UR QL STRIP: ABNORMAL
LEUKOCYTE ESTERASE UR QL STRIP.AUTO: ABNORMAL
LYMPHOCYTES # BLD AUTO: 0.68 10*3/MM3 (ref 0.7–3.1)
LYMPHOCYTES NFR BLD AUTO: 6.8 % (ref 19.6–45.3)
MCH RBC QN AUTO: 31.2 PG (ref 26.6–33)
MCHC RBC AUTO-ENTMCNC: 32.5 G/DL (ref 31.5–35.7)
MCV RBC AUTO: 96 FL (ref 79–97)
MONOCYTES # BLD AUTO: 0.36 10*3/MM3 (ref 0.1–0.9)
MONOCYTES NFR BLD AUTO: 3.6 % (ref 5–12)
MRSA DNA SPEC QL NAA+PROBE: NORMAL
NEUTROPHILS NFR BLD AUTO: 8.93 10*3/MM3 (ref 1.7–7)
NEUTROPHILS NFR BLD AUTO: 89.1 % (ref 42.7–76)
NITRITE UR QL STRIP: NEGATIVE
NRBC BLD AUTO-RTO: 0 /100 WBC (ref 0–0.2)
NT-PROBNP SERPL-MCNC: 6909 PG/ML (ref 0–1800)
PH UR STRIP.AUTO: 5.5 [PH] (ref 5–8)
PLAT MORPH BLD: NORMAL
PLATELET # BLD AUTO: 257 10*3/MM3 (ref 140–450)
PMV BLD AUTO: 10.6 FL (ref 6–12)
POTASSIUM SERPL-SCNC: 4.7 MMOL/L (ref 3.5–5.2)
PROCALCITONIN SERPL-MCNC: 0.74 NG/ML (ref 0–0.25)
PROT SERPL-MCNC: 8.4 G/DL (ref 6–8.5)
PROT UR QL STRIP: ABNORMAL
RBC # BLD AUTO: 4.29 10*6/MM3 (ref 3.77–5.28)
RBC # UR STRIP: ABNORMAL /HPF
RBC MORPH BLD: NORMAL
REF LAB TEST METHOD: ABNORMAL
SODIUM SERPL-SCNC: 134 MMOL/L (ref 136–145)
SP GR UR STRIP: 1.02 (ref 1–1.03)
SQUAMOUS #/AREA URNS HPF: ABNORMAL /HPF
TROPONIN T DELTA: -2 NG/L
TROPONIN T SERPL HS-MCNC: 73 NG/L
UROBILINOGEN UR QL STRIP: ABNORMAL
WBC # UR STRIP: ABNORMAL /HPF
WBC MORPH BLD: NORMAL
WBC NRBC COR # BLD AUTO: 10.02 10*3/MM3 (ref 3.4–10.8)
WHOLE BLOOD HOLD COAG: NORMAL
WHOLE BLOOD HOLD SPECIMEN: NORMAL

## 2024-05-11 PROCEDURE — 25010000002 ENOXAPARIN PER 10 MG: Performed by: INTERNAL MEDICINE

## 2024-05-11 PROCEDURE — 99223 1ST HOSP IP/OBS HIGH 75: CPT | Performed by: INTERNAL MEDICINE

## 2024-05-11 PROCEDURE — 84145 PROCALCITONIN (PCT): CPT | Performed by: NURSE PRACTITIONER

## 2024-05-11 PROCEDURE — 87641 MR-STAPH DNA AMP PROBE: CPT | Performed by: INTERNAL MEDICINE

## 2024-05-11 PROCEDURE — 80053 COMPREHEN METABOLIC PANEL: CPT | Performed by: STUDENT IN AN ORGANIZED HEALTH CARE EDUCATION/TRAINING PROGRAM

## 2024-05-11 PROCEDURE — 83605 ASSAY OF LACTIC ACID: CPT | Performed by: NURSE PRACTITIONER

## 2024-05-11 PROCEDURE — 36415 COLL VENOUS BLD VENIPUNCTURE: CPT

## 2024-05-11 PROCEDURE — 25810000003 SODIUM CHLORIDE 0.9 % SOLUTION: Performed by: NURSE PRACTITIONER

## 2024-05-11 PROCEDURE — 25810000003 SODIUM CHLORIDE 0.9 % SOLUTION: Performed by: INTERNAL MEDICINE

## 2024-05-11 PROCEDURE — 87040 BLOOD CULTURE FOR BACTERIA: CPT | Performed by: NURSE PRACTITIONER

## 2024-05-11 PROCEDURE — 85007 BL SMEAR W/DIFF WBC COUNT: CPT | Performed by: STUDENT IN AN ORGANIZED HEALTH CARE EDUCATION/TRAINING PROGRAM

## 2024-05-11 PROCEDURE — 94799 UNLISTED PULMONARY SVC/PX: CPT

## 2024-05-11 PROCEDURE — 83880 ASSAY OF NATRIURETIC PEPTIDE: CPT | Performed by: STUDENT IN AN ORGANIZED HEALTH CARE EDUCATION/TRAINING PROGRAM

## 2024-05-11 PROCEDURE — 85025 COMPLETE CBC W/AUTO DIFF WBC: CPT | Performed by: STUDENT IN AN ORGANIZED HEALTH CARE EDUCATION/TRAINING PROGRAM

## 2024-05-11 PROCEDURE — 84484 ASSAY OF TROPONIN QUANT: CPT | Performed by: STUDENT IN AN ORGANIZED HEALTH CARE EDUCATION/TRAINING PROGRAM

## 2024-05-11 PROCEDURE — 71045 X-RAY EXAM CHEST 1 VIEW: CPT

## 2024-05-11 PROCEDURE — 70450 CT HEAD/BRAIN W/O DYE: CPT

## 2024-05-11 PROCEDURE — 99285 EMERGENCY DEPT VISIT HI MDM: CPT

## 2024-05-11 PROCEDURE — 25010000002 CEFTRIAXONE PER 250 MG: Performed by: NURSE PRACTITIONER

## 2024-05-11 PROCEDURE — 94640 AIRWAY INHALATION TREATMENT: CPT

## 2024-05-11 PROCEDURE — 81001 URINALYSIS AUTO W/SCOPE: CPT | Performed by: NURSE PRACTITIONER

## 2024-05-11 PROCEDURE — 94761 N-INVAS EAR/PLS OXIMETRY MLT: CPT

## 2024-05-11 PROCEDURE — 71250 CT THORAX DX C-: CPT

## 2024-05-11 PROCEDURE — 93005 ELECTROCARDIOGRAM TRACING: CPT | Performed by: STUDENT IN AN ORGANIZED HEALTH CARE EDUCATION/TRAINING PROGRAM

## 2024-05-11 RX ORDER — ENOXAPARIN SODIUM 100 MG/ML
30 INJECTION SUBCUTANEOUS DAILY
Status: DISCONTINUED | OUTPATIENT
Start: 2024-05-11 | End: 2024-05-14 | Stop reason: HOSPADM

## 2024-05-11 RX ORDER — IPRATROPIUM BROMIDE AND ALBUTEROL SULFATE 2.5; .5 MG/3ML; MG/3ML
3 SOLUTION RESPIRATORY (INHALATION) EVERY 4 HOURS PRN
Status: DISCONTINUED | OUTPATIENT
Start: 2024-05-11 | End: 2024-05-14 | Stop reason: HOSPADM

## 2024-05-11 RX ORDER — AMOXICILLIN 250 MG
2 CAPSULE ORAL 2 TIMES DAILY
Status: DISCONTINUED | OUTPATIENT
Start: 2024-05-11 | End: 2024-05-14 | Stop reason: HOSPADM

## 2024-05-11 RX ORDER — GUAIFENESIN 600 MG/1
600 TABLET, EXTENDED RELEASE ORAL EVERY 12 HOURS SCHEDULED
Status: DISCONTINUED | OUTPATIENT
Start: 2024-05-11 | End: 2024-05-14 | Stop reason: HOSPADM

## 2024-05-11 RX ORDER — IPRATROPIUM BROMIDE AND ALBUTEROL SULFATE 2.5; .5 MG/3ML; MG/3ML
3 SOLUTION RESPIRATORY (INHALATION)
Status: DISCONTINUED | OUTPATIENT
Start: 2024-05-11 | End: 2024-05-14

## 2024-05-11 RX ORDER — SODIUM CHLORIDE 9 MG/ML
40 INJECTION, SOLUTION INTRAVENOUS AS NEEDED
Status: DISCONTINUED | OUTPATIENT
Start: 2024-05-11 | End: 2024-05-14 | Stop reason: HOSPADM

## 2024-05-11 RX ORDER — BUDESONIDE 0.5 MG/2ML
0.5 INHALANT ORAL
Status: DISCONTINUED | OUTPATIENT
Start: 2024-05-11 | End: 2024-05-14 | Stop reason: HOSPADM

## 2024-05-11 RX ORDER — SODIUM CHLORIDE 0.9 % (FLUSH) 0.9 %
10 SYRINGE (ML) INJECTION EVERY 12 HOURS SCHEDULED
Status: DISCONTINUED | OUTPATIENT
Start: 2024-05-11 | End: 2024-05-14 | Stop reason: HOSPADM

## 2024-05-11 RX ORDER — BISACODYL 5 MG/1
5 TABLET, DELAYED RELEASE ORAL DAILY PRN
Status: DISCONTINUED | OUTPATIENT
Start: 2024-05-11 | End: 2024-05-14 | Stop reason: HOSPADM

## 2024-05-11 RX ORDER — MEGESTROL ACETATE 40 MG/ML
400 SUSPENSION ORAL DAILY
Status: DISCONTINUED | OUTPATIENT
Start: 2024-05-11 | End: 2024-05-14 | Stop reason: HOSPADM

## 2024-05-11 RX ORDER — ASPIRIN 81 MG/1
81 TABLET ORAL DAILY
Status: DISCONTINUED | OUTPATIENT
Start: 2024-05-12 | End: 2024-05-14 | Stop reason: HOSPADM

## 2024-05-11 RX ORDER — ACETAMINOPHEN 650 MG/1
650 SUPPOSITORY RECTAL EVERY 4 HOURS PRN
Status: DISCONTINUED | OUTPATIENT
Start: 2024-05-11 | End: 2024-05-14 | Stop reason: HOSPADM

## 2024-05-11 RX ORDER — SODIUM CHLORIDE 9 MG/ML
100 INJECTION, SOLUTION INTRAVENOUS CONTINUOUS
Status: DISCONTINUED | OUTPATIENT
Start: 2024-05-11 | End: 2024-05-12

## 2024-05-11 RX ORDER — POLYETHYLENE GLYCOL 3350 17 G/17G
17 POWDER, FOR SOLUTION ORAL DAILY PRN
Status: DISCONTINUED | OUTPATIENT
Start: 2024-05-11 | End: 2024-05-14 | Stop reason: HOSPADM

## 2024-05-11 RX ORDER — HYDROCODONE BITARTRATE AND ACETAMINOPHEN 5; 325 MG/1; MG/1
1 TABLET ORAL EVERY 6 HOURS PRN
Status: DISCONTINUED | OUTPATIENT
Start: 2024-05-11 | End: 2024-05-12

## 2024-05-11 RX ORDER — AMLODIPINE BESYLATE 5 MG/1
5 TABLET ORAL DAILY
Status: DISCONTINUED | OUTPATIENT
Start: 2024-05-12 | End: 2024-05-14 | Stop reason: HOSPADM

## 2024-05-11 RX ORDER — SODIUM CHLORIDE 0.9 % (FLUSH) 0.9 %
10 SYRINGE (ML) INJECTION AS NEEDED
Status: DISCONTINUED | OUTPATIENT
Start: 2024-05-11 | End: 2024-05-14 | Stop reason: HOSPADM

## 2024-05-11 RX ORDER — L.ACID,PARA/B.BIFIDUM/S.THERM 8B CELL
1 CAPSULE ORAL 2 TIMES DAILY
Status: DISCONTINUED | OUTPATIENT
Start: 2024-05-11 | End: 2024-05-14 | Stop reason: HOSPADM

## 2024-05-11 RX ORDER — GABAPENTIN 300 MG/1
600 CAPSULE ORAL EVERY 8 HOURS SCHEDULED
Status: DISCONTINUED | OUTPATIENT
Start: 2024-05-11 | End: 2024-05-14 | Stop reason: HOSPADM

## 2024-05-11 RX ORDER — ACETAMINOPHEN 160 MG/5ML
650 SOLUTION ORAL EVERY 4 HOURS PRN
Status: DISCONTINUED | OUTPATIENT
Start: 2024-05-11 | End: 2024-05-14 | Stop reason: HOSPADM

## 2024-05-11 RX ORDER — METOPROLOL SUCCINATE 25 MG/1
50 TABLET, EXTENDED RELEASE ORAL DAILY
Status: DISCONTINUED | OUTPATIENT
Start: 2024-05-12 | End: 2024-05-14 | Stop reason: HOSPADM

## 2024-05-11 RX ORDER — ONDANSETRON 2 MG/ML
4 INJECTION INTRAMUSCULAR; INTRAVENOUS EVERY 6 HOURS PRN
Status: DISCONTINUED | OUTPATIENT
Start: 2024-05-11 | End: 2024-05-14 | Stop reason: HOSPADM

## 2024-05-11 RX ORDER — PANTOPRAZOLE SODIUM 40 MG/1
40 TABLET, DELAYED RELEASE ORAL
Status: DISCONTINUED | OUTPATIENT
Start: 2024-05-12 | End: 2024-05-14 | Stop reason: HOSPADM

## 2024-05-11 RX ORDER — DOXYCYCLINE 100 MG/1
100 CAPSULE ORAL EVERY 12 HOURS SCHEDULED
Status: DISCONTINUED | OUTPATIENT
Start: 2024-05-11 | End: 2024-05-14 | Stop reason: HOSPADM

## 2024-05-11 RX ORDER — ACETAMINOPHEN 325 MG/1
650 TABLET ORAL EVERY 4 HOURS PRN
Status: DISCONTINUED | OUTPATIENT
Start: 2024-05-11 | End: 2024-05-14 | Stop reason: HOSPADM

## 2024-05-11 RX ORDER — BISACODYL 10 MG
10 SUPPOSITORY, RECTAL RECTAL DAILY PRN
Status: DISCONTINUED | OUTPATIENT
Start: 2024-05-11 | End: 2024-05-14 | Stop reason: HOSPADM

## 2024-05-11 RX ORDER — MULTIPLE VITAMINS W/ MINERALS TAB 9MG-400MCG
1 TAB ORAL DAILY
Status: DISCONTINUED | OUTPATIENT
Start: 2024-05-11 | End: 2024-05-14 | Stop reason: HOSPADM

## 2024-05-11 RX ADMIN — DOXYCYCLINE 100 MG: 100 INJECTION, POWDER, LYOPHILIZED, FOR SOLUTION INTRAVENOUS at 11:55

## 2024-05-11 RX ADMIN — BUDESONIDE 0.5 MG: 0.5 INHALANT RESPIRATORY (INHALATION) at 14:39

## 2024-05-11 RX ADMIN — DOXYCYCLINE 100 MG: 100 CAPSULE ORAL at 20:40

## 2024-05-11 RX ADMIN — HYDROCODONE BITARTRATE AND ACETAMINOPHEN 1 TABLET: 5; 325 TABLET ORAL at 18:57

## 2024-05-11 RX ADMIN — CEFTRIAXONE SODIUM 1000 MG: 1 INJECTION, POWDER, FOR SOLUTION INTRAMUSCULAR; INTRAVENOUS at 11:18

## 2024-05-11 RX ADMIN — Medication 1 CAPSULE: at 20:40

## 2024-05-11 RX ADMIN — MEGESTROL ACETATE 400 MG: 40 SUSPENSION ORAL at 14:46

## 2024-05-11 RX ADMIN — Medication 1 TABLET: at 14:46

## 2024-05-11 RX ADMIN — SODIUM CHLORIDE 100 ML/HR: 9 INJECTION, SOLUTION INTRAVENOUS at 14:47

## 2024-05-11 RX ADMIN — IPRATROPIUM BROMIDE AND ALBUTEROL SULFATE 3 ML: .5; 3 SOLUTION RESPIRATORY (INHALATION) at 20:09

## 2024-05-11 RX ADMIN — GUAIFENESIN 600 MG: 600 TABLET, EXTENDED RELEASE ORAL at 20:40

## 2024-05-11 RX ADMIN — GABAPENTIN 600 MG: 300 CAPSULE ORAL at 14:46

## 2024-05-11 RX ADMIN — SODIUM CHLORIDE 500 ML: 9 INJECTION, SOLUTION INTRAVENOUS at 11:19

## 2024-05-11 RX ADMIN — GABAPENTIN 600 MG: 300 CAPSULE ORAL at 21:38

## 2024-05-11 RX ADMIN — ENOXAPARIN SODIUM 30 MG: 100 INJECTION SUBCUTANEOUS at 14:46

## 2024-05-11 RX ADMIN — SENNOSIDES AND DOCUSATE SODIUM 2 TABLET: 50; 8.6 TABLET ORAL at 20:40

## 2024-05-11 RX ADMIN — IPRATROPIUM BROMIDE AND ALBUTEROL SULFATE 3 ML: .5; 3 SOLUTION RESPIRATORY (INHALATION) at 14:39

## 2024-05-11 NOTE — H&P
[facility]   HISTORY AND PHYSICAL      Name:  Lesley Calero   Age:  83 y.o.  Sex:  female  :  1940  MRN:  [id number]   Visit Number:  [id number]  Admission Date:  2024  Date Of Service:  24  Primary Care Physician:  Nj Smith MD    Chief Complaint:     Generalized weakness, poor oral intake, cough and shortness of breath.    History Of Presenting Illness:      Lesley Calero is an 83-year-old female with history of coronary artery disease, diastolic heart failure, COPD on home oxygen at 3 L, hypertension, sick sinus syndrome status post pacemaker was brought to the emergency room by EMS with multiple symptoms including cough, shortness of breath, generalized weakness and weight loss.  Patient lives with her  and her daughter lives with them and takes care of them.  Patient does have history of COPD and has home nebulizers and uses oxygen at home.  She apparently quit smoking in .  According to the daughter, patient has been progressively weak and has not been able to walk much in the last several days and her oral intake also has decreased.  She apparently has lost about 15 pounds in the last 1 month.      Patient was recently admitted to this facility on 2024 with symptoms of chest pain, elevated troponins.  At that time she was seen by Dr. Nguyen who recommended transfer to tertiary care facility for possible cardiac catheterization.  Patient was subsequently transferred to [facility] on 2024 and was evaluated by cardiology there who did not feel patient had cardiac reasons for chest pain.  At that time she did undergo stress test which was reviewed by cardiology and they did not recommend cardiac catheterization.  Patient was subsequently treated for pneumonia with ceftriaxone and azithromycin and was discharged on oral cephalosporin therapy.    In the emergency room, she was afebrile and hemodynamically stable  saturating at 96% on 3 L of nasal cannula oxygen.  Initial troponin 73, proBNP 6909, sodium 134, BUN 62 (baseline 23), creatinine 1.65 (baseline 0.7), glucose 116.  LFTs were within normal limits.  Lactic acid was 2, procalcitonin elevated at 0.74.  CBC was unremarkable.  Chest x-ray showed persistent mid right lower lobe opacity worrisome for pneumonia.  This was also seen during her last x-ray 10 days ago.  CT of the chest without contrast showed bilateral posterior upper lobe opacities worrisome for pneumonia.  New 11 mm posterior right upper lobe nodule and 14 mm medial left lower lobe nodule noted.  PET/CT recommended for further evaluation.  EKG showed paced rhythm.  Patient was given 500 mL of normal saline bolus, Rocephin and doxycycline in the emergency room and was subsequently admitted to the medical floor with telemetry for treatment of recurrent pneumonia, hyponatremia and acute kidney injury.    Review Of Systems:    All systems were reviewed and negative except as mentioned in history of presenting illness, assessment and plan.    Past Medical History: Patient  has a past medical history of Cataracts, bilateral, CHF (congestive heart failure), COPD (chronic obstructive pulmonary disease), Coronary artery disease, Hyperlipidemia, Hypertension, Impaired functional mobility, balance, gait, and endurance, Myocardial infarction, Oxygen deficit, Pneumonia, RLS (restless legs syndrome), and Seizures.    Past Surgical History: Patient  has a past surgical history that includes Coronary angioplasty with stent; Pacemaker Implantation; Carotid endarterectomy (Left); Lung surgery (Left); Cataract extraction w/ intraocular lens implant (Right, 7/27/2023); and Cataract extraction w/ intraocular lens implant (Left, 8/10/2023).    Social History: Patient  reports that she has quit smoking. She quit smokeless tobacco use about 23 years ago. She reports that she does not drink alcohol and does not use drugs.    Family  History:  No significant family medical history.    Allergies:      Ativan [lorazepam], Ceftin [cefuroxime axetil], Codeine, Crestor [rosuvastatin], Doxycycline, Milk-related compounds, Morphine, Sulfa antibiotics, and Penicillins    Home Medications:    Prior to Admission Medications       Prescriptions Last Dose Informant Patient Reported? Taking?    amLODIPine (NORVASC) 5 MG tablet   Yes No    Take 1 tablet by mouth Daily.    aspirin 81 MG EC tablet   No No    Take 1 tablet by mouth Daily.    furosemide (LASIX) 40 MG tablet   No No    Take 1 tablet by mouth Daily for 3 days, THEN 0.5 tablets Daily for 30 days.    gabapentin (NEURONTIN) 800 MG tablet   Yes No    Take 1 tablet by mouth 3 (Three) Times a Day.    HYDROcodone-acetaminophen (NORCO) 5-325 MG per tablet   Yes No    Take 1 tablet by mouth Every 6 (Six) Hours As Needed.    ipratropium-albuterol (DUO-NEB) 0.5-2.5 mg/3 ml nebulizer   No No    Take 3 mL by nebulization Every 4 (Four) Hours As Needed for Shortness of Air.    metoprolol succinate XL (TOPROL-XL) 50 MG 24 hr tablet   No No    Take 1 tablet by mouth Daily.    nitroglycerin (NITROSTAT) 0.4 MG SL tablet   Yes No    Place 1 tablet under the tongue Every 5 (Five) Minutes As Needed for Chest Pain. Take no more than 3 doses in 15 minutes.    omeprazole (priLOSEC) 40 MG capsule   Yes No    Take 1 capsule by mouth Daily.    pravastatin (PRAVACHOL) 40 MG tablet   Yes No    Take 2 tablets by mouth Daily.    predniSONE (DELTASONE) 50 MG tablet   No No    Take 1 tablet by mouth Daily.    sacubitril-valsartan (ENTRESTO) 24-26 MG tablet   No No    Take 1 tablet by mouth 2 (Two) Times a Day.    spironolactone (ALDACTONE) 25 MG tablet   No No    Take 1/2 tablet by mouth Daily.     ED Medications:    Medications   sodium chloride 0.9 % flush 10 mL (has no administration in time range)   cefTRIAXone (ROCEPHIN) 1,000 mg in sodium chloride 0.9 % 100 mL IVPB-VTB (0 mg Intravenous Stopped 5/11/24 3604)   doxycycline  "(VIBRAMYCIN) 100 mg/100 mL 0.9% NS MBP (100 mg Intravenous New Bag 5/11/24 1155)   sodium chloride 0.9 % bolus 500 mL (500 mL Intravenous New Bag 5/11/24 1119)     Vital Signs:  Temp:  [98.1 °F (36.7 °C)-98.2 °F (36.8 °C)] 98.1 °F (36.7 °C)  Heart Rate:  [74-86] 86  Resp:  [18-20] 18  BP: (132-172)/(52-96) 141/59        05/11/24  1303   Weight: 51.3 kg (113 lb 1.5 oz)     Body mass index is 24.47 kg/m².    Physical Exam:     Most recent vital Signs: /59 (BP Location: Left arm, Patient Position: Lying)   Pulse 86   Temp 98.1 °F (36.7 °C) (Oral)   Resp 18   Ht 144.8 cm (57\")   Wt 51.3 kg (113 lb 1.5 oz)   SpO2 92%   BMI 24.47 kg/m²     Physical Exam  Constitutional:       Comments: Elderly frail female.   HENT:      Head: Normocephalic and atraumatic.      Right Ear: External ear normal.      Left Ear: External ear normal.      Nose: Nose normal.      Mouth/Throat:      Mouth: Mucous membranes are moist.   Eyes:      Extraocular Movements: Extraocular movements intact.      Conjunctiva/sclera: Conjunctivae normal.   Cardiovascular:      Rate and Rhythm: Normal rate and regular rhythm.      Pulses: Normal pulses.      Heart sounds: Normal heart sounds. No murmur heard.     Comments: Pacemaker palpated on the right upper chest.  Pulmonary:      Effort: Pulmonary effort is normal.      Breath sounds: Rales present. No wheezing.      Comments: Occasional scattered crackles heard.  Abdominal:      General: Bowel sounds are normal.      Palpations: Abdomen is soft.      Tenderness: There is no abdominal tenderness. There is no guarding or rebound.   Musculoskeletal:         General: Normal range of motion.      Cervical back: Neck supple.      Right lower leg: Edema present.      Left lower leg: Edema present.      Comments: 1+ pitting edema noted bilateral lower extremities.  Poor  muscle mass noted.   Skin:     General: Skin is warm.      Findings: Erythema present. No rash.   Neurological:      General: No " focal deficit present.      Mental Status: She is alert.      Comments: Oriented to place and person.  Pleasantly confused at times.  Moves all 4 limbs equally but does have generalized weakness.   Psychiatric:         Mood and Affect: Mood normal.         Behavior: Behavior normal.       Laboratory data:    I have reviewed the labs done in the emergency room.    Results from last 7 days   Lab Units 05/11/24  0816   SODIUM mmol/L 134*   POTASSIUM mmol/L 4.7   CHLORIDE mmol/L 88*   CO2 mmol/L 27.8   BUN mg/dL 62*   CREATININE mg/dL 1.65*   CALCIUM mg/dL 10.3   BILIRUBIN mg/dL 0.7   ALK PHOS U/L 106   ALT (SGPT) U/L 13   AST (SGOT) U/L 25   GLUCOSE mg/dL 116*     Results from last 7 days   Lab Units 05/11/24  0816   WBC 10*3/mm3 10.02   HEMOGLOBIN g/dL 13.4   HEMATOCRIT % 41.2   PLATELETS 10*3/mm3 257         Results from last 7 days   Lab Units 05/11/24  1056 05/11/24  0816   HSTROP T ng/L 71* 73*     Results from last 7 days   Lab Units 05/11/24  0816   PROBNP pg/mL 6,909.0*     EKG:      EKG done in the emergency room was reviewed by me.  It shows ventricularly paced rhythm at 72 bpm.  Broad QRS complexes noted with nonspecific ST-T changes.    Radiology:    CT Chest Without Contrast Diagnostic    Result Date: 5/11/2024  PROCEDURE: CT CHEST WO CONTRAST DIAGNOSTIC-  HISTORY: Recurrent pneumonia; N17.9-Acute kidney failure, unspecified; J18.9-Pneumonia, unspecified organism  COMPARISON: October 8, 2023  FINDINGS: Axial CT images of the chest were obtained without contrast. Coronal reformatted images were also obtained. This study was performed with techniques to keep radiation doses as low as reasonably achievable, (ALARA). Individualized dose reduction techniques using automated exposure control or adjustment of mA and/or kV according to the patient size were employed.  A right subclavian pacer is present. There are severe coronary artery calcifications. Borderline sized mediastinal nodes are seen which have not  significantly changed. No axillary mass or adenopathy is identified. Moderate emphysema is seen on the lung window images. There are bilateral posterior upper lobe opacities which are new and most worrisome for pneumonia. A questionable nodule is seen in the posterior right upper lobe measuring 11 mm. A 14 mm nodule is seen in the posteromedial left lower lobe which was not as well-seen as on the prior study but is probably larger. Bibasilar pulmonary atelectasis or scarring is noted. No chest wall abnormality is identified. Limited images of the upper abdomen reveal postoperative changes from cholecystectomy. There are diffuse vascular calcifications. A partially imaged low-attenuation mass is seen in the lateral right kidney which cannot be accurately characterized without contrast. A small left lateral renal mass does not appear to be a simple cyst but may represent a hemorrhagic cyst.      Bilateral posterior upper lobe opacities worrisome for pneumonia.  New 11 mm posterior right upper lobe nodular opacity may represent a pulmonary nodule or a portion of the presumed pneumonia.  14 mm medial left lower lobe nodule. Recommend PET/CT for further evaluation.    CTDI: 3.04 mGy DLP:107.84 mGy.cm  This report was signed and finalized on 5/11/2024 12:19 PM by Darin Conklin MD.      CT Head Without Contrast    Result Date: 5/11/2024  PROCEDURE: CT HEAD WO CONTRAST-  HISTORY: ams  COMPARISON: None  TECHNIQUE: Multiple axial CT images were performed from the foramen magnum to the vertex without contrast. Coronal reformatted images were also obtained.This study was performed with techniques to keep radiation doses as low as reasonably achievable, (ALARA). Individualized dose reduction techniques using automated exposure control or adjustment of mA and/or kV according to the patient size were employed.   FINDINGS: The ventricles are normal in size. There is no evidence of hemorrhage .  No masses are identified.  No  abnormal extra-axial fluid collection is seen.  There is no evidence of shift of the midline structures. Images of the sinuses reveal mucosal thickening in multiple sinuses. A fluid level is noted in the left maxillary sinus.. No bony abnormality is seen on the bone window images.      No acute intracranial abnormality.  Diffuse sinusitis.    CTDI: 30.86 mGy DLP:554.09 mGy.cm   This report was signed and finalized on 5/11/2024 8:53 AM by Darin Conklin MD.      XR Chest 1 View    Result Date: 5/11/2024  PROCEDURE: XR CHEST 1 VW-  HISTORY: [facility] Triage Protocol   COMPARISON: May 1, 2024.  FINDINGS:  The heart size is normal. A right subclavian pacer is present. There is mild scarring/fibrosis. A persistent mild opacity is seen in the right upper lobe worrisome for pneumonia. There is no pneumothorax. The bony thorax in intact.      Persistent mild right upper lobe opacity worrisome for pneumonia.    This report was signed and finalized on 5/11/2024 8:43 AM by Darin Conklin MD.       Assessment:    Bilateral upper lobe bacterial pneumonia, unable to classify further, POA.  Acute renal failure, POA.  Hyponatremia, POA.  Chronic diastolic heart failure, no exacerbation.  Coronary artery disease status post previous PCI.  COPD, no exacerbation.  Chronic hypoxic respiratory failure on home oxygen.  Bilateral lung nodules noted on CT scan.  Sick sinus syndrome status post pacemaker.  Essential hypertension.    Plan:    Bilateral upper lobe pneumonia.  - Continue ceftriaxone and doxycycline.  - Blood cultures have been drawn in the emergency room.  - Respiratory culture and nasal swab for MRSA.  - Bronchodilators, budesonide, mucolytic agents.  - Incentive spirometry.  - Continue nasal cannula oxygen to maintain saturations above 90%.    Acute kidney injury.  - Likely secondary to decreased oral intake and dehydration.  - We will place her on IV fluids.    COPD/HFpEF  - No evidence of exacerbation.  - Continue home  medications.    Bilateral lung nodules.  - Due to prior history of smoking, recent history of poor appetite, weight loss and hyponatremia, these are highly suspicious for malignancy.  - Patient may benefit from outpatient follow-up with pulmonology.  However, with her advanced age, she may not be a candidate for invasive biopsy and/or chemotherapy.    Nutrition.  - Patient does have decreased appetite and weight loss.  - We will start her on Megace.  - She will be placed on dietary supplements and multivitamins.    We will consult physical and occupational therapy.  I have discussed the patient's condition and treatment plan including suspicious CT scan findings with her daughter Anel over the phone today.    Risk Assessment: High  DVT Prophylaxis: Enoxaparin  Code Status: Full  Diet: Regular      Dawood Ruiz MD  05/11/24  13:52 EDT    Dictated utilizing Dragon dictation.

## 2024-05-11 NOTE — PROGRESS NOTES
"Pharmacy Consult - Enoxaparin Dosing  Lesley Calero is a 83 y.o. female who has been consulted to dose Enoxaparin for VTE PPX.     Allergies  Ativan [lorazepam], Ceftin [cefuroxime axetil], Codeine, Crestor [rosuvastatin], Doxycycline, Milk-related compounds, Morphine, Sulfa antibiotics, and Penicillins    Relevant clinical data and objective history reviewed:   [Ht: 144.8 cm (57\"); Wt: 51.3 kg (113 lb 1.5 oz)]  Body mass index is 24.47 kg/m².  Estimated Creatinine Clearance: 20.2 mL/min (A) (by C-G formula based on SCr of 1.65 mg/dL (H)).  Results from last 7 days   Lab Units 05/11/24  0816   HEMOGLOBIN g/dL 13.4   HEMATOCRIT % 41.2   PLATELETS 10*3/mm3 257   CREATININE mg/dL 1.65*       Asessment/Plan  Initiate Enoxaparin 30mg SQ every 24 hours for CrCl < 30.  Pharmacy will monitor Ms. Calero's renal function and clinical status and adjust the Enoxaparin dose and/or frequency as needed.    Thanks,   Brittayn Yi, PharmD  5/11/2024  14:06 EDT      "

## 2024-05-11 NOTE — PLAN OF CARE
Goal Outcome Evaluation:  Plan of Care Reviewed With: patient, daughter      Admitted to floor with no reports of pain.  Requiring 6 liters oxygen to maintain SAT,

## 2024-05-11 NOTE — ED PROVIDER NOTES
Subjective:  History of Present Illness:    Patient is a 83-year-old female with history of CHF, COPD, & hypertension.  Reports to the ER via EMS patient family reports that patient was recently diagnosed with pneumonia last week they report that she has been working refusing to take home medications.  Family also reports that patient has had intermittent confusion over the last 2 days.  During this interview patient was alert and oriented x 3.  She denies pain anywhere.  Denies fever.  Denies shortness of breath or chest pain.  Denies OTC medication or home remedy.  Denies alleviating or exacerbating factors.    Nurses Notes reviewed and agree, including vitals, allergies, social history and prior medical history.     REVIEW OF SYSTEMS: All systems reviewed and not pertinent unless noted.  Review of Systems   Respiratory:  Positive for cough.    Psychiatric/Behavioral:  Positive for agitation and confusion.    All other systems reviewed and are negative.      Past Medical History:   Diagnosis Date    Cataracts, bilateral     CHF (congestive heart failure)     COPD (chronic obstructive pulmonary disease)     Coronary artery disease     Hyperlipidemia     Hypertension     Impaired functional mobility, balance, gait, and endurance     Myocardial infarction     Oxygen deficit     Pneumonia     RLS (restless legs syndrome)     Seizures        Allergies:    Ativan [lorazepam], Ceftin [cefuroxime axetil], Codeine, Crestor [rosuvastatin], Doxycycline, Milk-related compounds, Morphine, Sulfa antibiotics, and Penicillins      Past Surgical History:   Procedure Laterality Date    CAROTID ENDARTERECTOMY Left     CATARACT EXTRACTION W/ INTRAOCULAR LENS IMPLANT Right 7/27/2023    Procedure: CATARACT PHACO EXTRACTION WITH INTRAOCULAR LENS IMPLANT RIGHT;  Surgeon: Dajuan Blackburn MD;  Location: [facility];  Service: Ophthalmology;  Laterality: Right;    CATARACT EXTRACTION W/ INTRAOCULAR LENS IMPLANT Left 8/10/2023     Procedure: CATARACT PHACO EXTRACTION WITH INTRAOCULAR LENS IMPLANT LEFT;  Surgeon: Dajuan Blackburn MD;  Location: [facility];  Service: Ophthalmology;  Laterality: Left;    CORONARY ANGIOPLASTY WITH STENT PLACEMENT      x2    LUNG SURGERY Left     PACEMAKER IMPLANTATION           Social History     Socioeconomic History    Marital status:    Tobacco Use    Smoking status: Former    Smokeless tobacco: Former     Quit date: 2001   Vaping Use    Vaping status: Never Used   Substance and Sexual Activity    Alcohol use: No    Drug use: No    Sexual activity: Not Currently         History reviewed. No pertinent family history.    Objective  Physical Exam:  /96   Pulse 86   Temp 98.2 °F (36.8 °C) (Oral)   Resp 18   SpO2 98%      Physical Exam  Vitals and nursing note reviewed.   Constitutional:       Appearance: She is well-developed and normal weight.   HENT:      Head: Normocephalic and atraumatic.      Mouth/Throat:      Pharynx: Oropharynx is clear.   Eyes:      Extraocular Movements: Extraocular movements intact.      Pupils: Pupils are equal, round, and reactive to light.   Cardiovascular:      Rate and Rhythm: Normal rate and regular rhythm.   Pulmonary:      Effort: Pulmonary effort is normal.      Breath sounds: Normal breath sounds.   Musculoskeletal:         General: Normal range of motion.      Cervical back: Normal range of motion and neck supple.   Skin:     General: Skin is warm and dry.      Capillary Refill: Capillary refill takes less than 2 seconds.   Neurological:      General: No focal deficit present.      Mental Status: She is alert and oriented to person, place, and time.   Psychiatric:         Mood and Affect: Mood normal.         Behavior: Behavior normal.         Procedures    ED Course:         Lab Results (last 24 hours)       Procedure Component Value Units Date/Time    CBC & Differential [[id number]]  (Abnormal) Collected: 05/11/24 0816    Specimen: Blood Updated: 05/11/24  0854    Narrative:      The following orders were created for panel order CBC & Differential.  Procedure                               Abnormality         Status                     ---------                               -----------         ------                     CBC Auto Differential[[id number]]        Abnormal            Final result               Scan Slide[[id number]]                   Normal              Final result                 Please view results for these tests on the individual orders.    Comprehensive Metabolic Panel [[id number]]  (Abnormal) Collected: 05/11/24 0816    Specimen: Blood Updated: 05/11/24 0840     Glucose 116 mg/dL      BUN 62 mg/dL      Creatinine 1.65 mg/dL      Sodium 134 mmol/L      Potassium 4.7 mmol/L      Chloride 88 mmol/L      CO2 27.8 mmol/L      Calcium 10.3 mg/dL      Total Protein 8.4 g/dL      Albumin 4.0 g/dL      ALT (SGPT) 13 U/L      AST (SGOT) 25 U/L      Alkaline Phosphatase 106 U/L      Total Bilirubin 0.7 mg/dL      Globulin 4.4 gm/dL      A/G Ratio 0.9 g/dL      BUN/Creatinine Ratio 37.6     Anion Gap 18.2 mmol/L      eGFR 30.3 mL/min/1.73     Narrative:      GFR Normal >60  Chronic Kidney Disease <60  Kidney Failure <15    The GFR formula is only valid for adults with stable renal function between ages 18 and 70.    BNP [[id number]]  (Abnormal) Collected: 05/11/24 0816    Specimen: Blood Updated: 05/11/24 0848     proBNP 6,909.0 pg/mL     Narrative:      This assay is used as an aid in the diagnosis of individuals suspected of having heart failure. It can be used as an aid in the diagnosis of acute decompensated heart failure (ADHF) in patients presenting with signs and symptoms of ADHF to the emergency department (ED). In addition, NT-proBNP of <300 pg/mL indicates ADHF is not likely.    Age Range Result Interpretation  NT-proBNP Concentration (pg/mL:      <50             Positive            >450                   Gray                 300-450                     Negative             <300    50-75           Positive            >900                  Gray                300-900                  Negative            <300      >75             Positive            >1800                  Gray                300-1800                  Negative            <300    Single High Sensitivity Troponin T [[id number]]  (Abnormal) Collected: 05/11/24 0816    Specimen: Blood Updated: 05/11/24 0858     HS Troponin T 73 ng/L     Narrative:      High Sensitive Troponin T Reference Range:  <14.0 ng/L- Negative Female for AMI  <22.0 ng/L- Negative Male for AMI  >=14 - Abnormal Female indicating possible myocardial injury.  >=22 - Abnormal Male indicating possible myocardial injury.   Clinicians would have to utilize clinical acumen, EKG, Troponin, and serial changes to determine if it is an Acute Myocardial Infarction or myocardial injury due to an underlying chronic condition.         CBC Auto Differential [[id number]]  (Abnormal) Collected: 05/11/24 0816    Specimen: Blood Updated: 05/11/24 0853     WBC 10.02 10*3/mm3      RBC 4.29 10*6/mm3      Hemoglobin 13.4 g/dL      Hematocrit 41.2 %      MCV 96.0 fL      MCH 31.2 pg      MCHC 32.5 g/dL      RDW 13.4 %      RDW-SD 47.6 fl      MPV 10.6 fL      Platelets 257 10*3/mm3      Neutrophil % 89.1 %      Lymphocyte % 6.8 %      Monocyte % 3.6 %      Eosinophil % 0.0 %      Basophil % 0.2 %      Immature Grans % 0.3 %      Neutrophils, Absolute 8.93 10*3/mm3      Lymphocytes, Absolute 0.68 10*3/mm3      Monocytes, Absolute 0.36 10*3/mm3      Eosinophils, Absolute 0.00 10*3/mm3      Basophils, Absolute 0.02 10*3/mm3      Immature Grans, Absolute 0.03 10*3/mm3      nRBC 0.0 /100 WBC     Scan Slide [[id number]]  (Normal) Collected: 05/11/24 0816    Specimen: Blood Updated: 05/11/24 0854     RBC Morphology Normal     WBC Morphology Normal     Platelet Morphology Normal    Procalcitonin [[id number]]  (Abnormal) Collected: 05/11/24 0816    Specimen: Blood  "Updated: 05/11/24 0911     Procalcitonin 0.74 ng/mL     Narrative:      As a Marker for Sepsis (Non-Neonates):    1. <0.5 ng/mL represents a low risk of severe sepsis and/or septic shock.  2. >2 ng/mL represents a high risk of severe sepsis and/or septic shock.    As a Marker for Lower Respiratory Tract Infections that require antibiotic therapy:    PCT on Admission    Antibiotic Therapy       6-12 Hrs later    >0.5                Strongly Recommended  >0.25 - <0.5        Recommended   0.1 - 0.25          Discouraged              Remeasure/reassess PCT  <0.1                Strongly Discouraged     Remeasure/reassess PCT    As 28 day mortality risk marker: \"Change in Procalcitonin Result\" (>80% or <=80%) if Day 0 (or Day 1) and Day 4 values are available. Refer to http://www.[employer]-pct-calculator.com    Change in PCT <=80%  A decrease of PCT levels below or equal to 80% defines a positive change in PCT test result representing a higher risk for 28-day all-cause mortality of patients diagnosed with severe sepsis for septic shock.    Change in PCT >80%  A decrease of PCT levels of more than 80% defines a negative change in PCT result representing a lower risk for 28-day all-cause mortality of patients diagnosed with severe sepsis or septic shock.       Lactic Acid, Plasma [[id number]]  (Normal) Collected: 05/11/24 0816    Specimen: Blood Updated: 05/11/24 0901     Lactate 2.0 mmol/L              CT Head Without Contrast    Result Date: 5/11/2024  PROCEDURE: CT HEAD WO CONTRAST-  HISTORY: ams  COMPARISON: None  TECHNIQUE: Multiple axial CT images were performed from the foramen magnum to the vertex without contrast. Coronal reformatted images were also obtained.This study was performed with techniques to keep radiation doses as low as reasonably achievable, (ALARA). Individualized dose reduction techniques using automated exposure control or adjustment of mA and/or kV according to the patient size were employed.   " FINDINGS: The ventricles are normal in size. There is no evidence of hemorrhage .  No masses are identified.  No abnormal extra-axial fluid collection is seen.  There is no evidence of shift of the midline structures. Images of the sinuses reveal mucosal thickening in multiple sinuses. A fluid level is noted in the left maxillary sinus.. No bony abnormality is seen on the bone window images.      Impression: No acute intracranial abnormality.  Diffuse sinusitis.    CTDI: 30.86 mGy DLP:554.09 mGy.cm   This report was signed and finalized on 5/11/2024 8:53 AM by Darin Conklin MD.      XR Chest 1 View    Result Date: 5/11/2024  PROCEDURE: XR CHEST 1 VW-  HISTORY: [facility] Triage Protocol   COMPARISON: May 1, 2024.  FINDINGS:  The heart size is normal. A right subclavian pacer is present. There is mild scarring/fibrosis. A persistent mild opacity is seen in the right upper lobe worrisome for pneumonia. There is no pneumothorax. The bony thorax in intact.      Impression: Persistent mild right upper lobe opacity worrisome for pneumonia.    This report was signed and finalized on 5/11/2024 8:43 AM by Darin Conklin MD.          MDM     Amount and/or Complexity of Data Reviewed  Decide to obtain previous medical records or to obtain history from someone other than the patient: yes        Initial impression of presenting illness: Patient is a 83-year-old female with history of CHF, COPD, & hypertension.  Reports to the ER via EMS patient family reports that patient was recently diagnosed with pneumonia last week they report that she has been working refusing to take home medications.  Family also reports that patient has had intermittent confusion over the last 2 days.  During this interview patient was alert and oriented x 3.  She denies pain anywhere.  Denies fever.  Denies shortness of breath or chest pain.  Denies OTC medication or home remedy.  Denies alleviating or exacerbating factors.    DDX: includes but is not  limited to: Viral syndrome, UTI, pneumonia, other    Patient arrives stable with vitals interpreted by myself.     Pertinent features from physical exam: Lung sounds are clear in bilateral upper lobe.  Middle lobes are clear.  Left lower lobe is with real right lower lobe with mild rale.  Abdomen soft nontender.  Bowel sounds are normal.  Heart sounds are normal..    Initial diagnostic plan: CBC, CMP, lactic acid, procalcitonin, chest x-ray, head CT, urinalysis, troponin, BMP    Results from initial plan were reviewed and interpreted by me revealing CBC is within normal parameters.  CMP is with elevation in serum creatinine elevated BUN.  Sodium and chloride are both low.  Procalcitonin mildly elevated.  Lactic acid was normal.  Initial troponin was 73.  BNP 6/9/2009.  Blood cultures are pending.  Chest x-ray with following impressionPersistent mild right upper lobe opacity worrisome for pneumonia.  CT head no acute intracranial abnormality diffuse sinusitis.  CT chest is pending.  Urinalysis pending    Diagnostic information from other sources: Chart review    Interventions / Re-evaluation: Vital signs stable throughout encounter    Results/clinical rationale were discussed with patient/patient daughter    Consultations/Discussion of results with other physicians: Dr. venegas.  Spoke with Dr. Ruiz and he is willing to admit this patient.    Disposition plan: Admit patient to hospital.  -----        Final diagnoses:   OLLIE (acute kidney injury)   Pneumonia due to infectious organism, unspecified laterality, unspecified part of lung          Frank Gomez, APRN  05/11/24 1050

## 2024-05-11 NOTE — CASE MANAGEMENT/SOCIAL WORK
Discharge Planning Assessment  [facility]     Patient Name: Lesley Calero  MRN: [id number]  Today's Date: 5/11/2024    Admit Date: 5/11/2024    Plan: Home with family   Discharge Needs Assessment       Row Name 05/11/24 1256       Living Environment    People in Home child(seth), adult;spouse    Current Living Arrangements home    Potentially Unsafe Housing Conditions none    In the past 12 months has the electric, gas, oil, or water company threatened to shut off services in your home? No    Primary Care Provided by self    Provides Primary Care For no one    Able to Return to Prior Arrangements yes       Resource/Environmental Concerns    Resource/Environmental Concerns none    Transportation Concerns none       Transportation Needs    In the past 12 months, has lack of transportation kept you from medical appointments or from getting medications? no    In the past 12 months, has lack of transportation kept you from meetings, work, or from getting things needed for daily living? No       Food Insecurity    Within the past 12 months, you worried that your food would run out before you got the money to buy more. Never true    Within the past 12 months, the food you bought just didn't last and you didn't have money to get more. Never true       Transition Planning    Patient/Family Anticipates Transition to home with family    Patient/Family Anticipated Services at Transition none    Transportation Anticipated family or friend will provide       Discharge Needs Assessment    Readmission Within the Last 30 Days no previous admission in last 30 days    Equipment Currently Used at Home cane, quad;oxygen;nebulizer;shower chair    Concerns to be Addressed no discharge needs identified    Anticipated Changes Related to Illness none    Equipment Needed After Discharge none                   Discharge Plan       Row Name 05/11/24 1256       Plan    Plan Home with family    Patient/Family in Agreement with Plan yes    Plan  Comments Met with patient and daughter at bedside. Patient consents to daughter being involved in DCP. Verified patient's address, phone number, contacts, physician and pharmacy. Patient has adequate transportation. Reports no financial or food insecurity. She lives with her spouse and adult daughter. She uses a cane, walker, and oxygen (inogen). She uses [facility] pharmacy, agreeable to meds to bed. Patient plans to discharge back home with her family once medically ready.    Final Discharge Disposition Code 01 - home or self-care                  Continued Care and Services - Admitted Since 5/11/2024    No active coordination exists for this encounter.       Selected Continued Care - Episodes Includes continued care and service providers with selected services from the active episodes listed below      Heart Failure Episode start date: 3/27/2024   There are no active outsourced providers for this episode.                 Selected Continued Care - Prior Encounters Includes continued care and service providers with selected services from prior encounters from 2/11/2024 to 5/11/2024      Discharged on 4/23/2024 Admission date: 4/21/2024 - Discharge disposition: Short Term Hospital (DC - External)      Destination       Service Provider Selected Services Address Phone Fax Patient Preferred    [facility] [place] [facility] [address], [place] [address] [phone] --                             Demographic Summary       Row Name 05/11/24 1256       General Information    Admission Type observation    Arrived From emergency department    Required Notices Provided Observation Status Notice    Referral Source admission list    Reason for Consult discharge planning    Preferred Language English       Contact Information    Permission Granted to Share Info With                    Functional Status       Row Name 05/11/24 1256       Functional Status    Usual Activity  Tolerance moderate    Current Activity Tolerance moderate       Physical Activity    On average, how many days per week do you engage in moderate to strenuous exercise (like a brisk walk)? 0 days    On average, how many minutes do you engage in exercise at this level? 0 min    Number of minutes of exercise per week 0       Assessment of Health Literacy    How often do you have someone help you read hospital materials? Occasionally    How often do you have problems learning about your medical condition because of difficulty understanding written information? Occasionally    How often do you have a problem understanding what is told to you about your medical condition? Occasionally    How confident are you filling out medical forms by yourself? Quite a bit    Health Literacy Good       Functional Status, IADL    Medications assistive equipment    Meal Preparation assistive equipment    Housekeeping assistive equipment    Laundry assistive equipment    Shopping assistive equipment                   Psychosocial       Row Name 05/11/24 8260       Values/Beliefs    Spiritual, Cultural Beliefs, Latter-day Practices, Values that Affect Care no       Mental Health    Little Interest or Pleasure in Doing Things 0-->not at all    Feeling Down, Depressed or Hopeless 0-->not at all       Stress    Do you feel stress - tense, restless, nervous, or anxious, or unable to sleep at night because your mind is troubled all the time - these days? Not at all       Coping/Stress    Major Change/Loss/Stressor illness    Patient Personal Strengths able to adapt;resilient    Techniques to [place] with Loss/Stress/Change diversional activities    Reaction to Health Status accepting    Understanding of Condition and Treatment adequate understanding of medical condition;adequate understanding of treatment       Developmental Stage (Adrianon's)    Developmental Stage Stage 8 (65 years-death/Late Adulthood) Integrity vs. Despair       C-SSRS  (Recent)    Q1 Wished to be Dead (Past Month) no    Q2 Suicidal Thoughts (Past Month) no    Q6 Suicide Behavior (Lifetime) no       Violence Risk    Feels Like Hurting Others no    Previous Attempt to Harm Others no                   Abuse/Neglect    No documentation.                  Legal    No documentation.                  Substance Abuse    No documentation.                  Patient Forms    No documentation.                     Terry Mccarty RN

## 2024-05-12 LAB
ANION GAP SERPL CALCULATED.3IONS-SCNC: 8.5 MMOL/L (ref 5–15)
BUN SERPL-MCNC: 51 MG/DL (ref 8–23)
BUN/CREAT SERPL: 46.4 (ref 7–25)
CALCIUM SPEC-SCNC: 8.9 MG/DL (ref 8.6–10.5)
CHLORIDE SERPL-SCNC: 102 MMOL/L (ref 98–107)
CO2 SERPL-SCNC: 30.5 MMOL/L (ref 22–29)
CREAT SERPL-MCNC: 1.1 MG/DL (ref 0.57–1)
DEPRECATED RDW RBC AUTO: 48.9 FL (ref 37–54)
EGFRCR SERPLBLD CKD-EPI 2021: 49.3 ML/MIN/1.73
ERYTHROCYTE [DISTWIDTH] IN BLOOD BY AUTOMATED COUNT: 13.5 % (ref 12.3–15.4)
GLUCOSE SERPL-MCNC: 91 MG/DL (ref 65–99)
HCT VFR BLD AUTO: 33.4 % (ref 34–46.6)
HGB BLD-MCNC: 10.7 G/DL (ref 12–15.9)
MCH RBC QN AUTO: 31.2 PG (ref 26.6–33)
MCHC RBC AUTO-ENTMCNC: 32 G/DL (ref 31.5–35.7)
MCV RBC AUTO: 97.4 FL (ref 79–97)
PLATELET # BLD AUTO: 170 10*3/MM3 (ref 140–450)
PMV BLD AUTO: 10.7 FL (ref 6–12)
POTASSIUM SERPL-SCNC: 4.1 MMOL/L (ref 3.5–5.2)
RBC # BLD AUTO: 3.43 10*6/MM3 (ref 3.77–5.28)
SODIUM SERPL-SCNC: 141 MMOL/L (ref 136–145)
WBC NRBC COR # BLD AUTO: 6.46 10*3/MM3 (ref 3.4–10.8)

## 2024-05-12 PROCEDURE — 97161 PT EVAL LOW COMPLEX 20 MIN: CPT

## 2024-05-12 PROCEDURE — 94760 N-INVAS EAR/PLS OXIMETRY 1: CPT

## 2024-05-12 PROCEDURE — 94799 UNLISTED PULMONARY SVC/PX: CPT

## 2024-05-12 PROCEDURE — 25010000002 CEFTRIAXONE PER 250 MG: Performed by: INTERNAL MEDICINE

## 2024-05-12 PROCEDURE — 25010000002 ENOXAPARIN PER 10 MG: Performed by: INTERNAL MEDICINE

## 2024-05-12 PROCEDURE — 94664 DEMO&/EVAL PT USE INHALER: CPT

## 2024-05-12 PROCEDURE — 85027 COMPLETE CBC AUTOMATED: CPT | Performed by: INTERNAL MEDICINE

## 2024-05-12 PROCEDURE — 80048 BASIC METABOLIC PNL TOTAL CA: CPT | Performed by: INTERNAL MEDICINE

## 2024-05-12 PROCEDURE — 99232 SBSQ HOSP IP/OBS MODERATE 35: CPT | Performed by: INTERNAL MEDICINE

## 2024-05-12 PROCEDURE — 94761 N-INVAS EAR/PLS OXIMETRY MLT: CPT

## 2024-05-12 RX ORDER — HYDROCODONE BITARTRATE AND ACETAMINOPHEN 7.5; 325 MG/1; MG/1
1 TABLET ORAL EVERY 6 HOURS PRN
Status: DISCONTINUED | OUTPATIENT
Start: 2024-05-12 | End: 2024-05-14 | Stop reason: HOSPADM

## 2024-05-12 RX ORDER — HYDROCODONE BITARTRATE AND ACETAMINOPHEN 7.5; 325 MG/1; MG/1
1 TABLET ORAL EVERY 6 HOURS PRN
COMMUNITY
Start: 2024-05-16

## 2024-05-12 RX ADMIN — BUDESONIDE 0.5 MG: 0.5 INHALANT RESPIRATORY (INHALATION) at 07:19

## 2024-05-12 RX ADMIN — Medication 1 CAPSULE: at 21:18

## 2024-05-12 RX ADMIN — Medication 10 ML: at 21:19

## 2024-05-12 RX ADMIN — DOXYCYCLINE 100 MG: 100 CAPSULE ORAL at 21:19

## 2024-05-12 RX ADMIN — IPRATROPIUM BROMIDE AND ALBUTEROL SULFATE 3 ML: .5; 3 SOLUTION RESPIRATORY (INHALATION) at 19:08

## 2024-05-12 RX ADMIN — GABAPENTIN 600 MG: 300 CAPSULE ORAL at 14:13

## 2024-05-12 RX ADMIN — ASPIRIN 81 MG: 81 TABLET, COATED ORAL at 08:23

## 2024-05-12 RX ADMIN — CEFTRIAXONE SODIUM 1000 MG: 1 INJECTION, POWDER, FOR SOLUTION INTRAMUSCULAR; INTRAVENOUS at 11:14

## 2024-05-12 RX ADMIN — HYDROCODONE BITARTRATE AND ACETAMINOPHEN 1 TABLET: 5; 325 TABLET ORAL at 00:56

## 2024-05-12 RX ADMIN — PANTOPRAZOLE SODIUM 40 MG: 40 TABLET, DELAYED RELEASE ORAL at 06:22

## 2024-05-12 RX ADMIN — Medication 1 CAPSULE: at 08:23

## 2024-05-12 RX ADMIN — HYDROCODONE BITARTRATE AND ACETAMINOPHEN 1 TABLET: 7.5; 325 TABLET ORAL at 18:13

## 2024-05-12 RX ADMIN — HYDROCODONE BITARTRATE AND ACETAMINOPHEN 1 TABLET: 5; 325 TABLET ORAL at 06:39

## 2024-05-12 RX ADMIN — GABAPENTIN 600 MG: 300 CAPSULE ORAL at 21:18

## 2024-05-12 RX ADMIN — IPRATROPIUM BROMIDE AND ALBUTEROL SULFATE 3 ML: .5; 3 SOLUTION RESPIRATORY (INHALATION) at 07:19

## 2024-05-12 RX ADMIN — BUDESONIDE 0.5 MG: 0.5 INHALANT RESPIRATORY (INHALATION) at 19:08

## 2024-05-12 RX ADMIN — ACETAMINOPHEN 650 MG: 325 TABLET ORAL at 21:19

## 2024-05-12 RX ADMIN — IPRATROPIUM BROMIDE AND ALBUTEROL SULFATE 3 ML: .5; 3 SOLUTION RESPIRATORY (INHALATION) at 12:54

## 2024-05-12 RX ADMIN — Medication 10 ML: at 08:23

## 2024-05-12 RX ADMIN — AMLODIPINE BESYLATE 5 MG: 5 TABLET ORAL at 08:23

## 2024-05-12 RX ADMIN — GUAIFENESIN 600 MG: 600 TABLET, EXTENDED RELEASE ORAL at 21:19

## 2024-05-12 RX ADMIN — METOPROLOL SUCCINATE 50 MG: 25 TABLET, EXTENDED RELEASE ORAL at 08:23

## 2024-05-12 RX ADMIN — ENOXAPARIN SODIUM 30 MG: 100 INJECTION SUBCUTANEOUS at 08:23

## 2024-05-12 RX ADMIN — SENNOSIDES AND DOCUSATE SODIUM 2 TABLET: 50; 8.6 TABLET ORAL at 08:23

## 2024-05-12 RX ADMIN — SENNOSIDES AND DOCUSATE SODIUM 2 TABLET: 50; 8.6 TABLET ORAL at 21:19

## 2024-05-12 RX ADMIN — GABAPENTIN 600 MG: 300 CAPSULE ORAL at 06:22

## 2024-05-12 RX ADMIN — GUAIFENESIN 600 MG: 600 TABLET, EXTENDED RELEASE ORAL at 08:23

## 2024-05-12 RX ADMIN — MEGESTROL ACETATE 400 MG: 40 SUSPENSION ORAL at 08:23

## 2024-05-12 RX ADMIN — DOXYCYCLINE 100 MG: 100 CAPSULE ORAL at 08:23

## 2024-05-12 RX ADMIN — Medication 1 TABLET: at 08:23

## 2024-05-12 NOTE — THERAPY EVALUATION
Patient Name: Lesley Calero  : 1940    MRN: [id number]                              Today's Date: 2024       Admit Date: 2024    Visit Dx:     ICD-10-CM ICD-9-CM   1. OLLIE (acute kidney injury)  N17.9 584.9   2. Pneumonia due to infectious organism, unspecified laterality, unspecified part of lung  J18.9 486     Patient Active Problem List   Diagnosis    Knee strain, right, initial encounter    Chronic pain of right knee    COPD (chronic obstructive pulmonary disease)    Essential hypertension    Status post placement of cardiac pacemaker    Nuclear sclerotic cataract of right eye    Chronic heart failure with preserved ejection fraction (HFpEF)    Chest pain    Pneumonia of right upper lobe due to infectious organism    Pneumonia     Past Medical History:   Diagnosis Date    Cataracts, bilateral     CHF (congestive heart failure)     COPD (chronic obstructive pulmonary disease)     Coronary artery disease     Hyperlipidemia     Hypertension     Impaired functional mobility, balance, gait, and endurance     Myocardial infarction     Oxygen deficit     Pneumonia     RLS (restless legs syndrome)     Seizures      Past Surgical History:   Procedure Laterality Date    CAROTID ENDARTERECTOMY Left     CATARACT EXTRACTION W/ INTRAOCULAR LENS IMPLANT Right 2023    Procedure: CATARACT PHACO EXTRACTION WITH INTRAOCULAR LENS IMPLANT RIGHT;  Surgeon: Dajuan Blackburn MD;  Location: [facility] OR;  Service: Ophthalmology;  Laterality: Right;    CATARACT EXTRACTION W/ INTRAOCULAR LENS IMPLANT Left 8/10/2023    Procedure: CATARACT PHACO EXTRACTION WITH INTRAOCULAR LENS IMPLANT LEFT;  Surgeon: Dajuan Blackburn MD;  Location: [facility] OR;  Service: Ophthalmology;  Laterality: Left;    CORONARY ANGIOPLASTY WITH STENT PLACEMENT      x2    LUNG SURGERY Left     PACEMAKER IMPLANTATION        General Information       Row Name 24 [id number]          Physical Therapy Time and Intention    Document Type evaluation   -     Mode of Treatment physical therapy  -       Row Name 05/12/24 1445          General Information    Patient Profile Reviewed yes  -JR     Prior Level of Function independent:;all household mobility  -     Existing Precautions/Restrictions fall;oxygen therapy device and L/min  -     Barriers to Rehab none identified  -[facility] Name 05/12/24 1445          Living Environment    People in Home child(seth), adult  -[facility] Name 05/12/24 1445          Home Main Entrance    Number of Stairs, Main Entrance none  -[facility] Name 05/12/24 1445          Stairs Within Home, Primary    Number of Stairs, Within Home, Primary none  -[facility] Name 05/12/24 1445          Cognition    Orientation Status (Cognition) oriented x 4  -[facility] Name 05/12/24 1445          Safety Issues, Functional Mobility    Safety Issues Affecting Function (Mobility) safety precautions follow-through/compliance;awareness of need for assistance;insight into deficits/self-awareness  -     Impairments Affecting Function (Mobility) strength;endurance/activity tolerance;shortness of breath;postural/trunk control;balance  -               User Key  (r) = Recorded By, (t) = Taken By, (c) = Cosigned By      Initials Name Provider Type    JR Susan Crum, PT Physical Therapist                   Mobility       [facility] Name 05/12/24 1442          Bed Mobility    Bed Mobility supine-sit  -     Supine-Sit [place] (Bed Mobility) minimum assist (75% patient effort)  -     Assistive Device (Bed Mobility) head of bed elevated;bed rails  -JR       Row Name 05/12/24 1447          Sit-Stand Transfer    Sit-Stand [place] (Transfers) minimum assist (75% patient effort)  -     Assistive Device (Sit-Stand Transfers) walker, front-wheeled  -[facility] Name 05/12/24 1443          Gait/Stairs (Locomotion)    [place] Level (Gait) minimum assist (75% patient effort)  -     Assistive Device (Gait) walker, front-wheeled  -      Patient was able to Ambulate yes  -JR     Distance in Feet (Gait) 12  -JR     Deviations/Abnormal Patterns (Gait) base of support, narrow;janeen decreased;festinating/shuffling;stride length decreased  -JR     Bilateral Gait Deviations forward flexed posture;heel strike decreased  -JR               User Key  (r) = Recorded By, (t) = Taken By, (c) = Cosigned By      Initials Name Provider Type    Susan Clayton PT Physical Therapist                   Obj/Interventions       [facility] Name 05/12/24 1445          Range of Motion Comprehensive    General Range of Motion no range of motion deficits identified  -JR       Row Name 05/12/24 1445          Strength Comprehensive (MMT)    Comment, General Manual Muscle Testing (MMT) Assessment Grossly 3+/5 thoughout  -[facility] Name 05/12/24 1445          Balance    Balance Assessment sitting static balance;sitting dynamic balance;sit to stand dynamic balance;standing static balance;standing dynamic balance  -JR     Static Sitting Balance contact guard  -JR     Dynamic Sitting Balance contact guard  -JR     Position, Sitting Balance unsupported;sitting edge of bed  -JR     Sit to Stand Dynamic Balance minimal assist  -JR     Static Standing Balance minimal assist  -JR     Dynamic Standing Balance minimal assist  -JR     Position/Device Used, Standing Balance walker, rolling  -JR               User Key  (r) = Recorded By, (t) = Taken By, (c) = Cosigned By      Initials Name Provider Type    Susan Clayton PT Physical Therapist                   Goals/Plan       Row Name 05/12/24 1445          Bed Mobility Goal 1 (PT)    Activity/Assistive Device (Bed Mobility Goal 1, PT) bed mobility activities, all  -JR     [place] Level/Cues Needed (Bed Mobility Goal 1, PT) supervision required  -     Time Frame (Bed Mobility Goal 1, PT) short term goal (STG)  -JR     Progress/Outcomes (Bed Mobility Goal 1, PT) goal ongoing  -JR       Row Name 05/12/24 1445          Transfer Goal 1  (PT)    Activity/Assistive Device (Transfer Goal 1, PT) sit-to-stand/stand-to-sit;bed-to-chair/chair-to-bed  -JR     [place] Level/Cues Needed (Transfer Goal 1, PT) supervision required  -JR     Time Frame (Transfer Goal 1, PT) long term goal (LTG)  -JR     Progress/Outcome (Transfer Goal 1, PT) goal ongoing  -JR       Row Name 05/12/24 1445          Gait Training Goal 1 (PT)    Activity/Assistive Device (Gait Training Goal 1, PT) gait (walking locomotion);walker, rolling;increase endurance/gait distance;decrease fall risk;improve balance and speed  -JR     [place] Level (Gait Training Goal 1, PT) supervision required  -JR     Distance (Gait Training Goal 1, PT) 100  -JR     Time Frame (Gait Training Goal 1, PT) long term goal (LTG)  -JR     Progress/Outcome (Gait Training Goal 1, PT) goal ongoing  -JR       Row Name 05/12/24 1445          Patient Education Goal (PT)    Activity (Patient Education Goal, PT) Perform BLE standing exercises x 20 reps  -JR     [place]/Cues/Accuracy (Memory Goal 2, PT) demonstrates adequately  -JR     Time Frame (Patient Education Goal, PT) 4 days  -JR     Progress/Outcome (Patient Education Goal, PT) goal ongoing  -JR       Row Name 05/12/24 1445          Therapy Assessment/Plan (PT)    Planned Therapy Interventions (PT) strengthening;balance training;bed mobility training;transfer training;gait training;home exercise program;patient/family education  -JR               User Key  (r) = Recorded By, (t) = Taken By, (c) = Cosigned By      Initials Name Provider Type    Susan Clayton, PT Physical Therapist                   Clinical Impression       Row Name 05/12/24 5174          Pain    Pretreatment Pain Rating 0/10 - no pain  -JR     Posttreatment Pain Rating 0/10 - no pain  -JR     Additional Documentation Pain Scale: Numbers Pre/Post-Treatment (Group)  -       Row Name 05/12/24 1445          Plan of Care Review    Plan of Care Reviewed With patient  -JR     Progress  no change  -JR     Outcome Evaluation Patient participated well in PT evaluation and demonstrated decreased overall mobility.  She required minimal assistance to perform bed mobility, transfers and gait x 12 feet with RW and cues for posture.  She is eager to get stronger and return home.  She is expected to benefit from additional PT whild hospitalized and upon discharge to home with home health care.  -JR       Row Name 05/12/24 1445          Therapy Assessment/Plan (PT)    Patient/Family Therapy Goals Statement (PT) Patient is eager to get strong enough to walk well  -JR     Rehab Potential (PT) good, to achieve stated therapy goals  -JR     Criteria for Skilled Interventions Met (PT) yes;meets criteria;skilled treatment is necessary  -JR     Therapy Frequency (PT) 5 times/wk  -JR     Predicted Duration of Therapy Intervention (PT) 14 days  -JR       Row Name 05/12/24 1445          Positioning and Restraints    Pre-Treatment Position in bed  -JR     Post Treatment Position chair  -JR     In Chair sitting;call light within reach;encouraged to call for assist;notified nsg  -JR               User Key  (r) = Recorded By, (t) = Taken By, (c) = Cosigned By      Initials Name Provider Type    Susan Clayton, PT Physical Therapist                   Outcome Measures       Row Name 05/12/24 1445 05/12/24 0800       How much help from another person do you currently need...    Turning from your back to your side while in flat bed without using bedrails? 3  -JR 3  -PK    Moving from lying on back to sitting on the side of a flat bed without bedrails? 3  -JR 3  -PK    Moving to and from a bed to a chair (including a wheelchair)? 2  -JR 2  -PK    Standing up from a chair using your arms (e.g., wheelchair, bedside chair)? 2  -JR 2  -PK    Climbing 3-5 steps with a railing? 2  -JR 1  -PK    To walk in hospital room? 2  -JR 2  -PK    AM-PAC 6 Clicks Score (PT) 14  -JR 13  -PK    Highest Level of Mobility Goal 4 --> Transfer to  chair/commode  - 4 --> Transfer to chair/commode  -PK      Row Name 05/12/24 1445          Functional Assessment    Outcome Measure Options AM-PAC 6 Clicks Basic Mobility (PT)  -               User Key  (r) = Recorded By, (t) = Taken By, (c) = Cosigned By      Initials Name Provider Type    Susan Clayton, PT Physical Therapist    Deborah Alan, RN Registered Nurse                                 Physical Therapy Education       Title: PT OT SLP Therapies (In Progress)       Topic: Physical Therapy (In Progress)       Point: Mobility training (Done)       Learning Progress Summary             Patient Acceptance, E,TB, VU by  at 5/12/2024 1906    Comment: Role of PT and POC                         Point: Home exercise program (Not Started)       Learner Progress:  Not documented in this visit.              Point: Body mechanics (Not Started)       Learner Progress:  Not documented in this visit.              Point: Precautions (Not Started)       Learner Progress:  Not documented in this visit.                              User Key       Initials Effective Dates Name Provider Type [facility] 08/22/23 -  Susan Crum, PT Physical Therapist PT                  PT Recommendation and Plan  Planned Therapy Interventions (PT): strengthening, balance training, bed mobility training, transfer training, gait training, home exercise program, patient/family education  Plan of Care Reviewed With: patient  Progress: no change  Outcome Evaluation: Patient participated well in PT evaluation and demonstrated decreased overall mobility.  She required minimal assistance to perform bed mobility, transfers and gait x 12 feet with RW and cues for posture.  She is eager to get stronger and return home.  She is expected to benefit from additional PT whild hospitalized and upon discharge to home with home health care.     Time Calculation:   PT Evaluation Complexity  History, PT Evaluation Complexity: 1-2 personal factors  and/or comorbidities  Examination of Body Systems (PT Eval Complexity): 1-2 elements  Clinical Presentation (PT Evaluation Complexity): evolving  Clinical Decision Making (PT Evaluation Complexity): low complexity  Overall Complexity (PT Evaluation Complexity): low complexity     PT Charges       Row Name 05/12/24 1445             Time Calculation    Start Time 1445  -JR      PT Received On 05/12/24  -JR      PT Goal Re-Cert Due Date 05/22/24  -JR         Untimed Charges    PT Eval/Re-eval Minutes 40  -JR         Total Minutes    Untimed Charges Total Minutes 40  -JR       Total Minutes 40  -JR                User Key  (r) = Recorded By, (t) = Taken By, (c) = Cosigned By      Initials Name Provider Type    JR Susan Crum, PT Physical Therapist                  Therapy Charges for Today       Code Description Service Date Service Provider Modifiers Qty    [id number]  PT EVAL LOW COMPLEXITY 3 5/12/2024 Susan Crum, PT GP 1            PT G-Codes  Outcome Measure Options: AM-PAC 6 Clicks Basic Mobility (PT)  AM-PAC 6 Clicks Score (PT): 14  PT Discharge Summary  Anticipated Discharge Disposition (PT): home with home health    Susan Crum, PT  5/12/2024

## 2024-05-12 NOTE — PLAN OF CARE
Goal Outcome Evaluation:  Plan of Care Reviewed With: patient, daughter   Pain controlled with lortab.  Up to chair with physical therapy.  Up to bedside commode.  Using IS as directed.  No acute events this shift.

## 2024-05-12 NOTE — PLAN OF CARE
Goal Outcome Evaluation:  Plan of Care Reviewed With: patient        Progress: no change  Outcome Evaluation: Patient participated well in PT evaluation and demonstrated decreased overall mobility.  She required minimal assistance to perform bed mobility, transfers and gait x 12 feet with RW and cues for posture.  She is eager to get stronger and return home.  She is expected to benefit from additional PT whild hospitalized and upon discharge to home with home health care.      Anticipated Discharge Disposition (PT): home with home health

## 2024-05-12 NOTE — PROGRESS NOTES
[facility]    Name:  Lesley Calero   Age:  83 y.o.  Sex:  female  :  1940  MRN:  [id number]   Visit Number:  [id number]  Admission Date:  2024  Date Of Service:  24  Primary Care Physician:  Nj Smith MD     LOS: 1 day :    Chief Complaint:      Generalized weakness.    Subjective:    Lesley Calero was seen and examined this morning.  She is currently sitting up in the bed and is comfortable at rest.  She has been on 6 L overnight and saturating in the upper 90s and I have decreased her oxygen to 4 L.  Denies any chest pain.    Hospital Course:    Lesley Calero is an 83-year-old female with history of coronary artery disease, diastolic heart failure, COPD on home oxygen at 3 L, hypertension, sick sinus syndrome status post pacemaker was brought to the emergency room by EMS with multiple symptoms including cough, shortness of breath, generalized weakness and weight loss.  Patient lives with her  and her daughter lives with them and takes care of them.  Patient does have history of COPD and has home nebulizers and uses oxygen at home.  She apparently quit smoking in .  According to the daughter, patient has been progressively weak and has not been able to walk much in the last several days and her oral intake also has decreased.  She apparently has lost about 15 pounds in the last 1 month.       Patient was recently admitted to this facility on 2024 with symptoms of chest pain, elevated troponins.  At that time she was seen by Dr. Nguyen who recommended transfer to tertiary care facility for possible cardiac catheterization.  Patient was subsequently transferred to [facility] on 2024 and was evaluated by cardiology there who did not feel patient had cardiac reasons for chest pain.  At that time she did undergo stress test which was reviewed by cardiology and they did not recommend cardiac  catheterization.  Patient was subsequently treated for pneumonia with ceftriaxone and azithromycin and was discharged on oral cephalosporin therapy.     In the emergency room, she was afebrile and hemodynamically stable saturating at 96% on 3 L of nasal cannula oxygen.  Initial troponin 73, proBNP 6909, sodium 134, BUN 62 (baseline 23), creatinine 1.65 (baseline 0.7), glucose 116.  LFTs were within normal limits.  Lactic acid was 2, procalcitonin elevated at 0.74.  CBC was unremarkable.  Chest x-ray showed persistent mid right lower lobe opacity worrisome for pneumonia.  This was also seen during her last x-ray 10 days ago.  CT of the chest without contrast showed bilateral posterior upper lobe opacities worrisome for pneumonia.  New 11 mm posterior right upper lobe nodule and 14 mm medial left lower lobe nodule noted.  PET/CT recommended for further evaluation.  EKG showed paced rhythm.  Patient was given 500 mL of normal saline bolus, Rocephin and doxycycline in the emergency room and was subsequently admitted to the medical floor with telemetry for treatment of recurrent pneumonia, hyponatremia and acute kidney injury.    Patient was continued on IV fluids with significant improvement in her renal function.  IV fluids were discontinued.  Patient was maintained on Rocephin and doxycycline.  She was initiated on physical and occupational therapy.    Review of Systems:     All systems were reviewed and negative except as mentioned in subjective, assessment and plan.    Vital Signs:    Temp:  [97.2 °F (36.2 °C)-98.2 °F (36.8 °C)] 97.6 °F (36.4 °C)  Heart Rate:  [71-97] 76  Resp:  [16-20] 16  BP: ()/(38-60) 129/57    Intake and output:    I/O last 3 completed shifts:  In: 1260 [P.O.:360; I.V.:800; IV Piggyback:100]  Out: 475 [Urine:475]  No intake/output data recorded.    Physical Examination:    General Appearance:  Alert and cooperative.    Head:  Atraumatic and normocephalic.   Eyes: Conjunctivae and sclerae  normal, no icterus. No pallor.   Throat: No oral lesions, no thrush, oral mucosa moist.   Neck: Supple, trachea midline, no thyromegaly.   Lungs:   Breath sounds heard bilaterally equally.  No wheezing or crackles. No Pleural rub or bronchial breathing.   Heart:  Normal S1 and S2, no murmur, no gallop, no rub. No JVD.  Pacemaker palpated on the right upper chest.   Abdomen:   Normal bowel sounds, no masses, no organomegaly. Soft, nontender, nondistended, no rebound tenderness.   Extremities: Supple, 1+ pitting ankle edema, no cyanosis, no clubbing.  Poor muscle mass noted.   Skin: No bleeding or rash.   Neurologic: Alert and oriented x 3. No facial asymmetry. Moves all four limbs but does have generalized weakness. No tremors.    Laboratory results:    Results from last 7 days   Lab Units 05/12/24  0806 05/11/24  0816   SODIUM mmol/L 141 134*   POTASSIUM mmol/L 4.1 4.7   CHLORIDE mmol/L 102 88*   CO2 mmol/L 30.5* 27.8   BUN mg/dL 51* 62*   CREATININE mg/dL 1.10* 1.65*   CALCIUM mg/dL 8.9 10.3   BILIRUBIN mg/dL  --  0.7   ALK PHOS U/L  --  106   ALT (SGPT) U/L  --  13   AST (SGOT) U/L  --  25   GLUCOSE mg/dL 91 116*     Results from last 7 days   Lab Units 05/12/24  0806 05/11/24  0816   WBC 10*3/mm3 6.46 10.02   HEMOGLOBIN g/dL 10.7* 13.4   HEMATOCRIT % 33.4* 41.2   PLATELETS 10*3/mm3 170 257         Results from last 7 days   Lab Units 05/11/24  1056 05/11/24  0816   HSTROP T ng/L 71* 73*     I have reviewed the patient's laboratory results.    Radiology results:    CT Chest Without Contrast Diagnostic    Result Date: 5/11/2024  PROCEDURE: CT CHEST WO CONTRAST DIAGNOSTIC-  HISTORY: Recurrent pneumonia; N17.9-Acute kidney failure, unspecified; J18.9-Pneumonia, unspecified organism  COMPARISON: October 8, 2023  FINDINGS: Axial CT images of the chest were obtained without contrast. Coronal reformatted images were also obtained. This study was performed with techniques to keep radiation doses as low as reasonably  achievable, (ALARA). Individualized dose reduction techniques using automated exposure control or adjustment of mA and/or kV according to the patient size were employed.  A right subclavian pacer is present. There are severe coronary artery calcifications. Borderline sized mediastinal nodes are seen which have not significantly changed. No axillary mass or adenopathy is identified. Moderate emphysema is seen on the lung window images. There are bilateral posterior upper lobe opacities which are new and most worrisome for pneumonia. A questionable nodule is seen in the posterior right upper lobe measuring 11 mm. A 14 mm nodule is seen in the posteromedial left lower lobe which was not as well-seen as on the prior study but is probably larger. Bibasilar pulmonary atelectasis or scarring is noted. No chest wall abnormality is identified. Limited images of the upper abdomen reveal postoperative changes from cholecystectomy. There are diffuse vascular calcifications. A partially imaged low-attenuation mass is seen in the lateral right kidney which cannot be accurately characterized without contrast. A small left lateral renal mass does not appear to be a simple cyst but may represent a hemorrhagic cyst.      Impression: Bilateral posterior upper lobe opacities worrisome for pneumonia.  New 11 mm posterior right upper lobe nodular opacity may represent a pulmonary nodule or a portion of the presumed pneumonia.  14 mm medial left lower lobe nodule. Recommend PET/CT for further evaluation.    CTDI: 3.04 mGy DLP:107.84 mGy.cm  This report was signed and finalized on 5/11/2024 12:19 PM by Darin Conklin MD.      CT Head Without Contrast    Result Date: 5/11/2024  PROCEDURE: CT HEAD WO CONTRAST-  HISTORY: ams  COMPARISON: None  TECHNIQUE: Multiple axial CT images were performed from the foramen magnum to the vertex without contrast. Coronal reformatted images were also obtained.This study was performed with techniques to keep  radiation doses as low as reasonably achievable, (ALARA). Individualized dose reduction techniques using automated exposure control or adjustment of mA and/or kV according to the patient size were employed.   FINDINGS: The ventricles are normal in size. There is no evidence of hemorrhage .  No masses are identified.  No abnormal extra-axial fluid collection is seen.  There is no evidence of shift of the midline structures. Images of the sinuses reveal mucosal thickening in multiple sinuses. A fluid level is noted in the left maxillary sinus.. No bony abnormality is seen on the bone window images.      Impression: No acute intracranial abnormality.  Diffuse sinusitis.    CTDI: 30.86 mGy DLP:554.09 mGy.cm   This report was signed and finalized on 5/11/2024 8:53 AM by Darin Conklin MD.      XR Chest 1 View    Result Date: 5/11/2024  PROCEDURE: XR CHEST 1 VW-  HISTORY: SOA Triage Protocol   COMPARISON: May 1, 2024.  FINDINGS:  The heart size is normal. A right subclavian pacer is present. There is mild scarring/fibrosis. A persistent mild opacity is seen in the right upper lobe worrisome for pneumonia. There is no pneumothorax. The bony thorax in intact.      Impression: Persistent mild right upper lobe opacity worrisome for pneumonia.    This report was signed and finalized on 5/11/2024 8:43 AM by Darin Conklin MD.     I have reviewed the patient's radiology reports.    Medication Review:     I have reviewed the patient's active and prn medications.     Problem List:      Pneumonia of right upper lobe due to infectious organism    COPD (chronic obstructive pulmonary disease)    Essential hypertension    Status post placement of cardiac pacemaker    Chronic heart failure with preserved ejection fraction (HFpEF)    Pneumonia    Assessment:    Bilateral upper lobe bacterial pneumonia, unable to classify further, POA.  Acute renal failure, POA.  Hyponatremia, POA.  Chronic diastolic heart failure, no  exacerbation.  Coronary artery disease status post previous PCI.  COPD, no exacerbation.  Chronic hypoxic respiratory failure on home oxygen.  Bilateral lung nodules noted on CT scan.  Sick sinus syndrome status post pacemaker.  Essential hypertension.    Plan:    Bilateral upper lobe pneumonia.  - Continue ceftriaxone and doxycycline (day 2).  - Blood cultures have been negative so far.  - Nasal swab for MRSA is negative.  - Respiratory culture has been sent and is pending.  - Bronchodilators, budesonide, mucolytic agents.  - Incentive spirometry.  - Continue nasal cannula oxygen to maintain saturations above 90%.     Acute kidney injury.  - Likely secondary to decreased oral intake and dehydration.  - Renal function has improved with IV hydration and I will discontinue the IV fluids.     COPD/HFpEF  - No evidence of exacerbation.  - Continue home medications.  - We will start her diuretic therapy from tomorrow.     Bilateral lung nodules.  - Due to prior history of smoking, recent history of poor appetite, weight loss and hyponatremia, these are highly suspicious for malignancy.  - Patient may benefit from outpatient follow-up with pulmonology and PET scan.  However, with her advanced age, she may not be a candidate for invasive biopsy and/or chemotherapy.     Nutrition.  - Patient does have decreased appetite and weight loss.  - She has been started on Megace, dietary supplements and multivitamins.    Physical and occupational therapy today.    I have discussed the patient's condition and treatment plan with her daughter Anel who is at the bedside.  Patient is not interested in going to short-term rehabilitation.    DVT Prophylaxis: Enoxaparin  Code Status: Full  Diet: Regular  Discharge Plan: Hopefully, home with home health in 1 to 2 days.    Dawood Ruiz MD  05/12/24  11:20 EDT    Dictated utilizing Dragon dictation.

## 2024-05-13 LAB
ANION GAP SERPL CALCULATED.3IONS-SCNC: 11.7 MMOL/L (ref 5–15)
BUN SERPL-MCNC: 35 MG/DL (ref 8–23)
BUN/CREAT SERPL: 43.2 (ref 7–25)
CALCIUM SPEC-SCNC: 9.2 MG/DL (ref 8.6–10.5)
CHLORIDE SERPL-SCNC: 104 MMOL/L (ref 98–107)
CO2 SERPL-SCNC: 28.3 MMOL/L (ref 22–29)
CREAT SERPL-MCNC: 0.81 MG/DL (ref 0.57–1)
EGFRCR SERPLBLD CKD-EPI 2021: 71.2 ML/MIN/1.73
GLUCOSE BLDC GLUCOMTR-MCNC: 206 MG/DL (ref 70–130)
GLUCOSE SERPL-MCNC: 93 MG/DL (ref 65–99)
POTASSIUM SERPL-SCNC: 4.5 MMOL/L (ref 3.5–5.2)
PROCALCITONIN SERPL-MCNC: 0.24 NG/ML (ref 0–0.25)
SODIUM SERPL-SCNC: 144 MMOL/L (ref 136–145)

## 2024-05-13 PROCEDURE — 25010000002 CEFTRIAXONE PER 250 MG: Performed by: INTERNAL MEDICINE

## 2024-05-13 PROCEDURE — 94799 UNLISTED PULMONARY SVC/PX: CPT

## 2024-05-13 PROCEDURE — 99232 SBSQ HOSP IP/OBS MODERATE 35: CPT | Performed by: INTERNAL MEDICINE

## 2024-05-13 PROCEDURE — 92610 EVALUATE SWALLOWING FUNCTION: CPT

## 2024-05-13 PROCEDURE — 80048 BASIC METABOLIC PNL TOTAL CA: CPT | Performed by: INTERNAL MEDICINE

## 2024-05-13 PROCEDURE — 94761 N-INVAS EAR/PLS OXIMETRY MLT: CPT

## 2024-05-13 PROCEDURE — 97116 GAIT TRAINING THERAPY: CPT

## 2024-05-13 PROCEDURE — 84145 PROCALCITONIN (PCT): CPT | Performed by: INTERNAL MEDICINE

## 2024-05-13 PROCEDURE — 82948 REAGENT STRIP/BLOOD GLUCOSE: CPT

## 2024-05-13 PROCEDURE — 97166 OT EVAL MOD COMPLEX 45 MIN: CPT

## 2024-05-13 PROCEDURE — 25010000002 ENOXAPARIN PER 10 MG: Performed by: INTERNAL MEDICINE

## 2024-05-13 PROCEDURE — 94664 DEMO&/EVAL PT USE INHALER: CPT

## 2024-05-13 PROCEDURE — 97110 THERAPEUTIC EXERCISES: CPT

## 2024-05-13 RX ORDER — ASCORBIC ACID 500 MG
1 TABLET ORAL DAILY
COMMUNITY
Start: 2024-05-16

## 2024-05-13 RX ORDER — MULTIPLE VITAMINS W/ MINERALS TAB 9MG-400MCG
1 TAB ORAL DAILY
COMMUNITY
Start: 2024-05-16

## 2024-05-13 RX ORDER — HYDROCODONE BITARTRATE AND ACETAMINOPHEN 7.5; 325 MG/1; MG/1
1 TABLET ORAL EVERY 6 HOURS PRN
Status: DISCONTINUED | OUTPATIENT
Start: 2024-05-13 | End: 2024-05-14 | Stop reason: HOSPADM

## 2024-05-13 RX ADMIN — Medication 1 CAPSULE: at 09:06

## 2024-05-13 RX ADMIN — DOXYCYCLINE 100 MG: 100 CAPSULE ORAL at 20:46

## 2024-05-13 RX ADMIN — PANTOPRAZOLE SODIUM 40 MG: 40 TABLET, DELAYED RELEASE ORAL at 06:26

## 2024-05-13 RX ADMIN — MEGESTROL ACETATE 400 MG: 40 SUSPENSION ORAL at 09:07

## 2024-05-13 RX ADMIN — GABAPENTIN 600 MG: 300 CAPSULE ORAL at 12:44

## 2024-05-13 RX ADMIN — DICLOFENAC SODIUM 2 G: 10 GEL TOPICAL at 20:15

## 2024-05-13 RX ADMIN — GABAPENTIN 600 MG: 300 CAPSULE ORAL at 06:26

## 2024-05-13 RX ADMIN — HYDROCODONE BITARTRATE AND ACETAMINOPHEN 1 TABLET: 7.5; 325 TABLET ORAL at 23:20

## 2024-05-13 RX ADMIN — AMLODIPINE BESYLATE 5 MG: 5 TABLET ORAL at 09:06

## 2024-05-13 RX ADMIN — IPRATROPIUM BROMIDE AND ALBUTEROL SULFATE 3 ML: .5; 3 SOLUTION RESPIRATORY (INHALATION) at 20:50

## 2024-05-13 RX ADMIN — IPRATROPIUM BROMIDE AND ALBUTEROL SULFATE 3 ML: .5; 3 SOLUTION RESPIRATORY (INHALATION) at 00:31

## 2024-05-13 RX ADMIN — ASPIRIN 81 MG: 81 TABLET, COATED ORAL at 09:04

## 2024-05-13 RX ADMIN — Medication 10 ML: at 09:07

## 2024-05-13 RX ADMIN — IPRATROPIUM BROMIDE AND ALBUTEROL SULFATE 3 ML: .5; 3 SOLUTION RESPIRATORY (INHALATION) at 13:18

## 2024-05-13 RX ADMIN — GUAIFENESIN 600 MG: 600 TABLET, EXTENDED RELEASE ORAL at 09:03

## 2024-05-13 RX ADMIN — Medication 1 TABLET: at 09:04

## 2024-05-13 RX ADMIN — SENNOSIDES AND DOCUSATE SODIUM 2 TABLET: 50; 8.6 TABLET ORAL at 20:46

## 2024-05-13 RX ADMIN — Medication 10 ML: at 12:44

## 2024-05-13 RX ADMIN — IPRATROPIUM BROMIDE AND ALBUTEROL SULFATE 3 ML: .5; 3 SOLUTION RESPIRATORY (INHALATION) at 07:10

## 2024-05-13 RX ADMIN — BUDESONIDE 0.5 MG: 0.5 INHALANT RESPIRATORY (INHALATION) at 20:50

## 2024-05-13 RX ADMIN — DOXYCYCLINE 100 MG: 100 CAPSULE ORAL at 09:06

## 2024-05-13 RX ADMIN — Medication 1 CAPSULE: at 20:47

## 2024-05-13 RX ADMIN — ENOXAPARIN SODIUM 30 MG: 100 INJECTION SUBCUTANEOUS at 09:07

## 2024-05-13 RX ADMIN — GABAPENTIN 600 MG: 300 CAPSULE ORAL at 21:32

## 2024-05-13 RX ADMIN — CEFTRIAXONE SODIUM 1000 MG: 1 INJECTION, POWDER, FOR SOLUTION INTRAMUSCULAR; INTRAVENOUS at 12:44

## 2024-05-13 RX ADMIN — BUDESONIDE 0.5 MG: 0.5 INHALANT RESPIRATORY (INHALATION) at 07:10

## 2024-05-13 RX ADMIN — GUAIFENESIN 600 MG: 600 TABLET, EXTENDED RELEASE ORAL at 20:47

## 2024-05-13 RX ADMIN — METOPROLOL SUCCINATE 50 MG: 25 TABLET, EXTENDED RELEASE ORAL at 09:04

## 2024-05-13 NOTE — PLAN OF CARE
Goal Outcome Evaluation:           Progress: no change  Outcome Evaluation: OT eval completed. Pt lying supine upon OT arrival this date. Pt A&O x4. Pt reports she lives with her . Pt reports (I) as PLOF.  Pt able to complete supine to sit EOB with CGA. Pt completes STS with CGA and verbal cues to push off from the bed when standing. Pt able to sit on BSC to complete toileting tasks with mod A for perineal hygiene. Pt able to complete fxl mobility x50ft to chair with CGA and RW. Pt able to return to bed with CGA. Pt now requiring increased assist with ADLs. Pt to benefit from skilled OT services for strengthening, endurance, fxl mobility, transfers, and ADL mgmt.

## 2024-05-13 NOTE — THERAPY TREATMENT NOTE
Patient Name: Lesley Calero  : 1940    MRN: [id number]                              Today's Date: 2024       Admit Date: 2024    Visit Dx:     ICD-10-CM ICD-9-CM   1. OLLIE (acute kidney injury)  N17.9 584.9   2. Pneumonia due to infectious organism, unspecified laterality, unspecified part of lung  J18.9 486     Patient Active Problem List   Diagnosis    Knee strain, right, initial encounter    Chronic pain of right knee    COPD (chronic obstructive pulmonary disease)    Essential hypertension    Status post placement of cardiac pacemaker    Nuclear sclerotic cataract of right eye    Chronic heart failure with preserved ejection fraction (HFpEF)    Chest pain    Pneumonia of right upper lobe due to infectious organism    Pneumonia     Past Medical History:   Diagnosis Date    Cataracts, bilateral     CHF (congestive heart failure)     COPD (chronic obstructive pulmonary disease)     Coronary artery disease     Hyperlipidemia     Hypertension     Impaired functional mobility, balance, gait, and endurance     Myocardial infarction     Oxygen deficit     Pneumonia     RLS (restless legs syndrome)     Seizures      Past Surgical History:   Procedure Laterality Date    CAROTID ENDARTERECTOMY Left     CATARACT EXTRACTION W/ INTRAOCULAR LENS IMPLANT Right 2023    Procedure: CATARACT PHACO EXTRACTION WITH INTRAOCULAR LENS IMPLANT RIGHT;  Surgeon: Dajuan Blackburn MD;  Location: [facility] OR;  Service: Ophthalmology;  Laterality: Right;    CATARACT EXTRACTION W/ INTRAOCULAR LENS IMPLANT Left 8/10/2023    Procedure: CATARACT PHACO EXTRACTION WITH INTRAOCULAR LENS IMPLANT LEFT;  Surgeon: Dajuan Blackburn MD;  Location: [facility] OR;  Service: Ophthalmology;  Laterality: Left;    CORONARY ANGIOPLASTY WITH STENT PLACEMENT      x2    LUNG SURGERY Left     PACEMAKER IMPLANTATION        General Information       Row Name 24 0857          Physical Therapy Time and Intention    Document Type therapy  note (daily note)  -CC     Mode of Treatment physical therapy  -       Row Name 05/13/24 1647          General Information    Patient Profile Reviewed yes  -CC     Existing Precautions/Restrictions fall;oxygen therapy device and L/min  -       Row Name 05/13/24 1647          Safety Issues, Functional Mobility    Safety Issues Affecting Function (Mobility) awareness of need for assistance;insight into deficits/self-awareness;safety precautions follow-through/compliance  -CC     Impairments Affecting Function (Mobility) strength;endurance/activity tolerance;shortness of breath;balance  -CC               User Key  (r) = Recorded By, (t) = Taken By, (c) = Cosigned By      Initials Name Provider Type     Rosa Maria Lehman PTA Physical Therapist Assistant                   Mobility       Row Name 05/13/24 1648          Bed Mobility    Bed Mobility supine-sit;sit-supine  -CC     Supine-Sit Crisp (Bed Mobility) contact guard  -CC     Sit-Supine Crisp (Bed Mobility) contact guard  -     Assistive Device (Bed Mobility) head of bed elevated;bed rails  -       Row Name 05/13/24 1648          Sit-Stand Transfer    Sit-Stand Crisp (Transfers) contact guard;verbal cues  -     Assistive Device (Sit-Stand Transfers) walker, front-wheeled  -       Row Name 05/13/24 1648          Gait/Stairs (Locomotion)    Crisp Level (Gait) contact guard;verbal cues  -     Assistive Device (Gait) walker, front-wheeled  -CC     Patient was able to Ambulate yes  -CC     Distance in Feet (Gait) 50  -CC     Deviations/Abnormal Patterns (Gait) base of support, narrow;stride length decreased  -CC     Bilateral Gait Deviations forward flexed posture;heel strike decreased  -CC               User Key  (r) = Recorded By, (t) = Taken By, (c) = Cosigned By      Initials Name Provider Type    Rosa Maria Donaldson PTA Physical Therapist Assistant                   Obj/Interventions    No documentation.                   Goals/Plan    No documentation.                  Clinical Impression       Row Name 05/13/24 1650          Pain    Pretreatment Pain Rating 0/10 - no pain  -CC     Posttreatment Pain Rating 0/10 - no pain  -CC     Additional Documentation Pain Scale: Numbers Pre/Post-Treatment (Group)  -CC       Row Name 05/13/24 1650          Plan of Care Review    Plan of Care Reviewed With patient  -CC     Progress improving  -CC     Outcome Evaluation Pt agreeable to physical therapy. Performed supine <->sit with CGA, sit <->stand with CGA and VC for hand placement with RW, transfered from EOB to/from BSC, amb with RW 50 nfeet with CGA and occ VC for upright posture as able. Static standing for julisa care and clothing mgt with 1 UE support for balance without LOB noted. Con't with PT POC and progress as tolerated  -CC       Row Name 05/13/24 1650          Positioning and Restraints    Pre-Treatment Position in bed  -CC     Post Treatment Position bed  -CC     In Bed notified nsg;supine;sitting;call light within reach;encouraged to call for assist  -CC               User Key  (r) = Recorded By, (t) = Taken By, (c) = Cosigned By      Initials Name Provider Type    CC Rosa Maria Lehman, PTA Physical Therapist Assistant                   Outcome Measures       Row Name 05/13/24 1652          How much help from another person do you currently need...    Turning from your back to your side while in flat bed without using bedrails? 4  -CC     Moving from lying on back to sitting on the side of a flat bed without bedrails? 3  -CC     Moving to and from a bed to a chair (including a wheelchair)? 3  -CC     Standing up from a chair using your arms (e.g., wheelchair, bedside chair)? 3  -CC     Climbing 3-5 steps with a railing? 2  -CC     To walk in hospital room? 3  -CC     AM-PAC 6 Clicks Score (PT) 18  -CC     Highest Level of Mobility Goal 6 --> Walk 10 steps or more  -CC       Row Name 05/13/24 1652 05/13/24 1618       Functional  Assessment    Outcome Measure Options AM-PAC 6 Clicks Basic Mobility (PT)  - AM-PAC 6 Clicks Daily Activity (OT)  -DB              User Key  (r) = Recorded By, (t) = Taken By, (c) = Cosigned By      Initials Name Provider Type    CC Rosa Maria Lehman, MAYUR Physical Therapist Assistant    Bridgett Chavis OT Occupational Therapist                                 Physical Therapy Education       Title: PT OT SLP Therapies (In Progress)       Topic: Physical Therapy (In Progress)       Point: Mobility training (Done)       Learning Progress Summary             Patient Acceptance, E,TB, VU by  at 5/12/2024 1906    Comment: Role of PT and POC                         Point: Home exercise program (Done)       Learning Progress Summary             Patient Acceptance, E,TB, VU by  at 5/13/2024 1654                         Point: Body mechanics (Not Started)       Learner Progress:  Not documented in this visit.              Point: Precautions (Not Started)       Learner Progress:  Not documented in this visit.                              User Key       Initials Effective Dates Name Provider Type Discipline     08/22/23 -  Susan Crum, PT Physical Therapist PT     06/16/21 -  Rosa Maria Lehman PTA Physical Therapist Assistant PT                  PT Recommendation and Plan     Plan of Care Reviewed With: patient  Progress: improving  Outcome Evaluation: Pt agreeable to physical therapy. Performed supine <->sit with CGA, sit <->stand with CGA and VC for hand placement with RW, transfered from EOB to/from BSC, amb with RW 50 nfeet with CGA and occ VC for upright posture as able. Static standing for julisa care and clothing mgt with 1 UE support for balance without LOB noted. Con't with PT POC and progress as tolerated     Time Calculation:         PT Charges       Row Name 05/13/24 3950             Time Calculation    PT Received On 05/13/24  -      PT Goal Re-Cert Due Date 05/22/24  -CC         Time  Calculation- PT    Total Timed Code Minutes- PT 30 minute(s)  -CC         Timed Charges    73891 - PT Therapeutic Exercise Minutes 15  -CC      39327 - Gait Training Minutes  15  -CC         Total Minutes    Timed Charges Total Minutes 30  -CC       Total Minutes 30  -CC                User Key  (r) = Recorded By, (t) = Taken By, (c) = Cosigned By      Initials Name Provider Type    CC Rosa Maria Lehman, PTA Physical Therapist Assistant                  Therapy Charges for Today       Code Description Service Date Service Provider Modifiers Qty    26997416625 HC PT THER PROC EA 15 MIN 5/13/2024 Rosa Maria Lehman, MAYUR GP 1    18976061695 HC GAIT TRAINING EA 15 MIN 5/13/2024 Rosa Maria Lehman, PTA GP 1            PT G-Codes  Outcome Measure Options: AM-PAC 6 Clicks Basic Mobility (PT)  AM-PAC 6 Clicks Score (PT): 18       Rosa Maria Lehman PTA  5/13/2024

## 2024-05-13 NOTE — PLAN OF CARE
Problem: Adult Inpatient Plan of Care  Goal: Plan of Care Review  Outcome: Ongoing, Progressing  Flowsheets (Taken 5/13/2024 1839)  Outcome Evaluation: Patient had PT today. Patient able to use BSC with x1 assist, patient I/O adequate, patient on baseline O2, patient's pain is amanageable with current pain medication regimen.  Goal: Patient-Specific Goal (Individualized)  Outcome: Ongoing, Progressing  Goal: Absence of Hospital-Acquired Illness or Injury  Outcome: Ongoing, Progressing  Intervention: Identify and Manage Fall Risk  Recent Flowsheet Documentation  Taken 5/13/2024 1800 by Tarah Spring RN  Safety Promotion/Fall Prevention:   safety round/check completed   room organization consistent   nonskid shoes/slippers when out of bed   fall prevention program maintained   clutter free environment maintained   assistive device/personal items within reach   activity supervised  Taken 5/13/2024 1600 by Tarah Spring RN  Safety Promotion/Fall Prevention:   safety round/check completed   room organization consistent   nonskid shoes/slippers when out of bed   fall prevention program maintained   clutter free environment maintained   assistive device/personal items within reach   activity supervised  Taken 5/13/2024 1400 by Tarah Spring RN  Safety Promotion/Fall Prevention:   safety round/check completed   room organization consistent   nonskid shoes/slippers when out of bed   fall prevention program maintained   clutter free environment maintained   assistive device/personal items within reach   activity supervised  Intervention: Prevent and Manage VTE (Venous Thromboembolism) Risk  Recent Flowsheet Documentation  Taken 5/13/2024 1400 by Tarah Spring RN  Range of Motion: active ROM (range of motion) encouraged  Goal: Optimal Comfort and Wellbeing  Outcome: Ongoing, Progressing  Intervention: Monitor Pain and Promote Comfort  Recent Flowsheet Documentation  Taken 5/13/2024 1400 by Tarah Spring RN  Pain Management  Interventions: no interventions per patient request  Goal: Readiness for Transition of Care  Outcome: Ongoing, Progressing     Problem: Fall Injury Risk  Goal: Absence of Fall and Fall-Related Injury  Outcome: Ongoing, Progressing  Intervention: Promote Injury-Free Environment  Recent Flowsheet Documentation  Taken 5/13/2024 1800 by Tarah Spring RN  Safety Promotion/Fall Prevention:   safety round/check completed   room organization consistent   nonskid shoes/slippers when out of bed   fall prevention program maintained   clutter free environment maintained   assistive device/personal items within reach   activity supervised  Taken 5/13/2024 1600 by Tarah Spring RN  Safety Promotion/Fall Prevention:   safety round/check completed   room organization consistent   nonskid shoes/slippers when out of bed   fall prevention program maintained   clutter free environment maintained   assistive device/personal items within reach   activity supervised  Taken 5/13/2024 1400 by Tarah Spring RN  Safety Promotion/Fall Prevention:   safety round/check completed   room organization consistent   nonskid shoes/slippers when out of bed   fall prevention program maintained   clutter free environment maintained   assistive device/personal items within reach   activity supervised     Problem: Infection  Goal: Absence of Infection Signs and Symptoms  Outcome: Ongoing, Progressing     Problem: Gas Exchange Impaired  Goal: Optimal Gas Exchange  Outcome: Ongoing, Progressing     Problem: COPD (Chronic Obstructive Pulmonary Disease) Comorbidity  Goal: Maintenance of COPD Symptom Control  Outcome: Ongoing, Progressing     Problem: Heart Failure Comorbidity  Goal: Maintenance of Heart Failure Symptom Control  Outcome: Ongoing, Progressing     Problem: Hypertension Comorbidity  Goal: Blood Pressure in Desired Range  Outcome: Ongoing, Progressing     Problem: Skin Injury Risk Increased  Goal: Skin Health and Integrity  Outcome: Ongoing,  Progressing   Goal Outcome Evaluation:              Outcome Evaluation: Patient had PT today. Patient able to use BSC with x1 assist, patient I/O adequate, patient on baseline O2, patient's pain is amanageable with current pain medication regimen.

## 2024-05-13 NOTE — CONSULTS
"Dietitian Assessment    Patient Name: Lesley Calero  YOB: 1940  MRN: [id number]  Admission date: 5/11/2024    Comment:      Clinical Nutrition Assessment      Reason for Assessment NSG/MST    H&P  Past Medical History:   Diagnosis Date    Cataracts, bilateral     CHF (congestive heart failure)     COPD (chronic obstructive pulmonary disease)     Coronary artery disease     Hyperlipidemia     Hypertension     Impaired functional mobility, balance, gait, and endurance     Myocardial infarction     Oxygen deficit     Pneumonia     RLS (restless legs syndrome)     Seizures        Past Surgical History:   Procedure Laterality Date    CAROTID ENDARTERECTOMY Left     CATARACT EXTRACTION W/ INTRAOCULAR LENS IMPLANT Right 7/27/2023    Procedure: CATARACT PHACO EXTRACTION WITH INTRAOCULAR LENS IMPLANT RIGHT;  Surgeon: Dajuan Blackburn MD;  Location: [facility] OR;  Service: Ophthalmology;  Laterality: Right;    CATARACT EXTRACTION W/ INTRAOCULAR LENS IMPLANT Left 8/10/2023    Procedure: CATARACT PHACO EXTRACTION WITH INTRAOCULAR LENS IMPLANT LEFT;  Surgeon: Dajuan Blackburn MD;  Location: [facility] OR;  Service: Ophthalmology;  Laterality: Left;    CORONARY ANGIOPLASTY WITH STENT PLACEMENT      x2    LUNG SURGERY Left     PACEMAKER IMPLANTATION              Current Problems   B/L upper lobe pneumonia  OLLIE  COPD  B/L lung nodules     Encounter Information        Trending Narrative     5/13: Pt w/ decreased appetite and placed on megace. Reviewed weight history - and pt w/ 9% wt loss w/in <2 months. Will order supplementation to encourage PO intake and provide additional calories and protein.      Anthropometrics        Current Height, Weight Height: 144.8 cm (57\")  Weight: 50.9 kg (112 lb 3.4 oz) (05/13/24 0300)   Trending Weight Hx     This admission:              PTA:     Wt Readings from Last 30 Encounters:   05/13/24 0300 50.9 kg (112 lb 3.4 oz)   05/12/24 0300 52.2 kg (115 lb 1.3 oz)   05/11/24 1303 51.3 " kg (113 lb 1.5 oz)   05/01/24 1403 47.6 kg (105 lb)   04/23/24 0412 48.8 kg (107 lb 9.4 oz)   04/22/24 1216 52.4 kg (115 lb 8.3 oz)   04/22/24 0942 55.8 kg (123 lb 0.3 oz)   04/22/24 0720 55.8 kg (123 lb 0.3 oz)   04/18/24 1508 55.8 kg (123 lb)   03/25/24 1405 55.3 kg (122 lb)   02/20/24 1508 54.4 kg (120 lb)   12/22/23 0429 49.7 kg (109 lb 9.1 oz)   12/21/23 0434 50.2 kg (110 lb 10.7 oz)   12/20/23 1606 50.4 kg (111 lb 1.8 oz)   12/20/23 0739 50.8 kg (112 lb)   12/01/23 0505 46.7 kg (102 lb 15.3 oz)   11/30/23 0740 47.6 kg (104 lb 15 oz)   11/29/23 2019 47.6 kg (105 lb)   11/12/23 1009 53.1 kg (117 lb)   10/08/23 1246 53.1 kg (117 lb)   09/08/23 1815 51.7 kg (114 lb)   09/02/23 1855 53.1 kg (117 lb)   08/09/23 0957 51.7 kg (114 lb)   07/27/23 1118 51.7 kg (114 lb)   04/14/23 0500 52.1 kg (114 lb 13.8 oz)   04/14/23 0224 52.1 kg (114 lb 13.8 oz)   04/13/23 1405 57.2 kg (126 lb)   04/13/23 0444 57.6 kg (126 lb 15.8 oz)   04/12/23 1742 58.3 kg (128 lb 8.5 oz)   04/12/23 1426 56.2 kg (124 lb)   02/25/23 1835 56.7 kg (125 lb)   01/09/23 1830 54.4 kg (120 lb)   04/07/21 1343 65.8 kg (145 lb)   12/08/19 1633 61.2 kg (135 lb)   11/21/19 0826 61.2 kg (135 lb)   09/03/19 0926 66.3 kg (146 lb 2 oz)   05/20/19 1301 63.9 kg (140 lb 12.8 oz)   11/03/18 1420 62.1 kg (137 lb)   07/31/18 1226 59 kg (130 lb)   04/03/18 1636 60.8 kg (134 lb)      BMI kg/m2 Body mass index is 24.28 kg/m².     Labs        Pertinent Labs     Results from last 7 days   Lab Units 05/13/24  0746 05/12/24  0806 05/11/24  0816   SODIUM mmol/L 144 141 134*   POTASSIUM mmol/L 4.5 4.1 4.7   CHLORIDE mmol/L 104 102 88*   CO2 mmol/L 28.3 30.5* 27.8   BUN mg/dL 35* 51* 62*   CREATININE mg/dL 0.81 1.10* 1.65*   CALCIUM mg/dL 9.2 8.9 10.3   BILIRUBIN mg/dL  --   --  0.7   ALK PHOS U/L  --   --  106   ALT (SGPT) U/L  --   --  13   AST (SGOT) U/L  --   --  25   GLUCOSE mg/dL 93 91 116*       Results from last 7 days   Lab Units 05/12/24  0806   HEMOGLOBIN g/dL 10.7*  "  HEMATOCRIT % 33.4*       No results found for: \"HGBA1C\"         Medications       Scheduled Medications amLODIPine, 5 mg, Oral, Daily  aspirin, 81 mg, Oral, Daily  budesonide, 0.5 mg, Nebulization, BID - RT  cefTRIAXone, 1,000 mg, Intravenous, Q24H  doxycycline, 100 mg, Oral, Q12H  enoxaparin, 30 mg, Subcutaneous, Daily  gabapentin, 600 mg, Oral, Q8H  guaiFENesin, 600 mg, Oral, Q12H  ipratropium-albuterol, 3 mL, Nebulization, Q6H - RT  lactobacillus acidophilus, 1 capsule, Oral, BID  megestrol, 400 mg, Oral, Daily  metoprolol succinate XL, 50 mg, Oral, Daily  multivitamin with minerals, 1 tablet, Oral, Daily  pantoprazole, 40 mg, Oral, Q AM  senna-docusate sodium, 2 tablet, Oral, BID  sodium chloride, 10 mL, Intravenous, Q12H        Infusions Pharmacy to Dose enoxaparin (LOVENOX),          PRN Medications   acetaminophen **OR** acetaminophen **OR** acetaminophen    senna-docusate sodium **AND** polyethylene glycol **AND** bisacodyl **AND** bisacodyl    Diclofenac Sodium    HYDROcodone-acetaminophen    ipratropium-albuterol    ondansetron    Pharmacy to Dose enoxaparin (LOVENOX)    sodium chloride    sodium chloride    sodium chloride     Physical Findings        Trending Physical   Appearance, NFPE    --  Edema  2+ L&R foot edema    Bowel Function Last bm 5/12   Tubes Peripheral IV    Chewing/Swallowing WNL    Skin WNL      Estimated/Assessed Needs       Energy Requirements    EST Needs, Method, Wt used 1250-1500kcals/day using 25-30kcals/kg       Protein Requirements    EST Needs, Method, Wt used 50-60g/day using 1-1.2g/kg       Fluid Requirements     Estimated Needs (mL/day) 1500ml per day        Current Nutrition Orders & Evaluation of Intake       Oral Nutrition     Food Allergies    Current PO Diet Diet: Cardiac; Healthy Heart (2-3 Na+); Fluid Consistency: Thin (IDDSI 0)   Supplement    PO Evaluation     Trending % PO Intake 5/13: 75% x 3 meals      Enteral Nutrition    Enteral Route    Order, Modulars, " Flushes    Residual/Tolerance    TF Observation         Parenteral Nutrition     TPN Route    Total # Days on TPN    TPN Order, Lipid Details    MVI & Trace Element Freq    TPN Observation       Nutrition Diagnosis         Nutrition Dx Problem 1 Unintentional wt loss r/t decreased appetite as evidenced by wt loss of 9% w/in <2 months.     Nutrition Dx Problem 2        Intervention Goal         Intervention Goal(s) PO intake to meet >50% of estimated needs  Adhere to supplement ordered  Maintain current body weight      Nutrition Intervention        RD Action Will order Boost Plus BID      Nutrition Prescription          Diet Prescription HH   Supplement Prescription Boost Plus BID     Enteral Prescription        TPN Prescription      Monitor/Evaluation        Monitor Per protocol, PO intake, Supplement intake, Pertinent labs, Weight, Skin status, GI status, Symptoms, POC/GOC, Swallow function, Hemodynamic stability     RD to follow-up    Electronically signed by:  Glen Hutchins RD  05/13/24 09:52 EDT

## 2024-05-13 NOTE — THERAPY EVALUATION
Patient Name: Lesley Calero  : 1940    MRN: [id number]                              Today's Date: 2024       Admit Date: 2024    Visit Dx:     ICD-10-CM ICD-9-CM   1. OLLIE (acute kidney injury)  N17.9 584.9   2. Pneumonia due to infectious organism, unspecified laterality, unspecified part of lung  J18.9 486     Patient Active Problem List   Diagnosis    Knee strain, right, initial encounter    Chronic pain of right knee    COPD (chronic obstructive pulmonary disease)    Essential hypertension    Status post placement of cardiac pacemaker    Nuclear sclerotic cataract of right eye    Chronic heart failure with preserved ejection fraction (HFpEF)    Chest pain    Pneumonia of right upper lobe due to infectious organism    Pneumonia     Past Medical History:   Diagnosis Date    Cataracts, bilateral     CHF (congestive heart failure)     COPD (chronic obstructive pulmonary disease)     Coronary artery disease     Hyperlipidemia     Hypertension     Impaired functional mobility, balance, gait, and endurance     Myocardial infarction     Oxygen deficit     Pneumonia     RLS (restless legs syndrome)     Seizures      Past Surgical History:   Procedure Laterality Date    CAROTID ENDARTERECTOMY Left     CATARACT EXTRACTION W/ INTRAOCULAR LENS IMPLANT Right 2023    Procedure: CATARACT PHACO EXTRACTION WITH INTRAOCULAR LENS IMPLANT RIGHT;  Surgeon: Dajuan Blackburn MD;  Location: [facility] OR;  Service: Ophthalmology;  Laterality: Right;    CATARACT EXTRACTION W/ INTRAOCULAR LENS IMPLANT Left 8/10/2023    Procedure: CATARACT PHACO EXTRACTION WITH INTRAOCULAR LENS IMPLANT LEFT;  Surgeon: Dajuan Blackburn MD;  Location: [facility] OR;  Service: Ophthalmology;  Laterality: Left;    CORONARY ANGIOPLASTY WITH STENT PLACEMENT      x2    LUNG SURGERY Left     PACEMAKER IMPLANTATION        General Information       Row Name 24 1613          OT Time and Intention    Document Type evaluation  -DB     Mode  of Treatment occupational therapy  -DB       Row Name 05/13/24 1613          General Information    Patient Profile Reviewed yes  -DB     Prior Level of Function independent:;ADL's  -DB     Existing Precautions/Restrictions fall;oxygen therapy device and L/min  -DB     Barriers to Rehab none identified  -DB       Row Name 05/13/24 1613          Occupational Profile    Reason for Services/Referral (Occupational Profile) ADL decline  -DB       Row Name 05/13/24 1613          Living Environment    People in Home child(seth), adult  -DB       Row Name 05/13/24 1613          Home Main Entrance    Number of Stairs, Main Entrance none  -DB       Row Name 05/13/24 1613          Cognition    Orientation Status (Cognition) oriented x 4  -DB       Row Name 05/13/24 1613          Safety Issues, Functional Mobility    Safety Issues Affecting Function (Mobility) safety precaution awareness;safety precautions follow-through/compliance;awareness of need for assistance;insight into deficits/self-awareness  -DB     Impairments Affecting Function (Mobility) strength;endurance/activity tolerance;shortness of breath;postural/trunk control;balance  -DB               User Key  (r) = Recorded By, (t) = Taken By, (c) = Cosigned By      Initials Name Provider Type    DB Bridgett Page OT Occupational Therapist                     Mobility/ADL's       Row Name 05/13/24 1614          Bed Mobility    Bed Mobility supine-sit  -DB     Supine-Sit [place] (Bed Mobility) contact guard  -DB     Assistive Device (Bed Mobility) head of bed elevated;bed rails  -DB       Row Name 05/13/24 1614          Sit-Stand Transfer    Sit-Stand [place] (Transfers) contact guard  -DB     Assistive Device (Sit-Stand Transfers) walker, front-wheeled  -DB       Row Name 05/13/24 1614          Functional Mobility    Functional Mobility- Ind. Level contact guard assist  -DB     Functional Mobility- Device walker, front-wheeled  -DB     Functional  Mobility-Distance (Feet) 50  -DB     Patient was able to Ambulate yes  -DB       Row Name 05/13/24 1614          Activities of Daily Living    BADL Assessment/Intervention bathing;upper body dressing;lower body dressing;grooming;feeding;toileting  -DB       Row Name 05/13/24 1614          Bathing Assessment/Intervention    False Pass Level (Bathing) bathing skills;minimum assist (75% patient effort)  -DB       Row Name 05/13/24 1614          Upper Body Dressing Assessment/Training    False Pass Level (Upper Body Dressing) upper body dressing skills;set up  -DB       Row Name 05/13/24 1614          Lower Body Dressing Assessment/Training    False Pass Level (Lower Body Dressing) lower body dressing skills;moderate assist (50% patient effort)  -DB       Row Name 05/13/24 1614          Grooming Assessment/Training    False Pass Level (Grooming) grooming skills;set up  -DB       [facility] Name 05/13/24 1614          Self-Feeding Assessment/Training    False Pass Level (Feeding) feeding skills;set up  -DB       [facility] Name 05/13/24 1614          Toileting Assessment/Training    False Pass Level (Toileting) toileting skills;moderate assist (50% patient effort)  -DB               User Key  (r) = Recorded By, (t) = Taken By, (c) = Cosigned By      Initials Name Provider Type    DB Bridgett Page OT Occupational Therapist                   Obj/Interventions       Row Name 05/13/24 1615          Range of Motion Comprehensive    General Range of Motion bilateral upper extremity ROM WFL  -DB       [facility] Name 05/13/24 1615          Strength Comprehensive (MMT)    General Manual Muscle Testing (MMT) Assessment upper extremity strength deficits identified  -DB       Row Name 05/13/24 1615          Upper Extremity (Manual Muscle Testing)    Comment, MMT: Upper Extremity BUEs grossly 3+/5  -DB               User Key  (r) = Recorded By, (t) = Taken By, (c) = Cosigned By      Initials Name Provider Type    KEV Page  Bridgett, OT Occupational Therapist                   Goals/Plan       Row Name 05/13/24 1618          Bed Mobility Goal 1 (OT)    Activity/Assistive Device (Bed Mobility Goal 1, OT) bed mobility activities, all  -DB     Drew Level/Cues Needed (Bed Mobility Goal 1, OT) modified independence  -DB     Time Frame (Bed Mobility Goal 1, OT) by discharge  -DB     Progress/Outcomes (Bed Mobility Goal 1, OT) goal ongoing  -DB       Row Name 05/13/24 1618          Transfer Goal 1 (OT)    Activity/Assistive Device (Transfer Goal 1, OT) toilet  -DB     Drew Level/Cues Needed (Transfer Goal 1, OT) standby assist  -DB     Time Frame (Transfer Goal 1, OT) by discharge  -DB     Progress/Outcome (Transfer Goal 1, OT) goal ongoing  -DB       Row Name 05/13/24 1618          Dressing Goal 1 (OT)    Activity/Device (Dressing Goal 1, OT) lower body dressing  -DB     Drew/Cues Needed (Dressing Goal 1, OT) contact guard required  -DB     Time Frame (Dressing Goal 1, OT) by discharge  -DB     Progress/Outcome (Dressing Goal 1, OT) goal ongoing  -DB       Row Name 05/13/24 1618          Toileting Goal 1 (OT)    Activity/Device (Toileting Goal 1, OT) toileting skills, all  -DB     Drew Level/Cues Needed (Toileting Goal 1, OT) contact guard required  -DB     Time Frame (Toileting Goal 1, OT) by discharge  -DB     Progress/Outcome (Toileting Goal 1, OT) goal ongoing  -DB       Row Name 05/13/24 1618          Grooming Goal 1 (OT)    Activity/Device (Grooming Goal 1, OT) grooming skills, all  -DB     Drew (Grooming Goal 1, OT) modified independence  -DB     Time Frame (Grooming Goal 1, OT) by discharge  -DB     Progress/Outcome (Grooming Goal 1, OT) goal ongoing  -DB       Row Name 05/13/24 1618          Strength Goal 1 (OT)    Strength Goal 1 (OT) Pt to tolerate resistance band exercises to increase strength and endurance needed for ADL mgmt  -DB       Row Name 05/13/24 1618          Therapy  Assessment/Plan (OT)    Planned Therapy Interventions (OT) activity tolerance training;adaptive equipment training;BADL retraining;functional balance retraining;occupation/activity based interventions;transfer/mobility retraining;ROM/therapeutic exercise;strengthening exercise  -DB               User Key  (r) = Recorded By, (t) = Taken By, (c) = Cosigned By      Initials Name Provider Type    DB Bridgett Page, EDWARD Occupational Therapist                   Clinical Impression       Row Name 05/13/24 1616          Pain Assessment    Pretreatment Pain Rating 0/10 - no pain  -DB     Posttreatment Pain Rating 0/10 - no pain  -DB       Row Name 05/13/24 1616          Plan of Care Review    Progress no change  -DB     Outcome Evaluation OT eval completed. Pt lying supine upon OT arrival this date. Pt A&O x4. Pt reports she lives with her . Pt reports (I) as PLOF.  Pt able to complete supine to sit EOB with CGA. Pt completes STS with CGA and verbal cues to push off from the bed when standing. Pt able to sit on BSC to complete toileting tasks with mod A for perineal hygiene. Pt able to complete fxl mobility x50ft to chair with CGA and RW. Pt able to return to bed with CGA. Pt now requiring increased assist with ADLs. Pt to benefit from skilled OT services for strengthening, endurance, fxl mobility, transfers, and ADL mgmt.  -DB       Row Name 05/13/24 1616          Therapy Assessment/Plan (OT)    Rehab Potential (OT) good, to achieve stated therapy goals  -DB     Criteria for Skilled Therapeutic Interventions Met (OT) yes;skilled treatment is necessary  -DB     Therapy Frequency (OT) 3 times/wk  5x's if indicated-(M-F)  -DB       Row Name 05/13/24 1616          Positioning and Restraints    Pre-Treatment Position in bed  -DB     Post Treatment Position bed  -DB     In Bed call light within reach;encouraged to call for assist;exit alarm on;fowlers  -DB               User Key  (r) = Recorded By, (t) = Taken By,  (c) = Cosigned By      Initials Name Provider Type    Bridgett Chavis OT Occupational Therapist                   Outcome Measures       Row Name 05/13/24 1618          How much help from another is currently needed...    Putting on and taking off regular lower body clothing? 2  -DB     Toileting (which includes using toilet bed pan or urinal) 2  -DB     Taking care of personal grooming (such as brushing teeth) 3  -DB     Eating meals 3  -DB       Row Name 05/13/24 1618          Functional Assessment    Outcome Measure Options AM-PAC 6 Clicks Daily Activity (OT)  -DB               User Key  (r) = Recorded By, (t) = Taken By, (c) = Cosigned By      Initials Name Provider Type    Bridgett Chavis OT Occupational Therapist                    Occupational Therapy Education       Title: PT OT SLP Therapies (In Progress)       Topic: Occupational Therapy (In Progress)       Point: ADL training (Done)       Description:   Instruct learner(s) on proper safety adaptation and remediation techniques during self care or transfers.   Instruct in proper use of assistive devices.                  Learning Progress Summary             Patient Acceptance, E,TB, VU by DB at 5/13/2024 1619    Comment: Pt educated on ADL retraining. Pt receptive this date.                         Point: Home exercise program (Not Started)       Description:   Instruct learner(s) on appropriate technique for monitoring, assisting and/or progressing therapeutic exercises/activities.                  Learner Progress:  Not documented in this visit.              Point: Precautions (Not Started)       Description:   Instruct learner(s) on prescribed precautions during self-care and functional transfers.                  Learner Progress:  Not documented in this visit.              Point: Body mechanics (Not Started)       Description:   Instruct learner(s) on proper positioning and spine alignment during self-care, functional mobility  activities and/or exercises.                  Learner Progress:  Not documented in this visit.                              User Key       Initials Effective Dates Name Provider Type Discipline    DB 07/06/23 -  Bridgett Page OT Occupational Therapist OT                  OT Recommendation and Plan  Planned Therapy Interventions (OT): activity tolerance training, adaptive equipment training, BADL retraining, functional balance retraining, occupation/activity based interventions, transfer/mobility retraining, ROM/therapeutic exercise, strengthening exercise  Therapy Frequency (OT): 3 times/wk (5x's if indicated-(M-F))  Plan of Care Review  Progress: no change  Outcome Evaluation: OT eval completed. Pt lying supine upon OT arrival this date. Pt A&O x4. Pt reports she lives with her . Pt reports (I) as PLOF.  Pt able to complete supine to sit EOB with CGA. Pt completes STS with CGA and verbal cues to push off from the bed when standing. Pt able to sit on BSC to complete toileting tasks with mod A for perineal hygiene. Pt able to complete fxl mobility x50ft to chair with CGA and RW. Pt able to return to bed with CGA. Pt now requiring increased assist with ADLs. Pt to benefit from skilled OT services for strengthening, endurance, fxl mobility, transfers, and ADL mgmt.     Time Calculation:   Evaluation Complexity (OT)  Review Occupational Profile/Medical/Therapy History Complexity: expanded/moderate complexity  Assessment, Occupational Performance/Identification of Deficit Complexity: 3-5 performance deficits  Clinical Decision Making Complexity (OT): detailed assessment/moderate complexity  Overall Complexity of Evaluation (OT): moderate complexity     Time Calculation- OT       Row Name 05/13/24 1619             Time Calculation- OT    OT Start Time 1431  -DB      OT Received On 05/13/24  -DB      OT Goal Re-Cert Due Date 05/23/24  -DB         Untimed Charges    OT Eval/Re-eval Minutes 39  -DB          Total Minutes    Untimed Charges Total Minutes 39  -DB       Total Minutes 39  -DB                User Key  (r) = Recorded By, (t) = Taken By, (c) = Cosigned By      Initials Name Provider Type    Bridgett Chavis OT Occupational Therapist                  Therapy Charges for Today       Code Description Service Date Service Provider Modifiers Qty    96907900970 HC OT EVAL MOD COMPLEXITY 3 5/13/2024 Bridgett Page OT GO 1                 Bridgett Page OT  5/13/2024

## 2024-05-13 NOTE — PLAN OF CARE
Problem: Adult Inpatient Plan of Care  Goal: Plan of Care Review  Outcome: Ongoing, Progressing  Goal: Patient-Specific Goal (Individualized)  Outcome: Ongoing, Progressing  Goal: Absence of Hospital-Acquired Illness or Injury  Outcome: Ongoing, Progressing  Intervention: Identify and Manage Fall Risk  Recent Flowsheet Documentation  Taken 5/13/2024 0400 by Areli Durham RN  Safety Promotion/Fall Prevention:   safety round/check completed   fall prevention program maintained  Taken 5/13/2024 0200 by Areli Durham RN  Safety Promotion/Fall Prevention:   safety round/check completed   fall prevention program maintained  Taken 5/13/2024 0000 by Areli Durham RN  Safety Promotion/Fall Prevention:   safety round/check completed   fall prevention program maintained  Taken 5/12/2024 2200 by Areli Durham RN  Safety Promotion/Fall Prevention:   safety round/check completed   fall prevention program maintained  Taken 5/12/2024 2000 by Areli Durham RN  Safety Promotion/Fall Prevention: activity supervised  Intervention: Prevent Skin Injury  Recent Flowsheet Documentation  Taken 5/13/2024 0400 by Areli Durham RN  Body Position: position changed independently  Skin Protection: adhesive use limited  Taken 5/13/2024 0200 by Areli Durham RN  Body Position: position changed independently  Skin Protection: adhesive use limited  Taken 5/13/2024 0000 by Areli Durham RN  Body Position: position changed independently  Skin Protection: adhesive use limited  Taken 5/12/2024 2200 by Areli Durham RN  Body Position: position changed independently  Skin Protection: adhesive use limited  Taken 5/12/2024 2000 by Areli Durham RN  Body Position: position changed independently  Skin Protection: adhesive use limited  Intervention: Prevent and Manage VTE (Venous Thromboembolism) Risk  Recent Flowsheet Documentation  Taken 5/13/2024 0400 by Areli Durham RN  Activity  Management: activity encouraged  VTE Prevention/Management:   sequential compression devices off   patient refused intervention  Taken 5/13/2024 0200 by Areli Durham RN  Activity Management: activity encouraged  VTE Prevention/Management:   sequential compression devices off   patient refused intervention  Taken 5/13/2024 0000 by Areli Durham RN  Activity Management: activity encouraged  VTE Prevention/Management:   sequential compression devices off   patient refused intervention  Taken 5/12/2024 2200 by Areli Durham RN  Activity Management: activity encouraged  VTE Prevention/Management:   sequential compression devices off   patient refused intervention  Taken 5/12/2024 2000 by Areli Durham RN  Activity Management: activity minimized  Intervention: Prevent Infection  Recent Flowsheet Documentation  Taken 5/12/2024 2000 by Areli Durham RN  Infection Prevention: environmental surveillance performed  Goal: Optimal Comfort and Wellbeing  Outcome: Ongoing, Progressing  Intervention: Monitor Pain and Promote Comfort  Recent Flowsheet Documentation  Taken 5/12/2024 2000 by Areli Durham RN  Pain Management Interventions: position adjusted  Intervention: Provide Person-Centered Care  Recent Flowsheet Documentation  Taken 5/12/2024 2000 by Areli Durham RN  Trust Relationship/Rapport: care explained  Goal: Readiness for Transition of Care  Outcome: Ongoing, Progressing     Problem: Fall Injury Risk  Goal: Absence of Fall and Fall-Related Injury  Outcome: Ongoing, Progressing  Intervention: Identify and Manage Contributors  Recent Flowsheet Documentation  Taken 5/13/2024 0400 by Areli Durham RN  Medication Review/Management: medications reviewed  Taken 5/13/2024 0200 by Areli Durham RN  Medication Review/Management: medications reviewed  Taken 5/13/2024 0000 by Areli Durham RN  Medication Review/Management: medications reviewed  Taken 5/12/2024  2200 by Areli Durham RN  Medication Review/Management: medications reviewed  Taken 5/12/2024 2000 by Areli Durham RN  Medication Review/Management: medications reviewed  Self-Care Promotion: independence encouraged  Intervention: Promote Injury-Free Environment  Recent Flowsheet Documentation  Taken 5/13/2024 0400 by Areli Durham RN  Safety Promotion/Fall Prevention:   safety round/check completed   fall prevention program maintained  Taken 5/13/2024 0200 by Areli Durham RN  Safety Promotion/Fall Prevention:   safety round/check completed   fall prevention program maintained  Taken 5/13/2024 0000 by Areli Durham RN  Safety Promotion/Fall Prevention:   safety round/check completed   fall prevention program maintained  Taken 5/12/2024 2200 by Areli Durham RN  Safety Promotion/Fall Prevention:   safety round/check completed   fall prevention program maintained  Taken 5/12/2024 2000 by Areli Durham RN  Safety Promotion/Fall Prevention: activity supervised     Problem: Infection  Goal: Absence of Infection Signs and Symptoms  Outcome: Ongoing, Progressing  Intervention: Prevent or Manage Infection  Recent Flowsheet Documentation  Taken 5/12/2024 2000 by Arlei Durham RN  Infection Management: aseptic technique maintained     Problem: Gas Exchange Impaired  Goal: Optimal Gas Exchange  Outcome: Ongoing, Progressing  Intervention: Optimize Oxygenation and Ventilation  Recent Flowsheet Documentation  Taken 5/13/2024 0400 by Areli Durham RN  Head of Bed (HOB) Positioning: HOB elevated  Taken 5/13/2024 0200 by Areli Durham RN  Head of Bed (HOB) Positioning: HOB elevated  Taken 5/13/2024 0000 by Areli Durham RN  Head of Bed (HOB) Positioning: HOB elevated  Taken 5/12/2024 2200 by Areli Durham RN  Head of Bed (HOB) Positioning: HOB elevated  Taken 5/12/2024 2000 by Areli Durham RN  Head of Bed (HOB) Positioning: HOB at 30  degrees     Problem: COPD (Chronic Obstructive Pulmonary Disease) Comorbidity  Goal: Maintenance of COPD Symptom Control  Outcome: Ongoing, Progressing  Intervention: Maintain COPD-Symptom Control  Recent Flowsheet Documentation  Taken 5/13/2024 0400 by Areli Durham RN  Medication Review/Management: medications reviewed  Taken 5/13/2024 0200 by Areli Durham RN  Medication Review/Management: medications reviewed  Taken 5/13/2024 0000 by Areli Durham RN  Medication Review/Management: medications reviewed  Taken 5/12/2024 2200 by Areli Durham RN  Medication Review/Management: medications reviewed  Taken 5/12/2024 2000 by Areli Durham RN  Supportive Measures: active listening utilized  Medication Review/Management: medications reviewed     Problem: Heart Failure Comorbidity  Goal: Maintenance of Heart Failure Symptom Control  Outcome: Ongoing, Progressing  Intervention: Maintain Heart Failure-Management  Recent Flowsheet Documentation  Taken 5/13/2024 0400 by Areli Durham RN  Medication Review/Management: medications reviewed  Taken 5/13/2024 0200 by Areli Durham RN  Medication Review/Management: medications reviewed  Taken 5/13/2024 0000 by Areli Durham RN  Medication Review/Management: medications reviewed  Taken 5/12/2024 2200 by Areli Durham RN  Medication Review/Management: medications reviewed  Taken 5/12/2024 2000 by Areli Durham RN  Medication Review/Management: medications reviewed     Problem: Hypertension Comorbidity  Goal: Blood Pressure in Desired Range  Outcome: Ongoing, Progressing  Intervention: Maintain Blood Pressure Management  Recent Flowsheet Documentation  Taken 5/13/2024 0400 by Areli Durham RN  Medication Review/Management: medications reviewed  Taken 5/13/2024 0200 by Areli Durham RN  Medication Review/Management: medications reviewed  Taken 5/13/2024 0000 by Areli Durham RN  Medication  Review/Management: medications reviewed  Taken 5/12/2024 2200 by Areli Durham RN  Medication Review/Management: medications reviewed  Taken 5/12/2024 2000 by Areli Durham RN  Medication Review/Management: medications reviewed     Problem: Skin Injury Risk Increased  Goal: Skin Health and Integrity  Outcome: Ongoing, Progressing  Intervention: Optimize Skin Protection  Recent Flowsheet Documentation  Taken 5/13/2024 0400 by Areli Durham RN  Pressure Reduction Techniques: frequent weight shift encouraged  Head of Bed (HOB) Positioning: HOB elevated  Pressure Reduction Devices: pressure-redistributing mattress utilized  Skin Protection: adhesive use limited  Taken 5/13/2024 0200 by Areli Durham RN  Pressure Reduction Techniques: frequent weight shift encouraged  Head of Bed (HOB) Positioning: HOB elevated  Pressure Reduction Devices: pressure-redistributing mattress utilized  Skin Protection: adhesive use limited  Taken 5/13/2024 0000 by Areli Durham RN  Pressure Reduction Techniques: frequent weight shift encouraged  Head of Bed (HOB) Positioning: HOB elevated  Pressure Reduction Devices: pressure-redistributing mattress utilized  Skin Protection: adhesive use limited  Taken 5/12/2024 2200 by Areli Durham RN  Pressure Reduction Techniques: frequent weight shift encouraged  Head of Bed (HOB) Positioning: HOB elevated  Pressure Reduction Devices: pressure-redistributing mattress utilized  Skin Protection: adhesive use limited  Taken 5/12/2024 2000 by Areli Durham RN  Pressure Reduction Techniques: frequent weight shift encouraged  Head of Bed (HOB) Positioning: HOB at 30 degrees  Pressure Reduction Devices: pressure-redistributing mattress utilized  Skin Protection: adhesive use limited   Goal Outcome Evaluation:

## 2024-05-13 NOTE — PLAN OF CARE
Goal Outcome Evaluation:  Plan of Care Reviewed With: patient           Outcome Evaluation: Bedside eval completed. Oral phase was remarkable only for edentulous affecting mastication efficiency with regular solids but otherwise WFL. No overt s/s aspiration or other pharyngeal phase dysphagia. Pt. denied dysphagia stating she swallows well and has no difficulty with po intake. No known hx of dysphagia. Recommend: 1. soft to chew diet with thin liq as rolo, 2. meds whole with thin liq as rolo, 3. aspiration precautions. No further identified needs at this time for ST. Will complete order.      Anticipated Discharge Disposition (SLP): unknown          SLP Swallowing Diagnosis: mild, oral dysphagia (due only to edentulous) (05/13/24 1620)

## 2024-05-13 NOTE — PROGRESS NOTES
[facility]    PROGRESS NOTE    Name:  Lesley Calero   Age:  83 y.o.  Sex:  female  :  1940  MRN:  [id number]   Visit Number:  [id number]  Admission Date:  2024  Date Of Service:  24  Primary Care Physician:  Nj Smith MD     LOS: 2 days :    Chief Complaint:      Generalized weakness.    Subjective:    Lesley Calero was seen and examined this morning.  She is currently sitting up in the bed and is comfortable at rest.  She is currently down to 3 L of nasal cannula oxygen.  Denies any chest pain.  She did walk a few steps around the bed with physical therapy yesterday.    Hospital Course:    Lesley Calero is an 83-year-old female with history of coronary artery disease, diastolic heart failure, COPD on home oxygen at 3 L, hypertension, sick sinus syndrome status post pacemaker was brought to the emergency room by EMS with multiple symptoms including cough, shortness of breath, generalized weakness and weight loss.  Patient lives with her  and her daughter lives with them and takes care of them.  Patient does have history of COPD and has home nebulizers and uses oxygen at home.  She apparently quit smoking in .  According to the daughter, patient has been progressively weak and has not been able to walk much in the last several days and her oral intake also has decreased.  She apparently has lost about 15 pounds in the last 1 month.       Patient was recently admitted to this facility on 2024 with symptoms of chest pain, elevated troponins.  At that time she was seen by Dr. Nguyen who recommended transfer to tertiary care facility for possible cardiac catheterization.  Patient was subsequently transferred to [facility] on 2024 and was evaluated by cardiology there who did not feel patient had cardiac reasons for chest pain.  At that time she did undergo stress test which was reviewed by cardiology and they did not  recommend cardiac catheterization.  Patient was subsequently treated for pneumonia with ceftriaxone and azithromycin and was discharged on oral cephalosporin therapy.     In the emergency room, she was afebrile and hemodynamically stable saturating at 96% on 3 L of nasal cannula oxygen.  Initial troponin 73, proBNP 6909, sodium 134, BUN 62 (baseline 23), creatinine 1.65 (baseline 0.7), glucose 116.  LFTs were within normal limits.  Lactic acid was 2, procalcitonin elevated at 0.74.  CBC was unremarkable.  Chest x-ray showed persistent mid right lower lobe opacity worrisome for pneumonia.  This was also seen during her last x-ray 10 days ago.  CT of the chest without contrast showed bilateral posterior upper lobe opacities worrisome for pneumonia.  New 11 mm posterior right upper lobe nodule and 14 mm medial left lower lobe nodule noted.  PET/CT recommended for further evaluation.  EKG showed paced rhythm.  Patient was given 500 mL of normal saline bolus, Rocephin and doxycycline in the emergency room and was subsequently admitted to the medical floor with telemetry for treatment of recurrent pneumonia, hyponatremia and acute kidney injury.    Patient was continued on IV fluids with significant improvement in her renal function.  IV fluids were discontinued.  Patient was maintained on Rocephin and doxycycline.  She was initiated on physical and occupational therapy.    Review of Systems:     All systems were reviewed and negative except as mentioned in subjective, assessment and plan.    Vital Signs:    Temp:  [97.6 °F (36.4 °C)-98.9 °F (37.2 °C)] 98.2 °F (36.8 °C)  Heart Rate:  [70-77] 74  Resp:  [16-18] 18  BP: (104-144)/(53-63) 125/53    Intake and output:    I/O last 3 completed shifts:  In: 820 [P.O.:720; IV Piggyback:100]  Out: 1275 [Urine:1275]  I/O this shift:  In: 240 [P.O.:240]  Out: -     Physical Examination:    General Appearance:  Alert and cooperative.    Head:  Atraumatic and normocephalic.   Eyes:  Conjunctivae and sclerae normal, no icterus. No pallor.   Throat: No oral lesions, no thrush, oral mucosa moist.   Neck: Supple, trachea midline, no thyromegaly.   Lungs:   Breath sounds heard bilaterally equally.  No wheezing or crackles. No Pleural rub or bronchial breathing.   Heart:  Normal S1 and S2, no murmur, no gallop, no rub. No JVD.  Pacemaker palpated on the right upper chest.   Abdomen:   Normal bowel sounds, no masses, no organomegaly. Soft, nontender, nondistended, no rebound tenderness.   Extremities: Supple, 1+ pitting ankle edema, no cyanosis, no clubbing.  Poor muscle mass noted.   Skin: No bleeding or rash.   Neurologic: Alert and oriented x 3. No facial asymmetry. Moves all four limbs but does have generalized weakness. No tremors.    Laboratory results:    Results from last 7 days   Lab Units 05/13/24  0746 05/12/24  0806 05/11/24  0816   SODIUM mmol/L 144 141 134*   POTASSIUM mmol/L 4.5 4.1 4.7   CHLORIDE mmol/L 104 102 88*   CO2 mmol/L 28.3 30.5* 27.8   BUN mg/dL 35* 51* 62*   CREATININE mg/dL 0.81 1.10* 1.65*   CALCIUM mg/dL 9.2 8.9 10.3   BILIRUBIN mg/dL  --   --  0.7   ALK PHOS U/L  --   --  106   ALT (SGPT) U/L  --   --  13   AST (SGOT) U/L  --   --  25   GLUCOSE mg/dL 93 91 116*     Results from last 7 days   Lab Units 05/12/24  0806 05/11/24  0816   WBC 10*3/mm3 6.46 10.02   HEMOGLOBIN g/dL 10.7* 13.4   HEMATOCRIT % 33.4* 41.2   PLATELETS 10*3/mm3 170 257         Results from last 7 days   Lab Units 05/11/24  1056 05/11/24  0816   HSTROP T ng/L 71* 73*     I have reviewed the patient's laboratory results.    Radiology results:    No radiology results from the last 24 hrs  I have reviewed the patient's radiology reports.    Medication Review:     I have reviewed the patient's active and prn medications.     Problem List:      Pneumonia of right upper lobe due to infectious organism    COPD (chronic obstructive pulmonary disease)    Essential hypertension    Status post placement of  cardiac pacemaker    Chronic heart failure with preserved ejection fraction (HFpEF)    Pneumonia    Assessment:    Bilateral upper lobe bacterial pneumonia, unable to classify further, POA.  Acute renal failure, POA.  Hyponatremia, POA.  Chronic diastolic heart failure, no exacerbation.  Coronary artery disease status post previous PCI.  COPD, no exacerbation.  Chronic hypoxic respiratory failure on home oxygen.  Bilateral lung nodules noted on CT scan.  Sick sinus syndrome status post pacemaker.  Essential hypertension.    Plan:    Bilateral upper lobe pneumonia.  - Continue ceftriaxone and doxycycline (day 3).  - Blood cultures have been negative so far.  - Nasal swab for MRSA is negative.  - Respiratory culture has been sent and is pending.  - Bronchodilators, budesonide, mucolytic agents.  - Incentive spirometry.  - Continue nasal cannula oxygen to maintain saturations above 90%.     Acute kidney injury.  - Likely secondary to decreased oral intake and dehydration.  - Renal function has improved with IV hydration.     COPD/HFpEF  - No evidence of exacerbation.  - Continue home medications     Bilateral lung nodules.  - Due to prior history of smoking, recent history of poor appetite, weight loss and hyponatremia, these are highly suspicious for malignancy.  - Patient may benefit from outpatient follow-up with pulmonology and PET scan.  However, with her advanced age, she may not be a candidate for invasive biopsy and/or chemotherapy.     Nutrition.  - Patient does have decreased appetite and weight loss.  - She has been started on Megace, dietary supplements and multivitamins.    Continue physical therapy.  I tried to call the patient's daughter Anel a couple of times today and both the numbers listed in the chart but unfortunately she did not respond.    Discussed with nursing staff and multidisciplinary team.    I have reviewed the copied text and it is accurate as of 05/13/24    DVT Prophylaxis:  Enoxaparin  Code Status: Full  Diet: Regular  Discharge Plan: Home with home health tomorrow.    Dawood Ruiz MD  05/13/24  14:06 EDT    Dictated utilizing Dragon dictation.

## 2024-05-13 NOTE — CASE MANAGEMENT/SOCIAL WORK
Case Management/Social Work    Patient Name:  Lesley Calero  YOB: 1940  MRN: [id number]  Admit Date:  5/11/2024        Pt is to return home with HH services when medically ready. Pt does not want STR. Pt has O2 through [employer]. SW following for any needs.       Electronically signed by:  FUENTES Winchester  05/13/24 10:22 EDT

## 2024-05-13 NOTE — PLAN OF CARE
Goal Outcome Evaluation:  Plan of Care Reviewed With: patient        Progress: improving  Outcome Evaluation: Pt agreeable to physical therapy. Performed supine <->sit with CGA, sit <->stand with CGA and VC for hand placement with RW, transfered from EOB to/from BSC, amb with RW 50 nfeet with CGA and occ VC for upright posture as able. Static standing for julisa care and clothing mgt with 1 UE support for balance without LOB noted. Con't with PT POC and progress as tolerated

## 2024-05-13 NOTE — THERAPY EVALUATION
Acute Care - Speech Language Pathology   Swallow Initial Evaluation  [facility]     Patient Name: Lesley Calero  : 1940  MRN: [id number]  Today's Date: 2024               Admit Date: 2024    Visit Dx:     ICD-10-CM ICD-9-CM   1. OLLIE (acute kidney injury)  N17.9 584.9   2. Pneumonia due to infectious organism, unspecified laterality, unspecified part of lung  J18.9 486     Patient Active Problem List   Diagnosis    Knee strain, right, initial encounter    Chronic pain of right knee    COPD (chronic obstructive pulmonary disease)    Essential hypertension    Status post placement of cardiac pacemaker    Nuclear sclerotic cataract of right eye    Chronic heart failure with preserved ejection fraction (HFpEF)    Chest pain    Pneumonia of right upper lobe due to infectious organism    Pneumonia     Past Medical History:   Diagnosis Date    Cataracts, bilateral     CHF (congestive heart failure)     COPD (chronic obstructive pulmonary disease)     Coronary artery disease     Hyperlipidemia     Hypertension     Impaired functional mobility, balance, gait, and endurance     Myocardial infarction     Oxygen deficit     Pneumonia     RLS (restless legs syndrome)     Seizures      Past Surgical History:   Procedure Laterality Date    CAROTID ENDARTERECTOMY Left     CATARACT EXTRACTION W/ INTRAOCULAR LENS IMPLANT Right 2023    Procedure: CATARACT PHACO EXTRACTION WITH INTRAOCULAR LENS IMPLANT RIGHT;  Surgeon: Dajuan Blackburn MD;  Location: [facility] OR;  Service: Ophthalmology;  Laterality: Right;    CATARACT EXTRACTION W/ INTRAOCULAR LENS IMPLANT Left 8/10/2023    Procedure: CATARACT PHACO EXTRACTION WITH INTRAOCULAR LENS IMPLANT LEFT;  Surgeon: Dajuan Blackburn MD;  Location: [facility];  Service: Ophthalmology;  Laterality: Left;    CORONARY ANGIOPLASTY WITH STENT PLACEMENT      x2    LUNG SURGERY Left     PACEMAKER IMPLANTATION         SLP Recommendation and Plan  SLP Swallowing Diagnosis:  mild, oral dysphagia (due only to edentulous) (05/13/24 1050)  SLP Diet Recommendation: soft to chew textures, thin liquids (05/13/24 1050)     SLP Rec. for Method of Medication Administration: meds whole, with thin liquids, as tolerated (05/13/24 1050)     Monitor for Signs of Aspiration: notify SLP if any concerns (05/13/24 1050)  Recommended Diagnostics: No further SLP services recommended (05/13/24 1050)  Swallow Criteria for Skilled Therapeutic Interventions Met: no problems identified which require skilled intervention (05/13/24 1050)  Anticipated Discharge Disposition (SLP): unknown (05/13/24 1050)     Therapy Frequency (Swallow): evaluation only (05/13/24 1050)     Oral Care Recommendations: Oral Care BID/PRN, Swab (05/13/24 1050)                                        Plan of Care Reviewed With: patient  Outcome Evaluation: Bedside eval completed. Oral phase was remarkable only for edentulous affecting mastication efficiency with regular solids but otherwise WFL. No overt s/s aspiration or other pharyngeal phase dysphagia. Pt. denied dysphagia stating she swallows well and has no difficulty with po intake. No known hx of dysphagia. Recommend: 1. soft to chew diet with thin liq as rolo, 2. meds whole with thin liq as rolo, 3. aspiration precautions. No further identified needs at this time for ST. Will complete order.      SWALLOW EVALUATION (Last 72 Hours)       SLP Adult Swallow Evaluation       Row Name 05/13/24 1050                   Rehab Evaluation    Document Type evaluation  -TM        Subjective Information no complaints  pt. denied dysphagia  -TM        Patient Observations alert;cooperative  -TM        Patient/Family/Caregiver Comments/Observations no family present  -TM        Patient Effort excellent  -TM           General Information    Patient Profile Reviewed yes  -TM        Pertinent History Of Current Problem pneumonia  -TM        Current Method of Nutrition regular textures;thin liquids  -TM         Precautions/Limitations, Vision WFL  -TM        Precautions/Limitations, Hearing WFL  -TM        Prior Level of Function-Communication WFL  -TM        Prior Level of Function-Swallowing no diet consistency restrictions  -TM        Plans/Goals Discussed with patient;other (see comments)  RN  -TM        Patient's Goals for Discharge return home  -TM           Pain    Additional Documentation Pain Scale: Numbers Pre/Post-Treatment (Group)  -TM           Pain Scale: Numbers Pre/Post-Treatment    Pretreatment Pain Rating 0/10 - no pain  -TM        Posttreatment Pain Rating 0/10 - no pain  -TM           Oral Motor Structure and Function    Oral Lesions or Structural Abnormalities and/or variants none identified  -TM        Dentition Assessment edentulous  -TM        Secretion Management WNL/WFL  -TM        Mucosal Quality moist, healthy  -TM        Volitional Cough WFL  -TM           Oral Musculature and Cranial Nerve Assessment    Oral Motor General Assessment WFL  -TM           General Eating/Swallowing Observations    Respiratory Support Currently in Use nasal cannula  -TM        O2 Liters 3L  -TM        Eating/Swallowing Skills fed by SLP  -TM        Positioning During Eating upright in bed  -TM        Utensils Used spoon;straw  -TM        Consistencies Trialed regular textures;pureed;thin liquids  -TM           Respiratory    Respiratory Status WFL;during swallowing/eating  -TM           Clinical Swallow Eval    Oral Prep Phase impaired  due to edentulous only but otherwise WFL  -TM        Oral Transit WFL  -TM        Oral Residue WFL  -TM        Pharyngeal Phase no overt signs/symptoms of pharyngeal impairment  -TM        Clinical Swallow Evaluation Summary Bedside eval completed.  Oral phase was remarkable only for edentulous affecting mastication efficiency with regular solids but otherwise WFL.  No overt s/s aspiration or other pharyngeal phase dysphagia.  Pt. denied dysphagia stating she swallows well and  has no difficulty with po intake.  No known hx of dysphagia.  Recommend:  1. soft to chew diet with thin liq as rolo, 2. meds whole with thin liq as rolo, 3. aspiration precautions.  No further identified needs at this time for ST.  Will complete order.  -TM           Oral Prep Concerns    Oral Prep Concerns other (see comments)  lack of dentition may affect mastication with some regular solids  -           SLP Evaluation Clinical Impression    SLP Swallowing Diagnosis mild;oral dysphagia  due only to edentulous  -TM        Functional Impact no impact on function  -        Swallow Criteria for Skilled Therapeutic Interventions Met no problems identified which require skilled intervention  -           Recommendations    Therapy Frequency (Swallow) evaluation only  -        SLP Diet Recommendation soft to chew textures;thin liquids  -TM        Recommended Diagnostics No further SLP services recommended  -        Oral Care Recommendations Oral Care BID/PRN;Swab  -        SLP Rec. for Method of Medication Administration meds whole;with thin liquids;as tolerated  -        Monitor for Signs of Aspiration notify SLP if any concerns  -        Anticipated Discharge Disposition (SLP) unknown  -                  User Key  (r) = Recorded By, (t) = Taken By, (c) = Cosigned By      Initials Name Effective Dates    TM Romelia Ren 06/16/21 -                     EDUCATION  The patient has been educated in the following areas:   Dysphagia (Swallowing Impairment) Oral Care/Hydration.              Time Calculation:    Time Calculation- SLP       Row Name 05/13/24 0555             Time Calculation- SLP    SLP Start Time 1050  -TM      SLP Received On 05/13/24  -         Untimed Charges    SLP Eval/Re-eval  ST Eval Oral Pharyng Swallow - 40520  -                User Key  (r) = Recorded By, (t) = Taken By, (c) = Cosigned By      Initials Name Provider Type     Romelia Ren Speech and Language Pathologist                     Therapy Charges for Today       Code Description Service Date Service Provider Modifiers Qty    [id number]  ST EVAL ORAL PHARYNG SWALLOW 4 5/13/2024 Romelia Ren GN 1                 Romelia Ren  5/13/2024

## 2024-05-14 ENCOUNTER — READMISSION MANAGEMENT (OUTPATIENT)
Dept: CALL CENTER | Facility: HOSPITAL | Age: 84
End: 2024-05-14
Payer: MEDICARE

## 2024-05-14 ENCOUNTER — DOCUMENTATION (OUTPATIENT)
Dept: HOME HEALTH SERVICES | Facility: HOME HEALTHCARE | Age: 84
End: 2024-05-14
Payer: MEDICARE

## 2024-05-14 ENCOUNTER — HOME HEALTH ADMISSION (OUTPATIENT)
Dept: HOME HEALTH SERVICES | Facility: HOME HEALTHCARE | Age: 84
End: 2024-05-14
Payer: MEDICARE

## 2024-05-14 VITALS
HEIGHT: 57 IN | WEIGHT: 112.21 LBS | HEART RATE: 76 BPM | SYSTOLIC BLOOD PRESSURE: 133 MMHG | DIASTOLIC BLOOD PRESSURE: 69 MMHG | RESPIRATION RATE: 16 BRPM | BODY MASS INDEX: 24.21 KG/M2 | OXYGEN SATURATION: 100 % | TEMPERATURE: 97.7 F

## 2024-05-14 PROCEDURE — 94799 UNLISTED PULMONARY SVC/PX: CPT

## 2024-05-14 PROCEDURE — 25010000002 ENOXAPARIN PER 10 MG: Performed by: INTERNAL MEDICINE

## 2024-05-14 PROCEDURE — 94760 N-INVAS EAR/PLS OXIMETRY 1: CPT

## 2024-05-14 PROCEDURE — 94664 DEMO&/EVAL PT USE INHALER: CPT

## 2024-05-14 PROCEDURE — 94618 PULMONARY STRESS TESTING: CPT

## 2024-05-14 PROCEDURE — 99239 HOSP IP/OBS DSCHRG MGMT >30: CPT | Performed by: INTERNAL MEDICINE

## 2024-05-14 RX ORDER — MEGESTROL ACETATE 40 MG/ML
400 SUSPENSION ORAL DAILY
Qty: 480 ML | Refills: 0 | Status: SHIPPED | OUTPATIENT
Start: 2024-05-15

## 2024-05-14 RX ORDER — GUAIFENESIN 600 MG/1
600 TABLET, EXTENDED RELEASE ORAL EVERY 12 HOURS SCHEDULED
Qty: 30 TABLET | Refills: 0 | Status: SHIPPED | OUTPATIENT
Start: 2024-05-14

## 2024-05-14 RX ORDER — IPRATROPIUM BROMIDE AND ALBUTEROL SULFATE 2.5; .5 MG/3ML; MG/3ML
3 SOLUTION RESPIRATORY (INHALATION)
Status: DISCONTINUED | OUTPATIENT
Start: 2024-05-14 | End: 2024-05-14 | Stop reason: HOSPADM

## 2024-05-14 RX ORDER — DOXYCYCLINE 100 MG/1
100 CAPSULE ORAL EVERY 12 HOURS SCHEDULED
Qty: 4 CAPSULE | Refills: 0 | Status: SHIPPED | OUTPATIENT
Start: 2024-05-14 | End: 2024-05-16

## 2024-05-14 RX ADMIN — GUAIFENESIN 600 MG: 600 TABLET, EXTENDED RELEASE ORAL at 08:23

## 2024-05-14 RX ADMIN — DOXYCYCLINE 100 MG: 100 CAPSULE ORAL at 08:23

## 2024-05-14 RX ADMIN — PANTOPRAZOLE SODIUM 40 MG: 40 TABLET, DELAYED RELEASE ORAL at 06:13

## 2024-05-14 RX ADMIN — METOPROLOL SUCCINATE 50 MG: 25 TABLET, EXTENDED RELEASE ORAL at 08:22

## 2024-05-14 RX ADMIN — BUDESONIDE 0.5 MG: 0.5 INHALANT RESPIRATORY (INHALATION) at 06:52

## 2024-05-14 RX ADMIN — IPRATROPIUM BROMIDE AND ALBUTEROL SULFATE 3 ML: .5; 3 SOLUTION RESPIRATORY (INHALATION) at 06:52

## 2024-05-14 RX ADMIN — MEGESTROL ACETATE 400 MG: 40 SUSPENSION ORAL at 08:21

## 2024-05-14 RX ADMIN — Medication 10 ML: at 08:24

## 2024-05-14 RX ADMIN — ENOXAPARIN SODIUM 30 MG: 100 INJECTION SUBCUTANEOUS at 08:22

## 2024-05-14 RX ADMIN — GABAPENTIN 600 MG: 300 CAPSULE ORAL at 06:13

## 2024-05-14 RX ADMIN — Medication 1 CAPSULE: at 08:22

## 2024-05-14 RX ADMIN — AMLODIPINE BESYLATE 5 MG: 5 TABLET ORAL at 08:24

## 2024-05-14 RX ADMIN — Medication 1 TABLET: at 08:22

## 2024-05-14 RX ADMIN — ASPIRIN 81 MG: 81 TABLET, COATED ORAL at 08:23

## 2024-05-14 NOTE — CASE MANAGEMENT/SOCIAL WORK
Case Management Discharge Note         KAYLIE met with pt at bedside. Pt refused STR, wanting to return home. Spouse is currently admitted here. Pt agreeable to . Sent referral to [facility] . Pt has O2 through [facility]. SW spoke with daughter via telephone, she is on her way to transport pt back home. She is bringing tank as well. She reports she is unable to care for pt at home. She is not POA. She is going to file APS report on home situation. Pt is A&O and able to make her own decisions. It is her home, daughter had moved in with them. KAYLIE updated team.        Selected Continued Care - Admitted Since 5/11/2024       Destination    No services have been selected for the patient.                Durable Medical Equipment    No services have been selected for the patient.                Dialysis/Infusion    No services have been selected for the patient.                Home Medical Care    No services have been selected for the patient.                Therapy    No services have been selected for the patient.                Community Resources    No services have been selected for the patient.                Community & DME    No services have been selected for the patient.                    Selected Continued Care - Episodes Includes continued care and service providers with selected services from the active episodes listed below      Heart Failure Episode start date: 3/27/2024   There are no active outsourced providers for this episode.                 Selected Continued Care - Prior Encounters Includes continued care and service providers with selected services from prior encounters from 2/11/2024 to 5/14/2024      Discharged on 4/23/2024 Admission date: 4/21/2024 - Discharge disposition: Short Term Hospital (DC - External)      Destination       Service Provider Selected Services Address Phone Fax Patient Preferred    [facility] [place] [facility] [address], [place] [address]  [phone] --                          Transportation Services  Private: Car    Final Discharge Disposition Code: 06 - home with home health care

## 2024-05-14 NOTE — PLAN OF CARE
Goal Outcome Evaluation:              Outcome Evaluation: Pt VSS, patient pain well controlled with minimal medication use. Appropriate I&O. Patient complaint with turning and keeping heels elevated this shift as well as using Incentive spirometer

## 2024-05-14 NOTE — DISCHARGE SUMMARY
[facility]   DISCHARGE SUMMARY      Name:  Lesley Calero   Age:  83 y.o.  Sex:  female  :  1940  MRN:  [id number]   Visit Number:  [id number]    Admission Date:  2024  Date of Discharge:  2024  Primary Care Physician:  Nj Smith MD    Important issues to note:    1.  Patient was admitted with shortness of breath secondary to pneumonia in the setting of underlying COPD and was treated with Rocephin and doxycycline and will be discharged home on doxycycline.  2.  Patient was noted to have bilateral lung nodules with history of further weight loss, malignancy is suspected and she will be scheduled to follow-up with Dr. Luna from pulmonology.  3.  Due to poor oral intake, she has been initiated on Megace.  4.  Patient does have generalized weakness and there was concern that patient is unable to take care of herself.  Patient however is declining to go to short-term rehabilitation and will be discharged home with home health.  She already has home oxygen.  She had walking oximetry and requires 3 L on exertion.  She already has home nebulizer therapy as well as a walker.  5.  Follow-up with primary care physician in 1 week.    Discharge Diagnoses:     Bilateral upper lobe bacterial pneumonia, unable to classify further, POA.  Acute renal failure, POA.  Hyponatremia, POA.  Chronic diastolic heart failure, no exacerbation.  Coronary artery disease status post previous PCI.  COPD, no exacerbation.  Chronic hypoxic respiratory failure on home oxygen.  Bilateral lung nodules noted on CT scan.  Sick sinus syndrome status post pacemaker.  Essential hypertension.    Problem List:     Active Hospital Problems    Diagnosis  POA    **Pneumonia of right upper lobe due to infectious organism [J18.9]  Yes    Pneumonia [J18.9]  Yes    Chronic heart failure with preserved ejection fraction (HFpEF) [I50.32]  Yes    COPD (chronic obstructive pulmonary disease) [J44.9]  Yes     Essential hypertension [I10]  Yes    Status post placement of cardiac pacemaker [Z95.0]  Yes      Resolved Hospital Problems   No resolved problems to display.     Presenting Problem:    Chief Complaint   Patient presents with    Shortness of Breath    Altered Mental Status      Consults:     Consulting Physician(s)                     None              Procedures Performed:    None.    History of presenting illness/Hospital Course:    Lesley Calero is an 83-year-old female with history of coronary artery disease, diastolic heart failure, COPD on home oxygen at 3 L, hypertension, sick sinus syndrome status post pacemaker was brought to the emergency room by EMS with multiple symptoms including cough, shortness of breath, generalized weakness and weight loss.  Patient lives with her  and her daughter lives with them and takes care of them.  Patient does have history of COPD and has home nebulizers and uses oxygen at home.  She apparently quit smoking in 2001.  According to the daughter, patient has been progressively weak and has not been able to walk much in the last several days and her oral intake also has decreased.  She apparently has lost about 15 pounds in the last 1 month.       Patient was recently admitted to this facility on 4/21/2024 with symptoms of chest pain, elevated troponins.  At that time she was seen by Dr. Nguyen who recommended transfer to tertiary care facility for possible cardiac catheterization.  Patient was subsequently transferred to [facility] on 4/22/2024 and was evaluated by cardiology there who did not feel patient had cardiac reasons for chest pain.  At that time she did undergo stress test which was reviewed by cardiology and they did not recommend cardiac catheterization.  Patient was subsequently treated for pneumonia with ceftriaxone and azithromycin and was discharged on oral cephalosporin therapy.     In the emergency room, she was afebrile and  hemodynamically stable saturating at 96% on 3 L of nasal cannula oxygen.  Initial troponin 73, proBNP 6909, sodium 134, BUN 62 (baseline 23), creatinine 1.65 (baseline 0.7), glucose 116.  LFTs were within normal limits.  Lactic acid was 2, procalcitonin elevated at 0.74.  CBC was unremarkable.  Chest x-ray showed persistent mid right lower lobe opacity worrisome for pneumonia.  This was also seen during her last x-ray 10 days ago.  CT of the chest without contrast showed bilateral posterior upper lobe opacities worrisome for pneumonia.  New 11 mm posterior right upper lobe nodule and 14 mm medial left lower lobe nodule noted.  PET/CT recommended for further evaluation.  EKG showed paced rhythm.  Patient was given 500 mL of normal saline bolus, Rocephin and doxycycline in the emergency room and was subsequently admitted to the medical floor with telemetry for treatment of recurrent pneumonia, hyponatremia and acute kidney injury.     Patient was continued on IV fluids with significant improvement in her renal function.  IV fluids were discontinued.  Patient was maintained on Rocephin and doxycycline.  She was initiated on physical and occupational therapy.    Bilateral upper lobe pneumonia.  - Continue ceftriaxone and doxycycline (day 4).  - Blood cultures have been negative so far.  - Nasal swab for MRSA is negative.  - Respiratory culture has been sent and is pending.  - Bronchodilators, budesonide, mucolytic agents.  - Incentive spirometry.  - Continue nasal cannula oxygen to maintain saturations above 90%.     Acute kidney injury.  - Likely secondary to decreased oral intake and dehydration.  - Renal function has improved with IV hydration.     COPD/HFpEF  - No evidence of exacerbation.  - Continue home medications     Bilateral lung nodules.  - Due to prior history of smoking, recent history of poor appetite, weight loss and hyponatremia, these are highly suspicious for malignancy.  - Patient may benefit from  outpatient follow-up with pulmonology and PET scan.  However, with her advanced age, she may not be a candidate for invasive biopsy and/or chemotherapy.     Nutrition.  - Patient does have decreased appetite and weight loss.  - She has been started on Megace, dietary supplements and multivitamins.    Patient does not want to go to short-term rehabilitation and wants to go home with home health.  She already has walker and oxygen at home and declined any further DME needs.  She lives with her  who apparently is in the hospital right now.  I had a long discussion with the patient's daughter Anel who is unable to take care of the patient at this time as she has been taking care of them for the last 8 months.  She is planning to file an APS report and hopes that the patient may be able to go either short or long-term rehabilitation.  Discussed with nursing staff, case management.    Vital Signs:    Temp:  [97.7 °F (36.5 °C)-98.9 °F (37.2 °C)] 97.7 °F (36.5 °C)  Heart Rate:  [72-78] 76  Resp:  [16-18] 16  BP: (116-152)/(53-74) 133/69    Physical Exam:    General Appearance:  Alert and cooperative.    Head:  Atraumatic and normocephalic.   Eyes: Conjunctivae and sclerae normal, no icterus. No pallor.   Ears:  Ears with no abnormalities noted.   Throat: No oral lesions, no thrush, oral mucosa moist.   Neck: Supple, trachea midline, no thyromegaly.   Back:   No kyphoscoliosis present. No tenderness to palpation.   Lungs:   Breath sounds heard bilaterally equally.  No crackles.  Bilateral scattered wheezing heard. No Pleural rub or bronchial breathing.   Heart:  Normal S1 and S2, no murmur, no gallop, no rub. No JVD.  Pacemaker palpated on the right upper chest.   Abdomen:   Normal bowel sounds, no masses, no organomegaly. Soft, nontender, nondistended, no rebound tenderness.   Extremities: Supple, 1+ pitting ankle edema, no cyanosis, no clubbing.  Poor muscle mass.   Pulses: Pulses palpable bilaterally.   Skin: No  bleeding or rash.   Neurologic: Alert and oriented x 3. No facial asymmetry. Moves all four limbs but does have generalized weakness. No tremors.     Pertinent Lab Results:     Results from last 7 days   Lab Units 05/13/24  0746 05/12/24  0806 05/11/24  0816   SODIUM mmol/L 144 141 134*   POTASSIUM mmol/L 4.5 4.1 4.7   CHLORIDE mmol/L 104 102 88*   CO2 mmol/L 28.3 30.5* 27.8   BUN mg/dL 35* 51* 62*   CREATININE mg/dL 0.81 1.10* 1.65*   CALCIUM mg/dL 9.2 8.9 10.3   BILIRUBIN mg/dL  --   --  0.7   ALK PHOS U/L  --   --  106   ALT (SGPT) U/L  --   --  13   AST (SGOT) U/L  --   --  25   GLUCOSE mg/dL 93 91 116*     Results from last 7 days   Lab Units 05/12/24  0806 05/11/24  0816   WBC 10*3/mm3 6.46 10.02   HEMOGLOBIN g/dL 10.7* 13.4   HEMATOCRIT % 33.4* 41.2   PLATELETS 10*3/mm3 170 257         Results from last 7 days   Lab Units 05/11/24  1056 05/11/24  0816   HSTROP T ng/L 71* 73*     Results from last 7 days   Lab Units 05/11/24  0816   PROBNP pg/mL 6,909.0*       Results from last 7 days   Lab Units 05/11/24  1109 05/11/24  1100   BLOODCX  No growth at 2 days No growth at 2 days     Pertinent Radiology Results:    Imaging Results (All)       Procedure Component Value Units Date/Time    CT Chest Without Contrast Diagnostic [[id number]] Collected: 05/11/24 1214     Updated: 05/11/24 1221    Narrative:      PROCEDURE: CT CHEST WO CONTRAST DIAGNOSTIC-     HISTORY: Recurrent pneumonia; N17.9-Acute kidney failure, unspecified;  J18.9-Pneumonia, unspecified organism     COMPARISON: October 8, 2023     FINDINGS: Axial CT images of the chest were obtained without contrast.  Coronal reformatted images were also obtained. This study was performed  with techniques to keep radiation doses as low as reasonably achievable,  (ALARA). Individualized dose reduction techniques using automated  exposure control or adjustment of mA and/or kV according to the patient  size were employed.     A right subclavian pacer is present. There  are severe coronary artery  calcifications. Borderline sized mediastinal nodes are seen which have  not significantly changed. No axillary mass or adenopathy is identified.  Moderate emphysema is seen on the lung window images. There are  bilateral posterior upper lobe opacities which are new and most  worrisome for pneumonia. A questionable nodule is seen in the posterior  right upper lobe measuring 11 mm. A 14 mm nodule is seen in the  posteromedial left lower lobe which was not as well-seen as on the prior  study but is probably larger. Bibasilar pulmonary atelectasis or  scarring is noted. No chest wall abnormality is identified. Limited  images of the upper abdomen reveal postoperative changes from  cholecystectomy. There are diffuse vascular calcifications. A partially  imaged low-attenuation mass is seen in the lateral right kidney which  cannot be accurately characterized without contrast. A small left  lateral renal mass does not appear to be a simple cyst but may represent  a hemorrhagic cyst.       Impression:      Bilateral posterior upper lobe opacities worrisome for  pneumonia.     New 11 mm posterior right upper lobe nodular opacity may represent a  pulmonary nodule or a portion of the presumed pneumonia.     14 mm medial left lower lobe nodule. Recommend PET/CT for further  evaluation.     This report was signed and finalized on 5/11/2024 12:19 PM by Darin Conklin MD.       CT Head Without Contrast [[id number]] Collected: 05/11/24 0852     Updated: 05/11/24 0855    Narrative:      PROCEDURE: CT HEAD WO CONTRAST-     HISTORY: ams     COMPARISON: None     TECHNIQUE: Multiple axial CT images were performed from the foramen  magnum to the vertex without contrast. Coronal reformatted images were  also obtained.This study was performed with techniques to keep radiation  doses as low as reasonably achievable, (ALARA). Individualized dose  reduction techniques using automated exposure control or adjustment  of  mA and/or kV according to the patient size were employed.        FINDINGS: The ventricles are normal in size. There is no evidence of  hemorrhage .  No masses are identified.  No abnormal extra-axial fluid  collection is seen.  There is no evidence of shift of the midline  structures. Images of the sinuses reveal mucosal thickening in multiple  sinuses. A fluid level is noted in the left maxillary sinus.. No bony  abnormality is seen on the bone window images.       Impression:      No acute intracranial abnormality.     Diffuse sinusitis.     This report was signed and finalized on 5/11/2024 8:53 AM by Darin Conklin MD.       XR Chest 1 View [[id number]] Collected: 05/11/24 0842     Updated: 05/11/24 0845    Narrative:      PROCEDURE: XR CHEST 1 VW-     HISTORY: SOA Triage Protocol        COMPARISON: May 1, 2024.     FINDINGS:  The heart size is normal. A right subclavian pacer is  present. There is mild scarring/fibrosis. A persistent mild opacity is  seen in the right upper lobe worrisome for pneumonia. There is no  pneumothorax. The bony thorax in intact.        Impression:      Persistent mild right upper lobe opacity worrisome for  pneumonia.     This report was signed and finalized on 5/11/2024 8:43 AM by Darin Conklin MD.        Echo:    Results for orders placed during the hospital encounter of 04/21/24    Adult Transthoracic Echo Complete W/ Cont if Necessary Per Protocol    Interpretation Summary    Left ventricular systolic function is normal. Calculated left ventricular 3D EF = 59% Left ventricular ejection fraction appears to be 56 - 60%. Left ventricular diastolic function was indeterminate.    The right ventricular cavity is mildly dilated with reduced systolic function.  Pacer lead present.    The right atrial cavity is mildly  dilated.    Significant aortic sclerosis and calcification without hemodynamically significant stenosis.    Mild mitral valve regurgitation is present.    Moderate  tricuspid valve regurgitation is present. Estimated right ventricular systolic pressure from tricuspid regurgitation is markedly elevated (>55 mmHg). Calculated right ventricular systolic pressure from tricuspid regurgitation is 64 mmHg.    Condition on Discharge:      Stable.    Code status during the hospital stay:    Code Status and Medical Interventions:   Ordered at: 05/13/24 1156     Code Status (Patient has no pulse and is not breathing):    CPR (Attempt to Resuscitate)     Medical Interventions (Patient has pulse or is breathing):    Full Support     Discharge Disposition:    Home-Health Care [facility]    Discharge Medications:       Discharge Medications        New Medications        Instructions Start Date   doxycycline 100 MG capsule  Commonly known as: MONODOX   100 mg, Oral, Every 12 Hours Scheduled      guaiFENesin 600 MG 12 hr tablet  Commonly known as: MUCINEX   600 mg, Oral, Every 12 Hours Scheduled      megestrol 40 MG/ML suspension  Commonly known as: MEGACE   Take 10 mL by mouth once Daily.   Start Date: May 15, 2024            Continue These Medications        Instructions Start Date   amLODIPine 5 MG tablet  Commonly known as: NORVASC   5 mg, Oral, Daily      ascorbic acid 500 MG tablet  Commonly known as: VITAMIN C   500 mg, Oral, Daily      aspirin 81 MG EC tablet   81 mg, Oral, Daily      gabapentin 800 MG tablet  Commonly known as: NEURONTIN   800 mg, Oral, 3 Times Daily      HYDROcodone-acetaminophen 7.5-325 MG per tablet  Commonly known as: NORCO   1 tablet, Oral, Every 6 Hours PRN      ipratropium-albuterol 0.5-2.5 mg/3 ml nebulizer  Commonly known as: DUO-NEB   3 mL, Nebulization, Every 4 Hours PRN      metoprolol succinate XL 50 MG 24 hr tablet  Commonly known as: TOPROL-XL   50 mg, Oral, Daily      multivitamin with minerals tablet tablet   1 tablet, Oral, Daily      nitroglycerin 0.4 MG SL tablet  Commonly known as: NITROSTAT   0.4 mg, Sublingual, Every 5 Minutes PRN, Take no more than 3  doses in 15 minutes.       omeprazole 40 MG capsule  Commonly known as: priLOSEC   40 mg, Oral, Daily      pravastatin 40 MG tablet  Commonly known as: PRAVACHOL   80 mg, Oral, Daily      sacubitril-valsartan 24-26 MG tablet  Commonly known as: ENTRESTO   1 tablet, Oral, 2 Times Daily             Discharge Diet:     Diet Instructions       Diet: Regular/House Diet; Soft to Chew (NDD 3); Chopped Meat; Thin (IDDSI 0)      Discharge Diet: Regular/House Diet    Texture: Soft to Chew (NDD 3)    Soft to Chew: Chopped Meat    Fluid Consistency: Thin (IDDSI 0)          Activity at Discharge:     Activity Instructions       Activity as Tolerated            Follow-up Appointments:    Additional Instructions for the Follow-ups that You Need to Schedule       Ambulatory Referral to Disease State Management   As directed      To dept: [facility] [[id number]]   What program(s) are you referring for?: COPD   Follow-up needed: Yes        Ambulatory Referral to Home Health (Hospital)   As directed      Face to Face Visit Date: 5/14/2024   Follow-up provider for Plan of Care?: I treated the patient in an acute care facility and will not continue treatment after discharge.   Follow-up provider: NJ SMITH [[id number]]   Reason/Clinical Findings: Pneumonia, COPD, bilateral lung nodules, sick sinus syndrome   Describe mobility limitations that make leaving home difficult: Generalized weakness, fall risk   Nursing/Therapeutic Services Requested: Skilled Nursing Physical Therapy Occupational Therapy   Skilled nursing orders: Medication education O2 instruction Mini-nebs COPD management Cardiopulmonary assessments   PT orders: Therapeutic exercise Gait Training Transfer training Strengthening Home safety assessment   Weight Bearing Status: As Tolerated   Occupational orders: Activities of daily living Strengthening Home safety assessment   Frequency: 1 Week 1               Follow-up Information       Nj Smith MD Follow up.     Specialty: Family Medicine  Why: Monday May 20th at 2:45 pm  Contact information:  [address]  [address]  [facility] [address]  [phone]               Carin Luna MD .    Specialties: Pulmonary Disease, Sleep Medicine  Contact information:  [address]  [facility] [address]  [phone]                           Test Results Pending at Discharge:    Pending Labs       Order Current Status    Blood Culture - Blood, Arm, Left Preliminary result    Blood Culture - Blood, Hand, Left Preliminary result             Dawood Ruiz MD  05/14/24  09:41 EDT    Time: I spent 35 minutes on this discharge activity which included: face-to-face encounter with the patient, reviewing the data in the system, coordination of the care with the nursing staff as well as consultants, documentation, and entering orders.     Dictated utilizing Dragon dictation.

## 2024-05-14 NOTE — CASE MANAGEMENT/SOCIAL WORK
Case Management/Social Work    Patient Name:  Lesley Calero  YOB: 1940  MRN: [id number]  Admit Date:  5/11/2024        [facility]  is able to accept for services.       Electronically signed by:  FUENTES Winchester  05/14/24 11:02 EDT

## 2024-05-14 NOTE — PROGRESS NOTES
Exercise Oximetry    Patient Name:Lesley Calero   MRN: [id number]   Date: 05/14/24             ROOM AIR BASELINE   SpO2% 88   Heart Rate 73   Blood Pressure      EXERCISE ON ROOM AIR SpO2% EXERCISE ON O2 @ 3 LPM SpO2%   1 MINUTE  1 MINUTE 2  90   2 MINUTES  2 MINUTES 2 88   3 MINUTES  3 MINUTES 3 97   4 MINUTES  4 MINUTES 3 95   5 MINUTES  5 MINUTES 3 95   6 MINUTES  6 MINUTES 3 93              Distance Walked   Distance Walked   Dyspnea (Alexis Scale)   Dyspnea (Alexis Scale)   Fatigue (Alexis Scale)   Fatigue (Alexis Scale)   SpO2% Post Exercise   SpO2% Post Exercise   HR Post Exercise   HR Post Exercise   Time to Recovery   Time to Recovery     Comments: PT Requires  3lpm NC on exertion

## 2024-05-15 ENCOUNTER — TELEPHONE (OUTPATIENT)
Dept: PHARMACY | Facility: HOSPITAL | Age: 84
End: 2024-05-15
Payer: MEDICARE

## 2024-05-15 DIAGNOSIS — I50.32 CHRONIC HEART FAILURE WITH PRESERVED EJECTION FRACTION (HFPEF): Primary | ICD-10-CM

## 2024-05-15 NOTE — OUTREACH NOTE
Prep Survey      Flowsheet Row Responses   [facility] facility patient discharged from? [facility]   Is LACE score < 7 ? No   Eligibility Readm Mgmt   Discharge diagnosis PNA   Does the patient have one of the following disease processes/diagnoses(primary or secondary)? Pneumonia   Does the patient have Home health ordered? Yes   What is the Home health agency?  [facility]   Is there a DME ordered? No   Prep survey completed? Yes            AARON BRIONES - Registered Nurse

## 2024-05-15 NOTE — TELEPHONE ENCOUNTER
Anel , pt's daughter, called and requested Entresto (24-26mg) samples. We have been trying to reach her about bringing in 2024 Benefit letter to complete application for patient assistance. She was notified and will get those and bring to appt on 05/20/24.    Please advise.

## 2024-05-16 ENCOUNTER — HOME CARE VISIT (OUTPATIENT)
Dept: HOME HEALTH SERVICES | Facility: HOME HEALTHCARE | Age: 84
End: 2024-05-16
Payer: MEDICARE

## 2024-05-16 LAB
BACTERIA SPEC AEROBE CULT: NORMAL
BACTERIA SPEC AEROBE CULT: NORMAL

## 2024-05-16 PROCEDURE — G0299 HHS/HOSPICE OF RN EA 15 MIN: HCPCS

## 2024-05-16 NOTE — Clinical Note
Please forward to Nj Smith MD.     Patient admitted to home health skilled nursing services on 5/16/24 with a visit frequency of 1w1, 2w3, 1w3. Please see SOC note below for additional information regarding POC.    SOC Note: Patient is an 83 y.o. female with a PMH of CHF, chronic right knee pain, COPD, HTN, cataract right eye, pacemaker, NSTEMI, CAD, GERD, HLD, RLS, and arthritis. On arrival to patient's home, she was observed sitting up on couch with oxygen on via nasal cannula. She was awake and alert. Oriented x4. She was pleasant and participated in establishing goals and providing information regarding her medical history and recent hospitalization.    Home Health ordered for: disciplines SN/PT/OT. SN visit frequency 1w1, 2w3, 1w3.    Reason for Hosp/Primary Dx/Co-morbidities: Patient admitted to [facility] from 5/11/24 - 5/14/24 with COPD exacerbation secondary to pneumonia. She was diagnosed with new lung nodule measuring 11mm in RUL. She will f/u with Dr. Mendoza for further evaluation concerning lung nodule.    Focus of Care:     Patient's goal(s): T/I COPD, Pneumonia, medication education, pain management, oxygen, home safety and fall prevention, and prevention of skin breakdown.    Current Functional status/mobility/DME: Patient is a moderate assist and ambulates with walker.     HB status/Living Arrangements: Patient lives with spouse and daughter in a single-story home.    Skin Integrity/wound status: CDI. Decreased skin turgor.    Code Status: CPR    Fall Risk/Safety concerns: High fall risk. Smoker in home. Evidence of smoking materials on front porch.    Educated on Emergency Plan, steps to take prior to going to the ER and when to Call Home Health First:  Yes.     Medication issues/Concerns:No.    Additional Problems/Concerns: No    SDOH Barriers (i.e. caregiver concerns, social isolation, transportation, food insecurity, environment, income etc.)/Need for MSW: No    Plan for next visit: T/I COPD,  Pneumonia, medication education, pain management, oxygen, home safety and fall prevention, and prevention of skin breakdown.

## 2024-05-17 VITALS
RESPIRATION RATE: 18 BRPM | SYSTOLIC BLOOD PRESSURE: 121 MMHG | TEMPERATURE: 98.2 F | OXYGEN SATURATION: 99 % | HEART RATE: 51 BPM | DIASTOLIC BLOOD PRESSURE: 69 MMHG

## 2024-05-17 NOTE — HOME HEALTH
"SOC Note: Patient is an 83 y.o. female with a PMH of CHF, chronic right knee pain, COPD, HTN, cataract right eye, pacemaker, NSTEMI, CAD, GERD, HLD, RLS, and arthritis. On arrival to patient's home, she was observed sitting up on couch with oxygen on via nasal cannula. She was awake and alert. Oriented x4. She was pleasant and participated in establishing goals and providing information regarding her medical history and recent hospitalization.    Home Health ordered for: disciplines SN/PT/OT. SN visit frequency 1w1, 2w3, 1w3.    Reason for Hosp/Primary Dx/Co-morbidities: Patient admitted to [facility] from 5/11/24 - 5/14/24 with COPD exacerbation secondary to pneumonia. She was diagnosed with new lung nodule measuring 11mm in RUL. She will f/u with Dr. Mendoza for further evaluation concerning lung nodule.    Focus of Care: T/I COPD, Pneumonia, medication education, pain management, oxygen, home safety and fall prevention, and prevention of skin breakdown.    Patient's goal(s): \"making sure my meds are ok, getting stronger.\"    Current Functional status/mobility/DME: Patient is a moderate assist and ambulates with walker.     HB status/Living Arrangements: Patient lives with spouse and daughter in a single-story home.    Skin Integrity/wound status: CDI. Decreased skin turgor.    Code Status: CPR    Fall Risk/Safety concerns: High fall risk. Smoker in home. Evidence of smoking materials on front porch.    Educated on Emergency Plan, steps to take prior to going to the ER and when to Call Home Health First:  Yes.     Medication issues/Concerns:No.    Additional Problems/Concerns: No    SDOH Barriers (i.e. caregiver concerns, social isolation, transportation, food insecurity, environment, income etc.)/Need for MSW: No    Plan for next visit: T/I COPD, Pneumonia, medication education, pain management, oxygen, home safety and fall prevention, and prevention of skin breakdown."

## 2024-05-20 ENCOUNTER — HOME CARE VISIT (OUTPATIENT)
Dept: HOME HEALTH SERVICES | Facility: HOME HEALTHCARE | Age: 84
End: 2024-05-20
Payer: MEDICARE

## 2024-05-21 ENCOUNTER — HOME CARE VISIT (OUTPATIENT)
Dept: HOME HEALTH SERVICES | Facility: HOME HEALTHCARE | Age: 84
End: 2024-05-21
Payer: MEDICARE

## 2024-05-21 PROCEDURE — G0495 RN CARE TRAIN/EDU IN HH: HCPCS

## 2024-05-21 NOTE — Clinical Note
Please forward to Nj Smith MD.    Patient admitted to  services and was agreeable to services. Daughter, Anel, advised [facility] they no longer wanted services. I placed a call to Anel and s/w Anel. She requested d/c from  services. Patient was present during phone call and also requested d/c. Patient stated daughter provides all her care and it was overwhelming to have HH services. SN visit on 5/21/24 to d/c patient from our services as requested.
How Many Skin Cancers Have You Had?: one
What Is The Reason For Today's Visit?: Surveillance against skin cancer recurrences
When Was Your Last Cancer Diagnosed?: 8/2021

## 2024-05-22 ENCOUNTER — READMISSION MANAGEMENT (OUTPATIENT)
Dept: CALL CENTER | Facility: HOSPITAL | Age: 84
End: 2024-05-22
Payer: MEDICARE

## 2024-05-22 NOTE — OUTREACH NOTE
COPD/PN Week 1 Survey      Flowsheet Row Responses   [facility] facility patient discharged from? [facility]   Does the patient have one of the following disease processes/diagnoses(primary or secondary)? Pneumonia   Week 1 attempt successful? No   Unsuccessful attempts Attempt 1            Margot Parkinson Licensed Nurse

## 2024-05-23 VITALS
SYSTOLIC BLOOD PRESSURE: 104 MMHG | TEMPERATURE: 97.9 F | OXYGEN SATURATION: 96 % | DIASTOLIC BLOOD PRESSURE: 59 MMHG | HEART RATE: 56 BPM | RESPIRATION RATE: 20 BRPM

## 2024-05-23 NOTE — HOME HEALTH
Discharge Decision:  Discharge    Plan for discharge: Patient discharge to home/family care and patient and caregiver's request.    Barriers to discharge: Patient and caregiver have decided to decline home health serivces.    Ongoing needs: Patient has ongoing needs for education related to COPD; however, patient and caregiver have declined services.    Any referrals needed: No    Any declines in outcomes: discuss and document reason/Order received to discharge without goals met? Unable to met goals as patient and caregiver have declined services.    Teaching needed prior to discharge: Educated COPD home management and s/s of exacerbation.    Assign DC notice responsibility:     Assign OASIS discharge responsibility:

## 2024-05-31 ENCOUNTER — READMISSION MANAGEMENT (OUTPATIENT)
Dept: CALL CENTER | Facility: HOSPITAL | Age: 84
End: 2024-05-31
Payer: MEDICARE

## 2024-05-31 NOTE — OUTREACH NOTE
COPD/PN Week 3 Survey      Flowsheet Row Responses   [facility] facility patient discharged from? [facility]   Does the patient have one of the following disease processes/diagnoses(primary or secondary)? Pneumonia   Week 3 attempt successful? No   Unsuccessful attempts Attempt 1            Suellen GALARZA - Registered Nurse

## 2024-06-03 ENCOUNTER — TELEPHONE (OUTPATIENT)
Dept: PHARMACY | Facility: HOSPITAL | Age: 84
End: 2024-06-03
Payer: MEDICARE

## 2024-06-03 DIAGNOSIS — I50.32 CHRONIC HEART FAILURE WITH PRESERVED EJECTION FRACTION (HFPEF): ICD-10-CM

## 2024-06-03 NOTE — TELEPHONE ENCOUNTER
Ms. Calero's daughter Anel contacted clinic about rescheduling missed HF clinic appts. Patient has been in and out of hospital and was not able to make these. Rescheduled for 6/10 @ 3PM. Anel also requested Entresto 24/26 mg sample as patient will be out of supply on Thursday. Spoke with cardiology office who provided sample.    LOT: [id number]  EXP: 10/31/2026

## 2024-06-05 ENCOUNTER — READMISSION MANAGEMENT (OUTPATIENT)
Dept: CALL CENTER | Facility: HOSPITAL | Age: 84
End: 2024-06-05
Payer: MEDICARE

## 2024-06-05 NOTE — OUTREACH NOTE
COPD/PN Week 3 Survey      Flowsheet Row Responses   [facility] patient discharged from? [facility]   Does the patient have one of the following disease processes/diagnoses(primary or secondary)? Pneumonia   Week 3 attempt successful? Yes   Call start time 1202   Call end time 1206   Discharge diagnosis PNA   Person spoke with today (if not patient) and relationship DaughterIggy Tam   Does the patient have a primary care provider?  Yes   Comments regarding PCP Has seen Dr. Smith   What is the Home health agency?  [facility]   Has home health visited the patient within 72 hours of discharge? Yes   Home health comments Patient declined Home Health.   Did the patient receive a copy of their discharge instructions? Yes   Nursing interventions Reviewed instructions with patient   What is the patient's perception of their health status since discharge? Improving   Is the patient/caregiver able to teach back the hierarchy of who to call/visit for symptoms/problems? PCP, Specialist, Home health nurse, Urgent Care, ED, 911 Yes   Is the patient able to teach back COPD zones? Yes   Patient reports what zone on this call? Yellow Zone   Green Zone Reports doing well   Yellow Zone Poor sleep and my symptoms woke me up, More breathless than usual   Yellow interventions Continue taking daily medications, Call provider immediately if symptoms don't improve: they may indicate that an adjustment in medication or oxygen therapy is needed   Is the patient/caregiver able to teach back signs and symptoms of worsening condition: Fever/chills, Shortness of breath, Chest pain   Is the patient/caregiver able to teach back importance of completing antibiotic course of treatment? Yes   Week 3 call completed? Yes   Is the patient interested in additional calls from an ambulatory ? No   Would this patient benefit from a Referral to [facility] Social Work? No   Wrap up additional comments Daughter reports patient is doing okay- breathing is  the same however not worse. Does have chronic pain in legs They will call Dr. Lagos to discuss chronic leg pain- and pain management. No other concerns or questions noted.   Call end time 1206            Suellen VALDEZ - Registered Nurse

## 2024-06-17 ENCOUNTER — DISEASE STATE MANAGEMENT VISIT (OUTPATIENT)
Dept: PHARMACY | Facility: HOSPITAL | Age: 84
End: 2024-06-17
Payer: MEDICARE

## 2024-06-17 VITALS
OXYGEN SATURATION: 91 % | HEIGHT: 57 IN | DIASTOLIC BLOOD PRESSURE: 68 MMHG | RESPIRATION RATE: 16 BRPM | SYSTOLIC BLOOD PRESSURE: 98 MMHG | BODY MASS INDEX: 24.38 KG/M2 | WEIGHT: 113 LBS | TEMPERATURE: 97.5 F | HEART RATE: 79 BPM

## 2024-06-17 DIAGNOSIS — I50.32 CHRONIC HEART FAILURE WITH PRESERVED EJECTION FRACTION (HFPEF): Primary | ICD-10-CM

## 2024-06-17 DIAGNOSIS — I10 ESSENTIAL HYPERTENSION: ICD-10-CM

## 2024-06-17 PROCEDURE — G0463 HOSPITAL OUTPT CLINIC VISIT: HCPCS

## 2024-06-17 RX ORDER — LOSARTAN POTASSIUM 25 MG/1
25 TABLET ORAL DAILY
Qty: 90 TABLET | Refills: 1 | Status: SHIPPED | OUTPATIENT
Start: 2024-06-17

## 2024-06-17 NOTE — PROGRESS NOTES
[facility] Heart Failure Clinic Follow Up Note    Lesley Calero  [id number]  2024    Primary Care Provider: Nj Smith MD   Referring Provider: Lisa Villafuerte A*    Chief Complaint: Follow-up CHF    History of Present Illness:   Mrs. Lesley Calero is a 83 y.o. female who presents to the HF Clinic for follow-up.  In , the patient was admitted to [facility] with acute on chronic exacerbation of HFpEF.  The patient has a past medical history of hypertension, hyperlipidemia, coronary artery disease, atrial fibrillation, pacemaker, COPD on continuous O2, and previous tobacco use with complete resolution in .  Since her last HF visit, she was hospitalized for pneumonia/COPD exacerbation at which time a CT scan showed a new 11 mm nodule of RUL and a 14 mm nodule of LLL which was noted in the setting of weight loss (Dr. Luna following with appt scheduled for ).  She presents today for follow-up of CHF.  The patient denies chest pain, SOB, palpitations, dizziness, wheezing, but reports some lower extremity edema consistent with her baseline.  She has been taking her medication as prescribed, but they have not filed taxes for the  year, so our staff has been able to complete patient assistance forms.  The patient has been using Entresto samples and is getting ready to run out in the next few days.  No other complaints or concerns at this time.    Past Cardiac Testin. Last Coronary Angio: None  2. Prior Stress Testing: None  3. Last Echo: 2023              1.  Normal left ventricular size and systolic function, LVEF 60-65%.  2.  Mild concentric LVH.  3.  Grade 2 diastolic dysfunction.  4.  Mild right ventricular dilation with normal RV systolic function.  5.  Severely increased left atrial volume index.  6.  Severe calcification of the aortic valve without significant stenosis.  7.  Moderate tricuspid regurgitation.  8.  Moderate pulmonary hypertension, RVSP  52 mmHg.  4. Prior Holter Monitor: None       Review of Systems:   Review of Systems  As Noted in HPI.   I have reviewed and confirmed the accuracy of the ROS as documented by the MA/LPN/RN DEVON Landers    I have reviewed and/or updated the patient's past medical, past surgical, family, social history, problem list and allergies as appropriate.     Medications:     Current Outpatient Medications:     amLODIPine (NORVASC) 5 MG tablet, Take 1 tablet by mouth Daily. Indications: High Blood Pressure Disorder, Disp: , Rfl:     ascorbic acid (VITAMIN C) 500 MG tablet, Take 1 tablet by mouth Daily. Indications: Inadequate Vitamin C, Disp: , Rfl:     aspirin 81 MG EC tablet, Take 1 tablet by mouth Daily., Disp: 30 tablet, Rfl: 0    Diclofenac Sodium (Voltaren) 1 % gel gel, Apply 4 g topically to the appropriate area as directed Daily. Indications: Arthritis, Disp: , Rfl:     gabapentin (NEURONTIN) 800 MG tablet, Take 1 tablet by mouth 3 (Three) Times a Day. Indications: Neuropathic Pain, Disp: , Rfl:     guaiFENesin (MUCINEX) 600 MG 12 hr tablet, Take 1 tablet by mouth Every 12 (Twelve) Hours., Disp: 30 tablet, Rfl: 0    HYDROcodone-acetaminophen (NORCO) 7.5-325 MG per tablet, Take 1 tablet by mouth Every 6 (Six) Hours As Needed for Moderate Pain. Indications: Pain, Disp: , Rfl:     ipratropium-albuterol (DUO-NEB) 0.5-2.5 mg/3 ml nebulizer, Take 3 mL by nebulization Every 4 (Four) Hours As Needed for Shortness of Air., Disp: 360 mL, Rfl: 0    megestrol (MEGACE) 40 MG/ML suspension, Take 10 mL by mouth once Daily., Disp: 480 mL, Rfl: 0    metoprolol succinate XL (TOPROL-XL) 50 MG 24 hr tablet, Take 1 tablet by mouth Daily., Disp: 30 tablet, Rfl: 2    multivitamin with minerals tablet tablet, Take 1 tablet by mouth Daily. Indications: Supplement, Disp: , Rfl:     nitroglycerin (NITROSTAT) 0.4 MG SL tablet, Place 1 tablet under the tongue Every 5 (Five) Minutes As Needed for Chest Pain. Take no more than 3 doses  "in 15 minutes.   Indications: Acute Angina Pectoris, Disp: , Rfl:     omeprazole (priLOSEC) 40 MG capsule, Take 1 capsule by mouth Daily. Indications: Heartburn, Disp: , Rfl:     pravastatin (PRAVACHOL) 40 MG tablet, Take 2 tablets by mouth Daily. Indications: High Amount of Fats in the Blood, Disp: , Rfl:     sacubitril-valsartan (ENTRESTO) 24-26 MG tablet, Take 1 tablet by mouth 2 (Two) Times a Day. Indications: Cardiac Failure, Disp: 28 tablet, Rfl: 0    Physical Exam:  Vital Signs:   Vitals:    06/17/24 1447   BP: 98/68   BP Location: Left arm   Patient Position: Sitting   Cuff Size: Adult   Pulse: 79   Resp: 16   Temp: 97.5 °F (36.4 °C)   TempSrc: Infrared   SpO2: 91%  Comment: RA   Weight: 51.3 kg (113 lb)   Height: 144.8 cm (57\")     Body mass index is 24.45 kg/m².    Physical Exam  Vitals and nursing note reviewed.   Constitutional:       General: She is not in acute distress.     Appearance: She is cachectic.   HENT:      Head: Normocephalic and atraumatic.   Neck:      Trachea: Trachea normal.   Cardiovascular:      Rate and Rhythm: Normal rate and regular rhythm.      Pulses: Normal pulses.      Heart sounds: Normal heart sounds. No murmur heard.     No friction rub. No gallop.   Pulmonary:      Effort: Pulmonary effort is normal.      Breath sounds: Normal breath sounds.   Musculoskeletal:      Cervical back: Neck supple.      Right lower leg: No edema.      Left lower leg: No edema.   Skin:     General: Skin is warm and dry.   Neurological:      Mental Status: She is alert and oriented to person, place, and time.   Psychiatric:         Mood and Affect: Mood normal.         Behavior: Behavior normal. Behavior is cooperative.         Thought Content: Thought content does not include suicidal ideation.         Results Review:   I reviewed the patient's new clinical results.    Procedures    Assessment / Plan:     1. Chronic heart failure with preserved ejection fraction (HFpEF) (Primary)  11/23, LVEF " 60-65%  Stage D  NYHA functional class II  Change Entresto to losartan d/t cost, but patient/daughter understand that they are eligible for patient assistance through [employer] when they are able to provide financials for 2024     2. Essential hypertension  Acceptable blood pressure         Preventative Cardiology:   Tobacco Cessation: N/A   Advance Care Planning: ACP discussion was declined by the patient. Patient does not have an advance directive, declines further assistance.     Follow Up:   Return in about 3 months (around 9/17/2024) for DSM.      Thank you for allowing me to participate in the care of your patient. Please to not hesitate to contact me with additional questions or concerns.     Lisa Villafuerte, APRN

## 2024-06-19 NOTE — PROGRESS NOTES
Ambulatory Care Clinic  Heart Failure Pharmacist Note     Patient Name: Lesley Calero  :1940  Age: 83 y.o.  Sex: female  Referring Provider: Lisa Villafuerte A*   Primary Cardiologist:   Encounter Provider:  DEVON Landers    HPI: Patient presents for follow-up evaluation in heart failure clinic for HFpEF (most recent EF 60-65% w/ grade 2 diastolic dysfunction 2023). PMH otherwise significant for HTN, HLD, CAD, Afib (PPM), COPD (continuous O2).     Patient was last hospitalized on  for [facility], has not been able to make scheduled follow up appointments since that admission. Patient/daughter report that she is feeling well. Denies any SOB, worsening swelling in LE or abdomen, chest pain, palpitations, dizziness, or lightheadedness. Lungs CTA per APRN exam. She has been taking her medications as prescribed. At last visit, we discussed needing updated financial documents for Entresto assistance however they have not been able to obtain these yet. To ensure continuity of medication therapy, discussed switching Entresto to losartan to limit medication costs until they are able to provide financial documentation. Once received, we can then begin enrollment process for [employer]. Patient/daughter agreeable to this. Otherwise, no concerns or questions at this time.    Heart Failure GDMT:      Drug Class   Drug   Dose Last Dose Adjustment   Additional Titration   Notes   ACEi/ARB/ARNI Losartan 25 mg QD Switched from Entresto (financial)  Pending pt/family providing updated financial documents for submission to [employer]   Beta Blocker Metoprolol XL 50 mg QD Changed from coreg 3/25     MRA Spironolactone 12.5 mg QD Started 3/25     SGLT2i Deferred for now - reassess next visit   N/A Hx of UTI     Other CV Meds:  Furosemide 20 mg daily  Pravastatin 40 mg daily  ASA 81    Medication Use:  Adherence: unclear  Past hx of medication use/intolerance:  Affordability: awaiting  financial documents for [employer]; switched Entresto to losartan for now    Social Determinants:    Social Determinants of Health     Tobacco Use: Medium Risk (5/11/2024)    Patient History     Smoking Tobacco Use: Former     Smokeless Tobacco Use: Former     Passive Exposure: Not on file   Alcohol Use: Not At Risk (5/11/2024)    AUDIT-C     Frequency of Alcohol Consumption: Never     Average Number of Drinks: Patient does not drink     Frequency of Binge Drinking: Never   Financial Resource Strain: Low Risk  (5/11/2024)    Overall Financial Resource Strain (CARDIA)     Difficulty of Paying Living Expenses: Not hard at all   Food Insecurity: No Food Insecurity (5/11/2024)    Hunger Vital Sign     Worried About Running Out of Food in the Last Year: Never true     Ran Out of Food in the Last Year: Never true   Transportation Needs: No Transportation Needs (5/21/2024)    OASIS : Transportation     Lack of Transportation (Medical): No     Lack of Transportation (Non-Medical): No     Patient Unable or Declines to Respond: No   Physical Activity: Inactive (5/11/2024)    Exercise Vital Sign     Days of Exercise per Week: 0 days     Minutes of Exercise per Session: 0 min   Stress: No Stress Concern Present (5/11/2024)    [employer] - Occupational Stress Questionnaire     Feeling of Stress : Not at all   Social Connections: Feeling Socially Integrated (5/21/2024)    OASIS : Social Isolation     Frequency of experiencing loneliness or isolation: Never   Interpersonal Safety: Not At Risk (5/11/2024)    Abuse Screen     Unsafe at Home or Work/School: no     Feels Threatened by Someone?: no     Does Anyone Keep You from Contacting Others or Doint Things Outside the Home?: no     Physical Sign of Abuse Present: no   Depression: Not at risk (5/11/2024)    PHQ-2     PHQ-2 Score: 0   Housing Stability: Not At Risk (5/11/2024)    Housing Stability     Current Living Arrangements: home      "Potentially Unsafe Housing Conditions: none   Utilities: Not At Risk (5/11/2024)    [facility] Utilities     Threatened with loss of utilities: No   Health Literacy: Adequate Health Literacy (5/21/2024)    OASIS : Health Literacy     Frequency of needing help to read materials from doctor or pharmacy: Rarely   Employment: Not At Risk (5/11/2024)    Employment     Do you want help finding or keeping work or a job?: I do not need or want help   Disabilities: Not At Risk (5/11/2024)    Disabilities     Concentrating, Remembering, or Making Decisions Difficulty: no     Doing Errands Independently Difficulty: no       Immunization Status:  Pneumococcal: vaccinated x1  Influenza: yearly  COVID-19: vaccinated    OBJECTIVE:  BP 98/68 (BP Location: Left arm, Patient Position: Sitting, Cuff Size: Adult)   Pulse 79   Temp 97.5 °F (36.4 °C) (Infrared)   Resp 16   Ht 144.8 cm (57\")   Wt 51.3 kg (113 lb)   SpO2 91% Comment: RA  BMI 24.45 kg/m²     Body mass index is 24.45 kg/m².  Wt Readings from Last 1 Encounters:   06/17/24 51.3 kg (113 lb)       Home BP Readings: encouraged daily monitoring of BP  10-yr ASCVD risk: The ASCVD Risk score (Cotopaxi DK, et al., 2019) failed to calculate.    Lab Review:  Renal Function: CrCl cannot be calculated (Patient's most recent lab result is older than the maximum 30 days allowed.).    Lab Results   Component Value Date    PROBNP 6,909.0 (H) 05/11/2024       Results for orders placed during the hospital encounter of 04/21/24    Adult Transthoracic Echo Complete W/ Cont if Necessary Per Protocol    Interpretation Summary    Left ventricular systolic function is normal. Calculated left ventricular 3D EF = 59% Left ventricular ejection fraction appears to be 56 - 60%. Left ventricular diastolic function was indeterminate.    The right ventricular cavity is mildly dilated with reduced systolic function.  Pacer lead present.    The right atrial cavity is mildly  dilated.    Significant aortic " sclerosis and calcification without hemodynamically significant stenosis.    Mild mitral valve regurgitation is present.    Moderate tricuspid valve regurgitation is present. Estimated right ventricular systolic pressure from tricuspid regurgitation is markedly elevated (>55 mmHg). Calculated right ventricular systolic pressure from tricuspid regurgitation is 64 mmHg.    ASSESSMENT/PLAN:  HFpEF (EF 60-65% w/ grade 2 diastolic dysfunction)  Switched Entresto back to losartan given cost constraints. Pt/family aware that she will qualify for [employer] to get Entresto for free pending submission of financial documents. Plan to switch back to Entresto if appropriate once those documents received by our office.  Continue metoprolol XL 50 mg daily.  Continue spironolactone 12.5 mg daily.  Defer SGLT2-inhibitor for now  Continue furosemide 20mg daily. Advised patient to call clinic with 2-3 lb weight gain in 24 hrs or >5 lb weight gain in 1 week    Darrel Mcallister RPH  [facility]  06/19/24  16:13 EDT

## 2024-06-24 RX ORDER — METOPROLOL SUCCINATE 50 MG/1
50 TABLET, EXTENDED RELEASE ORAL DAILY
Qty: 90 TABLET | Refills: 3 | Status: SHIPPED | OUTPATIENT
Start: 2024-06-24

## 2024-09-09 ENCOUNTER — DISEASE STATE MANAGEMENT VISIT (OUTPATIENT)
Dept: PHARMACY | Facility: HOSPITAL | Age: 84
End: 2024-09-09
Payer: MEDICARE

## 2024-09-09 VITALS
TEMPERATURE: 98.2 F | DIASTOLIC BLOOD PRESSURE: 48 MMHG | HEART RATE: 73 BPM | RESPIRATION RATE: 20 BRPM | BODY MASS INDEX: 26.54 KG/M2 | WEIGHT: 123 LBS | SYSTOLIC BLOOD PRESSURE: 103 MMHG | OXYGEN SATURATION: 94 % | HEIGHT: 57 IN

## 2024-09-09 DIAGNOSIS — I50.32 CHRONIC HEART FAILURE WITH PRESERVED EJECTION FRACTION (HFPEF): Primary | ICD-10-CM

## 2024-09-09 PROCEDURE — G0463 HOSPITAL OUTPT CLINIC VISIT: HCPCS

## 2024-09-09 RX ORDER — LOSARTAN POTASSIUM 25 MG/1
12.5 TABLET ORAL DAILY
Qty: 45 TABLET | Refills: 0 | Status: SHIPPED | OUTPATIENT
Start: 2024-09-09

## 2024-09-09 RX ORDER — SPIRONOLACTONE 25 MG/1
12.5 TABLET ORAL DAILY
Qty: 45 TABLET | Refills: 0 | Status: SHIPPED | OUTPATIENT
Start: 2024-09-09

## 2024-09-09 NOTE — PROGRESS NOTES
[facility]  Heart Failure Pharmacist Note     Patient Name: Lesley Calero  :1940  Age: 83 y.o.  Sex: female  Referring Provider: Nj Smith MD   Primary Cardiologist:   Encounter Provider:  DEVON Landers    HPI: Patient presents for follow-up evaluation in heart failure clinic for HFpEF (most recent EF 60-65% w/ grade 2 diastolic dysfunction 2023). PMH otherwise significant for HTN, HLD, CAD, Afib (PPM), COPD (continuous O2).     Patient reports feeling dizzy spells most days since last visit on 24, likely due to titration in losartan. For this reason, her losartan dose was decreased to prevent hypotension. Upon APRN examination she has some crackles in LLB. Otherwise, no swelling of LE or SOB. She does not keep a BP/HR/weight log at home, so she was given one at this visit and asked to keep track and bring it back at next follow-up. Spironolactone was initiated with labs scheduled for next week. She expressed understanding of changes.     Heart Failure GDMT:      Drug Class   Drug   Dose Last Dose Adjustment   Additional Titration   Notes   ACEi/ARB/ARNI Losartan 12.5 mg QD Decreased from 25 mg on 24  Decreased due to /48 at appt today and dizzy spells per patient   Beta Blocker Metoprolol XL 50 mg QD Changed from coreg 3/25     MRA Spironolactone 12.5 mg QD Started 24     SGLT2i Deferred for now - reassess next visit   N/A Hx of UTI     Other CV Meds:  Furosemide 20 mg daily  Pravastatin 40 mg daily  ASA 81    Medication Use:  Adherence: unclear  Past hx of medication use/intolerance:  Affordability: awaiting financial documents for [employer]; switched Entresto to losartan for now    Social Determinants:    Social Determinants of Health     Tobacco Use: Medium Risk (2024)    Patient History     Smoking Tobacco Use: Former     Smokeless Tobacco Use: Former     Passive Exposure: Not on file   Alcohol Use: Not At Risk (2024)    AUDIT-C      Frequency of Alcohol Consumption: Never     Average Number of Drinks: Patient does not drink     Frequency of Binge Drinking: Never   Financial Resource Strain: Low Risk  (5/11/2024)    Overall Financial Resource Strain (CARDIA)     Difficulty of Paying Living Expenses: Not hard at all   Food Insecurity: No Food Insecurity (5/11/2024)    Hunger Vital Sign     Worried About Running Out of Food in the Last Year: Never true     Ran Out of Food in the Last Year: Never true   Transportation Needs: No Transportation Needs (5/21/2024)    OASIS : Transportation     Lack of Transportation (Medical): No     Lack of Transportation (Non-Medical): No     Patient Unable or Declines to Respond: No   Physical Activity: Inactive (5/11/2024)    Exercise Vital Sign     Days of Exercise per Week: 0 days     Minutes of Exercise per Session: 0 min   Stress: No Stress Concern Present (5/11/2024)    [employer] - Occupational Stress Questionnaire     Feeling of Stress : Not at all   Social Connections: Feeling Socially Integrated (5/21/2024)    OASIS : Social Isolation     Frequency of experiencing loneliness or isolation: Never   Interpersonal Safety: Not At Risk (5/11/2024)    Abuse Screen     Unsafe at Home or Work/School: no     Feels Threatened by Someone?: no     Does Anyone Keep You from Contacting Others or Doint Things Outside the Home?: no     Physical Sign of Abuse Present: no   Depression: Not at risk (7/8/2024)    Received from [facility]    PHQ-2     Patient Health Questionnaire-2 Score: 0   Housing Stability: Not At Risk (5/11/2024)    Housing Stability     Current Living Arrangements: home     Potentially Unsafe Housing Conditions: none   Utilities: Not At Risk (5/11/2024)    [facility] Utilities     Threatened with loss of utilities: No   Health Literacy: Adequate Health Literacy (5/21/2024)    OASIS : Health Literacy     Frequency of needing help to read materials from  "doctor or pharmacy: Rarely   Employment: Not At Risk (5/11/2024)    Employment     Do you want help finding or keeping work or a job?: I do not need or want help   Disabilities: Not At Risk (5/11/2024)    Disabilities     Concentrating, Remembering, or Making Decisions Difficulty: no     Doing Errands Independently Difficulty: no       Immunization Status:  Pneumococcal: vaccinated x1  Influenza: yearly  COVID-19: vaccinated    OBJECTIVE:  /48 (BP Location: Right arm, Patient Position: Sitting, Cuff Size: Adult)   Pulse 73   Temp 98.2 °F (36.8 °C) (Infrared)   Resp 20   Ht 144.8 cm (57\")   Wt 55.8 kg (123 lb)   SpO2 94% Comment: 3L NC  BMI 26.62 kg/m²     Body mass index is 26.62 kg/m².  Wt Readings from Last 1 Encounters:   09/09/24 55.8 kg (123 lb)       Home BP Readings: encouraged daily monitoring of BP  10-yr ASCVD risk: The ASCVD Risk score (Latha DK, et al., 2019) failed to calculate for the following reasons:    The 2019 ASCVD risk score is only valid for ages 40 to 79    Lab Review:  Renal Function: CrCl cannot be calculated (Patient's most recent lab result is older than the maximum 30 days allowed.).    Lab Results   Component Value Date    PROBNP 6,909.0 (H) 05/11/2024       Results for orders placed during the hospital encounter of 04/21/24    Adult Transthoracic Echo Complete W/ Cont if Necessary Per Protocol    Interpretation Summary    Left ventricular systolic function is normal. Calculated left ventricular 3D EF = 59% Left ventricular ejection fraction appears to be 56 - 60%. Left ventricular diastolic function was indeterminate.    The right ventricular cavity is mildly dilated with reduced systolic function.  Pacer lead present.    The right atrial cavity is mildly  dilated.    Significant aortic sclerosis and calcification without hemodynamically significant stenosis.    Mild mitral valve regurgitation is present.    Moderate tricuspid valve regurgitation is present. Estimated " right ventricular systolic pressure from tricuspid regurgitation is markedly elevated (>55 mmHg). Calculated right ventricular systolic pressure from tricuspid regurgitation is 64 mmHg.    ASSESSMENT/PLAN:  HFpEF (EF 60-65% w/ grade 2 diastolic dysfunction)  Decrease losartan to 12.5 mg QD (0.5 pill per day) to assist with dizzy spells.  Continue metoprolol XL 50 mg daily.  Re-initiate spironolactone 12.5 mg daily.  Defer SGLT2-inhibitor for now  Have labs done in 1 week. Will follow-up via telephone with results.   Follow-up appointment in 2 weeks.  Continue furosemide 20mg daily. Advised patient to call clinic with 2-3 lb weight gain in 24 hrs or >5 lb weight gain in 1 week    Faye Crooks, Pharmacy Intern  [facility] Heart Failure Clinic  09/09/24  16:06 EDT

## 2024-09-09 NOTE — PROGRESS NOTES
[facility] Heart Failure Clinic Follow Up Note    Lesley Calero  [id number]  2024    Primary Care Provider: Nj Smith MD   Referring Provider: Nj Smith MD    Chief Complaint: Follow-up CHF    History of Present Illness:   Mrs. Lesley Calero is a 83 y.o. female who presents to the HF Clinic for follow up.   In , the patient was admitted to [facility] with acute on chronic exacerbation of HFpEF.  The patient has a past medical history of hypertension, hyperlipidemia, coronary artery disease, atrial fibrillation, pacemaker, COPD on continuous O2, and previous tobacco use with complete resolution in .  Since her last HF visit, she was hospitalized for pneumonia/COPD exacerbation at which time a CT scan showed a new 11 mm nodule of RUL and a 14 mm nodule of LLL which was noted in the setting of weight loss (Dr. Luna following with appt scheduled for ).  Entresto was previously initiated, but due unable to fill out patient assistance through [employer] without income papers from patient.  As a result, losartan was prescribed.       Little bit of dizziness almost every day, worse with standing.  This has increased in frequency since her last HF clinic.  She has not been checking her blood pressure/heart rate/and weight log at home.  She is taking metoprolol and losartan as prescribed.         Past Cardiac Testin. Last Coronary Angio: None  2. Prior Stress Testing: None  3. Last Echo: 2023              1.  Normal left ventricular size and systolic function, LVEF 60-65%.  2.  Mild concentric LVH.  3.  Grade 2 diastolic dysfunction.  4.  Mild right ventricular dilation with normal RV systolic function.  5.  Severely increased left atrial volume index.  6.  Severe calcification of the aortic valve without significant stenosis.  7.  Moderate tricuspid regurgitation.  8.  Moderate pulmonary hypertension, RVSP 52 mmHg.  4. Prior Holter Monitor: None        Review  of Systems:   Review of Systems  As Noted in HPI.   I have reviewed and confirmed the accuracy of the ROS as documented by the MA/LPN/RN DEVON Landers    I have reviewed and/or updated the patient's past medical, past surgical, family, social history, problem list and allergies as appropriate.     Medications:     Current Outpatient Medications:     ascorbic acid (VITAMIN C) 500 MG tablet, Take 1 tablet by mouth Daily. Indications: Inadequate Vitamin C, Disp: , Rfl:     aspirin 81 MG EC tablet, Take 1 tablet by mouth Daily., Disp: 30 tablet, Rfl: 0    Diclofenac Sodium (Voltaren) 1 % gel gel, Apply 4 g topically to the appropriate area as directed Daily. Indications: Arthritis, Disp: , Rfl:     gabapentin (NEURONTIN) 800 MG tablet, Take 1 tablet by mouth 3 (Three) Times a Day. Indications: Neuropathic Pain, Disp: , Rfl:     HYDROcodone-acetaminophen (NORCO) 7.5-325 MG per tablet, Take 1 tablet by mouth Every 6 (Six) Hours As Needed for Moderate Pain. Indications: Pain, Disp: , Rfl:     ipratropium-albuterol (DUO-NEB) 0.5-2.5 mg/3 ml nebulizer, Take 3 mL by nebulization Every 4 (Four) Hours As Needed for Shortness of Air., Disp: 360 mL, Rfl: 0    losartan (Cozaar) 25 MG tablet, Take 1 tablet by mouth Daily., Disp: 90 tablet, Rfl: 1    metoprolol succinate XL (TOPROL-XL) 50 MG 24 hr tablet, TAKE 1 TABLET BY MOUTH DAILY, Disp: 90 tablet, Rfl: 3    multivitamin with minerals tablet tablet, Take 1 tablet by mouth Daily. Indications: Supplement, Disp: , Rfl:     nitroglycerin (NITROSTAT) 0.4 MG SL tablet, Place 1 tablet under the tongue Every 5 (Five) Minutes As Needed for Chest Pain. Take no more than 3 doses in 15 minutes.   Indications: Acute Angina Pectoris, Disp: , Rfl:     omeprazole (priLOSEC) 40 MG capsule, Take 1 capsule by mouth Daily. Indications: Heartburn, Disp: , Rfl:     pravastatin (PRAVACHOL) 40 MG tablet, Take 2 tablets by mouth Daily. Indications: High Amount of Fats in the Blood, Disp: ,  "Rfl:     Physical Exam:  Vital Signs:   Vitals:    09/09/24 1505   BP: 103/48   BP Location: Right arm   Patient Position: Sitting   Cuff Size: Adult   Pulse: 73   Resp: 20   Temp: 98.2 °F (36.8 °C)   TempSrc: Infrared   SpO2: 94%  Comment: 3L NC   Weight: 55.8 kg (123 lb)   Height: 144.8 cm (57\")     Body mass index is 26.62 kg/m².    Physical Exam  Vitals and nursing note reviewed.   Constitutional:       General: She is not in acute distress.  HENT:      Head: Normocephalic and atraumatic.   Neck:      Trachea: Trachea normal.   Cardiovascular:      Rate and Rhythm: Normal rate and regular rhythm.      Pulses: Normal pulses.      Heart sounds: Normal heart sounds. No murmur heard.     No friction rub. No gallop.   Pulmonary:      Effort: Pulmonary effort is normal.      Breath sounds: Normal breath sounds.   Musculoskeletal:      Cervical back: Neck supple.      Right lower leg: No edema.      Left lower leg: No edema.   Skin:     General: Skin is warm and dry.   Neurological:      Mental Status: She is alert and oriented to person, place, and time.   Psychiatric:         Mood and Affect: Mood normal.         Behavior: Behavior normal. Behavior is cooperative.         Thought Content: Thought content does not include suicidal ideation.         Results Review:   I reviewed the patient's new clinical results.    Procedures    Assessment / Plan:     1. Chronic heart failure with preserved ejection fraction (HFpEF) (Primary)  11/23, LVEF 60-65%  Stage D  NYHA functional class II  Change Entresto to losartan d/t cost, but patient/daughter understand that they are eligible for patient assistance through [employer] when they are able to provide financials for 2024     2. Essential hypertension  Acceptable blood pressure      Preventative Cardiology:   Tobacco Cessation: N/A   Advance Care Planning: ACP discussion was declined by the patient. Patient has an advance directive in EMR which is still valid.      Follow Up: "   No follow-ups on file.      Thank you for allowing me to participate in the care of your patient. Please to not hesitate to contact me with additional questions or concerns.     DEVON Gasca

## 2024-09-11 ENCOUNTER — TELEPHONE (OUTPATIENT)
Dept: PHARMACY | Facility: HOSPITAL | Age: 84
End: 2024-09-11

## 2024-09-11 NOTE — TELEPHONE ENCOUNTER
Received call from patients daughter Anel who asked what brand new heart failure medication was supposed to be called in during her mothers appt on Monday in the CHF clinic. After reviewing the patients chart and confirming with pharmacy student intern, Faye, it was relayed to Anel that a new medication was not started. Patient should only be restarting Spironolactone (1/2 tablet) and cutting the 25mg Losartan in half.     Patients daughter reported patient taking Spironolactone but having to wait for Losartan to be ordered at pharmacy. She will pick it up today.    Patient and daughter were both reminded for patient to repeat labs on Monday.     FYI: DANNY/TAHMINA/LEDY

## 2024-09-18 ENCOUNTER — LAB (OUTPATIENT)
Dept: LAB | Facility: HOSPITAL | Age: 84
End: 2024-09-18
Payer: MEDICARE

## 2024-09-18 DIAGNOSIS — I50.32 CHRONIC HEART FAILURE WITH PRESERVED EJECTION FRACTION (HFPEF): ICD-10-CM

## 2024-09-18 LAB
ANION GAP SERPL CALCULATED.3IONS-SCNC: 10.5 MMOL/L (ref 5–15)
BUN SERPL-MCNC: 40 MG/DL (ref 8–23)
BUN/CREAT SERPL: 28.6 (ref 7–25)
CALCIUM SPEC-SCNC: 9.2 MG/DL (ref 8.6–10.5)
CHLORIDE SERPL-SCNC: 101 MMOL/L (ref 98–107)
CO2 SERPL-SCNC: 32.5 MMOL/L (ref 22–29)
CREAT SERPL-MCNC: 1.4 MG/DL (ref 0.57–1)
EGFRCR SERPLBLD CKD-EPI 2021: 36.9 ML/MIN/1.73
GLUCOSE SERPL-MCNC: 127 MG/DL (ref 65–99)
POTASSIUM SERPL-SCNC: 4.7 MMOL/L (ref 3.5–5.2)
SODIUM SERPL-SCNC: 144 MMOL/L (ref 136–145)

## 2024-09-18 PROCEDURE — 36415 COLL VENOUS BLD VENIPUNCTURE: CPT

## 2024-09-18 PROCEDURE — 80048 BASIC METABOLIC PNL TOTAL CA: CPT

## 2024-09-23 ENCOUNTER — TELEPHONE (OUTPATIENT)
Dept: PHARMACY | Facility: HOSPITAL | Age: 84
End: 2024-09-23
Payer: MEDICARE

## 2024-10-10 ENCOUNTER — APPOINTMENT (OUTPATIENT)
Dept: CT IMAGING | Facility: HOSPITAL | Age: 84
End: 2024-10-10
Payer: MEDICARE

## 2024-10-10 ENCOUNTER — APPOINTMENT (OUTPATIENT)
Dept: GENERAL RADIOLOGY | Facility: HOSPITAL | Age: 84
End: 2024-10-10
Payer: MEDICARE

## 2024-10-10 ENCOUNTER — HOSPITAL ENCOUNTER (EMERGENCY)
Facility: HOSPITAL | Age: 84
Discharge: HOME | End: 2024-10-10
Attending: STUDENT IN AN ORGANIZED HEALTH CARE EDUCATION/TRAINING PROGRAM
Payer: MEDICARE

## 2024-10-10 ENCOUNTER — DISEASE STATE MANAGEMENT VISIT (OUTPATIENT)
Dept: PHARMACY | Facility: HOSPITAL | Age: 84
End: 2024-10-10
Payer: MEDICARE

## 2024-10-10 VITALS
DIASTOLIC BLOOD PRESSURE: 43 MMHG | BODY MASS INDEX: 26.36 KG/M2 | HEART RATE: 71 BPM | WEIGHT: 121.8 LBS | SYSTOLIC BLOOD PRESSURE: 84 MMHG | OXYGEN SATURATION: 92 %

## 2024-10-10 VITALS
HEIGHT: 57 IN | TEMPERATURE: 98.1 F | RESPIRATION RATE: 18 BRPM | BODY MASS INDEX: 26.25 KG/M2 | OXYGEN SATURATION: 93 % | DIASTOLIC BLOOD PRESSURE: 67 MMHG | HEART RATE: 73 BPM | WEIGHT: 121.69 LBS | SYSTOLIC BLOOD PRESSURE: 146 MMHG

## 2024-10-10 DIAGNOSIS — Z13.9 ENCOUNTER FOR MEDICAL SCREENING EXAMINATION: Primary | ICD-10-CM

## 2024-10-10 DIAGNOSIS — I50.32 CHRONIC HEART FAILURE WITH PRESERVED EJECTION FRACTION (HFPEF): Primary | ICD-10-CM

## 2024-10-10 DIAGNOSIS — N17.9 AKI (ACUTE KIDNEY INJURY): ICD-10-CM

## 2024-10-10 LAB
ALBUMIN SERPL-MCNC: 3.9 G/DL (ref 3.5–5.2)
ALBUMIN/GLOB SERPL: 1.6 G/DL
ALP SERPL-CCNC: 71 U/L (ref 39–117)
ALT SERPL W P-5'-P-CCNC: 43 U/L (ref 1–33)
ANION GAP SERPL CALCULATED.3IONS-SCNC: 8.1 MMOL/L (ref 5–15)
AST SERPL-CCNC: 99 U/L (ref 1–32)
BACTERIA UR QL AUTO: NORMAL /HPF
BASOPHILS # BLD AUTO: 0.02 10*3/MM3 (ref 0–0.2)
BASOPHILS NFR BLD AUTO: 0.3 % (ref 0–1.5)
BILIRUB SERPL-MCNC: 0.4 MG/DL (ref 0–1.2)
BILIRUB UR QL STRIP: NEGATIVE
BUN SERPL-MCNC: 46 MG/DL (ref 8–23)
BUN/CREAT SERPL: 26.6 (ref 7–25)
CALCIUM SPEC-SCNC: 9.2 MG/DL (ref 8.6–10.5)
CHLORIDE SERPL-SCNC: 96 MMOL/L (ref 98–107)
CK SERPL-CCNC: 643 U/L (ref 20–180)
CLARITY UR: CLEAR
CO2 SERPL-SCNC: 30.9 MMOL/L (ref 22–29)
COLOR UR: YELLOW
CREAT SERPL-MCNC: 1.73 MG/DL (ref 0.57–1)
DEPRECATED RDW RBC AUTO: 47.3 FL (ref 37–54)
EGFRCR SERPLBLD CKD-EPI 2021: 28.7 ML/MIN/1.73
EOSINOPHIL # BLD AUTO: 0.14 10*3/MM3 (ref 0–0.4)
EOSINOPHIL NFR BLD AUTO: 2.3 % (ref 0.3–6.2)
ERYTHROCYTE [DISTWIDTH] IN BLOOD BY AUTOMATED COUNT: 13.2 % (ref 12.3–15.4)
FLUAV RNA RESP QL NAA+PROBE: NOT DETECTED
FLUBV RNA RESP QL NAA+PROBE: NOT DETECTED
GEN 5 2HR TROPONIN T REFLEX: 62 NG/L
GLOBULIN UR ELPH-MCNC: 2.4 GM/DL
GLUCOSE SERPL-MCNC: 93 MG/DL (ref 65–99)
GLUCOSE UR STRIP-MCNC: NEGATIVE MG/DL
HCT VFR BLD AUTO: 36 % (ref 34–46.6)
HGB BLD-MCNC: 11.6 G/DL (ref 12–15.9)
HGB UR QL STRIP.AUTO: NEGATIVE
HYALINE CASTS UR QL AUTO: NORMAL /LPF
IMM GRANULOCYTES # BLD AUTO: 0.02 10*3/MM3 (ref 0–0.05)
IMM GRANULOCYTES NFR BLD AUTO: 0.3 % (ref 0–0.5)
KETONES UR QL STRIP: NEGATIVE
LEUKOCYTE ESTERASE UR QL STRIP.AUTO: ABNORMAL
LIPASE SERPL-CCNC: 26 U/L (ref 13–60)
LYMPHOCYTES # BLD AUTO: 0.69 10*3/MM3 (ref 0.7–3.1)
LYMPHOCYTES NFR BLD AUTO: 11.4 % (ref 19.6–45.3)
MAGNESIUM SERPL-MCNC: 2.3 MG/DL (ref 1.6–2.4)
MCH RBC QN AUTO: 31.4 PG (ref 26.6–33)
MCHC RBC AUTO-ENTMCNC: 32.2 G/DL (ref 31.5–35.7)
MCV RBC AUTO: 97.3 FL (ref 79–97)
MONOCYTES # BLD AUTO: 0.37 10*3/MM3 (ref 0.1–0.9)
MONOCYTES NFR BLD AUTO: 6.1 % (ref 5–12)
NEUTROPHILS NFR BLD AUTO: 4.8 10*3/MM3 (ref 1.7–7)
NEUTROPHILS NFR BLD AUTO: 79.6 % (ref 42.7–76)
NITRITE UR QL STRIP: NEGATIVE
NRBC BLD AUTO-RTO: 0 /100 WBC (ref 0–0.2)
PH UR STRIP.AUTO: 5.5 [PH] (ref 5–8)
PLATELET # BLD AUTO: 126 10*3/MM3 (ref 140–450)
PMV BLD AUTO: 11.1 FL (ref 6–12)
POTASSIUM SERPL-SCNC: 4.7 MMOL/L (ref 3.5–5.2)
PROT SERPL-MCNC: 6.3 G/DL (ref 6–8.5)
PROT UR QL STRIP: ABNORMAL
RBC # BLD AUTO: 3.7 10*6/MM3 (ref 3.77–5.28)
RBC # UR STRIP: NORMAL /HPF
REF LAB TEST METHOD: NORMAL
RSV RNA RESP QL NAA+PROBE: NOT DETECTED
SARS-COV-2 RNA RESP QL NAA+PROBE: NOT DETECTED
SODIUM SERPL-SCNC: 135 MMOL/L (ref 136–145)
SP GR UR STRIP: 1.02 (ref 1–1.03)
SQUAMOUS #/AREA URNS HPF: NORMAL /HPF
TROPONIN T DELTA: -4 NG/L
TROPONIN T SERPL HS-MCNC: 66 NG/L
UROBILINOGEN UR QL STRIP: ABNORMAL
WBC # UR STRIP: NORMAL /HPF
WBC NRBC COR # BLD AUTO: 6.04 10*3/MM3 (ref 3.4–10.8)

## 2024-10-10 PROCEDURE — 93005 ELECTROCARDIOGRAM TRACING: CPT

## 2024-10-10 PROCEDURE — 70450 CT HEAD/BRAIN W/O DYE: CPT

## 2024-10-10 PROCEDURE — 85025 COMPLETE CBC W/AUTO DIFF WBC: CPT

## 2024-10-10 PROCEDURE — 80053 COMPREHEN METABOLIC PANEL: CPT

## 2024-10-10 PROCEDURE — 83735 ASSAY OF MAGNESIUM: CPT

## 2024-10-10 PROCEDURE — 71045 X-RAY EXAM CHEST 1 VIEW: CPT

## 2024-10-10 PROCEDURE — 83690 ASSAY OF LIPASE: CPT

## 2024-10-10 PROCEDURE — 87637 SARSCOV2&INF A&B&RSV AMP PRB: CPT

## 2024-10-10 PROCEDURE — 82550 ASSAY OF CK (CPK): CPT

## 2024-10-10 PROCEDURE — 81001 URINALYSIS AUTO W/SCOPE: CPT

## 2024-10-10 PROCEDURE — 84484 ASSAY OF TROPONIN QUANT: CPT

## 2024-10-10 PROCEDURE — 36415 COLL VENOUS BLD VENIPUNCTURE: CPT

## 2024-10-10 PROCEDURE — 99284 EMERGENCY DEPT VISIT MOD MDM: CPT

## 2024-10-10 RX ORDER — NITROGLYCERIN 0.4 MG/1
0.4 TABLET SUBLINGUAL
COMMUNITY
End: 2024-10-10

## 2024-10-10 RX ORDER — PRAVASTATIN SODIUM 80 MG/1
80 TABLET ORAL DAILY
COMMUNITY

## 2024-10-10 RX ORDER — LISINOPRIL 2.5 MG/1
2.5 TABLET ORAL DAILY
Qty: 30 TABLET | Refills: 1 | Status: SHIPPED | OUTPATIENT
Start: 2024-10-10

## 2024-10-10 RX ORDER — FUROSEMIDE 20 MG
20 TABLET ORAL DAILY PRN
Qty: 30 TABLET | Refills: 1 | Status: SHIPPED | OUTPATIENT
Start: 2024-10-10

## 2024-10-10 RX ORDER — GABAPENTIN 800 MG/1
800 TABLET ORAL 3 TIMES DAILY
COMMUNITY
Start: 2024-08-08 | End: 2024-10-10

## 2024-10-10 RX ORDER — HYDROCODONE BITARTRATE AND ACETAMINOPHEN 5; 325 MG/1; MG/1
1.5 TABLET ORAL EVERY 6 HOURS PRN
COMMUNITY
Start: 2024-10-07 | End: 2024-10-10

## 2024-10-10 RX ORDER — MULTIVIT-MIN/IRON/FOLIC ACID/K 18-600-40
500 CAPSULE ORAL DAILY
COMMUNITY
End: 2024-10-10

## 2024-10-10 RX ORDER — ALBUTEROL SULFATE 90 UG/1
2 INHALANT RESPIRATORY (INHALATION) EVERY 6 HOURS PRN
COMMUNITY
Start: 2024-04-29 | End: 2024-10-26

## 2024-10-10 RX ORDER — FUROSEMIDE 40 MG
40 TABLET ORAL DAILY
COMMUNITY
Start: 2024-08-17 | End: 2024-10-10

## 2024-10-10 RX ORDER — SPIRONOLACTONE 25 MG/1
12.5 TABLET ORAL DAILY
COMMUNITY
Start: 2024-04-18 | End: 2024-10-10 | Stop reason: SDUPTHER

## 2024-10-10 RX ORDER — OMEPRAZOLE 40 MG/1
40 CAPSULE, DELAYED RELEASE ORAL DAILY
COMMUNITY
Start: 2024-05-15 | End: 2024-10-10

## 2024-10-10 NOTE — PROGRESS NOTES
[facility] Heart Failure Clinic Follow Up Note    Lesley Calero  [id number]  10/10/2024    Primary Care Provider: Nj Smith MD   Referring Provider: Lisa Villafuerte A*    Chief Complaint: Follow-up CHF    History of Present Illness:   Mrs. Lesley Calero is a 83 y.o. female who presents to the HF Clinic for follow up.  In , the patient was admitted to [facility] with acute on chronic exacerbation of HFpEF.  The patient has a past medical history of hypertension, hyperlipidemia, coronary artery disease, atrial fibrillation, pacemaker, COPD on continuous O2, and previous tobacco use with complete resolution in .  Spironolactone was held after her creatinine increased from 0.8 to 1.4.  She presents today for follow-up.       Past Cardiac Testin. Last Coronary Angio: None  2. Prior Stress Testing: None  3. Last Echo: 2023              1.  Normal left ventricular size and systolic function, LVEF 60-65%.  2.  Mild concentric LVH.  3.  Grade 2 diastolic dysfunction.  4.  Mild right ventricular dilation with normal RV systolic function.  5.  Severely increased left atrial volume index.  6.  Severe calcification of the aortic valve without significant stenosis.  7.  Moderate tricuspid regurgitation.  8.  Moderate pulmonary hypertension, RVSP 52 mmHg.  4. Prior Holter Monitor: None    Review of Systems:   Review of Systems  As Noted in HPI.   I have reviewed and confirmed the accuracy of the ROS as documented by the MA/LPN/RN DEVON Landers    I have reviewed and/or updated the patient's past medical, past surgical, family, social history, problem list and allergies as appropriate.     Medications:     Current Outpatient Medications:     ascorbic acid (VITAMIN C) 500 MG tablet, Take 1 tablet by mouth Daily. Indications: Inadequate Vitamin C, Disp: , Rfl:     aspirin 81 MG EC tablet, Take 1 tablet by mouth Daily., Disp: 30 tablet, Rfl: 0    Diclofenac Sodium  (Voltaren) 1 % gel gel, Apply 4 g topically to the appropriate area as directed Daily. Indications: Arthritis, Disp: , Rfl:     gabapentin (NEURONTIN) 800 MG tablet, Take 1 tablet by mouth 3 (Three) Times a Day. Indications: Neuropathic Pain, Disp: , Rfl:     HYDROcodone-acetaminophen (NORCO) 7.5-325 MG per tablet, Take 1 tablet by mouth Every 6 (Six) Hours As Needed for Moderate Pain. Indications: Pain, Disp: , Rfl:     ipratropium-albuterol (DUO-NEB) 0.5-2.5 mg/3 ml nebulizer, Take 3 mL by nebulization Every 4 (Four) Hours As Needed for Shortness of Air., Disp: 360 mL, Rfl: 0    losartan (Cozaar) 25 MG tablet, Take 0.5 tablets by mouth Daily., Disp: 45 tablet, Rfl: 0    metoprolol succinate XL (TOPROL-XL) 50 MG 24 hr tablet, TAKE 1 TABLET BY MOUTH DAILY, Disp: 90 tablet, Rfl: 3    multivitamin with minerals tablet tablet, Take 1 tablet by mouth Daily. Indications: Supplement, Disp: , Rfl:     nitroglycerin (NITROSTAT) 0.4 MG SL tablet, Place 1 tablet under the tongue Every 5 (Five) Minutes As Needed for Chest Pain. Take no more than 3 doses in 15 minutes.   Indications: Acute Angina Pectoris, Disp: , Rfl:     omeprazole (priLOSEC) 40 MG capsule, Take 1 capsule by mouth Daily. Indications: Heartburn, Disp: , Rfl:     pravastatin (PRAVACHOL) 40 MG tablet, Take 2 tablets by mouth Daily. Indications: High Amount of Fats in the Blood, Disp: , Rfl:     spironolactone (ALDACTONE) 25 MG tablet, Take 0.5 tablets by mouth Daily., Disp: 45 tablet, Rfl: 0    Physical Exam:  Vital Signs: There were no vitals filed for this visit.  There is no height or weight on file to calculate BMI.    Physical Exam  Vitals and nursing note reviewed.   Constitutional:       General: She is not in acute distress.  HENT:      Head: Normocephalic and atraumatic.   Neck:      Trachea: Trachea normal.   Cardiovascular:      Rate and Rhythm: Normal rate and regular rhythm.      Pulses: Normal pulses.      Heart sounds: Normal heart sounds. No  murmur heard.     No friction rub. No gallop.   Pulmonary:      Effort: Pulmonary effort is normal.      Breath sounds: Normal breath sounds.   Musculoskeletal:      Cervical back: Neck supple.      Right lower leg: No edema.      Left lower leg: No edema.   Skin:     General: Skin is warm and dry.   Neurological:      Mental Status: She is alert and oriented to person, place, and time.   Psychiatric:         Mood and Affect: Mood normal.         Behavior: Behavior normal. Behavior is cooperative.         Thought Content: Thought content does not include suicidal ideation.         Results Review:   I reviewed the patient's new clinical results.    Procedures    Assessment / Plan:     1. Chronic heart failure with preserved ejection fraction (HFpEF) (Primary)  11/23, LVEF 60-65%  Stage D  NYHA functional class II  Change Entresto to losartan d/t cost, but patient/daughter understand that they are eligible for patient assistance through [employer] when they are able to provide financials for 2024     2. Essential hypertension  Acceptable blood pressure    Preventative Cardiology:   Tobacco Cessation: N/A   Advance Care Planning: ACP discussion was declined by the patient. Patient has an advance directive in EMR which is still valid.      Follow Up:   No follow-ups on file.      Thank you for allowing me to participate in the care of your patient. Please to not hesitate to contact me with additional questions or concerns.     DEVON Gasca

## 2024-10-10 NOTE — PATIENT INSTRUCTIONS
STOP LOSARTAN    START LOW-DOSE LISINOPRIL.    CHANGE LASIX FROM 40 MG A DAY TO 20 MG ONLY AS NEEDED FOR WEIGHT GAIN >2-3 LBS OVERNIGHT OR >5 LBS IN A WEEK'S TIME.    GET BMP DRAWN IN 1 WEEK.

## 2024-10-10 NOTE — PROGRESS NOTES
[facility]  Heart Failure Pharmacist Note     Patient Name: Lesley Calero  :1940  Age: 83 y.o.  Sex: female  Referring Provider: Lisa Villafuerte A*   Primary Cardiologist:   Encounter Provider:  DEVON Landers    HPI: Patient presents for follow-up management in heart failure clinic for HFpEF (most recent EF 60-65% w/ grade 2 diastolic dysfunction 2023). PMH otherwise significant for HTN, HLD, CAD, Afib (PPM), COPD (continuous O2).   Upon APRN examination some wheezing was heard, but other than that the patient offers no complaints. During visit today, patient and daughter brought summary of financials to enable enrollment in [employer], pending future modifications in therapy. Patient was hypotensive during today's visit (BP 84/43) which encouraged the transition from losartan 25 mg to lisinopril 2.5 mg. Spironolactone was discontinued iso hypotension. Patient was reportedly taking furosemide 40 mg QD; however, written directions were for furosemide 20 mg QD. During visit today, loop diuretic frequency of administration was changed to be furosemide 20 mg as needed for weight gain. She does not keep a BP/HR/weight log at home, but she was given one at this visit and asked to keep track and bring it back at next follow-up to enable us to make adjustments in her therapy. Patient was also given a self-check HF management plan and a fluid intake/management handout. She endorses use of a pillbox at home, and says that she removes any medications that are discontinued immediately following her appointments. Education was provided to patient and daughter, and both expressed understanding of changes. Will follow-up with patient via telephone to assess response to modifications in therapy, and ensure appropriate use.     Heart Failure GDMT:      Drug Class   Drug   Dose Last Dose Adjustment   Additional Titration   Notes   ACEi/ARB/ARNI Lisinopril 2.5 mg QD Transitioned from  Losartan 12.5 mg on 10/10/24  Patient BP was 84/43 during visit    Beta Blocker Metoprolol XL 50 mg QD Changed from coreg 3/25     Discontinued on 10/10/24 iso hypotension in office   SGLT2i Deferred for now - reassess next visit   N/A Hx of UTI     Other CV Meds:  Furosemide 20 mg daily PRN  Pravastatin 40 mg daily  ASA 81    Medication Use:  Adherence: patient states that she uses pillbox at home  Past hx of medication use/intolerance:  Affordability: awaiting financial documents for [employer]; switched Entresto to losartan for now    Social Determinants:    Social Determinants of Health     Tobacco Use: Medium Risk (10/10/2024)    Patient History     Smoking Tobacco Use: Former     Smokeless Tobacco Use: Former     Passive Exposure: Not on file   Alcohol Use: Not At Risk (5/11/2024)    AUDIT-C     Frequency of Alcohol Consumption: Never     Average Number of Drinks: Patient does not drink     Frequency of Binge Drinking: Never   Financial Resource Strain: Low Risk  (5/11/2024)    Overall Financial Resource Strain (CARDIA)     Difficulty of Paying Living Expenses: Not hard at all   Food Insecurity: No Food Insecurity (5/11/2024)    Hunger Vital Sign     Worried About Running Out of Food in the Last Year: Never true     Ran Out of Food in the Last Year: Never true   Transportation Needs: No Transportation Needs (5/21/2024)    OASIS : Transportation     Lack of Transportation (Medical): No     Lack of Transportation (Non-Medical): No     Patient Unable or Declines to Respond: No   Physical Activity: Inactive (5/11/2024)    Exercise Vital Sign     Days of Exercise per Week: 0 days     Minutes of Exercise per Session: 0 min   Stress: No Stress Concern Present (5/11/2024)    [employer] [place] [employer] - Occupational Stress Questionnaire     Feeling of Stress : Not at all   Social Connections: Feeling Socially Integrated (5/21/2024)    OASIS : Social Isolation     Frequency of experiencing  loneliness or isolation: Never   Interpersonal Safety: Not At Risk (5/11/2024)    Abuse Screen     Unsafe at Home or Work/School: no     Feels Threatened by Someone?: no     Does Anyone Keep You from Contacting Others or Doint Things Outside the Home?: no     Physical Sign of Abuse Present: no   Depression: Not at risk (7/8/2024)    Received from [facility]    PHQ-2     Patient Health Questionnaire-2 Score: 0   Housing Stability: Not At Risk (5/11/2024)    Housing Stability     Current Living Arrangements: home     Potentially Unsafe Housing Conditions: none   Utilities: Not At Risk (5/11/2024)    [facility] Utilities     Threatened with loss of utilities: No   Health Literacy: Adequate Health Literacy (5/21/2024)    OASIS : Health Literacy     Frequency of needing help to read materials from doctor or pharmacy: Rarely   Employment: Not At Risk (5/11/2024)    Employment     Do you want help finding or keeping work or a job?: I do not need or want help   Disabilities: Not At Risk (5/11/2024)    Disabilities     Concentrating, Remembering, or Making Decisions Difficulty: no     Doing Errands Independently Difficulty: no       Immunization Status:  Pneumococcal: vaccinated x1  Influenza: yearly  COVID-19: vaccinated    OBJECTIVE:  BP (!) 84/43   Pulse 71   Wt 55.2 kg (121 lb 12.8 oz)   SpO2 92%   BMI 26.36 kg/m²     Body mass index is 26.36 kg/m².  Wt Readings from Last 1 Encounters:   10/10/24 55.2 kg (121 lb 12.8 oz)       Home BP Readings: encouraged daily monitoring of BP  10-yr ASCVD risk: The ASCVD Risk score (Latha DK, et al., 2019) failed to calculate for the following reasons:    The 2019 ASCVD risk score is only valid for ages 40 to 79    Lab Review:  Renal Function: Estimated Creatinine Clearance: 22.9 mL/min (A) (by C-G formula based on SCr of 1.4 mg/dL (H)).    Lab Results   Component Value Date    PROBNP 6,909.0 (H) 05/11/2024       Results for orders placed during the hospital  encounter of 04/21/24    Adult Transthoracic Echo Complete W/ Cont if Necessary Per Protocol    Interpretation Summary    Left ventricular systolic function is normal. Calculated left ventricular 3D EF = 59% Left ventricular ejection fraction appears to be 56 - 60%. Left ventricular diastolic function was indeterminate.    The right ventricular cavity is mildly dilated with reduced systolic function.  Pacer lead present.    The right atrial cavity is mildly  dilated.    Significant aortic sclerosis and calcification without hemodynamically significant stenosis.    Mild mitral valve regurgitation is present.    Moderate tricuspid valve regurgitation is present. Estimated right ventricular systolic pressure from tricuspid regurgitation is markedly elevated (>55 mmHg). Calculated right ventricular systolic pressure from tricuspid regurgitation is 64 mmHg.    ASSESSMENT/PLAN:  HFpEF (EF 60-65% w/ grade 2 diastolic dysfunction)  Discontinue losartan 12.5 mg.  Discontinue spironolactone 12.5 mg.   Change furosemide to 20 mg as needed for weight gain.   Initiate lisinopril 2.5 mg.  Continue metoprolol XL 50 mg daily.  Defer SGLT2-inhibitor for now.  Have labs done in 1-2 weeks. Will follow-up via telephone with results.   Will follow-up via telephone to schedule next appointment for patient.   Will follow-up via telephone with patient to assess response to change in therapy.   Advised patient to call clinic with 2-3 lb weight gain in 24 hrs or >5 lb weight gain in 1 week    Alma Franco, Pharmacy Intern  [facility]  10/10/24  16:06 EDT

## 2024-10-10 NOTE — ED PROVIDER NOTES
" EMERGENCY DEPARTMENT ENCOUNTER    Pt Name: Lesley Calero  MRN: [id number]  Pt :   1940  Room Number:    Date of encounter:  10/10/2024  PCP: Nj Smith MD  ED Provider: Paul Lunsford PA-C    Historian: Patient, patient's daughter at bedside, EMS paramedic, nursing notes      HPI:  Chief Complaint: Chest pain, foggy headedness      Context: eLsley Calero is a 83 y.o. female who presents to the ED c/o foggy headedness and chest pain after waking up from a nap earlier today.  Patient's daughter at bedside states when she woke her up from a nap the patient seems somewhat disoriented and \"foggy headed\".  Patient states this is since resolved.  However she is still having intermittent chest pains.      PAST MEDICAL HISTORY  Past Medical History:   Diagnosis Date    Cataracts, bilateral     CHF (congestive heart failure)     COPD (chronic obstructive pulmonary disease)     Coronary artery disease     Hyperlipidemia     Hypertension     Impaired functional mobility, balance, gait, and endurance     Myocardial infarction     Oxygen deficit     Pneumonia     RLS (restless legs syndrome)     Seizures          PAST SURGICAL HISTORY  Past Surgical History:   Procedure Laterality Date    CAROTID ENDARTERECTOMY Left     CATARACT EXTRACTION W/ INTRAOCULAR LENS IMPLANT Right 2023    Procedure: CATARACT PHACO EXTRACTION WITH INTRAOCULAR LENS IMPLANT RIGHT;  Surgeon: Dajuan Blackburn MD;  Location: [facility];  Service: Ophthalmology;  Laterality: Right;    CATARACT EXTRACTION W/ INTRAOCULAR LENS IMPLANT Left 8/10/2023    Procedure: CATARACT PHACO EXTRACTION WITH INTRAOCULAR LENS IMPLANT LEFT;  Surgeon: Dajuan Blackburn MD;  Location: [facility];  Service: Ophthalmology;  Laterality: Left;    CORONARY ANGIOPLASTY WITH STENT PLACEMENT      x2    LUNG SURGERY Left     PACEMAKER IMPLANTATION           FAMILY HISTORY  History reviewed. No pertinent family history.      SOCIAL HISTORY  Social History "     Socioeconomic History    Marital status:    Tobacco Use    Smoking status: Former    Smokeless tobacco: Former     Quit date: 2001   Vaping Use    Vaping status: Never Used   Substance and Sexual Activity    Alcohol use: No    Drug use: No    Sexual activity: Not Currently         ALLERGIES  Ativan [lorazepam], Codeine, Crestor [rosuvastatin], Morphine, Ceftin [cefuroxime axetil], Doxycycline, Milk-related compounds, Sulfa antibiotics, and Penicillins        REVIEW OF SYSTEMS  Review of Systems   Constitutional:  Negative for chills and fever.   HENT:  Negative for congestion and sore throat.    Respiratory:  Negative for cough and shortness of breath.    Cardiovascular:  Positive for chest pain.   Gastrointestinal:  Negative for abdominal pain, nausea and vomiting.   Genitourinary:  Negative for dysuria.   Musculoskeletal:  Negative for back pain.   Skin:  Negative for wound.   Neurological:  Negative for dizziness, syncope, facial asymmetry, speech difficulty, weakness and headaches.   Psychiatric/Behavioral:  Negative for confusion.    All other systems reviewed and are negative.         All systems reviewed and negative except for those discussed in HPI.       PHYSICAL EXAM    I have reviewed the triage vital signs and nursing notes.    ED Triage Vitals [10/10/24 1922]   Temp Heart Rate Resp BP SpO2   98.1 °F (36.7 °C) 76 18 130/66 98 %      Temp src Heart Rate Source Patient Position BP Location FiO2 (%)   Oral Monitor Sitting Left arm --       Physical Exam  Vitals and nursing note reviewed.   Constitutional:       General: She is not in acute distress.     Appearance: She is not ill-appearing, toxic-appearing or diaphoretic.   HENT:      Head: Normocephalic and atraumatic.      Mouth/Throat:      Mouth: Mucous membranes are moist.      Pharynx: Oropharynx is clear.   Eyes:      Extraocular Movements: Extraocular movements intact.   Cardiovascular:      Rate and Rhythm: Normal rate.      Heart  sounds: Normal heart sounds.   Pulmonary:      Effort: Pulmonary effort is normal. No respiratory distress.      Breath sounds: Normal breath sounds. No wheezing or rales.   Abdominal:      Tenderness: There is no abdominal tenderness.   Skin:     General: Skin is warm and dry.      Findings: No rash.   Neurological:      Mental Status: She is alert.             LAB RESULTS  Recent Results (from the past 24 hour(s))   Comprehensive Metabolic Panel    Collection Time: 10/10/24  7:39 PM    Specimen: Blood   Result Value Ref Range    Glucose 93 65 - 99 mg/dL    BUN 46 (H) 8 - 23 mg/dL    Creatinine 1.73 (H) 0.57 - 1.00 mg/dL    Sodium 135 (L) 136 - 145 mmol/L    Potassium 4.7 3.5 - 5.2 mmol/L    Chloride 96 (L) 98 - 107 mmol/L    CO2 30.9 (H) 22.0 - 29.0 mmol/L    Calcium 9.2 8.6 - 10.5 mg/dL    Total Protein 6.3 6.0 - 8.5 g/dL    Albumin 3.9 3.5 - 5.2 g/dL    ALT (SGPT) 43 (H) 1 - 33 U/L    AST (SGOT) 99 (H) 1 - 32 U/L    Alkaline Phosphatase 71 39 - 117 U/L    Total Bilirubin 0.4 0.0 - 1.2 mg/dL    Globulin 2.4 gm/dL    A/G Ratio 1.6 g/dL    BUN/Creatinine Ratio 26.6 (H) 7.0 - 25.0    Anion Gap 8.1 5.0 - 15.0 mmol/L    eGFR 28.7 (L) >60.0 mL/min/1.73   Lipase    Collection Time: 10/10/24  7:39 PM    Specimen: Blood   Result Value Ref Range    Lipase 26 13 - 60 U/L   High Sensitivity Troponin T    Collection Time: 10/10/24  7:39 PM    Specimen: Blood   Result Value Ref Range    HS Troponin T 66 (C) <14 ng/L   CK    Collection Time: 10/10/24  7:39 PM    Specimen: Blood   Result Value Ref Range    Creatine Kinase 643 (H) 20 - 180 U/L   Magnesium    Collection Time: 10/10/24  7:39 PM    Specimen: Blood   Result Value Ref Range    Magnesium 2.3 1.6 - 2.4 mg/dL   CBC Auto Differential    Collection Time: 10/10/24  7:39 PM    Specimen: Blood   Result Value Ref Range    WBC 6.04 3.40 - 10.80 10*3/mm3    RBC 3.70 (L) 3.77 - 5.28 10*6/mm3    Hemoglobin 11.6 (L) 12.0 - 15.9 g/dL    Hematocrit 36.0 34.0 - 46.6 %    MCV 97.3 (H)  79.0 - 97.0 fL    MCH 31.4 26.6 - 33.0 pg    MCHC 32.2 31.5 - 35.7 g/dL    RDW 13.2 12.3 - 15.4 %    RDW-SD 47.3 37.0 - 54.0 fl    MPV 11.1 6.0 - 12.0 fL    Platelets 126 (L) 140 - 450 10*3/mm3    Neutrophil % 79.6 (H) 42.7 - 76.0 %    Lymphocyte % 11.4 (L) 19.6 - 45.3 %    Monocyte % 6.1 5.0 - 12.0 %    Eosinophil % 2.3 0.3 - 6.2 %    Basophil % 0.3 0.0 - 1.5 %    Immature Grans % 0.3 0.0 - 0.5 %    Neutrophils, Absolute 4.80 1.70 - 7.00 10*3/mm3    Lymphocytes, Absolute 0.69 (L) 0.70 - 3.10 10*3/mm3    Monocytes, Absolute 0.37 0.10 - 0.90 10*3/mm3    Eosinophils, Absolute 0.14 0.00 - 0.40 10*3/mm3    Basophils, Absolute 0.02 0.00 - 0.20 10*3/mm3    Immature Grans, Absolute 0.02 0.00 - 0.05 10*3/mm3    nRBC 0.0 0.0 - 0.2 /100 WBC   COVID-19, FLU A/B, RSV PCR 1 HR TAT - Swab, Nasopharynx    Collection Time: 10/10/24  8:12 PM    Specimen: Nasopharynx; Swab   Result Value Ref Range    COVID19 Not Detected Not Detected - Ref. Range    Influenza A PCR Not Detected Not Detected    Influenza B PCR Not Detected Not Detected    RSV, PCR Not Detected Not Detected   Urinalysis With Microscopic If Indicated (No Culture) - Urine, Clean Catch    Collection Time: 10/10/24  8:17 PM    Specimen: Urine, Clean Catch   Result Value Ref Range    Color, UA Yellow Yellow, Straw    Appearance, UA Clear Clear    pH, UA 5.5 5.0 - 8.0    Specific Gravity, UA 1.020 1.005 - 1.030    Glucose, UA Negative Negative    Ketones, UA Negative Negative    Bilirubin, UA Negative Negative    Blood, UA Negative Negative    Protein,  mg/dL (2+) (A) Negative    Leuk Esterase, UA Trace (A) Negative    Nitrite, UA Negative Negative    Urobilinogen, UA 0.2 E.U./dL 0.2 - 1.0 E.U./dL   Urinalysis, Microscopic Only - Urine, Clean Catch    Collection Time: 10/10/24  8:17 PM    Specimen: Urine, Clean Catch   Result Value Ref Range    RBC, UA None Seen None Seen, 0-2 /HPF    WBC, UA 0-2 None Seen, 0-2 /HPF    Bacteria, UA None Seen None Seen /HPF    Squamous  Epithelial Cells, UA 0-2 None Seen, 0-2 /HPF    Hyaline Casts, UA None Seen None Seen /LPF    Methodology Manual Light Microscopy    High Sensitivity Troponin T 2Hr    Collection Time: 10/10/24  9:39 PM    Specimen: Blood   Result Value Ref Range    HS Troponin T 62 (C) <14 ng/L    Troponin T Delta -4 (L) >=-4 - <+4 ng/L       If labs were ordered, I independently reviewed the results and considered them in treating the patient.        RADIOLOGY  CT Head Without Contrast    Result Date: 10/10/2024  FINAL REPORT TECHNIQUE: null CLINICAL HISTORY: Altered mental status COMPARISON: null FINDINGS: CT Brain without contrast Comparison: None FINDINGS: Cortical sulci: There is diffuse prominence of the cortical sulci compatible with age-related atrophy. Ventricles: Normal for age Brain parenchyma: There is patchy lucency throughout the deep white matter indicating chronic microvascular leukomalacia. Extra axial spaces: Normal Posterior Fossa: Normal Extracranial soft tissues: Normal Additional abnormality: Atherosclerosis calcification of the distal carotid artery and vertebral artery.     IMPRESSION: Age-related atrophy with chronic microvascular leukomalacia. No hemorrhage, mass effect, or acute findings identified. Authenticated and Electronically Signed by Pete Castellon on 10/10/2024 08:11:15 PM     I ordered and independently reviewed the above noted radiographic studies.      I viewed images of CT head without contrast which showed no acute intracranial abnormalities per my independent interpretation.      I viewed images of the chest x-ray which showed stable chest from previous study per my independent interpretation    See radiologist's dictation for official interpretation.        PROCEDURES    Procedures    ECG 12 Lead Chest Pain   Final Result          MEDICATIONS GIVEN IN ER    Medications   sodium chloride 0.9 % bolus 1,000 mL (has no administration in time range)         MEDICAL DECISION MAKING, PROGRESS, and  CONSULTS    All labs, if obtained, have been independently reviewed by me.  All radiology studies, if obtained, have been reviewed by me and the radiologist dictating the report.  All EKG's, if obtained, have been independently viewed and interpreted by me/my attending physician.      Discussion below represents my analysis of pertinent findings related to patient's condition, differential diagnosis, treatment plan and final disposition.    Patient is a 83-year-old female presenting to ER for evaluation via EMS after her daughter at home found her to be seemingly altered and confused  after waking up from a nap.  Today the patient is now alert oriented x 4 and communicating normally with no dysarthria or slurred speech.  My neuroexam is grossly unremarkable.  Patient's daughter at bedside states that the symptoms she had noted at home are no longer there.  Patient's vital signs and pulse oximetry as independently interpreted by me are all stable and within normal limits.  Extensive laboratory workup was initiated for possible altered mental status.    CBC showed no leukocytosis hemoglobin 11.6 CMP notable for a creatinine of 1.7, 3 weeks ago it had been 1.4.  His CK was mildly elevated at 643.  High-sensitivity troponin was 66, 2-hour repeat troponin 62 and EKG showed no concerning signs for acute ischemic changes per ED attending interpretation.  This appears to be patient's baseline troponin over the last several months.  COVID flu RSV are negative.  Urinalysis negative for infection.  CT head unremarkable chest x-ray per my independent interpretation is stable when compared to recent study with no evidence of pneumonia.  I again reevaluated the patient she remains alert and oriented.  I feel she is stable for discharge.  I did attempt to address this patient's acute kidney injury by offering her IV fluids but both the patient and her daughter at bedside refused fluids at this time.  She will drink plenty of clear  fluids and follow-up with her primary care provider and cardiologist-the patient stated that her cardiologist is already addressing and aware of her worsening kidney function.  Strict ED return precautions were explained and the patient verbalized understanding of and agreement with this plan of care.                     Differential diagnosis:    Differential diagnosis included but was not limited to altered mental status, confusion, pneumonia, OLLIE, UTI      Additional sources:    - Discussed/ obtained information from independent historians: Patient's daughter at bedside in addition to patient    - External (non-ED) record review: Patient's primary care records from Dr. Smith were independently reviewed by me I also reviewed the patient's cardiology notes from Dr. Villafuerte and current medication management for cardiac related masses patient is taking.    - Chronic or social conditions impacting care: None    Orders placed during this visit:  Orders Placed This Encounter   Procedures    COVID-19, FLU A/B, RSV PCR 1 HR TAT - Swab, Nasopharynx    XR Chest 1 View    CT Head Without Contrast    Comprehensive Metabolic Panel    Lipase    Urinalysis With Microscopic If Indicated (No Culture) - Urine, Clean Catch    High Sensitivity Troponin T    CK    Magnesium    CBC Auto Differential    High Sensitivity Troponin T 2Hr    Urinalysis, Microscopic Only - Urine, Clean Catch    ECG 12 Lead Chest Pain    CBC & Differential         Additional orders considered but not ordered: None      ED Course:    Consultants: None    ED Course as of 10/10/24 2239   Thu Oct 10, 2024   2022 HS Troponin T(!!): 66 [TG]      ED Course User Index  [TG] Paul Lunsford PA-C              Shared Decision Making:  After my consideration of clinical presentation and any laboratory/radiology studies obtained, I discussed the findings with the patient/patient representative who is in agreement with the treatment plan and the final disposition.    Risks and benefits of discharge and/or observation/admission were discussed.       AS OF 22:39 EDT VITALS:    BP - 146/67  HR - 73  TEMP - 98.1 °F (36.7 °C) (Oral)  O2 SATS - 93%                  DIAGNOSIS  Final diagnoses:   Encounter for medical screening examination   OLLIE (acute kidney injury)         DISPOSITION  Discharge      Please note that portions of this document were completed with voice recognition software.      Paul Lunsford PA-C  10/10/24 6330

## 2024-10-11 NOTE — DISCHARGE INSTRUCTIONS
Please drink plenty of clear fluids and follow-up with your primary care doctor and your cardiologist for further evaluation, you have been evaluated extensively today and we found that your kidney function has been reducing over the last several weeks.  Your current creatinine levels were 1.7 which is an increase from 1.43 weeks ago.  Return to the ER for any acute changes or worsening of your condition.

## 2024-12-01 DIAGNOSIS — I50.32 CHRONIC HEART FAILURE WITH PRESERVED EJECTION FRACTION (HFPEF): ICD-10-CM

## 2024-12-02 ENCOUNTER — TELEPHONE (OUTPATIENT)
Dept: PHARMACY | Facility: HOSPITAL | Age: 84
End: 2024-12-02
Payer: MEDICARE

## 2024-12-02 RX ORDER — LISINOPRIL 2.5 MG/1
2.5 TABLET ORAL DAILY
Qty: 30 TABLET | Refills: 0 | Status: SHIPPED | OUTPATIENT
Start: 2024-12-02

## 2024-12-02 NOTE — TELEPHONE ENCOUNTER
Called and spoke with Ms. Calero to remind her to complete her BMP as discussed at last in-clinic visit on 10/10. She stated that she forgot and would not be able to get it done until next Thursday, 12/12 due to not having transportation until then. Scheduled an in-clinic visit on 12/12 @ 1330 and will plan to get her labs drawn at that time.    She also stated that she has been taking her furosemide 40 mg daily  as opposed to furosemide 20 mg daily PRN for weight gain >2-3 lbs overnight or >5lbs in a week which was changed at last encounter. Called [facility] and confirmed they filled new furosemide dose/frequency for patient to prevent further confusion and she will begin taking it PRN moving forward.      DANNY LOERA

## 2024-12-12 ENCOUNTER — TELEPHONE (OUTPATIENT)
Dept: CARDIOLOGY | Facility: CLINIC | Age: 84
End: 2024-12-12

## 2024-12-12 NOTE — TELEPHONE ENCOUNTER
Caller: Lesley Calero    Relationship: Self    Best call back number: [phone]    Which medication are you concerned about: FUROSEMIDE 20 MG TABLET DAILY    Who prescribed you this medication: DEVON PELAYO    When did you start taking this medication:     What are your concerns: PATIENT HAS QUESTIONS ABOUT THE DOSAGE OF HER FUROSEMIDE. PLEASE REACH OUT.    How long have you had these concerns:       .”

## 2024-12-18 ENCOUNTER — TELEPHONE (OUTPATIENT)
Dept: CARDIOLOGY | Facility: CLINIC | Age: 84
End: 2024-12-18
Payer: MEDICARE

## 2024-12-18 NOTE — TELEPHONE ENCOUNTER
Caller: Lesley Calero    Relationship to patient: Self    Best call back number: [phone]    Chief complaint: IS NEEDING TO RESCHEDULE HER APPT WITH DAWNA AT THE [facility] ON 12.23.24    Type of visit: HF FU    Requested date: PT IS WANTING AFTER THE FIRST OF THE YEAR, BUT WANTS TO DOUBLE CHECK WITH DAWNA.      If rescheduling, when is the original appointment: 12.23.24

## 2024-12-30 DIAGNOSIS — I50.32 CHRONIC HEART FAILURE WITH PRESERVED EJECTION FRACTION (HFPEF): ICD-10-CM

## 2025-01-02 RX ORDER — LISINOPRIL 2.5 MG/1
2.5 TABLET ORAL DAILY
Qty: 7 TABLET | Refills: 0 | Status: SHIPPED | OUTPATIENT
Start: 2025-01-02

## 2025-01-02 NOTE — TELEPHONE ENCOUNTER
Please tell the patient I sent in a week's worth of this, but she will need to get her labs drawn per protocol to recheck her kidney function before I can send in any more.  Please advise her of this.

## 2025-01-06 DIAGNOSIS — I50.32 CHRONIC HEART FAILURE WITH PRESERVED EJECTION FRACTION (HFPEF): ICD-10-CM

## 2025-01-07 RX ORDER — LISINOPRIL 2.5 MG/1
2.5 TABLET ORAL DAILY
Qty: 7 TABLET | Refills: 0 | OUTPATIENT
Start: 2025-01-07

## 2025-01-10 ENCOUNTER — HOSPITAL ENCOUNTER (EMERGENCY)
Facility: HOSPITAL | Age: 85
Discharge: HOME | End: 2025-01-10
Attending: STUDENT IN AN ORGANIZED HEALTH CARE EDUCATION/TRAINING PROGRAM
Payer: MEDICARE

## 2025-01-10 ENCOUNTER — APPOINTMENT (OUTPATIENT)
Dept: GENERAL RADIOLOGY | Facility: HOSPITAL | Age: 85
End: 2025-01-10
Payer: MEDICARE

## 2025-01-10 VITALS
DIASTOLIC BLOOD PRESSURE: 88 MMHG | BODY MASS INDEX: 24.15 KG/M2 | SYSTOLIC BLOOD PRESSURE: 183 MMHG | WEIGHT: 123 LBS | HEART RATE: 70 BPM | OXYGEN SATURATION: 100 % | TEMPERATURE: 98.4 F | HEIGHT: 60 IN | RESPIRATION RATE: 18 BRPM

## 2025-01-10 DIAGNOSIS — I50.9 ACUTE ON CHRONIC CONGESTIVE HEART FAILURE, UNSPECIFIED HEART FAILURE TYPE: Primary | ICD-10-CM

## 2025-01-10 DIAGNOSIS — N18.9 CHRONIC KIDNEY DISEASE, UNSPECIFIED CKD STAGE: ICD-10-CM

## 2025-01-10 LAB
ALBUMIN SERPL-MCNC: 3.8 G/DL (ref 3.5–5.2)
ALBUMIN/GLOB SERPL: 1.4 G/DL
ALP SERPL-CCNC: 125 U/L (ref 39–117)
ALT SERPL W P-5'-P-CCNC: 30 U/L (ref 1–33)
ANION GAP SERPL CALCULATED.3IONS-SCNC: 15 MMOL/L (ref 5–15)
ANION GAP SERPL CALCULATED.3IONS-SCNC: 16 MMOL/L (ref 5–15)
AST SERPL-CCNC: 76 U/L (ref 1–32)
BASOPHILS # BLD AUTO: 0.03 10*3/MM3 (ref 0–0.2)
BASOPHILS NFR BLD AUTO: 0.5 % (ref 0–1.5)
BILIRUB SERPL-MCNC: 0.7 MG/DL (ref 0–1.2)
BUN SERPL-MCNC: 46 MG/DL (ref 8–23)
BUN SERPL-MCNC: 48 MG/DL (ref 8–23)
BUN/CREAT SERPL: 27.1 (ref 7–25)
BUN/CREAT SERPL: 27.5 (ref 7–25)
CALCIUM SPEC-SCNC: 8.8 MG/DL (ref 8.6–10.5)
CALCIUM SPEC-SCNC: 9.4 MG/DL (ref 8.6–10.5)
CHLORIDE SERPL-SCNC: 103 MMOL/L (ref 98–107)
CHLORIDE SERPL-SCNC: 106 MMOL/L (ref 98–107)
CO2 SERPL-SCNC: 21 MMOL/L (ref 22–29)
CO2 SERPL-SCNC: 23 MMOL/L (ref 22–29)
CREAT SERPL-MCNC: 1.67 MG/DL (ref 0.57–1)
CREAT SERPL-MCNC: 1.77 MG/DL (ref 0.57–1)
DEPRECATED RDW RBC AUTO: 56.4 FL (ref 37–54)
EGFRCR SERPLBLD CKD-EPI 2021: 27.7 ML/MIN/1.73
EGFRCR SERPLBLD CKD-EPI 2021: 29.7 ML/MIN/1.73
EOSINOPHIL # BLD AUTO: 0.03 10*3/MM3 (ref 0–0.4)
EOSINOPHIL NFR BLD AUTO: 0.5 % (ref 0.3–6.2)
ERYTHROCYTE [DISTWIDTH] IN BLOOD BY AUTOMATED COUNT: 15.4 % (ref 12.3–15.4)
GEN 5 1HR TROPONIN T REFLEX: 82 NG/L
GLOBULIN UR ELPH-MCNC: 2.7 GM/DL
GLUCOSE SERPL-MCNC: 82 MG/DL (ref 65–99)
GLUCOSE SERPL-MCNC: 89 MG/DL (ref 65–99)
HCT VFR BLD AUTO: 41.7 % (ref 34–46.6)
HGB BLD-MCNC: 12.9 G/DL (ref 12–15.9)
HOLD SPECIMEN: NORMAL
HOLD SPECIMEN: NORMAL
HYPOCHROMIA BLD QL: NORMAL
IMM GRANULOCYTES # BLD AUTO: 0.02 10*3/MM3 (ref 0–0.05)
IMM GRANULOCYTES NFR BLD AUTO: 0.3 % (ref 0–0.5)
LYMPHOCYTES # BLD AUTO: 0.38 10*3/MM3 (ref 0.7–3.1)
LYMPHOCYTES NFR BLD AUTO: 6.3 % (ref 19.6–45.3)
MCH RBC QN AUTO: 31.1 PG (ref 26.6–33)
MCHC RBC AUTO-ENTMCNC: 30.9 G/DL (ref 31.5–35.7)
MCV RBC AUTO: 100.5 FL (ref 79–97)
MONOCYTES # BLD AUTO: 0.28 10*3/MM3 (ref 0.1–0.9)
MONOCYTES NFR BLD AUTO: 4.6 % (ref 5–12)
NEUTROPHILS NFR BLD AUTO: 5.3 10*3/MM3 (ref 1.7–7)
NEUTROPHILS NFR BLD AUTO: 87.8 % (ref 42.7–76)
NRBC BLD AUTO-RTO: 0 /100 WBC (ref 0–0.2)
NT-PROBNP SERPL-MCNC: ABNORMAL PG/ML (ref 0–1800)
PLATELET # BLD AUTO: 99 10*3/MM3 (ref 140–450)
PMV BLD AUTO: 12.6 FL (ref 6–12)
POTASSIUM SERPL-SCNC: 4.8 MMOL/L (ref 3.5–5.2)
POTASSIUM SERPL-SCNC: 5.5 MMOL/L (ref 3.5–5.2)
POTASSIUM SERPL-SCNC: 5.8 MMOL/L (ref 3.5–5.2)
PROT SERPL-MCNC: 6.5 G/DL (ref 6–8.5)
RBC # BLD AUTO: 4.15 10*6/MM3 (ref 3.77–5.28)
SMALL PLATELETS BLD QL SMEAR: NORMAL
SODIUM SERPL-SCNC: 141 MMOL/L (ref 136–145)
SODIUM SERPL-SCNC: 143 MMOL/L (ref 136–145)
TROPONIN T % DELTA: -13 %
TROPONIN T NUMERIC DELTA: -12 NG/L
TROPONIN T SERPL HS-MCNC: 94 NG/L
WBC MORPH BLD: NORMAL
WBC NRBC COR # BLD AUTO: 6.04 10*3/MM3 (ref 3.4–10.8)
WHOLE BLOOD HOLD COAG: NORMAL
WHOLE BLOOD HOLD SPECIMEN: NORMAL

## 2025-01-10 PROCEDURE — 99284 EMERGENCY DEPT VISIT MOD MDM: CPT | Performed by: STUDENT IN AN ORGANIZED HEALTH CARE EDUCATION/TRAINING PROGRAM

## 2025-01-10 PROCEDURE — 71045 X-RAY EXAM CHEST 1 VIEW: CPT

## 2025-01-10 PROCEDURE — 84132 ASSAY OF SERUM POTASSIUM: CPT | Performed by: STUDENT IN AN ORGANIZED HEALTH CARE EDUCATION/TRAINING PROGRAM

## 2025-01-10 PROCEDURE — 25010000002 FUROSEMIDE PER 20 MG: Performed by: STUDENT IN AN ORGANIZED HEALTH CARE EDUCATION/TRAINING PROGRAM

## 2025-01-10 PROCEDURE — 83880 ASSAY OF NATRIURETIC PEPTIDE: CPT | Performed by: STUDENT IN AN ORGANIZED HEALTH CARE EDUCATION/TRAINING PROGRAM

## 2025-01-10 PROCEDURE — 96374 THER/PROPH/DIAG INJ IV PUSH: CPT

## 2025-01-10 PROCEDURE — 36415 COLL VENOUS BLD VENIPUNCTURE: CPT

## 2025-01-10 PROCEDURE — 93005 ELECTROCARDIOGRAM TRACING: CPT | Performed by: STUDENT IN AN ORGANIZED HEALTH CARE EDUCATION/TRAINING PROGRAM

## 2025-01-10 PROCEDURE — 84484 ASSAY OF TROPONIN QUANT: CPT | Performed by: STUDENT IN AN ORGANIZED HEALTH CARE EDUCATION/TRAINING PROGRAM

## 2025-01-10 PROCEDURE — 85025 COMPLETE CBC W/AUTO DIFF WBC: CPT | Performed by: STUDENT IN AN ORGANIZED HEALTH CARE EDUCATION/TRAINING PROGRAM

## 2025-01-10 PROCEDURE — 80053 COMPREHEN METABOLIC PANEL: CPT | Performed by: STUDENT IN AN ORGANIZED HEALTH CARE EDUCATION/TRAINING PROGRAM

## 2025-01-10 PROCEDURE — 85007 BL SMEAR W/DIFF WBC COUNT: CPT | Performed by: STUDENT IN AN ORGANIZED HEALTH CARE EDUCATION/TRAINING PROGRAM

## 2025-01-10 RX ORDER — FUROSEMIDE 10 MG/ML
40 INJECTION INTRAMUSCULAR; INTRAVENOUS ONCE
Status: COMPLETED | OUTPATIENT
Start: 2025-01-10 | End: 2025-01-10

## 2025-01-10 RX ADMIN — FUROSEMIDE 40 MG: 10 INJECTION, SOLUTION INTRAMUSCULAR; INTRAVENOUS at 11:36

## 2025-01-10 NOTE — ED PROVIDER NOTES
[facility] EMERGENCY DEPARTMENT  Emergency Department Encounter  Emergency Medicine Physician Note       Pt Name: Lesley Calero  MRN: [id number]  Pt :   1940  Room Number:  10/10  Date of encounter:  1/10/2025  PCP: Nj Smith MD  ED Provider: Oliver Negron MD    Historian: Patient      HPI:  Chief Complaint: Shortness of breath, edema        Context: Lesley Calero is a 84 y.o. female who presents to the ED for shortness of breath, edema. Patient states she has had weeping lesions to the bilateral lower extremities left greater than right and her swelling has worsened.  She reports mild shortness of breath at rest.  She wears nasal cannula oxygen chronically.  She reports compliance with her medications but is not able to tell me exactly what she is taking and when she is taking them.  No chest pain.  No fevers or chills.  No cough.      PAST MEDICAL HISTORY  Past Medical History:   Diagnosis Date    Cataracts, bilateral     CHF (congestive heart failure)     COPD (chronic obstructive pulmonary disease)     Coronary artery disease     Hyperlipidemia     Hypertension     Impaired functional mobility, balance, gait, and endurance     Myocardial infarction     Oxygen deficit     Pneumonia     RLS (restless legs syndrome)     Seizures          PAST SURGICAL HISTORY  Past Surgical History:   Procedure Laterality Date    CAROTID ENDARTERECTOMY Left     CATARACT EXTRACTION W/ INTRAOCULAR LENS IMPLANT Right 2023    Procedure: CATARACT PHACO EXTRACTION WITH INTRAOCULAR LENS IMPLANT RIGHT;  Surgeon: Dajuan Blackburn MD;  Location: [facility];  Service: Ophthalmology;  Laterality: Right;    CATARACT EXTRACTION W/ INTRAOCULAR LENS IMPLANT Left 8/10/2023    Procedure: CATARACT PHACO EXTRACTION WITH INTRAOCULAR LENS IMPLANT LEFT;  Surgeon: Dajuan Blackburn MD;  Location: [facility] OR;  Service: Ophthalmology;  Laterality: Left;    CORONARY ANGIOPLASTY WITH STENT PLACEMENT      x2     LUNG SURGERY Left     PACEMAKER IMPLANTATION           FAMILY HISTORY  No family history on file.      SOCIAL HISTORY  Social History     Socioeconomic History    Marital status:    Tobacco Use    Smoking status: Former    Smokeless tobacco: Former     Quit date: 2001   Vaping Use    Vaping status: Never Used   Substance and Sexual Activity    Alcohol use: No    Drug use: No    Sexual activity: Not Currently         ALLERGIES  Ativan [lorazepam], Codeine, Crestor [rosuvastatin], Morphine, Ceftin [cefuroxime axetil], Doxycycline, Milk-related compounds, Sulfa antibiotics, and Penicillins        REVIEW OF SYSTEMS  As noted in HPI      PHYSICAL EXAM    I have reviewed the triage vital signs and nursing notes.    ED Triage Vitals [01/10/25 1035]   Temp Heart Rate Resp BP SpO2   98.4 °F (36.9 °C) 72 18 (!) 191/85 95 %      Temp src Heart Rate Source Patient Position BP Location FiO2 (%)   Oral Monitor -- -- --       Physical Exam  Constitutional:       General: She is not in acute distress.  Eyes:      Extraocular Movements: Extraocular movements intact.   Cardiovascular:      Rate and Rhythm: Normal rate.      Pulses: Normal pulses.   Pulmonary:      Effort: Pulmonary effort is normal. No respiratory distress.      Breath sounds: No wheezing.      Comments: On 3 L nasal cannula baseline for her satting well, diminished in the bases  Abdominal:      Palpations: Abdomen is soft.      Tenderness: There is no abdominal tenderness.   Musculoskeletal:      Cervical back: Neck supple.      Right lower leg: Edema present.      Left lower leg: Edema present.      Comments: Edema and weeping lesions of left greater than right lower extremities   Skin:     General: Skin is warm.   Neurological:      General: No focal deficit present.      Mental Status: She is alert and oriented to person, place, and time.           LAB RESULTS  No results found for this or any previous visit (from the past 24 hours).      If labs were  ordered, I independently reviewed the results and considered them in treating the patient.        RADIOLOGY  No Radiology Exams Resulted Within Past 24 Hours    PROCEDURES    Procedures    ECG 12 Lead Dyspnea   Final Result          MEDICATIONS GIVEN IN ER    Medications   furosemide (LASIX) injection 40 mg (40 mg Intravenous Given 1/10/25 1136)         MEDICAL DECISION MAKING, PROGRESS, and CONSULTS    All labs, if obtained, have been independently reviewed by me.  All radiology studies, if obtained, have been reviewed by me and the radiologist dictating the report.  All EKG's, if obtained, have been independently viewed and interpreted by me.      Discussion below represents my analysis of pertinent findings related to patient's condition, differential diagnosis, treatment plan and final disposition.                         Differential diagnosis:    CHF, ACS, pneumonia, effusion, edema, others.      Additional sources:    - Discussed/ obtained information from independent historians:      - External (non-ED) record review: Telephone encounter 1/2/2025 cardiology, progress note 10/10/2024    - Chronic or social conditions impacting care: Poor access to transportation    - Shared decision making:        Orders placed during this visit:  Orders Placed This Encounter   Procedures    XR Chest 1 View    [place] Draw    Comprehensive Metabolic Panel    BNP    High Sensitivity Troponin T    CBC Auto Differential    Scan Slide    High Sensitivity Troponin T 1Hr    Basic Metabolic Panel    Potassium    ECG 12 Lead Dyspnea    Green Top (Gel)    Lavender Top    Gold Top - SST    Light Blue Top    CBC & Differential         Additional orders considered but not ordered:      ED Course/MDM Discussion:    Patient is a 84-year-old female who presented for lower extremity edema as well as intermittent shortness of breath.    Clinically patient appears mildly fluid overloaded.  She is hypertensive on arrival with otherwise  reassuring vital signs.  She is satting well on baseline nasal cannula.    EKG demonstrated a paced rhythm.  Her troponin is chronically elevated.  On 1 hour repeat it is not significantly uptrending demonstrating a plateau type pattern and more likely represents demand.  Her BNP is elevated.  Creatinine is elevated similar to previous consistent with known CKD.  She had mild hypokalemia on CMP however hemolysis is noted.  Random potassium drawn and this was normal.  Chest x-ray shows no overt pleural effusion on my interpretation of imaging.  On radiology interpretation there is cardiomegaly noted.    Discussed with patient her lower extremity edema which is her main concern is secondary to her heart failure including the weeping lesions on her legs.  There is no evidence of cellulitis or abscess.  She is not hypoxic on her baseline nasal cannula.  She is hypertensive however she has been noncompliant with her medicines.  Suspect mild CHF acute on chronic she was given a dose of Lasix here in the emergency department.  On assessment she is not dyspneic and is doing well on baseline nasal cannula, given this I think she is amenable to continued outpatient management with oral diuretics and cardiology follow-up.  I did recommend elevating the legs above heart level in addition to purchasing compression socks.  Patient demonstrated understanding of this.                  Consultants:      Shared Decision Making:  After my consideration of clinical presentation and any laboratory/radiology studies obtained, I discussed the findings with the patient/patient representative who is in agreement with the treatment plan and the final disposition.   Risks and benefits of discharge and/or observation/admission were discussed.       AS OF 09:56 EST VITALS:    BP - (!) 183/88  HR - 70  TEMP - 98.4 °F (36.9 °C) (Oral)  O2 SATS - 100%                  DIAGNOSIS  Final diagnoses:   Acute on chronic congestive heart failure,  unspecified heart failure type   Chronic kidney disease, unspecified CKD stage         DISPOSITION  ED Disposition       ED Disposition   Discharge    Condition   Stable    Comment   --                   Please note that portions of this document were completed with voice recognition software.   \     Oliver Negron MD  01/12/25 0982

## 2025-01-10 NOTE — DISCHARGE INSTRUCTIONS
Recommend close follow-up with your cardiologist for congestive heart failure.  Please take Lasix as previously prescribed  Recommend compression socks to help with leg swelling.  Please keep legs elevated above heart level  Please follow-up with your primary care provider to monitor kidney function  Do not hesitate to return if symptoms worsen

## 2025-01-25 ENCOUNTER — HOSPITAL ENCOUNTER (OUTPATIENT)
Facility: HOSPITAL | Age: 85
Setting detail: OBSERVATION
Discharge: HOME HEALTH | End: 2025-01-27
Attending: STUDENT IN AN ORGANIZED HEALTH CARE EDUCATION/TRAINING PROGRAM | Admitting: FAMILY MEDICINE
Payer: MEDICARE

## 2025-01-25 ENCOUNTER — APPOINTMENT (OUTPATIENT)
Dept: GENERAL RADIOLOGY | Facility: HOSPITAL | Age: 85
End: 2025-01-25
Payer: MEDICARE

## 2025-01-25 DIAGNOSIS — R79.89 ELEVATED TROPONIN: ICD-10-CM

## 2025-01-25 DIAGNOSIS — I50.9 ACUTE ON CHRONIC CONGESTIVE HEART FAILURE, UNSPECIFIED HEART FAILURE TYPE: Primary | ICD-10-CM

## 2025-01-25 DIAGNOSIS — Z78.9 FAILURE OF OUTPATIENT TREATMENT: ICD-10-CM

## 2025-01-25 DIAGNOSIS — I50.9 CONGESTIVE HEART FAILURE, UNSPECIFIED HF CHRONICITY, UNSPECIFIED HEART FAILURE TYPE: ICD-10-CM

## 2025-01-25 LAB
ALBUMIN SERPL-MCNC: 3.9 G/DL (ref 3.5–5.2)
ALBUMIN/GLOB SERPL: 1.6 G/DL
ALP SERPL-CCNC: 165 U/L (ref 39–117)
ALT SERPL W P-5'-P-CCNC: 29 U/L (ref 1–33)
ANION GAP SERPL CALCULATED.3IONS-SCNC: 10.1 MMOL/L (ref 5–15)
AST SERPL-CCNC: 57 U/L (ref 1–32)
BASOPHILS # BLD AUTO: 0.02 10*3/MM3 (ref 0–0.2)
BASOPHILS NFR BLD AUTO: 0.4 % (ref 0–1.5)
BILIRUB SERPL-MCNC: 0.8 MG/DL (ref 0–1.2)
BUN SERPL-MCNC: 47 MG/DL (ref 8–23)
BUN/CREAT SERPL: 26.3 (ref 7–25)
CALCIUM SPEC-SCNC: 8.9 MG/DL (ref 8.6–10.5)
CHLORIDE SERPL-SCNC: 99 MMOL/L (ref 98–107)
CO2 SERPL-SCNC: 28.9 MMOL/L (ref 22–29)
CREAT SERPL-MCNC: 1.79 MG/DL (ref 0.57–1)
DEPRECATED RDW RBC AUTO: 58.2 FL (ref 37–54)
EGFRCR SERPLBLD CKD-EPI 2021: 27.3 ML/MIN/1.73
EOSINOPHIL # BLD AUTO: 0.02 10*3/MM3 (ref 0–0.4)
EOSINOPHIL NFR BLD AUTO: 0.4 % (ref 0.3–6.2)
ERYTHROCYTE [DISTWIDTH] IN BLOOD BY AUTOMATED COUNT: 16 % (ref 12.3–15.4)
GEN 5 1HR TROPONIN T REFLEX: 126 NG/L
GLOBULIN UR ELPH-MCNC: 2.4 GM/DL
GLUCOSE SERPL-MCNC: 136 MG/DL (ref 65–99)
HCT VFR BLD AUTO: 43.5 % (ref 34–46.6)
HGB BLD-MCNC: 13.5 G/DL (ref 12–15.9)
HOLD SPECIMEN: NORMAL
HOLD SPECIMEN: NORMAL
IMM GRANULOCYTES # BLD AUTO: 0.01 10*3/MM3 (ref 0–0.05)
IMM GRANULOCYTES NFR BLD AUTO: 0.2 % (ref 0–0.5)
LYMPHOCYTES # BLD AUTO: 0.66 10*3/MM3 (ref 0.7–3.1)
LYMPHOCYTES NFR BLD AUTO: 11.8 % (ref 19.6–45.3)
MCH RBC QN AUTO: 30.7 PG (ref 26.6–33)
MCHC RBC AUTO-ENTMCNC: 31 G/DL (ref 31.5–35.7)
MCV RBC AUTO: 98.9 FL (ref 79–97)
MONOCYTES # BLD AUTO: 0.39 10*3/MM3 (ref 0.1–0.9)
MONOCYTES NFR BLD AUTO: 7 % (ref 5–12)
NEUTROPHILS NFR BLD AUTO: 4.48 10*3/MM3 (ref 1.7–7)
NEUTROPHILS NFR BLD AUTO: 80.2 % (ref 42.7–76)
NRBC BLD AUTO-RTO: 0 /100 WBC (ref 0–0.2)
NT-PROBNP SERPL-MCNC: ABNORMAL PG/ML (ref 0–1800)
PLATELET # BLD AUTO: 153 10*3/MM3 (ref 140–450)
PMV BLD AUTO: 11.3 FL (ref 6–12)
POTASSIUM SERPL-SCNC: 4 MMOL/L (ref 3.5–5.2)
PROT SERPL-MCNC: 6.3 G/DL (ref 6–8.5)
RBC # BLD AUTO: 4.4 10*6/MM3 (ref 3.77–5.28)
SODIUM SERPL-SCNC: 138 MMOL/L (ref 136–145)
TROPONIN T % DELTA: -17
TROPONIN T NUMERIC DELTA: -26 NG/L
TROPONIN T SERPL HS-MCNC: 152 NG/L
WBC NRBC COR # BLD AUTO: 5.58 10*3/MM3 (ref 3.4–10.8)
WHOLE BLOOD HOLD COAG: NORMAL
WHOLE BLOOD HOLD SPECIMEN: NORMAL

## 2025-01-25 PROCEDURE — 36415 COLL VENOUS BLD VENIPUNCTURE: CPT

## 2025-01-25 PROCEDURE — G0378 HOSPITAL OBSERVATION PER HR: HCPCS

## 2025-01-25 PROCEDURE — 96374 THER/PROPH/DIAG INJ IV PUSH: CPT

## 2025-01-25 PROCEDURE — 96372 THER/PROPH/DIAG INJ SC/IM: CPT

## 2025-01-25 PROCEDURE — 99223 1ST HOSP IP/OBS HIGH 75: CPT | Performed by: INTERNAL MEDICINE

## 2025-01-25 PROCEDURE — 99285 EMERGENCY DEPT VISIT HI MDM: CPT | Performed by: STUDENT IN AN ORGANIZED HEALTH CARE EDUCATION/TRAINING PROGRAM

## 2025-01-25 PROCEDURE — 25010000002 FUROSEMIDE PER 20 MG: Performed by: PHYSICIAN ASSISTANT

## 2025-01-25 PROCEDURE — 71045 X-RAY EXAM CHEST 1 VIEW: CPT

## 2025-01-25 PROCEDURE — 80053 COMPREHEN METABOLIC PANEL: CPT | Performed by: PHYSICIAN ASSISTANT

## 2025-01-25 PROCEDURE — 93005 ELECTROCARDIOGRAM TRACING: CPT | Performed by: PHYSICIAN ASSISTANT

## 2025-01-25 PROCEDURE — 85025 COMPLETE CBC W/AUTO DIFF WBC: CPT | Performed by: PHYSICIAN ASSISTANT

## 2025-01-25 PROCEDURE — 84484 ASSAY OF TROPONIN QUANT: CPT | Performed by: PHYSICIAN ASSISTANT

## 2025-01-25 PROCEDURE — 83880 ASSAY OF NATRIURETIC PEPTIDE: CPT | Performed by: PHYSICIAN ASSISTANT

## 2025-01-25 PROCEDURE — 25010000002 HEPARIN (PORCINE) PER 1000 UNITS: Performed by: INTERNAL MEDICINE

## 2025-01-25 RX ORDER — GABAPENTIN 300 MG/1
600 CAPSULE ORAL EVERY 8 HOURS SCHEDULED
Status: DISCONTINUED | OUTPATIENT
Start: 2025-01-25 | End: 2025-01-27 | Stop reason: HOSPADM

## 2025-01-25 RX ORDER — BISACODYL 5 MG/1
5 TABLET, DELAYED RELEASE ORAL DAILY PRN
Status: DISCONTINUED | OUTPATIENT
Start: 2025-01-25 | End: 2025-01-27 | Stop reason: HOSPADM

## 2025-01-25 RX ORDER — AMOXICILLIN 250 MG
2 CAPSULE ORAL 2 TIMES DAILY PRN
Status: DISCONTINUED | OUTPATIENT
Start: 2025-01-25 | End: 2025-01-27 | Stop reason: HOSPADM

## 2025-01-25 RX ORDER — LISINOPRIL 5 MG/1
2.5 TABLET ORAL DAILY
Status: DISCONTINUED | OUTPATIENT
Start: 2025-01-26 | End: 2025-01-27 | Stop reason: HOSPADM

## 2025-01-25 RX ORDER — SODIUM CHLORIDE 0.9 % (FLUSH) 0.9 %
10 SYRINGE (ML) INJECTION AS NEEDED
Status: DISCONTINUED | OUTPATIENT
Start: 2025-01-25 | End: 2025-01-27 | Stop reason: HOSPADM

## 2025-01-25 RX ORDER — FUROSEMIDE 10 MG/ML
40 INJECTION INTRAMUSCULAR; INTRAVENOUS
Status: DISCONTINUED | OUTPATIENT
Start: 2025-01-26 | End: 2025-01-27 | Stop reason: HOSPADM

## 2025-01-25 RX ORDER — ACETAMINOPHEN 650 MG/1
650 SUPPOSITORY RECTAL EVERY 4 HOURS PRN
Status: DISCONTINUED | OUTPATIENT
Start: 2025-01-25 | End: 2025-01-27 | Stop reason: HOSPADM

## 2025-01-25 RX ORDER — BISACODYL 10 MG
10 SUPPOSITORY, RECTAL RECTAL DAILY PRN
Status: DISCONTINUED | OUTPATIENT
Start: 2025-01-25 | End: 2025-01-27 | Stop reason: HOSPADM

## 2025-01-25 RX ORDER — HYDROCODONE BITARTRATE AND ACETAMINOPHEN 7.5; 325 MG/1; MG/1
1 TABLET ORAL EVERY 6 HOURS PRN
Status: DISCONTINUED | OUTPATIENT
Start: 2025-01-25 | End: 2025-01-27 | Stop reason: HOSPADM

## 2025-01-25 RX ORDER — ACETAMINOPHEN 325 MG/1
650 TABLET ORAL EVERY 4 HOURS PRN
Status: DISCONTINUED | OUTPATIENT
Start: 2025-01-25 | End: 2025-01-27 | Stop reason: HOSPADM

## 2025-01-25 RX ORDER — NITROGLYCERIN 0.4 MG/1
0.4 TABLET SUBLINGUAL
Status: DISCONTINUED | OUTPATIENT
Start: 2025-01-25 | End: 2025-01-27 | Stop reason: HOSPADM

## 2025-01-25 RX ORDER — METOPROLOL SUCCINATE 50 MG/1
50 TABLET, EXTENDED RELEASE ORAL DAILY
Status: DISCONTINUED | OUTPATIENT
Start: 2025-01-26 | End: 2025-01-27 | Stop reason: HOSPADM

## 2025-01-25 RX ORDER — SODIUM CHLORIDE 9 MG/ML
40 INJECTION, SOLUTION INTRAVENOUS AS NEEDED
Status: DISCONTINUED | OUTPATIENT
Start: 2025-01-25 | End: 2025-01-27 | Stop reason: HOSPADM

## 2025-01-25 RX ORDER — PRAVASTATIN SODIUM 20 MG
80 TABLET ORAL DAILY
Status: DISCONTINUED | OUTPATIENT
Start: 2025-01-26 | End: 2025-01-27 | Stop reason: HOSPADM

## 2025-01-25 RX ORDER — HEPARIN SODIUM 5000 [USP'U]/ML
5000 INJECTION, SOLUTION INTRAVENOUS; SUBCUTANEOUS EVERY 8 HOURS SCHEDULED
Status: DISCONTINUED | OUTPATIENT
Start: 2025-01-25 | End: 2025-01-27 | Stop reason: HOSPADM

## 2025-01-25 RX ORDER — ASPIRIN 81 MG/1
81 TABLET ORAL DAILY
Status: DISCONTINUED | OUTPATIENT
Start: 2025-01-26 | End: 2025-01-27 | Stop reason: HOSPADM

## 2025-01-25 RX ORDER — PANTOPRAZOLE SODIUM 40 MG/1
40 TABLET, DELAYED RELEASE ORAL
Status: DISCONTINUED | OUTPATIENT
Start: 2025-01-26 | End: 2025-01-27 | Stop reason: HOSPADM

## 2025-01-25 RX ORDER — POLYETHYLENE GLYCOL 3350 17 G/17G
17 POWDER, FOR SOLUTION ORAL DAILY PRN
Status: DISCONTINUED | OUTPATIENT
Start: 2025-01-25 | End: 2025-01-27 | Stop reason: HOSPADM

## 2025-01-25 RX ORDER — SODIUM CHLORIDE 0.9 % (FLUSH) 0.9 %
10 SYRINGE (ML) INJECTION EVERY 12 HOURS SCHEDULED
Status: DISCONTINUED | OUTPATIENT
Start: 2025-01-25 | End: 2025-01-27 | Stop reason: HOSPADM

## 2025-01-25 RX ORDER — FUROSEMIDE 10 MG/ML
80 INJECTION INTRAMUSCULAR; INTRAVENOUS ONCE
Status: COMPLETED | OUTPATIENT
Start: 2025-01-25 | End: 2025-01-25

## 2025-01-25 RX ORDER — ACETAMINOPHEN 160 MG/5ML
650 SOLUTION ORAL EVERY 4 HOURS PRN
Status: DISCONTINUED | OUTPATIENT
Start: 2025-01-25 | End: 2025-01-27 | Stop reason: HOSPADM

## 2025-01-25 RX ADMIN — GABAPENTIN 600 MG: 300 CAPSULE ORAL at 21:30

## 2025-01-25 RX ADMIN — HEPARIN SODIUM 5000 UNITS: 5000 INJECTION INTRAVENOUS; SUBCUTANEOUS at 21:30

## 2025-01-25 RX ADMIN — FUROSEMIDE 80 MG: 10 INJECTION, SOLUTION INTRAMUSCULAR; INTRAVENOUS at 15:44

## 2025-01-25 RX ADMIN — GABAPENTIN 600 MG: 300 CAPSULE ORAL at 17:18

## 2025-01-25 RX ADMIN — Medication 10 ML: at 21:30

## 2025-01-25 RX ADMIN — HEPARIN SODIUM 5000 UNITS: 5000 INJECTION INTRAVENOUS; SUBCUTANEOUS at 17:18

## 2025-01-25 NOTE — ED PROVIDER NOTES
"Subjective:  Chief Complaint:  Leg swelling    History of Present Illness:  Patient is an 84-year-old female with history of CHF, COPD, CAD, hyperlipidemia, hypertension, among others presenting to the ER with complaints of bilateral lower extremity swelling.  Patient states she has fluid weeping from her legs.  States this has gotten worse over the last couple weeks.  Daughter appears frustrated during initial evaluation.  States she cannot believe they let her go a week and a half ago when she was here.  Patient was seen 15 days ago and given 40 mg IV Lasix in the ER and advised to follow-up with her cardiologist.  Patient currently on 20 mg Lasix daily as needed for weight gain greater than 2 to 3 pounds overnight.  Daughter states that she feels like the patient needs to get a new cardiologist that she does not like how the cardiologist has managed the patient's medications.  States that she did not have issues with the swelling when she was taking the diuretic daily.  States that her mother cannot keep up with whether or not she has gained weight and states that she believes she needs to be on diuretic daily. Discussed that there may be a reason they advised not taking this daily. Asked if it was possibly secondary to decrease in kidney function. She states she does believe it was due to that. Patient states she feels like her kidneys are strong. Patient reports she has taken the Lasix every day this week and sometimes taken 1 and 1/2 pills, 30mg. States symptoms have continued to get worse. Patient states she has had some chest pain but not currently having chest pain.  States she has increased shortness of breath with exertion but no increased oxygen requirement.  Patient uses supplemental oxygen at baseline.  Patient stating \"I cannot go home like this\" and requesting admission prior to any work-up.  Patient appears to have had a cardiology appointment on 1/16/25 that she cancelled and one for 1/20/25 but " was a no-show per review of records.      Nurses Notes reviewed and agree, including vitals, allergies, social history and prior medical history.     REVIEW OF SYSTEMS: All systems reviewed and not pertinent unless noted.  Review of Systems   Respiratory:  Positive for shortness of breath.    Cardiovascular:  Positive for chest pain and leg swelling.   All other systems reviewed and are negative.      Past Medical History:   Diagnosis Date    Cataracts, bilateral     CHF (congestive heart failure)     COPD (chronic obstructive pulmonary disease)     Coronary artery disease     Hyperlipidemia     Hypertension     Impaired functional mobility, balance, gait, and endurance     Myocardial infarction     Oxygen deficit     Pneumonia     RLS (restless legs syndrome)     Seizures        Allergies:    Ativan [lorazepam], Codeine, Crestor [rosuvastatin], Morphine, Ceftin [cefuroxime axetil], Doxycycline, Milk-related compounds, Sulfa antibiotics, and Penicillins      Past Surgical History:   Procedure Laterality Date    CAROTID ENDARTERECTOMY Left     CATARACT EXTRACTION W/ INTRAOCULAR LENS IMPLANT Right 7/27/2023    Procedure: CATARACT PHACO EXTRACTION WITH INTRAOCULAR LENS IMPLANT RIGHT;  Surgeon: Dajuan Blackburn MD;  Location: [facility];  Service: Ophthalmology;  Laterality: Right;    CATARACT EXTRACTION W/ INTRAOCULAR LENS IMPLANT Left 8/10/2023    Procedure: CATARACT PHACO EXTRACTION WITH INTRAOCULAR LENS IMPLANT LEFT;  Surgeon: Dajuan Blackburn MD;  Location: [facility];  Service: Ophthalmology;  Laterality: Left;    CORONARY ANGIOPLASTY WITH STENT PLACEMENT      x2    LUNG SURGERY Left     PACEMAKER IMPLANTATION           Social History     Socioeconomic History    Marital status:    Tobacco Use    Smoking status: Former    Smokeless tobacco: Former     Quit date: 2001   Vaping Use    Vaping status: Never Used   Substance and Sexual Activity    Alcohol use: No    Drug use: No    Sexual activity: Not  "Currently         History reviewed. No pertinent family history.    Objective  Physical Exam:  /84   Pulse 70   Temp 98.2 °F (36.8 °C) (Oral)   Resp 19   Ht 152.4 cm (60\")   Wt 55.8 kg (123 lb 0.3 oz)   SpO2 94%   BMI 24.03 kg/m²      Physical Exam  Vitals and nursing note reviewed.   Constitutional:       General: She is not in acute distress.     Appearance: She is not toxic-appearing.   HENT:      Head: Normocephalic and atraumatic.      Right Ear: External ear normal.      Left Ear: External ear normal.      Nose: Nose normal.   Eyes:      Extraocular Movements: Extraocular movements intact.      Conjunctiva/sclera: Conjunctivae normal.   Cardiovascular:      Rate and Rhythm: Normal rate.   Pulmonary:      Effort: Pulmonary effort is normal. No respiratory distress.   Abdominal:      General: There is no distension.   Musculoskeletal:         General: Normal range of motion.      Cervical back: Normal range of motion and neck supple.      Comments: 2+ pitting edema bilaterally with weeping noted to left lower extremity; no erythema, warmth, or purulent drainage noted, no concern for acute cellulitis based on exam   Skin:     General: Skin is warm and dry.   Neurological:      General: No focal deficit present.      Mental Status: She is alert and oriented to person, place, and time.   Psychiatric:         Mood and Affect: Mood normal.         Behavior: Behavior normal.         Procedures    ED Course:    ED Course as of 01/25/25 1620   Sat Jan 25, 2025   1607 EKG: I reviewed and independently interpreted the EKG as:  Patient rhythm at 76 bpm, left axis deviation, normal QT interval, no ischemic changes [CS]      ED Course User Index  [CS] Wilver Mcguire MD       Lab Results (last 24 hours)       Procedure Component Value Units Date/Time    CBC & Differential [[id number]]  (Abnormal) Collected: 01/25/25 1450    Specimen: Blood Updated: 01/25/25 1507    Narrative:      The following orders " were created for panel order CBC & Differential.  Procedure                               Abnormality         Status                     ---------                               -----------         ------                     CBC Auto Differential[[id number]]        Abnormal            Final result                 Please view results for these tests on the individual orders.    Comprehensive Metabolic Panel [[id number]]  (Abnormal) Collected: 01/25/25 1450    Specimen: Blood Updated: 01/25/25 1536     Glucose 136 mg/dL      BUN 47 mg/dL      Creatinine 1.79 mg/dL      Sodium 138 mmol/L      Potassium 4.0 mmol/L      Chloride 99 mmol/L      CO2 28.9 mmol/L      Calcium 8.9 mg/dL      Total Protein 6.3 g/dL      Albumin 3.9 g/dL      ALT (SGPT) 29 U/L      AST (SGOT) 57 U/L      Alkaline Phosphatase 165 U/L      Total Bilirubin 0.8 mg/dL      Globulin 2.4 gm/dL      A/G Ratio 1.6 g/dL      BUN/Creatinine Ratio 26.3     Anion Gap 10.1 mmol/L      eGFR 27.3 mL/min/1.73     Narrative:      GFR Categories in Chronic Kidney Disease (CKD)      GFR Category          GFR (mL/min/1.73)    Interpretation  G1                     90 or greater         Normal or high (1)  G2                      60-89                Mild decrease (1)  G3a                   45-59                Mild to moderate decrease  G3b                   30-44                Moderate to severe decrease  G4                    15-29                Severe decrease  G5                    14 or less           Kidney failure          (1)In the absence of evidence of kidney disease, neither GFR category G1 or G2 fulfill the criteria for CKD.    eGFR calculation 2021 CKD-EPI creatinine equation, which does not include race as a factor    BNP [[id number]]  (Abnormal) Collected: 01/25/25 1450    Specimen: Blood Updated: 01/25/25 1542     proBNP 33,762.0 pg/mL     Narrative:      This assay is used as an aid in the diagnosis of individuals suspected of having heart failure.  It can be used as an aid in the diagnosis of acute decompensated heart failure (ADHF) in patients presenting with signs and symptoms of ADHF to the emergency department (ED). In addition, NT-proBNP of <300 pg/mL indicates ADHF is not likely.    Age Range Result Interpretation  NT-proBNP Concentration (pg/mL:      <50             Positive            >450                   Gray                 300-450                    Negative             <300    50-75           Positive            >900                  Gray                300-900                  Negative            <300      >75             Positive            >1800                  Gray                300-1800                  Negative            <300    High Sensitivity Troponin T [[id number]]  (Abnormal) Collected: 01/25/25 1450    Specimen: Blood Updated: 01/25/25 1558     HS Troponin T 152 ng/L     Narrative:      High Sensitive Troponin T Reference Range:  <14.0 ng/L- Negative Female for AMI  <22.0 ng/L- Negative Male for AMI  >=14 - Abnormal Female indicating possible myocardial injury.  >=22 - Abnormal Male indicating possible myocardial injury.   Clinicians would have to utilize clinical acumen, EKG, Troponin, and serial changes to determine if it is an Acute Myocardial Infarction or myocardial injury due to an underlying chronic condition.         CBC Auto Differential [[id number]]  (Abnormal) Collected: 01/25/25 1450    Specimen: Blood Updated: 01/25/25 1507     WBC 5.58 10*3/mm3      RBC 4.40 10*6/mm3      Hemoglobin 13.5 g/dL      Hematocrit 43.5 %      MCV 98.9 fL      MCH 30.7 pg      MCHC 31.0 g/dL      RDW 16.0 %      RDW-SD 58.2 fl      MPV 11.3 fL      Platelets 153 10*3/mm3      Neutrophil % 80.2 %      Lymphocyte % 11.8 %      Monocyte % 7.0 %      Eosinophil % 0.4 %      Basophil % 0.4 %      Immature Grans % 0.2 %      Neutrophils, Absolute 4.48 10*3/mm3      Lymphocytes, Absolute 0.66 10*3/mm3      Monocytes, Absolute 0.39 10*3/mm3       Eosinophils, Absolute 0.02 10*3/mm3      Basophils, Absolute 0.02 10*3/mm3      Immature Grans, Absolute 0.01 10*3/mm3      nRBC 0.0 /100 WBC              XR Chest 1 View    Result Date: 1/25/2025  PORTABLE CHEST  HISTORY: Leg swelling  COMPARISON: 1/10/2025  FINDINGS: A single radiograph of the chest was performed. There is a right subclavian pacemaker. The heart is borderline in size. There is calcified plaque of the aorta. There are chronic interstitial changes. There is no edema or infiltrate.      Impression: Chronic changes with no acute process.  This report was signed and finalized on 1/25/2025 3:57 PM by Klaus Brody MD.          MDM  Patient evaluated in the ER for bilateral lower extremity swelling and weeping of fluid.  Patient was seen 15 days ago and given 40 mg IV Lasix and advised to follow-up with her cardiologist.  Appears she had an appointment on 1 - 20 - 25 that she did not go to per review of records.  Daughter not happy that she was discharged 15 days ago and not happy with care from cardiologist.  Patient and daughter stating that she cannot go home like this.  Patient doing well on baseline supplemental oxygen.  Does report increased shortness of breath with activity and intermittent chest pain.  Has been taking low-dose of Lasix, 20 mg as needed for weight gain.  Daughter states she was taking this daily but was advised not to take it daily and only to take it as needed due to concern for kidney function decreasing.  Differential diagnosis includes but is not limited to CHF exacerbation, venous insufficiency, among others. Initial plan includes CBC, CMP, Troponin, BNP, EKG, CXR and initial treatment with 80mg IV Lasix.    BNP has gone up from 29,000-33,000 despite increasing the frequency and dose of Lasix.  Patient also notes worsening of lower extremity edema and weeping to the left lower extremity.  Patient and daughter are very adamant that they want her admitted.  She does also have  elevation in troponin from baseline.  Currently without chest pain.  Discussed with hospitalist, Dr. Lyons, requested admission for CHF exacerbation with failure of outpatient treatment.  He has graciously accepted the patient for admission for further evaluation and management.    Final diagnoses:   Acute on chronic congestive heart failure, unspecified heart failure type   Failure of outpatient treatment   Elevated troponin          Leslie Hernandez PA-C  01/25/25 1084

## 2025-01-25 NOTE — H&P
[facility] HISTORY AND PHYSICAL  Date: 2025   Patient Name: Lesley Calero  : 1940  MRN: [id number]  Primary Care Physician:  Nj Smith MD  Date of admission: 2025    Subjective   Subjective     Chief Complaint: Bilateral lower extremity swelling    HPI:    Lesley Calero is a 84 y.o. female past medical history of congestive heart failure that presented to the emergency department with increased lower extremity swelling x 5 days.  She denies any other fevers, chills, sweats, nausea, vomiting, shortness of breath, palpitations, abdominal pain, diarrhea constipation, dysuria, weakness.  She does endorse chest pain that has been going on for about 2 to 3 weeks.  She describes it as an ache, left-sided, intermittent, no known aggravating relieving factors.  In the emergency department slightly elevated troponin from baseline and significant edema on exam.  She has been started on IV Lasix and will be admitted for ongoing monitoring and management.      Personal History     Past Medical History:  Past Medical History:   Diagnosis Date    Cataracts, bilateral     CHF (congestive heart failure)     COPD (chronic obstructive pulmonary disease)     Coronary artery disease     Hyperlipidemia     Hypertension     Impaired functional mobility, balance, gait, and endurance     Myocardial infarction     Oxygen deficit     Pneumonia     RLS (restless legs syndrome)     Seizures          Past Surgical History:  Past Surgical History:   Procedure Laterality Date    CAROTID ENDARTERECTOMY Left     CATARACT EXTRACTION W/ INTRAOCULAR LENS IMPLANT Right 2023    Procedure: CATARACT PHACO EXTRACTION WITH INTRAOCULAR LENS IMPLANT RIGHT;  Surgeon: Dajuan Blackburn MD;  Location: [facility];  Service: Ophthalmology;  Laterality: Right;    CATARACT EXTRACTION W/ INTRAOCULAR LENS IMPLANT Left 8/10/2023    Procedure: CATARACT PHACO EXTRACTION WITH INTRAOCULAR LENS IMPLANT LEFT;  Surgeon:  Dajuan Blackburn MD;  Location: [facility];  Service: Ophthalmology;  Laterality: Left;    CORONARY ANGIOPLASTY WITH STENT PLACEMENT      x2    LUNG SURGERY Left     PACEMAKER IMPLANTATION           Family History:   History reviewed. No pertinent family history.      Social History:   Social History     Tobacco Use    Smoking status: Former    Smokeless tobacco: Former     Quit date: 2001   Vaping Use    Vaping status: Never Used   Substance Use Topics    Alcohol use: No    Drug use: No         Home Medications:  Diclofenac Sodium, HYDROcodone-acetaminophen, ascorbic acid, aspirin, furosemide, gabapentin, lisinopril, metoprolol succinate XL, multivitamin with minerals, nitroglycerin, omeprazole, and pravastatin    Allergies:  Allergies   Allergen Reactions    Ativan [Lorazepam] Other (See Comments)     Was unable to wake up.     Codeine Delirium    Crestor [Rosuvastatin] Other (See Comments)     Muscle pain     Morphine Nausea And Vomiting    Ceftin [Cefuroxime Axetil] Diarrhea    Doxycycline Diarrhea    Milk-Related Compounds Diarrhea    Sulfa Antibiotics Hives    Penicillins Rash       Review of Systems   All systems were reviewed and negative except for: Bilateral lower extremity swelling, chest pain    Objective   Objective     Vitals:   Temp:  [98.2 °F (36.8 °C)] 98.2 °F (36.8 °C)  Heart Rate:  [70-76] 70  Resp:  [19] 19  BP: (104-121)/(58-84) 104/84    Physical Exam    Constitutional: Awake, alert, no acute distress   Eyes: Pupils equal, sclerae anicteric, no conjunctival injection   HENT: NCAT, mucous membranes moist   Neck: Supple, no thyromegaly, no lymphadenopathy, trachea midline   Respiratory: Clear to auscultation bilaterally, nonlabored respirations    Cardiovascular: RRR, no murmurs, rubs, or gallops, palpable pedal pulses bilaterally   Gastrointestinal: Positive bowel sounds, soft, nontender, nondistended   Musculoskeletal: +3 bilateral ankle edema, no clubbing or cyanosis to  extremities   Psychiatric: Appropriate affect, cooperative   Neurologic: Oriented x 3, strength symmetric in all extremities, Cranial Nerves grossly intact to confrontation, speech clear   Skin: No rashes     Result Review    Result Review:  I have personally reviewed the results from the time of this admission to 1/25/2025 16:14 EST and agree with these findings:  [x]  Laboratory  []  Microbiology  [x]  Radiology  []  EKG/Telemetry   []  Cardiology/Vascular   []  Pathology  []  Old records  []  Other:      Assessment & Plan   Assessment / Plan     Assessment/Plan:   Acute congestive heart failure preserved ejection fraction: Receiving Lasix in ER, will continue twice daily.  Supplemental oxygen as needed monitor on telemetry.  Monitor intake and output and daily weights.  Monitor serial labs with renal function closely and electrolytes.  Chest pain/elevated troponin: Chest pain is atypical.  Elevated troponin in setting of acute CHF.  Will trend and monitor on telemetry.  Supportive care.  History of COPD: No exacerbation.  Continue home regimen  CKD stage III: Appears at baseline.  Monitor.  Renally dose medications and avoid nephrotoxins.  Hypertension: Add back home medications as appropriate      VTE Prophylaxis:  Pharmacologic VTE prophylaxis orders are present.        CODE STATUS:     Full code    Admission Status:  I believe this patient meets observation status.    Electronically signed by El Lyons Jr, MD, 01/25/25, 4:14 PM EST.

## 2025-01-26 LAB
ALBUMIN SERPL-MCNC: 3.2 G/DL (ref 3.5–5.2)
ALBUMIN/GLOB SERPL: 1.6 G/DL
ALP SERPL-CCNC: 136 U/L (ref 39–117)
ALT SERPL W P-5'-P-CCNC: 22 U/L (ref 1–33)
ANION GAP SERPL CALCULATED.3IONS-SCNC: 9 MMOL/L (ref 5–15)
AST SERPL-CCNC: 42 U/L (ref 1–32)
BASOPHILS # BLD AUTO: 0.02 10*3/MM3 (ref 0–0.2)
BASOPHILS NFR BLD AUTO: 0.5 % (ref 0–1.5)
BILIRUB SERPL-MCNC: 0.5 MG/DL (ref 0–1.2)
BUN SERPL-MCNC: 46 MG/DL (ref 8–23)
BUN/CREAT SERPL: 29.1 (ref 7–25)
CALCIUM SPEC-SCNC: 8.6 MG/DL (ref 8.6–10.5)
CHLORIDE SERPL-SCNC: 103 MMOL/L (ref 98–107)
CO2 SERPL-SCNC: 29 MMOL/L (ref 22–29)
CREAT SERPL-MCNC: 1.58 MG/DL (ref 0.57–1)
DEPRECATED RDW RBC AUTO: 58.3 FL (ref 37–54)
EGFRCR SERPLBLD CKD-EPI 2021: 31.8 ML/MIN/1.73
EOSINOPHIL # BLD AUTO: 0.16 10*3/MM3 (ref 0–0.4)
EOSINOPHIL NFR BLD AUTO: 4.3 % (ref 0.3–6.2)
ERYTHROCYTE [DISTWIDTH] IN BLOOD BY AUTOMATED COUNT: 15.9 % (ref 12.3–15.4)
GLOBULIN UR ELPH-MCNC: 2 GM/DL
GLUCOSE SERPL-MCNC: 111 MG/DL (ref 65–99)
HCT VFR BLD AUTO: 39.2 % (ref 34–46.6)
HGB BLD-MCNC: 12.1 G/DL (ref 12–15.9)
IMM GRANULOCYTES # BLD AUTO: 0 10*3/MM3 (ref 0–0.05)
IMM GRANULOCYTES NFR BLD AUTO: 0 % (ref 0–0.5)
LYMPHOCYTES # BLD AUTO: 0.8 10*3/MM3 (ref 0.7–3.1)
LYMPHOCYTES NFR BLD AUTO: 21.4 % (ref 19.6–45.3)
MCH RBC QN AUTO: 30.8 PG (ref 26.6–33)
MCHC RBC AUTO-ENTMCNC: 30.9 G/DL (ref 31.5–35.7)
MCV RBC AUTO: 99.7 FL (ref 79–97)
MONOCYTES # BLD AUTO: 0.58 10*3/MM3 (ref 0.1–0.9)
MONOCYTES NFR BLD AUTO: 15.5 % (ref 5–12)
NEUTROPHILS NFR BLD AUTO: 2.18 10*3/MM3 (ref 1.7–7)
NEUTROPHILS NFR BLD AUTO: 58.3 % (ref 42.7–76)
NRBC BLD AUTO-RTO: 0 /100 WBC (ref 0–0.2)
PLATELET # BLD AUTO: 109 10*3/MM3 (ref 140–450)
PMV BLD AUTO: 11.4 FL (ref 6–12)
POTASSIUM SERPL-SCNC: 3.8 MMOL/L (ref 3.5–5.2)
PROT SERPL-MCNC: 5.2 G/DL (ref 6–8.5)
RBC # BLD AUTO: 3.93 10*6/MM3 (ref 3.77–5.28)
SODIUM SERPL-SCNC: 141 MMOL/L (ref 136–145)
WBC NRBC COR # BLD AUTO: 3.74 10*3/MM3 (ref 3.4–10.8)

## 2025-01-26 PROCEDURE — 25010000002 FUROSEMIDE PER 20 MG: Performed by: INTERNAL MEDICINE

## 2025-01-26 PROCEDURE — 96376 TX/PRO/DX INJ SAME DRUG ADON: CPT

## 2025-01-26 PROCEDURE — 96372 THER/PROPH/DIAG INJ SC/IM: CPT

## 2025-01-26 PROCEDURE — 99232 SBSQ HOSP IP/OBS MODERATE 35: CPT | Performed by: FAMILY MEDICINE

## 2025-01-26 PROCEDURE — 85025 COMPLETE CBC W/AUTO DIFF WBC: CPT | Performed by: INTERNAL MEDICINE

## 2025-01-26 PROCEDURE — 80053 COMPREHEN METABOLIC PANEL: CPT | Performed by: INTERNAL MEDICINE

## 2025-01-26 PROCEDURE — G0378 HOSPITAL OBSERVATION PER HR: HCPCS

## 2025-01-26 PROCEDURE — 25010000002 HEPARIN (PORCINE) PER 1000 UNITS: Performed by: INTERNAL MEDICINE

## 2025-01-26 RX ADMIN — HEPARIN SODIUM 5000 UNITS: 5000 INJECTION INTRAVENOUS; SUBCUTANEOUS at 21:11

## 2025-01-26 RX ADMIN — ASPIRIN 81 MG: 81 TABLET, COATED ORAL at 08:46

## 2025-01-26 RX ADMIN — HEPARIN SODIUM 5000 UNITS: 5000 INJECTION INTRAVENOUS; SUBCUTANEOUS at 13:01

## 2025-01-26 RX ADMIN — METOPROLOL SUCCINATE 50 MG: 50 TABLET, EXTENDED RELEASE ORAL at 08:46

## 2025-01-26 RX ADMIN — Medication 10 ML: at 21:11

## 2025-01-26 RX ADMIN — HYDROCODONE BITARTRATE AND ACETAMINOPHEN 1 TABLET: 7.5; 325 TABLET ORAL at 00:56

## 2025-01-26 RX ADMIN — HEPARIN SODIUM 5000 UNITS: 5000 INJECTION INTRAVENOUS; SUBCUTANEOUS at 06:06

## 2025-01-26 RX ADMIN — FUROSEMIDE 40 MG: 10 INJECTION, SOLUTION INTRAMUSCULAR; INTRAVENOUS at 08:46

## 2025-01-26 RX ADMIN — LISINOPRIL 2.5 MG: 5 TABLET ORAL at 08:46

## 2025-01-26 RX ADMIN — Medication 10 ML: at 08:46

## 2025-01-26 RX ADMIN — PANTOPRAZOLE SODIUM 40 MG: 40 TABLET, DELAYED RELEASE ORAL at 06:06

## 2025-01-26 RX ADMIN — FUROSEMIDE 40 MG: 10 INJECTION, SOLUTION INTRAMUSCULAR; INTRAVENOUS at 17:03

## 2025-01-26 RX ADMIN — HYDROCODONE BITARTRATE AND ACETAMINOPHEN 1 TABLET: 7.5; 325 TABLET ORAL at 08:46

## 2025-01-26 RX ADMIN — SENNOSIDES AND DOCUSATE SODIUM 2 TABLET: 50; 8.6 TABLET ORAL at 08:49

## 2025-01-26 RX ADMIN — GABAPENTIN 600 MG: 300 CAPSULE ORAL at 06:06

## 2025-01-26 RX ADMIN — PRAVASTATIN SODIUM 80 MG: 20 TABLET ORAL at 08:46

## 2025-01-26 RX ADMIN — HYDROCODONE BITARTRATE AND ACETAMINOPHEN 1 TABLET: 7.5; 325 TABLET ORAL at 21:29

## 2025-01-26 NOTE — PLAN OF CARE
Goal Outcome Evaluation:  Plan of Care Reviewed With: patient        Progress: improving  Outcome Evaluation: VSS; continue diuresising; monitor labs; no acute events noted overnight

## 2025-01-26 NOTE — CASE MANAGEMENT/SOCIAL WORK
Discharge Planning Assessment  [facility]     Patient Name: Lesley Calero  MRN: [id number]  Today's Date: 1/26/2025    Admit Date: 1/25/2025    Plan: Return  Home may consider Home Health   Discharge Needs Assessment       Row Name 01/26/25 1013       Living Environment    People in Home child(seth), adult    Name(s) of People in Home Anel    Unique Family Situation Daughter  stays with her 2 weeks at a time pt is alone 1 week at a tome neighbor assists    Current Living Arrangements home    Potentially Unsafe Housing Conditions none    In the past 12 months has the electric, gas, oil, or water company threatened to shut off services in your home? No    Primary Care Provided by self    Provides Primary Care For no one    Family Caregiver if Needed child(seth), adult;friend(S)    Family Caregiver Names Anel Garcias  [phone]    Quality of Family Relationships helpful    Able to Return to Prior Arrangements yes       Resource/Environmental Concerns    Resource/Environmental Concerns none    Transportation Concerns none       Transportation Needs    In the past 12 months, has lack of transportation kept you from medical appointments or from getting medications? no    In the past 12 months, has lack of transportation kept you from meetings, work, or from getting things needed for daily living? No       Food Insecurity    Within the past 12 months, you worried that your food would run out before you got the money to buy more. Never true    Within the past 12 months, the food you bought just didn't last and you didn't have money to get more. Never true       Transition Planning    Patient/Family Anticipates Transition to home with help/services    Patient/Family Anticipated Services at Transition ;home health care    Transportation Anticipated family or friend will provide       Discharge Needs Assessment    Readmission Within the Last 30 Days no previous admission in last 30 days    Equipment  Currently Used at Home cane, straight;walker, rolling;wheelchair;oxygen    Concerns to be Addressed discharge planning    Do you want help finding or keeping work or a job? I do not need or want help    Do you want help with school or training? For example, starting or completing job training or getting a high school diploma, GED or equivalent No    Anticipated Changes Related to Illness inability to care for self                   Discharge Plan       Row Name 01/26/25 1020       Plan    Plan Return  Home may consider Home Health    Patient/Family in Agreement with Plan yes    Plan Comments Spoke to pt and her daughter at bedside .Confirmed address,phone number and primary care provider as being correct on face sheet   Her daughter stay with her two weeks at a time then goes home for one week a neighbor  assists during that week ,She has Oxygen  [employer] ,Living will in chart .They are wanting more assistance at home gave her [employer]  book and how to apply for Medicaid .at this time plan is to return home .At first they wanted home health when explained services they declined wanting more assistance with housekeeping  and errands                  Continued Care and Services - Admitted Since 1/25/2025    No active coordination exists for this encounter.       Selected Continued Care - Episodes Includes continued care and service providers with selected services from the active episodes listed below      Heart Failure Episode start date: 3/27/2024   There are no active outsourced providers for this episode.                    Demographic Summary       Row Name 01/26/25 1011       General Information    Admission Type observation    Arrived From emergency department    Required Notices Provided Observation Status Notice    Referral Source admission list    Reason for Consult discharge planning    Preferred Language English                   Functional Status       Row Name 01/26/25 1013       Functional Status     Usual Activity Tolerance fair       Physical Activity    On average, how many days per week do you engage in moderate to strenuous exercise (like a brisk walk)? 0 days    On average, how many minutes do you engage in exercise at this level? 0 min    Number of minutes of exercise per week 0       Functional Status, IADL    Medications independent    Meal Preparation assistive person    Housekeeping assistive person    Laundry assistive person    Shopping completely dependent    If for any reason you need help with day-to-day activities such as bathing, preparing meals, shopping, managing finances, etc., do you get the help you need? I need a lot more help       Mental Status    General Appearance WDL WDL                   Psychosocial    No documentation.                  Abuse/Neglect    No documentation.                  Legal    No documentation.                  Substance Abuse    No documentation.                  Patient Forms    No documentation.                     Riya Wynne RN

## 2025-01-26 NOTE — PLAN OF CARE
Problem: Adult Inpatient Plan of Care  Goal: Absence of Hospital-Acquired Illness or Injury  Intervention: Identify and Manage Fall Risk  Recent Flowsheet Documentation  Taken 1/26/2025 1600 by Medina Araujo RN  Safety Promotion/Fall Prevention:   safety round/check completed   room organization consistent   clutter free environment maintained   assistive device/personal items within reach  Taken 1/26/2025 1400 by Medina Araujo RN  Safety Promotion/Fall Prevention:   safety round/check completed   room organization consistent   clutter free environment maintained   assistive device/personal items within reach  Taken 1/26/2025 1200 by Medina Araujo RN  Safety Promotion/Fall Prevention:   activity supervised   assistive device/personal items within reach   clutter free environment maintained   fall prevention program maintained   gait belt   lighting adjusted   nonskid shoes/slippers when out of bed   safety round/check completed   room organization consistent  Taken 1/26/2025 1000 by Medina Araujo RN  Safety Promotion/Fall Prevention:   activity supervised   assistive device/personal items within reach   clutter free environment maintained   fall prevention program maintained   gait belt   lighting adjusted   nonskid shoes/slippers when out of bed   safety round/check completed   room organization consistent  Taken 1/26/2025 0846 by Medina Araujo RN  Safety Promotion/Fall Prevention:   safety round/check completed   room organization consistent   clutter free environment maintained   assistive device/personal items within reach  Intervention: Prevent Skin Injury  Recent Flowsheet Documentation  Taken 1/26/2025 1600 by Medina Araujo RN  Body Position:   sitting up in bed   neutral head position   neutral body alignment  Taken 1/26/2025 1400 by Medina Araujo RN  Body Position:   sitting up in bed   tilted   left  Taken 1/26/2025 1200 by Medina Araujo RN  Body Position:   sitting up in bed   neutral head position    neutral body alignment  Taken 1/26/2025 1000 by Medina Araujo RN  Body Position:   tilted   sitting up in bed   left  Taken 1/26/2025 0846 by Medina Araujo RN  Body Position:   sitting up in bed   neutral head position   neutral body alignment  Skin Protection:   pulse oximeter probe site changed   skin sealant/moisture barrier applied  Intervention: Prevent Infection  Recent Flowsheet Documentation  Taken 1/26/2025 1600 by Medina Araujo RN  Infection Prevention:   environmental surveillance performed   single patient room provided  Taken 1/26/2025 1400 by Medina Araujo RN  Infection Prevention:   environmental surveillance performed   single patient room provided  Taken 1/26/2025 0846 by Medina Araujo RN  Infection Prevention:   environmental surveillance performed   single patient room provided  Goal: Optimal Comfort and Wellbeing  Intervention: Monitor Pain and Promote Comfort  Recent Flowsheet Documentation  Taken 1/26/2025 0846 by Medina Araujo RN  Pain Management Interventions:   pain medication given   pillow support provided   position adjusted  Intervention: Provide Person-Centered Care  Recent Flowsheet Documentation  Taken 1/26/2025 0846 by Medina Araujo RN  Trust Relationship/Rapport:   care explained   thoughts/feelings acknowledged   reassurance provided   questions encouraged   questions answered     Problem: Skin Injury Risk Increased  Goal: Skin Health and Integrity  Intervention: Optimize Skin Protection  Recent Flowsheet Documentation  Taken 1/26/2025 1600 by Medina Araujo RN  Activity Management: activity encouraged  Head of Bed (HOB) Positioning: HOB at 60-90 degrees  Taken 1/26/2025 1400 by Medina Araujo RN  Activity Management: activity encouraged  Head of Bed (HOB) Positioning: HOB at 30-45 degrees  Taken 1/26/2025 1200 by Medina Araujo RN  Activity Management: activity encouraged  Head of Bed (HOB) Positioning:   HOB elevated   HOB at 60-90 degrees  Taken 1/26/2025 1000 by Medina Araujo  RN  Activity Management: activity encouraged  Head of Bed (HOB) Positioning:   HOB at 30-45 degrees   HOB elevated  Taken 1/26/2025 0846 by Medina Araujo RN  Activity Management: activity encouraged  Pressure Reduction Techniques:   frequent weight shift encouraged   weight shift assistance provided  Head of Bed (HOB) Positioning: HOB at 60-90 degrees  Pressure Reduction Devices: positioning supports utilized  Skin Protection:   pulse oximeter probe site changed   skin sealant/moisture barrier applied     Problem: Comorbidity Management  Goal: Maintenance of Osteoarthritis Symptom Control  Intervention: Maintain Osteoarthritis Symptom Control  Recent Flowsheet Documentation  Taken 1/26/2025 1600 by Medina Araujo RN  Activity Management: activity encouraged  Assistive Device Utilized:   gait belt   walker  Taken 1/26/2025 1400 by Medina Araujo RN  Activity Management: activity encouraged  Assistive Device Utilized:   gait belt   walker  Taken 1/26/2025 1200 by Medina Araujo RN  Activity Management: activity encouraged  Assistive Device Utilized: gait belt  Taken 1/26/2025 1000 by Medina Araujo RN  Activity Management: activity encouraged  Assistive Device Utilized:   gait belt   walker  Taken 1/26/2025 0846 by Medina Araujo RN  Activity Management: activity encouraged  Assistive Device Utilized:   gait belt   walker     Problem: Fall Injury Risk  Goal: Absence of Fall and Fall-Related Injury  Intervention: Promote Injury-Free Environment  Recent Flowsheet Documentation  Taken 1/26/2025 1600 by Medina Araujo RN  Safety Promotion/Fall Prevention:   safety round/check completed   room organization consistent   clutter free environment maintained   assistive device/personal items within reach  Taken 1/26/2025 1400 by Medina Araujo RN  Safety Promotion/Fall Prevention:   safety round/check completed   room organization consistent   clutter free environment maintained   assistive device/personal items within reach  Taken  1/26/2025 1200 by Medina Araujo RN  Safety Promotion/Fall Prevention:   activity supervised   assistive device/personal items within reach   clutter free environment maintained   fall prevention program maintained   gait belt   lighting adjusted   nonskid shoes/slippers when out of bed   safety round/check completed   room organization consistent  Taken 1/26/2025 1000 by Medina Araujo RN  Safety Promotion/Fall Prevention:   activity supervised   assistive device/personal items within reach   clutter free environment maintained   fall prevention program maintained   gait belt   lighting adjusted   nonskid shoes/slippers when out of bed   safety round/check completed   room organization consistent  Taken 1/26/2025 0846 by Medina Araujo RN  Safety Promotion/Fall Prevention:   safety round/check completed   room organization consistent   clutter free environment maintained   assistive device/personal items within reach     Problem: Wound  Goal: Optimal Functional Ability  Intervention: Optimize Functional Ability  Recent Flowsheet Documentation  Taken 1/26/2025 1600 by Medina Araujo RN  Activity Management: activity encouraged  Activity Assistance Provided: assistance, 1 person  Assistive Device Utilized:   gait belt   walker  Taken 1/26/2025 1400 by Medina Araujo RN  Activity Management: activity encouraged  Activity Assistance Provided: assistance, 1 person  Assistive Device Utilized:   gait belt   walker  Taken 1/26/2025 1200 by Medina Araujo RN  Activity Management: activity encouraged  Activity Assistance Provided: assistance, stand-by  Assistive Device Utilized: gait belt  Taken 1/26/2025 1000 by Medina Araujo RN  Activity Management: activity encouraged  Activity Assistance Provided: assistance, stand-by  Assistive Device Utilized:   gait belt   walker  Taken 1/26/2025 0846 by Medina Araujo RN  Activity Management: activity encouraged  Activity Assistance Provided: assistance, stand-by  Assistive Device  Utilized:   gait belt   walker  Goal: Optimal Pain Control and Function  Intervention: Prevent or Manage Pain  Recent Flowsheet Documentation  Taken 1/26/2025 0846 by Medina Araujo RN  Pain Management Interventions:   pain medication given   pillow support provided   position adjusted  Goal: Skin Health and Integrity  Intervention: Optimize Skin Protection  Recent Flowsheet Documentation  Taken 1/26/2025 1600 by Medina Araujo RN  Activity Management: activity encouraged  Head of Bed (HOB) Positioning: HOB at 60-90 degrees  Taken 1/26/2025 1400 by Medina Araujo RN  Activity Management: activity encouraged  Head of Bed (HOB) Positioning: HOB at 30-45 degrees  Taken 1/26/2025 1200 by Medina Araujo RN  Activity Management: activity encouraged  Head of Bed (HOB) Positioning:   HOB elevated   HOB at 60-90 degrees  Taken 1/26/2025 1000 by Medina Araujo RN  Activity Management: activity encouraged  Head of Bed (HOB) Positioning:   HOB at 30-45 degrees   HOB elevated  Taken 1/26/2025 0846 by Medina Araujo RN  Activity Management: activity encouraged  Pressure Reduction Techniques:   frequent weight shift encouraged   weight shift assistance provided  Head of Bed (HOB) Positioning: HOB at 60-90 degrees  Pressure Reduction Devices: positioning supports utilized   Goal Outcome Evaluation:           Progress: improving  Outcome Evaluation: Patient has been up to bedside commode multiple times during shift. Some complaints of pain, see MAR.

## 2025-01-26 NOTE — PROGRESS NOTES
[facility] HOSPITALIST PROGRESS NOTE     Patient Identification:  Name:  Lesley Calero  Age:  84 y.o.  Sex:  female  :  1940  MRN:  [id number]  Visit Number:  [id number]  ROOM: [facility]     Primary Care Provider:  Nj Smith MD     Date of Admission: 2025    Length of stay in inpatient status:  0    Subjective     Chief Compliant:    Chief Complaint   Patient presents with    Edema    Leg Swelling     History of Presenting Illness:    Patient was seen and examined face-to-face.  Patient overall is extremely pleasant.  She says she feels much better because she had diuresed so well overnight.  Patient says she is a little bit nervous about going home as she is afraid she will get worse again.  Objective     Current Hospital Meds:  aspirin, 81 mg, Oral, Daily  furosemide, 40 mg, Intravenous, BID Diuretics  gabapentin, 600 mg, Oral, Q8H  heparin (porcine), 5,000 Units, Subcutaneous, Q8H  lisinopril, 2.5 mg, Oral, Daily  metoprolol succinate XL, 50 mg, Oral, Daily  pantoprazole, 40 mg, Oral, Q AM  pravastatin, 80 mg, Oral, Daily  sodium chloride, 10 mL, Intravenous, Q12H       Current Antimicrobial Therapy:  Anti-Infectives (From admission, onward)      None          Current Diuretic Therapy:  Diuretics (From admission, onward)      Ordered     Dose/Rate Route Frequency Start Stop    25 1613  furosemide (LASIX) injection 40 mg        Ordering Provider: El Lyons Jr., MD    40 mg Intravenous 2 Times Daily (Diuretics) 25 0900      25 1514  furosemide (LASIX) injection 80 mg        Ordering Provider: Leslie Hernandez PA-C    80 mg Intravenous Once 25 1530 25 1544          ----------------------------------------------------------------------------------------------------------------------  Vital Signs:  Temp:  [97.9 °F (36.6 °C)-98.5 °F (36.9 °C)] 98 °F (36.7 °C)  Heart Rate:  [69-76] 75  Resp:  [19-20] 20  BP: (104-142)/(25-84) 133/74  SpO2:  [82 %-98  %] 97 %  on  Flow (L/min) (Oxygen Therapy):  [2] 2;   Device (Oxygen Therapy): nasal cannula  Body mass index is 24.67 kg/m².    Wt Readings from Last 3 Encounters:   01/25/25 57.3 kg (126 lb 5.2 oz)   01/10/25 55.8 kg (123 lb)   10/10/24 55.2 kg (121 lb 11.1 oz)     Intake & Output (last 3 days)         01/23 0701 01/24 0700 01/24 0701 01/25 0700 01/25 0701 01/26 0700 01/26 0701 01/27 0700    P.O.    360    Total Intake(mL/kg)    360 (6.3)    Urine (mL/kg/hr)   700 900 (2.2)    Stool    0    Total Output   700 900    Net   -700 -540            Urine Unmeasured Occurrence    1 x    Stool Unmeasured Occurrence    1 x          Diet: Cardiac; Healthy Heart (2-3 Na+); Fluid Consistency: Thin (IDDSI 0)  ----------------------------------------------------------------------------------------------------------------------  Physical Exam  Constitutional:  Well-developed and well-nourished.  No acute distress.      HENT:  Head:  Normocephalic and atraumatic.  Mouth:  Moist mucous membranes.    Eyes:  Conjunctivae and EOM are normal. No scleral icterus.    Neck:  Neck supple.  No JVD present.    Cardiovascular:  Normal rate, regular rhythm and normal heart sounds with no murmur.  Pulmonary/Chest:  No respiratory distress, few scattered crackles in the bases bilaterally.  Abdominal:  Soft. No distension and no tenderness.   Musculoskeletal:  No tenderness, and no deformity.  No red or swollen joints anywhere.    Neurological:  Alert and oriented to person, place, and time.  No cranial nerve deficit.    Skin:  Skin is warm and dry. No rash noted. No pallor.   Peripheral vascular:  No clubbing, no cyanosis, no edema.  Psychiatric: Appropriate mood and affect  : Deferred    ----------------------------------------------------------------------------------------------------------------------  Tele:   "  ----------------------------------------------------------------------------------------------------------------------  LABS:    CBC and coagulation:  Results from last 7 days   Lab Units 01/26/25  0729 01/25/25  1450   WBC 10*3/mm3 3.74 5.58   HEMOGLOBIN g/dL 12.1 13.5   HEMATOCRIT % 39.2 43.5   MCV fL 99.7* 98.9*   MCHC g/dL 30.9* 31.0*   PLATELETS 10*3/mm3 109* 153     Acid/base balance:      Renal and electrolytes:  Results from last 7 days   Lab Units 01/26/25  0729 01/25/25  1450   SODIUM mmol/L 141 138   POTASSIUM mmol/L 3.8 4.0   CHLORIDE mmol/L 103 99   CO2 mmol/L 29.0 28.9   BUN mg/dL 46* 47*   CREATININE mg/dL 1.58* 1.79*   CALCIUM mg/dL 8.6 8.9   GLUCOSE mg/dL 111* 136*     Estimated Creatinine Clearance: 20.3 mL/min (A) (by C-G formula based on SCr of 1.58 mg/dL (H)).    Liver and pancreatic function:  Results from last 7 days   Lab Units 01/26/25  0729 01/25/25  1450   ALBUMIN g/dL 3.2* 3.9   BILIRUBIN mg/dL 0.5 0.8   ALK PHOS U/L 136* 165*   AST (SGOT) U/L 42* 57*   ALT (SGPT) U/L 22 29     Endocrine function:  No results found for: \"HGBA1C\"  Point of care bedside glucose levels:      Glucose levels from the [facility]:  Results from last 7 days   Lab Units 01/26/25  0729 01/25/25  1450   GLUCOSE mg/dL 111* 136*     Lab Results   Component Value Date    TSH 0.489 04/24/2024     Cardiac:  Results from last 7 days   Lab Units 01/25/25  1719 01/25/25  1450   HSTROP T ng/L 126* 152*   PROBNP pg/mL  --  33,762.0*       Cultures:  Lab Results   Component Value Date    COLORU Yellow 10/10/2024    CLARITYU Clear 10/10/2024    PHUR 5.5 10/10/2024    GLUCOSEU Negative 10/10/2024    KETONESU Negative 10/10/2024    BLOODU Negative 10/10/2024    NITRITEU Negative 10/10/2024    LEUKOCYTESUR Trace (A) 10/10/2024    BILIRUBINUR Negative 10/10/2024    UROBILINOGEN 0.2 E.U./dL 10/10/2024    RBCUA None Seen 10/10/2024    WBCUA 0-2 10/10/2024    BACTERIA None Seen 10/10/2024     Microbiology Results (last 10 days)       ** No " "results found for the last 240 hours. **            No results found for: \"PREGTESTUR\", \"PREGSERUM\", \"HCG\", \"HCGQUANT\"  Pain Management Panel           No data to display                I have personally looked at the labs and they are summarized above.  ----------------------------------------------------------------------------------------------------------------------  Detailed radiology reports for the last 24 hours:    Imaging Results (Last 24 Hours)       Procedure Component Value Units Date/Time    XR Chest 1 View [[id number]] Collected: 01/25/25 1555     Updated: 01/25/25 5359    Narrative:      PORTABLE CHEST     HISTORY: Leg swelling     COMPARISON: 1/10/2025     FINDINGS: A single radiograph of the chest was performed. There is a  right subclavian pacemaker. The heart is borderline in size. There is  calcified plaque of the aorta. There are chronic interstitial changes.  There is no edema or infiltrate.       Impression:      Chronic changes with no acute process.     This report was signed and finalized on 1/25/2025 3:57 PM by Klaus Brody MD.             I have personally looked at the radiology images and I have read the available final and preliminary reports.    Assessment & Plan    Acute exacerbation of preserved ejection fraction congestive heart failure  Chest pain elevated troponin  Acute on chronic renal failure stage IIIb  - Patient diuresed very well overnight  - We will continue to diurese today  - Continue to trend troponin  - Continue to support with O2 via nasal cannula  - Continue to monitor laboratory data including kidney function and proBNP    History of COPD  Hypertension  - Continue to monitor patient's vital signs closely  - Repeat labs in the morning  -  VTE Prophylaxis:   Active VTE Prophylaxis:  Pharmacologic:        Start     Dose Route Frequency Stop    01/25/25 3550  heparin (porcine) 5000 UNIT/ML injection 5,000 Units         5,000 Units SC Every 8 Hours Scheduled --         "          Select Mechanical VTE Prophylaxis if Desired & Appropriate     The patient is high risk due to the following diagnoses/reasons: Heart failure    Disposition: Probable discharge tomorrow    Jose Evangelista DO  [facility] Hospitalist  01/26/25  14:16 EST

## 2025-01-27 ENCOUNTER — DOCUMENTATION (OUTPATIENT)
Dept: HOME HEALTH SERVICES | Facility: HOME HEALTHCARE | Age: 85
End: 2025-01-27
Payer: MEDICARE

## 2025-01-27 ENCOUNTER — READMISSION MANAGEMENT (OUTPATIENT)
Dept: CALL CENTER | Facility: HOSPITAL | Age: 85
End: 2025-01-27
Payer: MEDICARE

## 2025-01-27 VITALS
RESPIRATION RATE: 18 BRPM | BODY MASS INDEX: 24.8 KG/M2 | OXYGEN SATURATION: 99 % | HEIGHT: 60 IN | WEIGHT: 126.32 LBS | SYSTOLIC BLOOD PRESSURE: 150 MMHG | DIASTOLIC BLOOD PRESSURE: 74 MMHG | TEMPERATURE: 97.8 F | HEART RATE: 73 BPM

## 2025-01-27 PROCEDURE — 97161 PT EVAL LOW COMPLEX 20 MIN: CPT

## 2025-01-27 PROCEDURE — 99239 HOSP IP/OBS DSCHRG MGMT >30: CPT | Performed by: NURSE PRACTITIONER

## 2025-01-27 PROCEDURE — G0378 HOSPITAL OBSERVATION PER HR: HCPCS

## 2025-01-27 RX ORDER — FUROSEMIDE 40 MG/1
40 TABLET ORAL DAILY
Qty: 30 TABLET | Refills: 0 | Status: SHIPPED | OUTPATIENT
Start: 2025-01-27

## 2025-01-27 RX ADMIN — HYDROCODONE BITARTRATE AND ACETAMINOPHEN 1 TABLET: 7.5; 325 TABLET ORAL at 06:46

## 2025-01-27 RX ADMIN — Medication 10 ML: at 08:35

## 2025-01-27 RX ADMIN — PANTOPRAZOLE SODIUM 40 MG: 40 TABLET, DELAYED RELEASE ORAL at 06:28

## 2025-01-27 RX ADMIN — PRAVASTATIN SODIUM 80 MG: 20 TABLET ORAL at 08:35

## 2025-01-27 RX ADMIN — ASPIRIN 81 MG: 81 TABLET, COATED ORAL at 08:35

## 2025-01-27 RX ADMIN — LISINOPRIL 2.5 MG: 5 TABLET ORAL at 08:35

## 2025-01-27 RX ADMIN — METOPROLOL SUCCINATE 50 MG: 50 TABLET, EXTENDED RELEASE ORAL at 08:35

## 2025-01-27 NOTE — PLAN OF CARE
Goal Outcome Evaluation:      I received a consult to speak to the pt about ACP.  The pt has a Living Will on file.

## 2025-01-27 NOTE — DISCHARGE INSTRUCTIONS
Patient to be discharged home.  Continue home medications.  Follow with PCP and cardiology as scheduled.  Return to ED for worsening symptoms.

## 2025-01-27 NOTE — CASE MANAGEMENT/SOCIAL WORK
Case Management Discharge Note           Provided Post Acute Provider List?: Yes  Post Acute Provider List: Home Health  Delivered To: Patient, Support Person  Method of Delivery: In person    Selected Continued Care - Discharged on 1/27/2025 Admission date: 1/25/2025 - Discharge disposition: Home-Health Care [facility]          Home Medical Care Coordination complete.      Service Provider Services Address Phone Fax Patient Preferred    [facility] [address] [id number] [phone] --              Therapy    No services have been selected for the patient.                Community Resources Coordination complete.      Service Provider Services Address Phone Fax Patient Preferred    [facility] PATIENTS ONLY [employer] [address] [address] [phone] -- --                 Transportation Services  Private: Car    Final Discharge Disposition Code: 06 - home with home health care

## 2025-01-27 NOTE — OUTREACH NOTE
Prep Survey      Flowsheet Row Responses   [facility] facility patient discharged from? [facility]   Is LACE score < 7 ? No   Eligibility Readm Mgmt   Discharge diagnosis A/C CHF (congestive heart failure)   Does the patient have one of the following disease processes/diagnoses(primary or secondary)? CHF   Does the patient have Home health ordered? Yes   What is the Home health agency?  [facility]   Is there a DME ordered? No   Prep survey completed? Yes            Linnea SILVERIO - Registered Nurse

## 2025-01-27 NOTE — DISCHARGE SUMMARY
[place] [facility]   DISCHARGE SUMMARY      Name:  Lesley Calero   Age:  84 y.o.  Sex:  female  :  1940  MRN:  [id number]   Visit Number:  [id number]    Admission Date:  2025  Date of Discharge:  2025  Primary Care Physician:  Nj Smith MD    Important issues to note:    -Patient admitted for acute exacerbation of diastolic heart failure.  Given IV diuresis.  Continue GDMT as tolerated.  -Discharged with 40 mg of Lasix.  Education given on fluid restriction.  -Currently on 2 L nasal cannula with baseline oxygen at 3 L.  -She will follow with heart failure clinic and PCP.  -Patient continued to deny any chest pain.  -Return precautions given.    Discharge Diagnoses:     Acute exacerbation of diastolic heart failure with preserved EF, POA  Elevated troponin, do not suspect ACS, POA  Chronic kidney disease stage IIIb  COPD on 3 L chronically  Essential hypertension    Problem List:     Active Hospital Problems    Diagnosis  POA    **CHF (congestive heart failure) [I50.9]  Yes      Resolved Hospital Problems   No resolved problems to display.     Presenting Problem:    Chief Complaint   Patient presents with    Edema    Leg Swelling      Consults:     Consulting Physician(s)                     None              Procedures Performed:        History of presenting illness/Hospital Course:    Ms. Calero is a pleasant 84-year-old female with pertinent history of heart failure with preserved EF, COPD on 3 L chronically, chronic kidney disease stage III, atrial fibrillation status post pacemaker, hypertension who presented to emergency department due to worsening edema and shortness of air.  Patient denied palpitations or fever.  She currently lives with her daughter who assist her.  Patient does have intermittent chest pain that has been ongoing for the past several weeks to months.  She does follow with heart failure clinic/Dr. Nguyen.    Upon ED presentation patient  requiring 2 L nasal cannula.  Pertinent labs and imaging noted chronically elevated troponin slightly above baseline at 152 with repeat 126 without current chest pain, BNP 33,000 above baseline, creatinine 1.7 which is baseline, chest x-ray noted chronic changes with no acute process.  Patient admitted for further IV diuresis.  Fortunately patient with overall improvement noted.  She was continued on GDMT as tolerated.  Otherwise reassuring labs and vitals noted.  Continued to deny any chest pain.  No obvious ST elevation noted.  Patient otherwise stable for discharge at this time.  She will follow-up with PCP and heart failure clinic as scheduled.  Daughter and patient updated on plan of care with all questions answered.    Vital Signs:    Temp:  [97.5 °F (36.4 °C)-97.8 °F (36.6 °C)] 97.8 °F (36.6 °C)  Heart Rate:  [70-73] 73  Resp:  [18-20] 18  BP: (128-164)/(72-75) 150/74    Physical Exam:    General Appearance:  Alert and cooperative.  Chronically ill-appearing elderly female.   Head:  Atraumatic and normocephalic.   Eyes: Conjunctivae and sclerae normal, no icterus. No pallor.   Ears:  Ears with no abnormalities noted.   Throat: No oral lesions, no thrush, oral mucosa moist.   Neck: Supple, trachea midline, no thyromegaly.   Back:   No kyphoscoliosis present. No tenderness to palpation.   Lungs:   Breath sounds heard bilaterally equally.  No crackles or wheezing. No Pleural rub or bronchial breathing.  Unlabored on 2 L nasal cannula.   Heart:  Normal S1 and S2, no murmur, no gallop, no rub. No JVD.   Abdomen:   Normal bowel sounds, no masses, no organomegaly. Soft, nontender, nondistended, no rebound tenderness.   Extremities: Supple, trace bilateral lower extremity edema, no cyanosis, no clubbing.   Pulses: Pulses palpable bilaterally.   Skin: No bleeding or rash.  Scattered ecchymosis.   Neurologic: Alert and oriented x 3. No facial asymmetry. Moves all four limbs. No tremors.  Generalized weakness.      Pertinent Lab Results:     Results from last 7 days   Lab Units 01/26/25  0729 01/25/25  1450   SODIUM mmol/L 141 138   POTASSIUM mmol/L 3.8 4.0   CHLORIDE mmol/L 103 99   CO2 mmol/L 29.0 28.9   BUN mg/dL 46* 47*   CREATININE mg/dL 1.58* 1.79*   CALCIUM mg/dL 8.6 8.9   BILIRUBIN mg/dL 0.5 0.8   ALK PHOS U/L 136* 165*   ALT (SGPT) U/L 22 29   AST (SGOT) U/L 42* 57*   GLUCOSE mg/dL 111* 136*     Results from last 7 days   Lab Units 01/26/25  0729 01/25/25  1450   WBC 10*3/mm3 3.74 5.58   HEMOGLOBIN g/dL 12.1 13.5   HEMATOCRIT % 39.2 43.5   PLATELETS 10*3/mm3 109* 153         Results from last 7 days   Lab Units 01/25/25  1719 01/25/25  1450   HSTROP T ng/L 126* 152*     Results from last 7 days   Lab Units 01/25/25  1450   PROBNP pg/mL 33,762.0*                       Pertinent Radiology Results:    Imaging Results (All)       Procedure Component Value Units Date/Time    XR Chest 1 View [[id number]] Collected: 01/25/25 1555     Updated: 01/25/25 1559    Narrative:      PORTABLE CHEST     HISTORY: Leg swelling     COMPARISON: 1/10/2025     FINDINGS: A single radiograph of the chest was performed. There is a  right subclavian pacemaker. The heart is borderline in size. There is  calcified plaque of the aorta. There are chronic interstitial changes.  There is no edema or infiltrate.       Impression:      Chronic changes with no acute process.     This report was signed and finalized on 1/25/2025 3:57 PM by Klaus Brody MD.               Echo:    Results for orders placed during the hospital encounter of 04/21/24    Adult Transthoracic Echo Complete W/ Cont if Necessary Per Protocol    Interpretation Summary    Left ventricular systolic function is normal. Calculated left ventricular 3D EF = 59% Left ventricular ejection fraction appears to be 56 - 60%. Left ventricular diastolic function was indeterminate.    The right ventricular cavity is mildly dilated with reduced systolic function.  Pacer lead present.    The  right atrial cavity is mildly  dilated.    Significant aortic sclerosis and calcification without hemodynamically significant stenosis.    Mild mitral valve regurgitation is present.    Moderate tricuspid valve regurgitation is present. Estimated right ventricular systolic pressure from tricuspid regurgitation is markedly elevated (>55 mmHg). Calculated right ventricular systolic pressure from tricuspid regurgitation is 64 mmHg.    Condition on Discharge:      Stable.    Code status during the hospital stay:    Code Status and Medical Interventions: CPR (Attempt to Resuscitate); Full Support   Ordered at: 01/25/25 161     Code Status (Patient has no pulse and is not breathing):    CPR (Attempt to Resuscitate)     Medical Interventions (Patient has pulse or is breathing):    Full Support     Discharge Disposition:    [facility]    Discharge Medications:       Discharge Medications        Changes to Medications        Instructions Start Date   furosemide 40 MG tablet  Commonly known as: Lasix  What changed:   medication strength  how much to take  when to take this  reasons to take this   40 mg, Oral, Daily             Continue These Medications        Instructions Start Date   ascorbic acid 500 MG tablet  Commonly known as: VITAMIN C   1 tablet, Daily      aspirin 81 MG EC tablet   81 mg, Oral, Daily      gabapentin 800 MG tablet  Commonly known as: NEURONTIN   1 tablet, 3 Times Daily      HYDROcodone-acetaminophen 7.5-325 MG per tablet  Commonly known as: NORCO   1 tablet, Every 6 Hours PRN      lisinopril 2.5 MG tablet  Commonly known as: PRINIVIL,ZESTRIL   2.5 mg, Oral, Daily      metoprolol succinate XL 50 MG 24 hr tablet  Commonly known as: TOPROL-XL   50 mg, Oral, Daily      multivitamin with minerals tablet tablet   1 tablet, Daily      nitroglycerin 0.4 MG SL tablet  Commonly known as: NITROSTAT   1 tablet, Sublingual, Every 5 Minutes PRN, Take no more than 3 doses in 15 minutes.       omeprazole  40 MG capsule  Commonly known as: priLOSEC   1 capsule, Daily      pravastatin 80 MG tablet  Commonly known as: PRAVACHOL   80 mg, Daily      Voltaren 1 % gel gel  Generic drug: Diclofenac Sodium   4 g, Daily             Discharge Diet:     Diet Instructions       Diet: Cardiac Diets, Fluid Restriction (240 mL/tray) Diets; Healthy Heart (2-3 Na+); Thin (IDDSI 0); 2000 mL/day      Discharge Diet:  Cardiac Diets  Fluid Restriction (240 mL/tray) Diets       Cardiac Diet: Healthy Heart (2-3 Na+)    Fluid Consistency: Thin (IDDSI 0)    Fluid Restriction Diet (240 mL/tray): 2000 mL/day          Activity at Discharge:     Activity Instructions       Activity as Tolerated            Follow-up Appointments:    Additional Instructions for the Follow-ups that You Need to Schedule       Ambulatory Referral to Disease State Management   As directed      To dept: [facility] [[id number]]   What program(s) are you referring for?: Heart Failure   Follow-up needed: Yes        Ambulatory Referral to Home Health (Hospital)   As directed      Face to Face Visit Date: 1/27/2025   Follow-up provider for Plan of Care?: I treated the patient in an acute care facility and will not continue treatment after discharge.   Follow-up provider: NJ SMITH [[id number]]   Reason/Clinical Findings: debility   Describe mobility limitations that make leaving home difficult: debility   Nursing/Therapeutic Services Requested: Physical Therapy Occupational Therapy Skilled Nursing   Skilled nursing orders: CHF management   Frequency: 1 Week 1               Contact information for follow-up providers       Nj Smith MD. Go on 1/30/2025.    Specialty: Family Medicine  Why: @ 1:45pm  Contact information:  [facility] [address] [place] [address]  [phone]               [facility]. Go on 2/10/2025.    Specialty: Pharmacy  Why: @ 9:00am  Contact information:  [address]  [facility]  [address]  [phone]                     Contact information for after-discharge care       [facility] PATIENTS ONLY [facility] .    Service: Food Pantry  Contact information:  [address] [facility] [address]  [phone]                                 Future Appointments   Date Time Provider Department Center   2/10/2025  9:00 AM Lisa Villafuerte APRN  [facility]     Test Results Pending at Discharge:    Pending Results       None                 DEVON [place]  01/27/25  13:28 EST    Time: I spent >30 minutes on this discharge activity which included: face-to-face encounter with the patient, reviewing the data in the system, coordination of the care with the nursing staff as well as consultants, documentation, and entering orders.     Dictated utilizing Dragon dictation.

## 2025-01-27 NOTE — PLAN OF CARE
Goal Outcome Evaluation:  Plan of Care Reviewed With: patient        Progress: no change  Outcome Evaluation: No acute events overnight. Patient has rested off and on overnight. Pain seems well controlled. Tollerating 2L well. Possible discharge today. Plan of care ongoing.                             Problem: Comorbidity Management  Goal: Maintenance of Asthma Control  Outcome: Unable to Meet  Goal: Maintenance of Behavioral Health Symptom Control  Outcome: Unable to Meet  Goal: Blood Glucose Level Within Target Range  Outcome: Unable to Meet  Goal: Bariatric Home Regimen Maintained  Outcome: Unable to Meet  Goal: Maintenance of Seizure Control  Outcome: Unable to Meet

## 2025-01-27 NOTE — THERAPY EVALUATION
Patient Name: Lesley Calero  : 1940    MRN: [id number]                              Today's Date: 2025       Admit Date: 2025    Visit Dx:     ICD-10-CM ICD-9-CM   1. Acute on chronic congestive heart failure, unspecified heart failure type  I50.9 428.0   2. Failure of outpatient treatment  Z78.9 V49.89   3. Elevated troponin  R79.89 790.6   4. Congestive heart failure, unspecified HF chronicity, unspecified heart failure type  I50.9 428.0     Patient Active Problem List   Diagnosis    Knee strain, right, initial encounter    Chronic pain of right knee    COPD (chronic obstructive pulmonary disease)    Essential hypertension    Status post placement of cardiac pacemaker    Nuclear sclerotic cataract of right eye    Chronic heart failure with preserved ejection fraction (HFpEF)    Chest pain    Pneumonia of right upper lobe due to infectious organism    Pneumonia    CHF (congestive heart failure)     Past Medical History:   Diagnosis Date    Cataracts, bilateral     CHF (congestive heart failure)     COPD (chronic obstructive pulmonary disease)     Coronary artery disease     Hyperlipidemia     Hypertension     Impaired functional mobility, balance, gait, and endurance     Myocardial infarction     Oxygen deficit     Pneumonia     RLS (restless legs syndrome)     Seizures      Past Surgical History:   Procedure Laterality Date    CAROTID ENDARTERECTOMY Left     CATARACT EXTRACTION W/ INTRAOCULAR LENS IMPLANT Right 2023    Procedure: CATARACT PHACO EXTRACTION WITH INTRAOCULAR LENS IMPLANT RIGHT;  Surgeon: Dajuan Blackburn MD;  Location: [facility] OR;  Service: Ophthalmology;  Laterality: Right;    CATARACT EXTRACTION W/ INTRAOCULAR LENS IMPLANT Left 8/10/2023    Procedure: CATARACT PHACO EXTRACTION WITH INTRAOCULAR LENS IMPLANT LEFT;  Surgeon: Dajuan Blackburn MD;  Location: [facility];  Service: Ophthalmology;  Laterality: Left;    CORONARY ANGIOPLASTY WITH STENT PLACEMENT      x2    LUNG  SURGERY Left     PACEMAKER IMPLANTATION        General Information       [facility] Name 01/27/25 0945          Physical Therapy Time and Intention    Document Type discharge evaluation/summary (P)   -     Mode of Treatment individual therapy;physical therapy (P)   -[facility] Name 01/27/25 0945          General Information    Patient Profile Reviewed yes (P)   -     Prior Level of Function independent:;all household mobility (P)   Minimal assistance with ADL's at home  -     Existing Precautions/Restrictions fall (P)   -     Barriers to Rehab none identified (P)   -[facility] Name 01/27/25 0945          Living Environment    People in Home child(seth), adult (P)   -[facility] Name 01/27/25 0945          Home Main Entrance    Number of Stairs, Main Entrance three (P)   -     Stair Railings, Main Entrance railings safe and in good condition (P)   -[facility] Name 01/27/25 0945          Stairs Within Home, Primary    Number of Stairs, Within Home, Primary none (P)   -[facility] Name 01/27/25 0945          Cognition    Orientation Status (Cognition) oriented x 4 (P)   -[facility] Name 01/27/25 0945          Safety Issues/Impairments Affecting Functional Mobility    Safety Issues Affecting Function (Mobility) at risk behavior observed;insight into deficits/self-awareness;impulsivity (P)   -     Impairments Affecting Function (Mobility) strength;endurance/activity tolerance (P)   -               User Key  (r) = Recorded By, (t) = Taken By, (c) = Cosigned By      Initials Name Provider Type     Man Hankins, PT Student PT Student                   Mobility       [facility] Name 01/27/25 0945          Bed Mobility    Bed Mobility bed mobility (all) activities (P)   -     All Activities, [place] (Bed Mobility) modified independence (P)   -     Assistive Device (Bed Mobility) bed rails;head of bed elevated (P)   -[facility] Name 01/27/25 0945          Sit-Stand Transfer    Sit-Stand [place]  (Transfers) supervision (P)   -     Assistive Device (Sit-Stand Transfers) walker, front-wheeled (P)   -       Row Name 01/27/25 0945          Gait/Stairs (Locomotion)    [place] Level (Gait) supervision (P)   -     Assistive Device (Gait) walker, front-wheeled (P)   -     Patient was able to Ambulate yes (P)   -     Distance in Feet (Gait) 60 (P)   -     Deviations/Abnormal Patterns (Gait) base of support, narrow;bilateral deviations;gait speed decreased;festinating/shuffling (P)   -     Bilateral Gait Deviations forward flexed posture (P)   -     [place] Level (Stairs) not tested (P)   -               User Key  (r) = Recorded By, (t) = Taken By, (c) = Cosigned By      Initials Name Provider Type    Man Dsouza PT Student PT Student                   Obj/Interventions       Row Name 01/27/25 1216          Range of Motion Comprehensive    General Range of Motion no range of motion deficits identified (P)   -[facility] Name 01/27/25 1216          Strength Comprehensive (MMT)    General Manual Muscle Testing (MMT) Assessment no strength deficits identified (P)   -     Comment, General Manual Muscle Testing (MMT) Assessment B LE grossly WFL with fatigue (P)   -       Row Name 01/27/25 1216          Balance    Balance Assessment sitting static balance;standing static balance;sitting dynamic balance;standing dynamic balance (P)   -     Static Sitting Balance supervision (P)   -     Dynamic Sitting Balance supervision (P)   -     Position, Sitting Balance unsupported;sitting edge of bed (P)   -     Static Standing Balance supervision (P)   -     Dynamic Standing Balance supervision (P)   -     Position/Device Used, Standing Balance walker, front-wheeled (P)   -       Row Name 01/27/25 1216          Sensory Assessment (Somatosensory)    Sensory Assessment (Somatosensory) sensation intact (P)   -               User Key  (r) = Recorded By, (t) = Taken By, (c) = Cosigned  By      Initials Name Provider Type     Man Hankins, PT Student PT Student                   Goals/Plan    No documentation.                  Clinical Impression       Row Name 01/27/25 1217          Pain    Pretreatment Pain Rating 0/10 - no pain (P)   -     Posttreatment Pain Rating 0/10 - no pain (P)   -       Row Name 01/27/25 1217          Plan of Care Review    Plan of Care Reviewed With patient;child (P)   -     Progress no change (P)   -     Outcome Evaluation PT evaluation completed today. Pt is a 84 year old female received lying supine in bed with HR of 75 and oxygen saturation of 95% on 3L nc. Pt reports living in a single story home with 3 step entry and B handrails. Pt reports that her adult daughter lives with her and assists her with most activities of daily living but pt is able to ambulate in the household with mod I with single point cane. Pt denies any pain at this time and reports feeling much better. Pt performed all bed mobility activities including supine to sit with modified independence. Sitting EOB pt demonstrated good static and dynamic sitting balance but needed assistance donning socks. Pt performed sit to stand transfer with supervision and front wheel walker. Pt demonstrated good static and dynamic standing balance with front wheel walker. Pt ambulated 60' supervision with front wheel walker and had decreased gait speed and decreased safety awareness with the walker. Pt needed 1 standing rest break due to fatigue. Pt returned to the room and performed sit to supine with modified independence. Evaluation ended with pt supine in bed with HR of 74 and oxygen saturation of 99%. Pt would benefit from continued PT with home health PT. (P)   -       Row Name 01/27/25 1217          Therapy Assessment/Plan (PT)    Patient/Family Therapy Goals Statement (PT) Pt wants to be able to go home with modified independence. (P)   -     Rehab Potential (PT) good (P)   -     Criteria for  Skilled Interventions Met (PT) yes;skilled treatment is necessary (P)   -     Therapy Frequency (PT) evaluation only (P)   -       Row Name 01/27/25 1217          Vital Signs    Pretreatment Heart Rate (beats/min) 75 (P)   -     Posttreatment Heart Rate (beats/min) 74 (P)   -     Pre SpO2 (%) 95 (P)   -     O2 Delivery Pre Treatment nasal cannula (P)   3L  -     Post SpO2 (%) 99 (P)   -     O2 Delivery Post Treatment nasal cannula (P)   3L  -JH     Pre Patient Position Supine (P)   -     Post Patient Position Supine (P)   -       Row Name 01/27/25 1217          Positioning and Restraints    Pre-Treatment Position in bed (P)   -     Post Treatment Position bed (P)   -     In Bed supine;call light within reach;encouraged to call for assist (P)   -               User Key  (r) = Recorded By, (t) = Taken By, (c) = Cosigned By      Initials Name Provider Type    Man Dsouza, PT Student PT Student                   Outcome Measures       Row Name 01/27/25 1236 01/27/25 0800       How much help from another person do you currently need...    Turning from your back to your side while in flat bed without using bedrails? 4 (P)   - 4  -LA    Moving from lying on back to sitting on the side of a flat bed without bedrails? 4 (P)   - 4  -LA    Moving to and from a bed to a chair (including a wheelchair)? 4 (P)   - 3  -LA    Standing up from a chair using your arms (e.g., wheelchair, bedside chair)? 4 (P)   - 3  -LA    Climbing 3-5 steps with a railing? 2 (P)   - 2  -LA    To walk in hospital room? 4 (P)   - 3  -LA    AM-PAC 6 Clicks Score (PT) 22 (P)   - 19  -LA    Highest Level of Mobility Goal 7 --> Walk 25 feet or more (P)   - 6 --> Walk 10 steps or more  -LA      Row Name 01/27/25 1236          Functional Assessment    Outcome Measure Options AM-PAC 6 Clicks Basic Mobility (PT) (P)   -               User Key  (r) = Recorded By, (t) = Taken By, (c) = Cosigned By      Initials Name  Provider Type    Medina Woods, RN Registered Nurse     Head, Man, PT Student PT Student                                 Physical Therapy Education       Title: PT OT SLP Therapies (Resolved)       Topic: Physical Therapy (Resolved)       Point: Mobility training (Resolved)       Learner Progress:  Not documented in this visit.              Point: Home exercise program (Resolved)       Learner Progress:  Not documented in this visit.              Point: Body mechanics (Resolved)       Learner Progress:  Not documented in this visit.              Point: Precautions (Resolved)       Learner Progress:  Not documented in this visit.                                  PT Recommendation and Plan     Progress: (P) no change  Outcome Evaluation: (P) PT evaluation completed today. Pt is a 84 year old female received lying supine in bed with HR of 75 and oxygen saturation of 95% on 3L nc. Pt reports living in a single story home with 3 step entry and B handrails. Pt reports that her adult daughter lives with her and assists her with most activities of daily living but pt is able to ambulate in the household with mod I with single point cane. Pt denies any pain at this time and reports feeling much better. Pt performed all bed mobility activities including supine to sit with modified independence. Sitting EOB pt demonstrated good static and dynamic sitting balance but needed assistance donning socks. Pt performed sit to stand transfer with supervision and front wheel walker. Pt demonstrated good static and dynamic standing balance with front wheel walker. Pt ambulated 60' supervision with front wheel walker and had decreased gait speed and decreased safety awareness with the walker. Pt needed 1 standing rest break due to fatigue. Pt returned to the room and performed sit to supine with modified independence. Evaluation ended with pt supine in bed with HR of 74 and oxygen saturation of 99%. Pt would benefit from continued  PT with home health PT.     Time Calculation:   PT Evaluation Complexity  History, PT Evaluation Complexity: (P) 1-2 personal factors and/or comorbidities  Examination of Body Systems (PT Eval Complexity): (P) 1-2 elements  Clinical Presentation (PT Evaluation Complexity): (P) stable  Clinical Decision Making (PT Evaluation Complexity): (P) low complexity  Overall Complexity (PT Evaluation Complexity): (P) low complexity     PT Charges       Row Name 01/27/25 0945             Time Calculation    Start Time 0945 (P)   -      PT Received On 01/27/25 (P)   -      PT Goal Re-Cert Due Date 02/06/25 (P)   -         Untimed Charges    PT Eval/Re-eval Minutes 53 (P)   -         Total Minutes    Untimed Charges Total Minutes 53 (P)   -       Total Minutes 53 (P)   -                User Key  (r) = Recorded By, (t) = Taken By, (c) = Cosigned By      Initials Name Provider Type     Man Hankins, PT Student PT Student                  Therapy Charges for Today       Code Description Service Date Service Provider Modifiers Qty    [id number]  PT EVAL LOW COMPLEXITY 4 1/27/2025 Man Hankins, PT Student GP 1            PT G-Codes  Outcome Measure Options: (P) AM-PAC 6 Clicks Basic Mobility (PT)  AM-PAC 6 Clicks Score (PT): (P) 22  PT Discharge Summary  Anticipated Discharge Disposition (PT): (P) home with home health    Man Hankins PT Student  1/27/2025

## 2025-01-27 NOTE — CASE MANAGEMENT/SOCIAL WORK
Continued Stay Note   Keith     Patient Name: Lesley Calero  MRN: [id number]  Today's Date: 1/27/2025    Admit Date: 1/25/2025    Plan: spoke with pt and daughter in room; daughter transporting home; food bag given; given hh list; stated no pref; sent secure chat to [facility] hh; daughter stating they are completing medicaid application   Discharge Plan       Row Name 01/27/25 1143       Plan    Plan spoke with pt and daughter in room; daughter transporting home; food bag given; given hh list; stated no pref; sent secure chat to [facility]; daughter stating they are completing medicaid application    Provided Post Acute Provider List? Yes    Post Acute Provider List Home Health    Delivered To Patient;Support Person    Method of Delivery In person                   Discharge Codes    No documentation.                 Expected Discharge Date and Time       Expected Discharge Date Expected Discharge Time    Jan 27, 2025               Kaleigh Chan RN

## 2025-01-27 NOTE — PLAN OF CARE
Goal Outcome Evaluation:  Plan of Care Reviewed With: (P) patient, child        Progress: (P) no change  Outcome Evaluation: (P) PT evaluation completed today. Pt is a 84 year old female received lying supine in bed with HR of 75 and oxygen saturation of 95% on 3L nc. Pt reports living in a single story home with 3 step entry and B handrails. Pt reports that her adult daughter lives with her and assists her with most activities of daily living but pt is able to ambulate in the household with mod I with single point cane. Pt denies any pain at this time and reports feeling much better. Pt performed all bed mobility activities including supine to sit with modified independence. Sitting EOB pt demonstrated good static and dynamic sitting balance but needed assistance donning socks. Pt performed sit to stand transfer with supervision and front wheel walker. Pt demonstrated good static and dynamic standing balance with front wheel walker. Pt ambulated 60' supervision with front wheel walker and had decreased gait speed and decreased safety awareness with the walker. Pt needed 1 standing rest break due to fatigue. Pt returned to the room and performed sit to supine with modified independence. Evaluation ended with pt supine in bed with HR of 74 and oxygen saturation of 99%. Pt would benefit from continued PT with home health PT.    Anticipated Discharge Disposition (PT): (P) home with home health

## 2025-01-30 ENCOUNTER — HOME CARE VISIT (OUTPATIENT)
Dept: HOME HEALTH SERVICES | Facility: HOME HEALTHCARE | Age: 85
End: 2025-01-30

## 2025-01-30 ENCOUNTER — READMISSION MANAGEMENT (OUTPATIENT)
Dept: CALL CENTER | Facility: HOSPITAL | Age: 85
End: 2025-01-30
Payer: MEDICARE

## 2025-01-30 NOTE — OUTREACH NOTE
CHF Week 1 Survey      Flowsheet Row Responses   [facility] patient discharged from? [facility]   Does the patient have one of the following disease processes/diagnoses(primary or secondary)? CHF   CHF Week 1 attempt successful? Yes   Call start time 1252   Call end time 1257   Discharge diagnosis A/C CHF (congestive heart failure)   Person spoke with today (if not patient) and relationship Anel, daughter   Meds reviewed with patient/caregiver? Yes   Is the patient having any side effects they believe may be caused by any medication additions or changes? No   Does the patient have all medications ordered at discharge? Yes   Is the patient taking all medications as directed (includes completed medication regime)? Yes   Does the patient have a primary care provider?  Yes   Does the patient have an appointment with their PCP within 7 days of discharge? Greater than 7 days   What is preventing the patient from scheduling follow up appointments within 7 days of discharge? Earlier appointment not available   Nursing Interventions Verified appointment date/time/provider   Has the patient kept scheduled appointments due by today? N/A   What is the Home health agency?  M[facility]ed [facility]   Has home health visited the patient within 72 hours of discharge? Yes   Pulse Ox monitoring None   Psychosocial issues? No   Comments discussed obtaining pulse ox device to closely monitor O2 status   Did the patient receive a copy of their discharge instructions? Yes   Nursing interventions Reviewed instructions with patient   What is the patient's perception of their health status since discharge? Improving   Nursing interventions Nurse provided patient education   Is the patient able to teach back signs and symptoms of worsening condition? (i.e. weight gain, shortness of air, etc.) Yes   Is the patient/caregiver able to teach back the hierarchy of who to call/visit for symptoms/problems? PCP, Specialist, Home health nurse, Urgent Care, ED, 911  Yes   Is the patient able to teach back Heart Failure Zones? Yes   CHF Zone this Call Green Zone   Green Zone Patient reports doing well, No new or worsening shortness of breath, Physical activity level is normal for you, No new swelling -  feet, ankles and legs look normal for you, Weight check stable, No chest pain   Green Zone Interventions Daily weight check, Low sodium diet, Follow up visits planned, Meds as directed    CHF Week 1 call completed? Yes   Call end time Suhail SILVERIO - Registered Nurse

## (undated) DEVICE — SOL IRRIG H2O 1000ML STRL

## (undated) DEVICE — Device

## (undated) DEVICE — BLD BEAVER MICROSHARP 15D 3MM BLU LF

## (undated) DEVICE — CLEARCUT® HP2 SLIT KNIFE INTREPID MICRO-COAXIAL SYSTEM 2.4 DB: Brand: CLEARCUT®; INTREPID

## (undated) DEVICE — HP CONCL INTREPID COAX I/A CRV .3MM

## (undated) DEVICE — CANN IRR/INJ AIR ANT CHAMBER 6MM BEND 27G

## (undated) DEVICE — EYE CARE POST OP KIT: Brand: MEDLINE INDUSTRIES, INC.

## (undated) DEVICE — THE MONARCH® "D" CARTRIDGE IS A SINGLE-USE POLYPROPYLENE CARTRIDGE FOR POSTERIOR CHAMBER IOL DELIVERY: Brand: MONARCH® III

## (undated) DEVICE — SYR LUERLOK 5CC

## (undated) DEVICE — GLV SURG SENSICARE PI LF PF 8 GRN STRL